# Patient Record
Sex: FEMALE | Race: BLACK OR AFRICAN AMERICAN | NOT HISPANIC OR LATINO | Employment: UNEMPLOYED | URBAN - METROPOLITAN AREA
[De-identification: names, ages, dates, MRNs, and addresses within clinical notes are randomized per-mention and may not be internally consistent; named-entity substitution may affect disease eponyms.]

---

## 2017-03-10 ENCOUNTER — OFFICE VISIT (OUTPATIENT)
Dept: FAMILY MEDICINE | Facility: CLINIC | Age: 67
End: 2017-03-10
Payer: MEDICARE

## 2017-03-10 VITALS
HEART RATE: 59 BPM | TEMPERATURE: 99 F | OXYGEN SATURATION: 97 % | SYSTOLIC BLOOD PRESSURE: 150 MMHG | WEIGHT: 232.81 LBS | BODY MASS INDEX: 39.75 KG/M2 | HEIGHT: 64 IN | DIASTOLIC BLOOD PRESSURE: 80 MMHG

## 2017-03-10 DIAGNOSIS — E11.69 DIABETES MELLITUS TYPE 2 IN OBESE: ICD-10-CM

## 2017-03-10 DIAGNOSIS — E66.9 DIABETES MELLITUS TYPE 2 IN OBESE: ICD-10-CM

## 2017-03-10 DIAGNOSIS — Z12.31 OTHER SCREENING MAMMOGRAM: ICD-10-CM

## 2017-03-10 DIAGNOSIS — J06.9 UPPER RESPIRATORY TRACT INFECTION, UNSPECIFIED TYPE: ICD-10-CM

## 2017-03-10 DIAGNOSIS — I10 ESSENTIAL HYPERTENSION: Primary | ICD-10-CM

## 2017-03-10 DIAGNOSIS — Z12.31 SCREENING MAMMOGRAM, ENCOUNTER FOR: ICD-10-CM

## 2017-03-10 PROCEDURE — 1160F RVW MEDS BY RX/DR IN RCRD: CPT | Mod: S$GLB,,, | Performed by: FAMILY MEDICINE

## 2017-03-10 PROCEDURE — 3046F HEMOGLOBIN A1C LEVEL >9.0%: CPT | Mod: S$GLB,,, | Performed by: FAMILY MEDICINE

## 2017-03-10 PROCEDURE — 3077F SYST BP >= 140 MM HG: CPT | Mod: S$GLB,,, | Performed by: FAMILY MEDICINE

## 2017-03-10 PROCEDURE — 3079F DIAST BP 80-89 MM HG: CPT | Mod: S$GLB,,, | Performed by: FAMILY MEDICINE

## 2017-03-10 PROCEDURE — 99203 OFFICE O/P NEW LOW 30 MIN: CPT | Mod: S$GLB,,, | Performed by: FAMILY MEDICINE

## 2017-03-10 PROCEDURE — 99999 PR PBB SHADOW E&M-NEW PATIENT-LVL III: CPT | Mod: PBBFAC,,, | Performed by: FAMILY MEDICINE

## 2017-03-10 PROCEDURE — 2022F DILAT RTA XM EVC RTNOPTHY: CPT | Mod: S$GLB,,, | Performed by: FAMILY MEDICINE

## 2017-03-10 PROCEDURE — 3060F POS MICROALBUMINURIA REV: CPT | Mod: S$GLB,,, | Performed by: FAMILY MEDICINE

## 2017-03-10 RX ORDER — TRIAMTERENE/HYDROCHLOROTHIAZID 37.5-25 MG
1 TABLET ORAL DAILY
COMMUNITY
End: 2017-03-10 | Stop reason: SDUPTHER

## 2017-03-10 RX ORDER — METFORMIN HYDROCHLORIDE 500 MG/1
500 TABLET ORAL 2 TIMES DAILY WITH MEALS
COMMUNITY
End: 2017-03-10 | Stop reason: SDUPTHER

## 2017-03-10 RX ORDER — METFORMIN HYDROCHLORIDE 500 MG/1
500 TABLET ORAL 2 TIMES DAILY WITH MEALS
Qty: 180 TABLET | Refills: 1 | Status: SHIPPED | OUTPATIENT
Start: 2017-03-10 | End: 2017-06-08 | Stop reason: SDUPTHER

## 2017-03-10 RX ORDER — ASPIRIN 81 MG/1
81 TABLET ORAL DAILY
COMMUNITY

## 2017-03-10 RX ORDER — TRIAMTERENE/HYDROCHLOROTHIAZID 37.5-25 MG
1 TABLET ORAL DAILY
Qty: 90 TABLET | Refills: 1 | Status: SHIPPED | OUTPATIENT
Start: 2017-03-10 | End: 2017-06-08 | Stop reason: SDUPTHER

## 2017-03-10 RX ORDER — PROMETHAZINE HYDROCHLORIDE AND DEXTROMETHORPHAN HYDROBROMIDE 6.25; 15 MG/5ML; MG/5ML
5 SYRUP ORAL
Qty: 150 ML | Refills: 0 | Status: SHIPPED | OUTPATIENT
Start: 2017-03-10 | End: 2017-03-20

## 2017-03-10 RX ORDER — AMLODIPINE BESYLATE 10 MG/1
10 TABLET ORAL DAILY
Qty: 90 TABLET | Refills: 1 | Status: SHIPPED | OUTPATIENT
Start: 2017-03-10 | End: 2017-06-08 | Stop reason: SDUPTHER

## 2017-03-10 NOTE — MR AVS SNAPSHOT
[facility]  [address]  [place] [address]  Phone: [phone]  Fax: [phone]                  Bette WANG Johnson   3/10/2017 7:45 AM   Office Visit    Description:  Female : 1950   Provider:  Neeta Estrada MD   Department:  [facility]           Reason for Visit     Establish Care     Diabetes                To Do List           Goals (5 Years of Data)     None       These Medications        Disp Refills Start End    amlodipine (NORVASC) 10 MG tablet 90 tablet 1 3/10/2017 3/10/2018    Take 1 tablet (10 mg total) by mouth once daily. - Oral    Pharmacy: [facility] - [place], [place] - [address] Ph #: [phone]       triamterene-hydrochlorothiazide 37.5-25 mg (MAXZIDE-25) 37.5-25 mg per tablet 90 tablet 1 3/10/2017     Take 1 tablet by mouth once daily. - Oral    Pharmacy: [facility] - [place], [place] - [address] Ph #: [phone]       metformin (GLUCOPHAGE) 500 MG tablet 180 tablet 1 3/10/2017     Take 1 tablet (500 mg total) by mouth 2 (two) times daily with meals. - Oral    Pharmacy: [facility] - [place], [place] - [address] Ph #: [phone]       promethazine-dextromethorphan (PROMETHAZINE-DM) 6.25-15 mg/5 mL Syrp 150 mL 0 3/10/2017 3/20/2017    Take 5 mLs by mouth every 4 to 6 hours as needed. - Oral    Pharmacy: [facility] - [place], [place] - [address] Ph #: [phone]         [facility] On Call     [facility] On Call Nurse [facility] -  Assistance  Registered nurses in the [facility] provide clinical advisement, health education, appointment booking, and other advisory services.  Call for this free service at [phone].             Medications           Message regarding Medications     Verify the changes and/or additions to your medication regime listed below are the same as discussed with your clinician today.  If any of these changes or additions are incorrect, please  "notify your healthcare provider.        START taking these NEW medications        Refills    amlodipine (NORVASC) 10 MG tablet 1    Sig: Take 1 tablet (10 mg total) by mouth once daily.    Class: Normal    Route: Oral    triamterene-hydrochlorothiazide 37.5-25 mg (MAXZIDE-25) 37.5-25 mg per tablet 1    Sig: Take 1 tablet by mouth once daily.    Class: Normal    Route: Oral    metformin (GLUCOPHAGE) 500 MG tablet 1    Sig: Take 1 tablet (500 mg total) by mouth 2 (two) times daily with meals.    Class: Normal    Route: Oral    promethazine-dextromethorphan (PROMETHAZINE-DM) 6.25-15 mg/5 mL Syrp 0    Sig: Take 5 mLs by mouth every 4 to 6 hours as needed.    Class: Print    Route: Oral           Verify that the below list of medications is an accurate representation of the medications you are currently taking.  If none reported, the list may be blank. If incorrect, please contact your healthcare provider. Carry this list with you in case of emergency.           Current Medications     amlodipine (NORVASC) 10 MG tablet Take 1 tablet (10 mg total) by mouth once daily.    aspirin (ECOTRIN) 81 MG EC tablet Take 81 mg by mouth once daily.    metformin (GLUCOPHAGE) 500 MG tablet Take 1 tablet (500 mg total) by mouth 2 (two) times daily with meals.    triamterene-hydrochlorothiazide 37.5-25 mg (MAXZIDE-25) 37.5-25 mg per tablet Take 1 tablet by mouth once daily.    promethazine-dextromethorphan (PROMETHAZINE-DM) 6.25-15 mg/5 mL Syrp Take 5 mLs by mouth every 4 to 6 hours as needed.           Clinical Reference Information           Your Vitals Were     BP Pulse Temp Height Weight SpO2    150/80 (BP Location: Right arm, Patient Position: Sitting, BP Method: Manual) 59 98.5 °F (36.9 °C) (Oral) 5' 4" (1.626 m) 105.6 kg (232 lb 12.9 oz) 97%    BMI                39.96 kg/m2          Blood Pressure          Most Recent Value    BP  (!)  150/80      Allergies as of 3/10/2017     Iodine And Iodide Containing Products    Caffeine    " Latex, Natural Rubber    Penicillins      Immunizations Administered on Date of Encounter - 3/10/2017     None      [facility] Sign-Up     Activating your [facility] account is as easy as 1-2-3!     1) Visit [email or web address], select Sign Up Now, enter this activation code and your date of birth, then select Next.  [email or web address]  Expires: 4/24/2017  8:26 AM      2) Create a username and password to use when you visit [facility] in the future and select a security question in case you lose your password and select Next.    3) Enter your e-mail address and click Sign Up!    Additional Information  If you have questions, please e-mail [email or web address] or call [phone] to talk to our [facility] staff. Remember, [facility] is NOT to be used for urgent needs. For medical emergencies, dial 911.         Instructions      Adult Self-Care for Colds  Colds are caused by viruses. They cant be cured with antibiotics. However, you can relieve symptoms and support your bodys efforts to heal itself. No matter which symptoms you have, be sure to drink plenty of fluids (water or clear soup); stop smoking and drinking alcohol; and get plenty of rest.   Understand a fever  · Take your temperature several times a day. If your fever is 100.4°F (38.0°C) for more than a day, call your doctor.  · Relax, lie down. Go to bed if you want. Just get off your feet and rest. Also, drink plenty of fluids to avoid dehydration.  · Take acetaminophen or a nonsteroidal anti-inflammatory agent (NSAID), such as ibuprofen.  Treat a troubled nose kindly  · Breathe steam or heated humidified air to open blocked nasal passages.  a hot shower or use a vaporizer. Be careful not to get burned by the steam.  · Saline nasal sprays and decongestant tablets help open a stuffy nose. Antihistamines can also help, but they can cause side effects such as drowsiness and drying of the eyes, nose, and mouth.  Soothe a sore throat and cough  · Gargle  every 2 hours with 1/4 teaspoon of salt dissolved in 1/2 cup of warm water. Suck on throat lozenges and cough drops to moisten your throat.  · Cough medicines are available but it is unclear how effective they actually are.  · Take acetaminophen or an NSAID, such as ibuprofen to ease throat pain  Ease digestive problems  · Put fluid back into your body. Take frequent sips of clear liquids such as water or broth. Do not drink beverages with a lot of sugar in them, such as juices and sodas. These can make diarrhea worse. Older children and adults can drink sports drinks.  · As your appetite returns, you can resume your normal diet. Ask your doctor whether there are any foods you should avoid.     When to seek medical care  When you first notice symptoms, ask your health care provider if antiviral medications are appropriate. Antibiotics should not be taken for colds or flu. Also, call your doctor if you have any of the following symptoms or if you arent feeling better after 7 days:  · Shortness of breath  · Pain or pressure in the chest or abdomen  · Worsening symptoms, especially after a period of improvement  · Fever of 100.4°F  (38.0°C) or higher, or fever that doesnt go down with medication  · Sudden dizziness or confusion  · Severe or continued vomiting  · Signs of dehydration, including extreme thirst, dark urine, infrequent urination, dry mouth  · Spotted, red, or very sore throat   Date Last Reviewed: 6/19/2014  © 8103-5731 [employer]. [address] [place] [address], [place] [address]. All rights reserved. This information is not intended as a substitute for professional medical care. Always follow your healthcare professional's instructions.             Language Assistance Services     ATTENTION: Language assistance services are available, free of charge. Please call [phone].      ATENCIÓN: Si habla español, tiene a lazaro disposición servicios gratuitos de asistencia lingüística. Lledgardo al  [phone].     BARON Ý: N?u b?n nói Ti?ng Vi?t, có các d?ch v? h? tr? ngôn ng? mi?n phí dành cho b?n. G?i s? [phone].         [facility] complies with applicable Federal civil rights laws and does not discriminate on the basis of race, color, national origin, age, disability, or sex.

## 2017-03-10 NOTE — PATIENT INSTRUCTIONS
Adult Self-Care for Colds  Colds are caused by viruses. They cant be cured with antibiotics. However, you can relieve symptoms and support your bodys efforts to heal itself. No matter which symptoms you have, be sure to drink plenty of fluids (water or clear soup); stop smoking and drinking alcohol; and get plenty of rest.   Understand a fever  · Take your temperature several times a day. If your fever is 100.4°F (38.0°C) for more than a day, call your doctor.  · Relax, lie down. Go to bed if you want. Just get off your feet and rest. Also, drink plenty of fluids to avoid dehydration.  · Take acetaminophen or a nonsteroidal anti-inflammatory agent (NSAID), such as ibuprofen.  Treat a troubled nose kindly  · Breathe steam or heated humidified air to open blocked nasal passages.  a hot shower or use a vaporizer. Be careful not to get burned by the steam.  · Saline nasal sprays and decongestant tablets help open a stuffy nose. Antihistamines can also help, but they can cause side effects such as drowsiness and drying of the eyes, nose, and mouth.  Soothe a sore throat and cough  · Gargle every 2 hours with 1/4 teaspoon of salt dissolved in 1/2 cup of warm water. Suck on throat lozenges and cough drops to moisten your throat.  · Cough medicines are available but it is unclear how effective they actually are.  · Take acetaminophen or an NSAID, such as ibuprofen to ease throat pain  Ease digestive problems  · Put fluid back into your body. Take frequent sips of clear liquids such as water or broth. Do not drink beverages with a lot of sugar in them, such as juices and sodas. These can make diarrhea worse. Older children and adults can drink sports drinks.  · As your appetite returns, you can resume your normal diet. Ask your doctor whether there are any foods you should avoid.     When to seek medical care  When you first notice symptoms, ask your health care provider if antiviral medications are  appropriate. Antibiotics should not be taken for colds or flu. Also, call your doctor if you have any of the following symptoms or if you arent feeling better after 7 days:  · Shortness of breath  · Pain or pressure in the chest or abdomen  · Worsening symptoms, especially after a period of improvement  · Fever of 100.4°F  (38.0°C) or higher, or fever that doesnt go down with medication  · Sudden dizziness or confusion  · Severe or continued vomiting  · Signs of dehydration, including extreme thirst, dark urine, infrequent urination, dry mouth  · Spotted, red, or very sore throat   Date Last Reviewed: 6/19/2014  © 6723-3059 [employer]. [address] [place] [address], [place] [address]. All rights reserved. This information is not intended as a substitute for professional medical care. Always follow your healthcare professional's instructions.

## 2017-03-10 NOTE — PROGRESS NOTES
"Routine Office Visit    Patient Name: Bette Johnson    : 1950  MRN: [id number]    Subjective:  Bette WANG is a 67 y.o. female who presents today for     1. Establish care / new to me / was previously establish at [facility]  2. Cough and congestion - started 5 days ago - pt c/o nasal congestion, cough and congestion. She has tried otc medications with minimal to no relief of symptoms. No fever.     Review of Systems   Constitutional: Positive for chills. Negative for fever.   HENT: Positive for congestion.    Eyes: Negative for blurred vision.   Respiratory: Positive for cough. Negative for sputum production, shortness of breath and wheezing.    Cardiovascular: Negative for chest pain.   Gastrointestinal: Negative for abdominal pain, constipation, diarrhea, heartburn, nausea and vomiting.   Genitourinary: Negative for dysuria.   Musculoskeletal: Negative for myalgias.   Skin: Negative for itching and rash.   Neurological: Negative for dizziness and headaches.   Psychiatric/Behavioral: Negative for depression.       Active Problem List  There is no problem list on file for this patient.      Past Surgical History  Past Surgical History:   Procedure Laterality Date    CHOLECYSTECTOMY         Family History  History reviewed. No pertinent family history.    Social History  Social History     Social History    Marital status:      Spouse name: N/A    Number of children: N/A    Years of education: N/A     Occupational History    Not on file.     Social History Main Topics    Smoking status: Never Smoker    Smokeless tobacco: Not on file    Alcohol use No    Drug use: Not on file    Sexual activity: Not on file     Other Topics Concern    Not on file     Social History Narrative       Medications and Allergies  Reviewed and updated.       Physical Exam  BP (!) 150/80 (BP Location: Right arm, Patient Position: Sitting, BP Method: Manual)  Pulse (!) 59  Temp 98.5 °F (36.9 °C) (Oral)   Ht 5' 4" " (1.626 m)  Wt 105.6 kg (232 lb 12.9 oz)  SpO2 97%  BMI 39.96 kg/m2  Physical Exam   Constitutional: She is oriented to person, place, and time. She appears well-developed and well-nourished.   HENT:   Head: Normocephalic and atraumatic.   Right Ear: Tympanic membrane and external ear normal.   Left Ear: Tympanic membrane and external ear normal.   Nose: Mucosal edema and rhinorrhea present.   Mouth/Throat: Oropharynx is clear and moist and mucous membranes are normal.   Eyes: Conjunctivae and EOM are normal. Pupils are equal, round, and reactive to light.   Neck: Normal range of motion. Neck supple.   Cardiovascular: Normal rate, regular rhythm and normal heart sounds.  Exam reveals no gallop and no friction rub.    No murmur heard.  Pulmonary/Chest: Effort normal and breath sounds normal. No respiratory distress. She has no wheezes.   Abdominal: Soft. Bowel sounds are normal. She exhibits no distension. There is no tenderness.   Musculoskeletal: Normal range of motion.   Lymphadenopathy:     She has no cervical adenopathy.   Neurological: She is alert and oriented to person, place, and time.   Skin: Skin is warm and dry.   Psychiatric: She has a normal mood and affect. Her behavior is normal.         Assessment/Plan:  Bette Johnson is a 67 y.o. female who presents today for :    Essential hypertension  -     amlodipine (NORVASC) 10 MG tablet; Take 1 tablet (10 mg total) by mouth once daily.  Dispense: 90 tablet; Refill: 1  -     triamterene-hydrochlorothiazide 37.5-25 mg (MAXZIDE-25) 37.5-25 mg per tablet; Take 1 tablet by mouth once daily.  Dispense: 90 tablet; Refill: 1  The current medical regimen is effective;  continue present plan and medications.    Diabetes mellitus type 2 in obese  -     metformin (GLUCOPHAGE) 500 MG tablet; Take 1 tablet (500 mg total) by mouth 2 (two) times daily with meals.  Dispense: 180 tablet; Refill: 1  The current medical regimen is effective;  continue present plan and  medications.    Upper respiratory tract infection, unspecified type  -     promethazine-dextromethorphan (PROMETHAZINE-DM) 6.25-15 mg/5 mL Syrp; Take 5 mLs by mouth every 4 to 6 hours as needed.  Dispense: 150 mL; Refill: 0  - Antibiotics are not needed at this time  - Usual course of cold symptom is 10-14 days, but longer if patient is a smoker.   - If symptoms have not improved, will consider an antibiotic.   - Symptomatic treatment with over the counter Tylenol / Ibuprofen  - Use salt water gargle for sore throat  - Drink plenty of fluids      Return in about 3 months (around 6/10/2017), or if symptoms worsen or fail to improve.

## 2017-03-30 ENCOUNTER — TELEPHONE (OUTPATIENT)
Dept: FAMILY MEDICINE | Facility: CLINIC | Age: 67
End: 2017-03-30

## 2017-03-30 RX ORDER — METFORMIN HYDROCHLORIDE 500 MG/1
500 TABLET ORAL
COMMUNITY
End: 2017-06-08 | Stop reason: SDUPTHER

## 2017-03-30 NOTE — TELEPHONE ENCOUNTER
Spoke with patient and she states that she has been taking metformin once daily for years and now the prescription states to take BID. She is concerned because there was no blood work done and she wanted to know why this has changed or should she stay on her once a day regimen. Please advise

## 2017-03-30 NOTE — TELEPHONE ENCOUNTER
----- Message from Ladonna Miller sent at 3/30/2017  8:25 AM CDT -----  Contact: self  Pt calling to discuss recent script. Please call [phone].

## 2017-03-30 NOTE — TELEPHONE ENCOUNTER
The refill request was sent to me without directions of how you usually take it, so I put down the usual dose of the medication. I did not have record of your original prescription.

## 2017-06-08 ENCOUNTER — LAB VISIT (OUTPATIENT)
Dept: LAB | Facility: HOSPITAL | Age: 67
End: 2017-06-08
Attending: FAMILY MEDICINE
Payer: MEDICARE

## 2017-06-08 ENCOUNTER — OFFICE VISIT (OUTPATIENT)
Dept: FAMILY MEDICINE | Facility: CLINIC | Age: 67
End: 2017-06-08
Payer: MEDICARE

## 2017-06-08 VITALS
OXYGEN SATURATION: 97 % | TEMPERATURE: 97 F | DIASTOLIC BLOOD PRESSURE: 80 MMHG | BODY MASS INDEX: 39.4 KG/M2 | SYSTOLIC BLOOD PRESSURE: 150 MMHG | WEIGHT: 230.81 LBS | HEIGHT: 64 IN | HEART RATE: 48 BPM

## 2017-06-08 DIAGNOSIS — E66.9 DIABETES MELLITUS TYPE 2 IN OBESE: ICD-10-CM

## 2017-06-08 DIAGNOSIS — I10 ESSENTIAL HYPERTENSION: ICD-10-CM

## 2017-06-08 DIAGNOSIS — E11.69 DIABETES MELLITUS TYPE 2 IN OBESE: ICD-10-CM

## 2017-06-08 DIAGNOSIS — Z12.11 SCREENING FOR MALIGNANT NEOPLASM OF COLON: ICD-10-CM

## 2017-06-08 DIAGNOSIS — E11.9 TYPE 2 DIABETES MELLITUS WITHOUT COMPLICATION: ICD-10-CM

## 2017-06-08 DIAGNOSIS — Z00.00 ANNUAL PHYSICAL EXAM: Primary | ICD-10-CM

## 2017-06-08 DIAGNOSIS — Z00.00 ANNUAL PHYSICAL EXAM: ICD-10-CM

## 2017-06-08 LAB
ALBUMIN SERPL BCP-MCNC: 4 G/DL
ALP SERPL-CCNC: 76 U/L
ALT SERPL W/O P-5'-P-CCNC: 14 U/L
ANION GAP SERPL CALC-SCNC: 7 MMOL/L
AST SERPL-CCNC: 20 U/L
BASOPHILS # BLD AUTO: 0.04 K/UL
BASOPHILS NFR BLD: 0.8 %
BILIRUB SERPL-MCNC: 0.4 MG/DL
BUN SERPL-MCNC: 15 MG/DL
CALCIUM SERPL-MCNC: 9.3 MG/DL
CHLORIDE SERPL-SCNC: 99 MMOL/L
CHOLEST/HDLC SERPL: 3.5 {RATIO}
CO2 SERPL-SCNC: 29 MMOL/L
CREAT SERPL-MCNC: 1 MG/DL
DIFFERENTIAL METHOD: ABNORMAL
EOSINOPHIL # BLD AUTO: 0.2 K/UL
EOSINOPHIL NFR BLD: 3.5 %
ERYTHROCYTE [DISTWIDTH] IN BLOOD BY AUTOMATED COUNT: 14.2 %
EST. GFR  (AFRICAN AMERICAN): >60 ML/MIN/1.73 M^2
EST. GFR  (NON AFRICAN AMERICAN): 59.3 ML/MIN/1.73 M^2
GLUCOSE SERPL-MCNC: 119 MG/DL
HCT VFR BLD AUTO: 42.3 %
HDL/CHOLESTEROL RATIO: 28.5 %
HDLC SERPL-MCNC: 179 MG/DL
HDLC SERPL-MCNC: 51 MG/DL
HGB BLD-MCNC: 13.8 G/DL
LDLC SERPL CALC-MCNC: 105 MG/DL
LYMPHOCYTES # BLD AUTO: 2.4 K/UL
LYMPHOCYTES NFR BLD: 49.7 %
MCH RBC QN AUTO: 27.9 PG
MCHC RBC AUTO-ENTMCNC: 32.6 %
MCV RBC AUTO: 86 FL
MONOCYTES # BLD AUTO: 0.3 K/UL
MONOCYTES NFR BLD: 6.4 %
NEUTROPHILS # BLD AUTO: 1.9 K/UL
NEUTROPHILS NFR BLD: 39.6 %
NONHDLC SERPL-MCNC: 128 MG/DL
PLATELET # BLD AUTO: 305 K/UL
PMV BLD AUTO: 10.6 FL
POTASSIUM SERPL-SCNC: 3.9 MMOL/L
PROT SERPL-MCNC: 8.4 G/DL
RBC # BLD AUTO: 4.95 M/UL
SODIUM SERPL-SCNC: 135 MMOL/L
TRIGL SERPL-MCNC: 115 MG/DL
TSH SERPL DL<=0.005 MIU/L-ACNC: 2.38 UIU/ML
WBC # BLD AUTO: 4.81 K/UL

## 2017-06-08 PROCEDURE — 36415 COLL VENOUS BLD VENIPUNCTURE: CPT | Mod: PO

## 2017-06-08 PROCEDURE — 99999 PR PBB SHADOW E&M-EST. PATIENT-LVL V: CPT | Mod: PBBFAC,,, | Performed by: FAMILY MEDICINE

## 2017-06-08 PROCEDURE — 99397 PER PM REEVAL EST PAT 65+ YR: CPT | Mod: S$GLB,,, | Performed by: FAMILY MEDICINE

## 2017-06-08 PROCEDURE — 84443 ASSAY THYROID STIM HORMONE: CPT

## 2017-06-08 PROCEDURE — 99499 UNLISTED E&M SERVICE: CPT | Mod: S$GLB,,, | Performed by: FAMILY MEDICINE

## 2017-06-08 PROCEDURE — 85025 COMPLETE CBC W/AUTO DIFF WBC: CPT

## 2017-06-08 PROCEDURE — 80053 COMPREHEN METABOLIC PANEL: CPT

## 2017-06-08 PROCEDURE — 80061 LIPID PANEL: CPT

## 2017-06-08 PROCEDURE — 83036 HEMOGLOBIN GLYCOSYLATED A1C: CPT

## 2017-06-08 RX ORDER — METFORMIN HYDROCHLORIDE 500 MG/1
500 TABLET ORAL
Qty: 90 TABLET | Refills: 1 | Status: SHIPPED | OUTPATIENT
Start: 2017-06-08 | End: 2017-12-11 | Stop reason: SDUPTHER

## 2017-06-08 RX ORDER — AMLODIPINE BESYLATE 10 MG/1
10 TABLET ORAL DAILY
Qty: 90 TABLET | Refills: 1 | Status: SHIPPED | OUTPATIENT
Start: 2017-06-08 | End: 2017-12-11 | Stop reason: SDUPTHER

## 2017-06-08 RX ORDER — TRIAMTERENE/HYDROCHLOROTHIAZID 37.5-25 MG
1 TABLET ORAL DAILY
Qty: 90 TABLET | Refills: 1 | Status: SHIPPED | OUTPATIENT
Start: 2017-06-08 | End: 2017-12-11 | Stop reason: SDUPTHER
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
## 2017-06-08 NOTE — PATIENT INSTRUCTIONS
Managing Diabetes: The A1C Test       Healthy red blood cells have some glucose stuck to them. A high A1C means that unhealthy amounts of glucose are stuck to the cells.   What is the A1C test?  Using your meter helps you track your blood sugar every day. But your glucose meter tells you the value at the time of testing only. You also need to know if your treatment plan is keeping you healthy over time. The hemoglobin A1C (or glycated hemoglobin) test can help. This test measures your average blood sugar level over a few months. A higher A1C result means that you have a higher risk of developing complications.  The A1C test  The A1C is a blood test done by your healthcare provider. You will likely have an A1C test every 3 to 6 months.  Your blood glucose goal  A1C has been shown as a percentage. But it can also be shown as a number representing the estimated Average Glucose (eAG). Unlike the A1C percentage, eAG is a number similar to the numbers listed on your daily glucose monitor. Both A1C and eAG measure the amount of glucose stuck to a protein called hemoglobin in red blood cells. Your healthcare provider will help you figure out what your ideal A1C or eAG should be. Your target number will depend on your age, general health, and other factors. If your current number is too high, your treatment plan may need changes, such as different medicines.  Sample results  Most people aim for an A1c lower than 7%. Thats an eAG less than 154 mg/dL. Or, your healthcare provider may want you to aim for an A1C of 6%. Thats an eAG of 126 mg/dL.     Glucose calculator  Visit http://professional.diabetes.org/diapro/glucose_calc for a chart that helps convert your A1C percentages into eAG numbers.   Date Last Reviewed: 6/1/2016  © 3618-3841 [employer]. [address] [place] [address], [place] [address]. All rights reserved. This information is not intended as a substitute for professional medical care. Always follow your  healthcare professional's instructions.

## 2017-06-09 LAB
ESTIMATED AVG GLUCOSE: 151 MG/DL
HBA1C MFR BLD HPLC: 6.9 %

## 2017-06-16 ENCOUNTER — TELEPHONE (OUTPATIENT)
Dept: FAMILY MEDICINE | Facility: CLINIC | Age: 67
End: 2017-06-16

## 2017-06-16 NOTE — TELEPHONE ENCOUNTER
----- Message from Jeanette Barth sent at 6/15/2017  4:27 PM CDT -----  REFERRAL: Pt scheduled to see Dr. Mcginnis on 6/19/17.

## 2017-06-20 ENCOUNTER — TELEPHONE (OUTPATIENT)
Dept: FAMILY MEDICINE | Facility: CLINIC | Age: 67
End: 2017-06-20

## 2017-06-20 NOTE — TELEPHONE ENCOUNTER
----- Message from Anju Adame sent at 6/19/2017  1:39 PM CDT -----  Contact: self  Patient called stating that she seen eye Dr on today and results will be sent. Please contact patient at [phone].    Thanks!

## 2017-07-12 ENCOUNTER — OFFICE VISIT (OUTPATIENT)
Dept: FAMILY MEDICINE | Facility: CLINIC | Age: 67
End: 2017-07-12
Payer: MEDICARE

## 2017-07-12 VITALS
WEIGHT: 234 LBS | DIASTOLIC BLOOD PRESSURE: 70 MMHG | SYSTOLIC BLOOD PRESSURE: 140 MMHG | TEMPERATURE: 97 F | BODY MASS INDEX: 39.95 KG/M2 | HEIGHT: 64 IN | HEART RATE: 52 BPM

## 2017-07-12 DIAGNOSIS — I10 BENIGN ESSENTIAL HTN: ICD-10-CM

## 2017-07-12 DIAGNOSIS — E11.9 TYPE 2 DIABETES MELLITUS WITHOUT COMPLICATION, WITHOUT LONG-TERM CURRENT USE OF INSULIN: ICD-10-CM

## 2017-07-12 DIAGNOSIS — E66.01 MORBID OBESITY WITH BMI OF 40.0-44.9, ADULT: ICD-10-CM

## 2017-07-12 DIAGNOSIS — I70.0 THORACIC AORTA ATHEROSCLEROSIS: ICD-10-CM

## 2017-07-12 DIAGNOSIS — Z00.00 ENCOUNTER FOR PREVENTIVE HEALTH EXAMINATION: Primary | ICD-10-CM

## 2017-07-12 PROCEDURE — G0439 PPPS, SUBSEQ VISIT: HCPCS | Mod: S$GLB,,, | Performed by: NURSE PRACTITIONER

## 2017-07-12 PROCEDURE — 99499 UNLISTED E&M SERVICE: CPT | Mod: S$GLB,,, | Performed by: NURSE PRACTITIONER

## 2017-07-12 PROCEDURE — 99999 PR PBB SHADOW E&M-EST. PATIENT-LVL IV: CPT | Mod: PBBFAC,,, | Performed by: NURSE PRACTITIONER

## 2017-07-12 RX ORDER — LORATADINE 10 MG/1
10 TABLET ORAL DAILY
COMMUNITY

## 2017-07-12 NOTE — PROGRESS NOTES
"Bette Johnson presented for a  Medicare AWV and comprehensive Health Risk Assessment today. The following components were reviewed and updated:    · Medical history  · Family History  · Social history  · Allergies and Current Medications  · Health Risk Assessment  · Health Maintenance  · Care Team     ** See Completed Assessments for Annual Wellness Visit within the encounter summary.**       The following assessments were completed:  · Living Situation  · CAGE  · Depression Screening  · Timed Get Up and Go  · Whisper Test  · Cognitive Function Screening  · Nutrition Screening  · ADL Screening  · PAQ Screening    Vitals:    07/12/17 1003   BP: (!) 140/70   Pulse: (!) 52   Temp: 97.4 °F (36.3 °C)   TempSrc: Oral   Weight: 106.1 kg (234 lb 0.3 oz)   Height: 5' 4" (1.626 m)     Body mass index is 40.17 kg/m².  Physical Exam   Constitutional: She is oriented to person, place, and time.   Cardiovascular: Normal rate, regular rhythm and normal heart sounds.    Pulses:       Dorsalis pedis pulses are 2+ on the right side, and 2+ on the left side.   Pulmonary/Chest: Effort normal and breath sounds normal.   Musculoskeletal: Normal range of motion. Edema: bilateral trace pretibial edema         Right foot: There is normal range of motion and no deformity.        Left foot: There is normal range of motion and no deformity.   Feet:   Right Foot:   Skin Integrity: Negative for ulcer, blister, skin breakdown, erythema, warmth, callus or dry skin.   Left Foot:   Skin Integrity: Negative for ulcer, blister, skin breakdown, erythema, warmth, callus or dry skin.   Neurological: She is alert and oriented to person, place, and time.   Skin: Skin is warm and dry.   Psychiatric: She has a normal mood and affect. Her behavior is normal. Thought content normal.   Vitals reviewed.        Diagnoses and health risks identified today and associated recommendations/orders:    1. Encounter for preventive health examination  Education " provided about preventive health examinations and procedures; addressed and discussed patient's health concerns. Additionally, reviewed medical record for risk factors and documented the results during this encounter.    2. Type 2 diabetes mellitus without complication, without long-term current use of insulin  Education provided about diabetes, management of blood glucose with diet and activities, monitoring for worsening effects of diabetes.  Reviewed most recent Ha1c (6.9- June 2017), complications associated with uncontrolled diabetes.   Patient requesting referral to podiatry for thorough evaluation of feet.  We discussed diabetic shoes, inserts, diabetic complications.- Ambulatory referral to Podiatry    3. Morbid obesity with BMI of 40.0-44.9, adult  Reminded patient of [employer]'s Silver Sneakers benefits with access to fitness centers and classes.   We discussed diet and exercise for weight loss.  Educated about diet, activities, and ways to avoid sedentary lifestyle.     4. Thoracic aorta atherosclerosis  Stable, asymptomatic on ASA and antihypertensive medication; monitor    5. Benign essential HTN  Discussed blood pressure, dietary options, activities; encouraged participation in aquatics for weight management.     Reviewed health maintenance with patient, educated about recommended examinations, procedures (labs & images), and immunizations.     Provided Bette with a 5-10 year written screening schedule and personal prevention plan. Recommendations were developed using the USPSTF age appropriate recommendations. Education, counseling, and referrals were provided as needed. After Visit Summary printed and given to patient which includes a list of additional screenings\tests needed.    No Follow-up on file.    Manas Vann Jr, NP

## 2017-07-12 NOTE — PATIENT INSTRUCTIONS
Counseling and Referral of Other Preventative  (Italic type indicates deductible and co-insurance are waived)    Patient Name: Bette Johnson  Today's Date: 7/12/2017      SERVICE LIMITATIONS RECOMMENDATION    Vaccines    · Pneumococcal (once after 65)    · Influenza (annually)    · Hepatitis B (if medium/high risk)    · Prevnar 13      Hepatitis B medium/high risk factors:       - End-stage renal disease       - Hemophiliacs who received Factor VII or         IX concentrates       - Clients of institutions for the mentally             retarded       - Persons who live in the same house as          a HepB carrier       - Homosexual men       - Illicit injectable drug abusers     Pneumococcal: discussed with patient     Influenza: discussed with patient     Hepatitis B: N/A     Prevnar 13:  discussed with patient      Mammogram (biennial age 50-74)  Annually (age 40 or over)  Done this year, repeat every year    Pap (up to age 70 and after 70 if unknown history or abnormal study last 10 years)          discussed with patient      Colorectal cancer screening (to age 75)    · Fecal occult blood test (annual)  · Flexible sigmoidoscopy (5y)  · Screening colonoscopy (10y)  · Barium enema   scheduled by provider June 2017    Diabetes self-management training (no USPSTF recommendations)  Requires referral by treating physician for patient with diabetes or renal disease. 10 hours of initial DSMT sessions of no less than 30 minutes each in a continuous 12-month period. 2 hours of follow-up DSMT in subsequent years.  Requires referral by treating physician for patient with diabetes    Bone mass measurements (age 65 & older, biennial)  Requires diagnosis related to osteoporosis or estrogen deficiency. Biennial benefit unless patient has history of long-term glucocorticoid  Recommended to patient    Glaucoma screening (no USPSTF recommendation)  Diabetes mellitus, family history   , age 50 or over     American, age 65 or over  Recommended to patient    Medical nutrition therapy for diabetes or renal disease (no recommended schedule)  Requires referral by treating physician for patient with diabetes or renal disease or kidney transplant within the past 3 years.  Can be provided in same year as diabetes self-management training (DSMT), and CMS recommends medical nutrition therapy take place after DSMT. Up to 3 hours for initial year and 2 hours in subsequent years.  Requires referral by treating physician for patient with diabetes    Cardiovascular screening blood tests (every 5 years)  · Fasting lipid panel  Order as a panel if possible  Done this year, repeat every year    Diabetes screening tests (at least every 3 years, Medicare covers annually or at 6-month intervals for prediabetic patients)  · Fasting blood sugar (FBS) or glucose tolerance test (GTT)  Patient must be diagnosed with one of the following:       - Hypertension       - Dyslipidemia       - Obesity (BMI 30kg/m2)       - Previous elevated impaired FBS or GTT       ... or any two of the following:       - Overweight (BMI 25 but <30)       - Family history of diabetes       - Age 65 or older       - History of gestational diabetes or birth of baby weighing more than 9 pounds  Done this year, repeat every 6 months     Abdominal aortic aneurysm screening (once)  · Sonogram   Limited to patients who meet one of the following criteria:       - Men who are 65-75 years old and have smoked more than 100 cigarette in their lifetime       - Anyone with a family history of abdominal aortic aneurysm       - Anyone recommended for screening by the USPSTF  no clinical risk factors      HIV screening (annually for increased risk patients)  · HIV-1 and HIV-2 by EIA, or KARL, rapid antibody test or oral mucosa transudate  Patients must be at increased risk for HIV infection per USPSTF guidelines or pregnant. Tests covered annually for patient at increased risk or  as requested by the patient. Pregnant patients may receive up to 3 tests during pregnancy.  no clinical risk factors      Smoking cessation counseling (up to 8 sessions per year)  Patients must be asymptomatic of tobacco-related conditions to receive as a preventative service.  Non-smoker    Subsequent annual wellness visit  At least 12 months since last AWV  Return in one year     The following information is provided to all patients.  This information is to help you find resources for any of the problems found today that may be affecting your health:                Living healthy guide: www.[facility].[place].[facility]      Understanding Diabetes: www.diabetes.org      Eating healthy: www.cdc.gov/healthyweight      [facility] home safety checklist: www.cdc.gov/steadi/patient.html      Agency on Aging: www.goea.[place].[facility]      Alcoholics anonymous (AA): www.aa.org      Physical Activity: www.allie.nih.gov/ai4mgiz      Tobacco use: www.quitwithusla.org

## 2017-07-21 ENCOUNTER — OFFICE VISIT (OUTPATIENT)
Dept: PODIATRY | Facility: CLINIC | Age: 67
End: 2017-07-21
Payer: MEDICARE

## 2017-07-21 VITALS
SYSTOLIC BLOOD PRESSURE: 118 MMHG | DIASTOLIC BLOOD PRESSURE: 60 MMHG | BODY MASS INDEX: 39.95 KG/M2 | HEIGHT: 64 IN | WEIGHT: 234 LBS

## 2017-07-21 DIAGNOSIS — M79.671 FOOT PAIN, RIGHT: ICD-10-CM

## 2017-07-21 DIAGNOSIS — B35.1 ONYCHOMYCOSIS DUE TO DERMATOPHYTE: ICD-10-CM

## 2017-07-21 DIAGNOSIS — L60.0 INGROWN TOENAIL WITHOUT INFECTION: ICD-10-CM

## 2017-07-21 DIAGNOSIS — M19.071 ARTHRITIS OF RIGHT FOOT: ICD-10-CM

## 2017-07-21 DIAGNOSIS — M19.072 ARTHRITIS OF LEFT FOOT: ICD-10-CM

## 2017-07-21 DIAGNOSIS — E11.49 TYPE II DIABETES MELLITUS WITH NEUROLOGICAL MANIFESTATIONS: ICD-10-CM

## 2017-07-21 DIAGNOSIS — M79.674 PAIN AROUND TOENAIL, RIGHT FOOT: ICD-10-CM

## 2017-07-21 DIAGNOSIS — R60.0 BILATERAL LOWER EXTREMITY EDEMA: Primary | ICD-10-CM

## 2017-07-21 DIAGNOSIS — E11.9 COMPREHENSIVE DIABETIC FOOT EXAMINATION, TYPE 2 DM, ENCOUNTER FOR: ICD-10-CM

## 2017-07-21 PROCEDURE — 3044F HG A1C LEVEL LT 7.0%: CPT | Mod: S$GLB,,, | Performed by: PODIATRIST

## 2017-07-21 PROCEDURE — 99999 PR PBB SHADOW E&M-EST. PATIENT-LVL III: CPT | Mod: PBBFAC,,, | Performed by: PODIATRIST

## 2017-07-21 PROCEDURE — 11720 DEBRIDE NAIL 1-5: CPT | Mod: Q9,S$GLB,, | Performed by: PODIATRIST

## 2017-07-21 PROCEDURE — 99499 UNLISTED E&M SERVICE: CPT | Mod: S$GLB,,, | Performed by: PODIATRIST

## 2017-07-21 PROCEDURE — 99203 OFFICE O/P NEW LOW 30 MIN: CPT | Mod: 25,S$GLB,, | Performed by: PODIATRIST

## 2017-07-21 NOTE — PATIENT INSTRUCTIONS
Recommend lotions: eucerin, aquaphor, A&D ointment, gold bond for diabetics        Shoe recommendations: (try 6pm.com, zappos.com , nordstromrack.com, or shoes.com for discounted prices) you can visit DSW shoes in [place] as well    Asics (GT 1000 or gel foundations), new balance, saucony (stabil c3),  Dumont (transcend), vionic, propet (tennis shoe)    soft brand, clarks, crocs, aerosoles, naturalizers, SAS, ecco, altaf, yogesh banegas, rockports (dress shoes)    Vionic, volitiles, burkenstocks, fitflops, propet (sandals)    Nike comfort thong sandals, crocs (house shoes)      Nail Home remedy:  Vicks Vapor rub to cuticles  Listerine and apple cider vinegar in a spray bottle to nails          Diabetes: Inspecting Your Feet    Diabetes increases your chances of developing foot problems. So inspect your feet every day. This helps you find small skin irritations before they become serious ulcers or infections. If you have trouble seeing the bottoms of your feet, use a mirror or ask a family member or friend to help.  How to check your feet  Below are tips to help you look for foot problems. Try to check your feet at the same time each day, such as when you get out of bed in the morning:  · Check the top of each foot. The tops of toes, back of the heel, and outer edge of the foot can get a lot of rubbing from poor-fitting shoes.  · Check the bottom of each foot. Daily wear and tear often leads to problems at pressure spots.  · Check the toes and nails. Fungal infections often occur between toes. Toenail problems can also be a sign of fungal infections or lead to breaks in the skin.  · Check your shoes, too. Loose objects inside a shoe can injure the foot. Use your hand to feel inside your shoes for things like joni, loose stitching, or rough areas that could irritate your skin.  Warning signs  Look for any color changes in the foot. Redness with streaks can signal a severe infection, which needs immediate medical  attention. Tell your healthcare provider right away if you have any of these problems:  · Swelling, sometimes with color changes, may be a sign of poor blood flow or infection. Symptoms include tenderness and an increase in the size of your foot.  · Warm or hot areas on your feet may be signs of infection. A foot that is cold may not be getting enough blood.  · Sensations such as burning, tingling, or pins and needles can be signs of a problem. Also check for areas that may be numb.  · Hot spots are caused by friction or pressure. Look for hot spots in areas that get a lot of rubbing. Hot spots can turn into blisters, calluses, or sores.  · Cracks and sores are caused by dry or irritated skin. They are a sign that the skin is breaking down, which can lead to infection.  · Toenail problems to watch for include nails growing into the skin (ingrown toenail) and causing redness or pain. Thick, yellow, or discolored nails can signal a fungal infection.  · Drainage and odor can develop from untreated sores and ulcers. Call your healthcare provider right away if you notice white or yellow drainage, bleeding, or unpleasant odor.   Date Last Reviewed: 6/1/2016  © 5277-8020 [employer]. [address] [place] [address], [place] [address]. All rights reserved. This information is not intended as a substitute for professional medical care. Always follow your healthcare professional's instructions.      Recommend lotions: eucerin, aquaphor, A&D ointment, gold bond for diabetics    Shoe recommendations: (try 6pm.com, zappos.com , nordstromrack.com, or shoes.com for discounted prices) you can visit DSW shoes in [place] as well    Asics (GT 1000 or gel foundations), new balance, saucony (stabil c3),  Dumont (transcend), vionic, propet (tennis shoe)    soft brand, clarks, crocs, aerosoles, naturalizers, SAS, ecco, altaf, yogesh banegas, rockports (dress shoes)    Vionic, volitiles, burkenstocks, fitflops, propet (sandals)    Seven  comfort maximiliano paige, crocs (house shoes)

## 2017-07-21 NOTE — LETTER
July 21, 2017      Manas Vann Jr., NP  [address]  [place] [address]           [facility]  [address] [facility]  [place] [address]  Phone: [phone]          Patient: Bette Johnson   MR Number: [id number]   YOB: 1950   Date of Visit: 7/21/2017       Dear Manas Vann Jr.:    Thank you for referring Bette Johnson to me for evaluation. Attached you will find relevant portions of my assessment and plan of care.    If you have questions, please do not hesitate to call me. I look forward to following Bette Johnson along with you.    Sincerely,    Ekaterina Young DPM    Enclosure  CC:  No Recipients    If you would like to receive this communication electronically, please contact [email or web address] or [phone] to request more information on [employer] access.    For providers and/or their staff who would like to refer a patient to [facility], please contact us through our one-stop-shop provider referral line, [facility] , at [phone].    If you feel you have received this communication in error or would no longer like to receive these types of communications, please e-mail [email or web address]

## 2017-07-21 NOTE — PROGRESS NOTES
Subjective:      Patient ID: Bette Johnson is a 67 y.o. female.    Chief Complaint: Diabetes Mellitus (pcp Dr. Estrada 7/12/17); Diabetic Foot Exam; Nail Care; and Foot Pain (top right)    Bette is a 67 y.o. female who presents to the clinic upon referral from Dr. Vann  for evaluation and treatment of diabetic feet. Bette has a past medical history of Diabetes mellitus, type 2; Hypertension; and Severe obesity (BMI >= 40) (7/12/2017). Patient relates no major problem with feet. Only complaints today consist of having comprehensive DM foot exam and to establish care per recommendations of PCP. She is having on and off pain in the top and outside of the right foot middle part of the foot. This has been ongoing for several months. She uses [place] arthritis cream and massages this in the area which helps control the pain. Some days are worse than others. Here for assessment. She also relates a painful, thick and sharp nail on the R great toe which bothers her from time to time. She trims it periodically but the pain always returns.       PCP: Neeta Estrada MD    Date Last Seen by PCP:   Chief Complaint   Patient presents with    Diabetes Mellitus     pcp Dr. Estrada 7/12/17    Diabetic Foot Exam    Nail Care    Foot Pain     top right         Current shoe gear: Casual shoes    Hemoglobin A1C   Date Value Ref Range Status   06/08/2017 6.9 (H) 4.5 - 6.2 % Final     Comment:     According to ADA guidelines, hemoglobin A1C <7.0% represents  optimal control in non-pregnant diabetic patients.  Different  metrics may apply to specific populations.   Standards of Medical Care in Diabetes - 2016.  For the purpose of screening for the presence of diabetes:  <5.7%     Consistent with the absence of diabetes  5.7-6.4%  Consistent with increasing risk for diabetes   (prediabetes)  >or=6.5%  Consistent with diabetes  Currently no consensus exists for use of hemoglobin A1C  for diagnosis of diabetes for children.         Past  Medical History:   Diagnosis Date    Diabetes mellitus, type 2     Hypertension     Severe obesity (BMI >= 40) 7/12/2017       Past Surgical History:   Procedure Laterality Date    CHOLECYSTECTOMY         Family History   Problem Relation Age of Onset    Diabetes Mother     Diabetes Sister     Diabetes Brother     Kidney disease Brother     Diabetes Sister     Hypertension Sister     Diabetes Sister     Hypertension Sister     Hyperlipidemia Sister        Social History     Social History    Marital status:      Spouse name: N/A    Number of children: N/A    Years of education: N/A     Social History Main Topics    Smoking status: Never Smoker    Smokeless tobacco: Never Used    Alcohol use Yes    Drug use: No    Sexual activity: No     Other Topics Concern    None     Social History Narrative    None       Current Outpatient Prescriptions   Medication Sig Dispense Refill    amlodipine (NORVASC) 10 MG tablet Take 1 tablet (10 mg total) by mouth once daily. 90 tablet 1    aspirin (ECOTRIN) 81 MG EC tablet Take 81 mg by mouth once daily.      loratadine (CLARITIN) 10 mg tablet Take 10 mg by mouth once daily.      metformin (GLUCOPHAGE) 500 MG tablet Take 1 tablet (500 mg total) by mouth daily with breakfast. 90 tablet 1    triamterene-hydrochlorothiazide 37.5-25 mg (MAXZIDE-25) 37.5-25 mg per tablet Take 1 tablet by mouth once daily. 90 tablet 1     No current facility-administered medications for this visit.        Review of patient's allergies indicates:   Allergen Reactions    Iodine and iodide containing products Shortness Of Breath    Caffeine Other (See Comments)     Hyper then  Causes low    Latex, natural rubber Other (See Comments)     Dental work cause mouth ulcers    Penicillins Other (See Comments)     Loss of consciousness         Review of Systems   Constitution: Negative for chills and fever.   Cardiovascular: Positive for leg swelling. Negative for chest pain and  claudication.   Respiratory: Negative for cough and shortness of breath.    Skin: Positive for dry skin and nail changes.   Musculoskeletal: Positive for arthritis, joint pain and stiffness.   Gastrointestinal: Negative for nausea and vomiting.   Neurological: Negative for numbness and paresthesias.   Psychiatric/Behavioral: Negative for altered mental status.           Objective:      Physical Exam   Constitutional: She is oriented to person, place, and time. She appears well-developed and well-nourished.   HENT:   Head: Normocephalic.   Cardiovascular: Intact distal pulses.    Pulses:       Dorsalis pedis pulses are 2+ on the right side, and 2+ on the left side.        Posterior tibial pulses are 2+ on the right side, and 2+ on the left side.   CRT < 3 sec to tips of toes. 1+ pitting edema noted to b/l LE. No vericosities noted to b/l LEs.      Pulmonary/Chest: No respiratory distress.   Musculoskeletal:   Gastrocnemius equinus noted to b/l ankles with decreased DF noted on exam. MMT 5/5 in DF/PF/Inv/Ev resistance with no reproduction of pain in any direction. Passive range of motion of ankle and pedal joints is painless. Adequate pedal joint ROM.     + palpable bony prominences noted to R dorsal tarsometatarsal joints of rays 1-3 as well as 4th/5th met-cuboid joints. + minimal to no pain along this area with palpation and ROM.     + pain with palpation of R hallux nail medial border.     Hypermobility noted to 1st ray b/l with near complete collapse of medial longitudinal arch b/l with loading.      Neurological: She is alert and oriented to person, place, and time. She has normal strength. A sensory deficit is present.   Light touch, proprioception, and sharp/dull sensation are all intact bilaterally. Protective threshold with the Montague-Wienstein monofilament is intact bilaterally. Vibratory sensation decreased b/l distal foot.      Skin: Skin is warm, dry and intact. No erythema.   No open lesions, lacerations  or wounds noted. R hallux toenail slightly elongated and thickened by 2mm with yellow brown discoloration, brittleness and sharp incurvated medial nail border with associated pressure induced redness. No open wound associated. Remaining nails are normotrophic to R 2-5 and L 1-5. Interdigital spaces clean, dry and intact b/l. No erythema noted to b/l foot. Skin texture thin, shiny (legs), atrophic, dry. Pedal hair decreased. Skin temperature normal b/l foot.      Psychiatric: She has a normal mood and affect. Her behavior is normal. Judgment and thought content normal.   Vitals reviewed.            Assessment:       Encounter Diagnoses   Name Primary?    Type II diabetes mellitus with neurological manifestations     Bilateral lower extremity edema Yes    Comprehensive diabetic foot examination, type 2 DM, encounter for     Arthritis of right foot     Arthritis of left foot     Foot pain, right     Pain around toenail, right foot     Onychomycosis due to dermatophyte     Ingrown toenail without infection - Right Foot          Plan:       Bette was seen today for diabetes mellitus, diabetic foot exam, nail care and foot pain.    Diagnoses and all orders for this visit:    Bilateral lower extremity edema    Type II diabetes mellitus with neurological manifestations    Comprehensive diabetic foot examination, type 2 DM, encounter for    Arthritis of right foot    Arthritis of left foot    Foot pain, right    Pain around toenail, right foot    Onychomycosis due to dermatophyte    Ingrown toenail without infection - Right Foot      I counseled the patient on her conditions, their implications and medical management.        - Shoe inspection. Diabetic Foot Education. Patient reminded of the importance of good nutrition and blood sugar control to help prevent podiatric complications of diabetes. Patient instructed on proper foot hygeine. We discussed wearing proper shoe gear, daily foot inspections, never walking  without protective shoe gear, never putting sharp instruments to feet, routine podiatric nail visits every 2-3 months.      - With patient's permission, nails were aggressively reduced and debrided x 1 to their soft tissue attachment mechanically and with electric , removing all offending nail and debris. Patient relates relief following the procedure. She will continue to monitor the areas daily, inspect her feet, wear protective shoe gear when ambulatory, moisturizer to maintain skin integrity and follow in this office in approximately 2-3 months, sooner p.r.n.    - Discussed regular and routine moisturizer to skin of both feet to help improve dry skin. Advised to apply twice daily until resolution of symptoms. Avoid between toes.     Discussed the use of compression stockings b/l to help control edema. Tubigrip applied today. Discussed proper and consistent elevation of lower extremities, above the level of the heart, while at rest, to help control/improve edema.     Long discussion with patient regarding appropriate, supportive and comfortable shoes. Recommended SAS/Easy Spirit style shoe brands with adequate arch supports to alleviate abnormal pressure and improve stability of foot while walking. Avoid flat shoes and barefoot walking as these will exacerbate or worsen symptoms.     Continue using Lelo Arthritis cream as needed.     The patient is advised to apply ice or cold packs intermittently as needed to relieve pain. 20 minute increments, protect skin with towel.     RTC 1 year for DM foot exam, sooner PRN

## 2017-10-25 ENCOUNTER — HOSPITAL ENCOUNTER (OUTPATIENT)
Dept: RADIOLOGY | Facility: HOSPITAL | Age: 67
Discharge: HOME | End: 2017-10-25
Attending: FAMILY MEDICINE
Payer: MEDICARE

## 2017-10-25 DIAGNOSIS — Z12.31 SCREENING MAMMOGRAM, ENCOUNTER FOR: ICD-10-CM

## 2017-10-25 PROCEDURE — 77067 SCR MAMMO BI INCL CAD: CPT | Mod: 26,,, | Performed by: RADIOLOGY

## 2017-10-25 PROCEDURE — 77063 BREAST TOMOSYNTHESIS BI: CPT | Mod: 26,,, | Performed by: RADIOLOGY

## 2017-10-25 PROCEDURE — 77067 SCR MAMMO BI INCL CAD: CPT | Mod: TC

## 2017-12-11 DIAGNOSIS — E11.69 DIABETES MELLITUS TYPE 2 IN OBESE: ICD-10-CM

## 2017-12-11 DIAGNOSIS — I10 ESSENTIAL HYPERTENSION: ICD-10-CM

## 2017-12-11 DIAGNOSIS — E66.9 DIABETES MELLITUS TYPE 2 IN OBESE: ICD-10-CM

## 2017-12-11 RX ORDER — METFORMIN HYDROCHLORIDE 500 MG/1
500 TABLET ORAL
Qty: 90 TABLET | Refills: 1 | Status: SHIPPED | OUTPATIENT
Start: 2017-12-11 | End: 2017-12-27 | Stop reason: SDUPTHER

## 2017-12-11 RX ORDER — AMLODIPINE BESYLATE 10 MG/1
10 TABLET ORAL DAILY
Qty: 90 TABLET | Refills: 1 | Status: SHIPPED | OUTPATIENT
Start: 2017-12-11 | End: 2018-06-11 | Stop reason: SDUPTHER

## 2017-12-11 RX ORDER — TRIAMTERENE/HYDROCHLOROTHIAZID 37.5-25 MG
1 TABLET ORAL DAILY
Qty: 90 TABLET | Refills: 1 | Status: SHIPPED | OUTPATIENT
Start: 2017-12-11 | End: 2018-06-04 | Stop reason: SDUPTHER

## 2017-12-11 NOTE — TELEPHONE ENCOUNTER
Patient scheduled for 12/27/17 @ 3:00 pm, this was Dr. Estrada's earliest available and patient would only like to see Dr. Estrada.   Patient requesting Triamterene-Hydrochlorothiazide 37.5-25 mg,Amlodipine 10 mg and Metformin 500 mg.       no

## 2017-12-11 NOTE — TELEPHONE ENCOUNTER
----- Message from Birgit Ceron sent at 12/11/2017  9:02 AM CST -----  Contact: self  Pt states she will be out of medications in about 7 days. She is asking for an appt to see. Dr. Estrada and only Dr. Estrada. Next available appt is 12/27. Please contact pt back in regards to an earlier appt.   [phone].  Thank you.

## 2017-12-27 ENCOUNTER — OFFICE VISIT (OUTPATIENT)
Dept: FAMILY MEDICINE | Facility: CLINIC | Age: 67
End: 2017-12-27
Payer: MEDICARE

## 2017-12-27 ENCOUNTER — LAB VISIT (OUTPATIENT)
Dept: LAB | Facility: HOSPITAL | Age: 67
End: 2017-12-27
Attending: FAMILY MEDICINE
Payer: MEDICARE

## 2017-12-27 VITALS
DIASTOLIC BLOOD PRESSURE: 80 MMHG | BODY MASS INDEX: 42.41 KG/M2 | OXYGEN SATURATION: 98 % | HEART RATE: 64 BPM | WEIGHT: 248.44 LBS | HEIGHT: 64 IN | SYSTOLIC BLOOD PRESSURE: 120 MMHG | TEMPERATURE: 98 F

## 2017-12-27 DIAGNOSIS — E11.9 TYPE 2 DIABETES MELLITUS WITHOUT COMPLICATION, WITHOUT LONG-TERM CURRENT USE OF INSULIN: ICD-10-CM

## 2017-12-27 DIAGNOSIS — I70.0 THORACIC AORTA ATHEROSCLEROSIS: ICD-10-CM

## 2017-12-27 DIAGNOSIS — Z23 NEED FOR PROPHYLACTIC VACCINATION AND INOCULATION AGAINST INFLUENZA: Primary | ICD-10-CM

## 2017-12-27 DIAGNOSIS — Z12.11 SCREENING FOR MALIGNANT NEOPLASM OF COLON: ICD-10-CM

## 2017-12-27 DIAGNOSIS — E66.01 MORBID OBESITY WITH BMI OF 40.0-44.9, ADULT: ICD-10-CM

## 2017-12-27 DIAGNOSIS — I10 BENIGN ESSENTIAL HTN: ICD-10-CM

## 2017-12-27 LAB
ESTIMATED AVG GLUCOSE: 143 MG/DL
HBA1C MFR BLD HPLC: 6.6 %

## 2017-12-27 PROCEDURE — 99499 UNLISTED E&M SERVICE: CPT | Mod: S$GLB,,, | Performed by: FAMILY MEDICINE

## 2017-12-27 PROCEDURE — 90662 IIV NO PRSV INCREASED AG IM: CPT | Mod: S$GLB,,, | Performed by: FAMILY MEDICINE

## 2017-12-27 PROCEDURE — 36415 COLL VENOUS BLD VENIPUNCTURE: CPT | Mod: PO

## 2017-12-27 PROCEDURE — 99214 OFFICE O/P EST MOD 30 MIN: CPT | Mod: S$GLB,,, | Performed by: FAMILY MEDICINE

## 2017-12-27 PROCEDURE — 99999 PR PBB SHADOW E&M-EST. PATIENT-LVL III: CPT | Mod: PBBFAC,,, | Performed by: FAMILY MEDICINE

## 2017-12-27 PROCEDURE — G0008 ADMIN INFLUENZA VIRUS VAC: HCPCS | Mod: S$GLB,,, | Performed by: FAMILY MEDICINE

## 2017-12-27 PROCEDURE — 83036 HEMOGLOBIN GLYCOSYLATED A1C: CPT

## 2017-12-27 RX ORDER — METFORMIN HYDROCHLORIDE 500 MG/1
500 TABLET ORAL 2 TIMES DAILY WITH MEALS
Qty: 180 TABLET | Refills: 1 | Status: SHIPPED | OUTPATIENT
Start: 2017-12-27 | End: 2018-06-11 | Stop reason: SDUPTHER

## 2017-12-27 NOTE — PROGRESS NOTES
Influenza vaccine administered, harpal well. Instructed to wait 15mins for observation, no reaction noted @ time of discharge.

## 2017-12-27 NOTE — PROGRESS NOTES
Routine Office Visit    Patient Name: Bette Johnson    : 1950  MRN: [id number]    Subjective:  Bette is a 67 y.o. female who presents today for     1. Hypertension - follow-up - pt has been taking her blood pressure medications. She reports no side effects from current medication regimen. bp is still high on occasion, but is within normal limits today after repeating. She c/o increasing weight gain. Weight gain is causing her to be short of breath especially with exertion (she has to walk up stairs to her home). She does try to exercise regularly. She does exercise class 3 times per week. She states that she has increased craving for food at night and indulges in the cravings. She believes this is cause of weight gain. She wants to make changes to her diet / exercise to lose weight.     Review of Systems   Constitutional: Negative for chills and fever.   HENT: Negative for congestion.    Eyes: Negative for blurred vision.   Respiratory: Positive for shortness of breath. Negative for cough.    Cardiovascular: Negative for chest pain.   Gastrointestinal: Negative for abdominal pain, constipation, diarrhea, heartburn, nausea and vomiting.   Genitourinary: Negative for dysuria.   Musculoskeletal: Negative for myalgias.   Skin: Negative for itching and rash.   Neurological: Negative for dizziness and headaches.   Psychiatric/Behavioral: Negative for depression.       Active Problem List  Patient Active Problem List   Diagnosis    Type 2 diabetes mellitus without complication, without long-term current use of insulin    Morbid obesity with BMI of 40.0-44.9, adult    Benign essential HTN    Thoracic aorta atherosclerosis       Past Surgical History  Past Surgical History:   Procedure Laterality Date    CHOLECYSTECTOMY         Family History  Family History   Problem Relation Age of Onset    Diabetes Mother     Diabetes Sister     Diabetes Brother     Kidney disease Brother     Diabetes Sister      "Hypertension Sister     Diabetes Sister     Hypertension Sister     Hyperlipidemia Sister        Social History  Social History     Social History    Marital status:      Spouse name: N/A    Number of children: N/A    Years of education: N/A     Occupational History    Not on file.     Social History Main Topics    Smoking status: Never Smoker    Smokeless tobacco: Never Used    Alcohol use Yes    Drug use: No    Sexual activity: No     Other Topics Concern    Not on file     Social History Narrative    No narrative on file       Medications and Allergies  Reviewed and updated.   Current Outpatient Prescriptions   Medication Sig    amLODIPine (NORVASC) 10 MG tablet Take 1 tablet (10 mg total) by mouth once daily.    aspirin (ECOTRIN) 81 MG EC tablet Take 81 mg by mouth once daily.    loratadine (CLARITIN) 10 mg tablet Take 10 mg by mouth once daily.    metFORMIN (GLUCOPHAGE) 500 MG tablet Take 1 tablet (500 mg total) by mouth 2 (two) times daily with meals.    triamterene-hydrochlorothiazide 37.5-25 mg (MAXZIDE-25) 37.5-25 mg per tablet Take 1 tablet by mouth once daily.     No current facility-administered medications for this visit.        Physical Exam  /80 (BP Location: Left arm, Patient Position: Sitting, BP Method: Large (Manual))   Pulse 64   Temp 97.7 °F (36.5 °C) (Oral)   Ht 5' 4" (1.626 m)   Wt 112.7 kg (248 lb 7.3 oz)   SpO2 98%   BMI 42.65 kg/m²   Physical Exam   Constitutional: She is oriented to person, place, and time. She appears well-developed and well-nourished.   HENT:   Head: Normocephalic and atraumatic.   Eyes: Conjunctivae and EOM are normal. Pupils are equal, round, and reactive to light.   Neck: Normal range of motion. Neck supple.   Cardiovascular: Normal rate, regular rhythm and normal heart sounds.  Exam reveals no gallop and no friction rub.    No murmur heard.  Pulmonary/Chest: Breath sounds normal. No respiratory distress.   Abdominal: Soft. Bowel " sounds are normal. She exhibits no distension. There is no tenderness.   Musculoskeletal: Normal range of motion.   Lymphadenopathy:     She has no cervical adenopathy.   Neurological: She is alert and oriented to person, place, and time.   Skin: Skin is warm.   Psychiatric: She has a normal mood and affect.         Assessment/Plan:  Bette Johnson is a 67 y.o. female who presents today for :    Need for prophylactic vaccination and inoculation against influenza  -     Influenza - High Dose (65+) (PF) (IM)    Type 2 diabetes mellitus without complication, without long-term current use of insulin  -     Hemoglobin A1c; Future; Expected date: 12/27/2017  -     metFORMIN (GLUCOPHAGE) 500 MG tablet; Take 1 tablet (500 mg total) by mouth 2 (two) times daily with meals.  Dispense: 180 tablet; Refill: 1  Will increase metformin from 500 qdaily to bid   Common side effects of this medication were discussed with the patient. Questions regarding medications were discussed during this visit.   To help decrease appetite at night    Benign essential HTN  -     2D Echo w/ Color Flow Doppler; Future    Screening for malignant neoplasm of colon  -     Case request GI: COLONOSCOPY    Thoracic aorta atherosclerosis  Patient with Atherosclerosis of the Aorta.  Stable/asymptomatic. Currently stable on lipid and b/p monitoring.    Morbid obesity with BMI of 40.0-44.9, adult  BMI noted  The patient is asked to make an attempt to improve diet and exercise patterns to aid in medical management of this problem.            Return in about 6 months (around 6/27/2018), or if symptoms worsen or fail to improve.

## 2017-12-28 ENCOUNTER — TELEPHONE (OUTPATIENT)
Dept: FAMILY MEDICINE | Facility: CLINIC | Age: 67
End: 2017-12-28

## 2017-12-28 NOTE — TELEPHONE ENCOUNTER
----- Message from Neeta Estrada MD sent at 12/28/2017  9:43 AM CST -----  a1c has improved  Keep up the great work!  Continue with increase in metformin.

## 2018-01-02 ENCOUNTER — HOSPITAL ENCOUNTER (OUTPATIENT)
Dept: CARDIOLOGY | Facility: HOSPITAL | Age: 68
Discharge: HOME | End: 2018-01-02
Attending: FAMILY MEDICINE
Payer: MEDICARE

## 2018-01-02 DIAGNOSIS — I10 BENIGN ESSENTIAL HTN: ICD-10-CM

## 2018-01-02 LAB
DIASTOLIC DYSFUNCTION: NO
ESTIMATED PA SYSTOLIC PRESSURE: 31.3
RETIRED EF AND QEF - SEE NOTES: 55 (ref 55–65)
TRICUSPID VALVE REGURGITATION: NORMAL

## 2018-01-02 PROCEDURE — 93306 TTE W/DOPPLER COMPLETE: CPT | Mod: 26,,, | Performed by: INTERNAL MEDICINE

## 2018-01-02 PROCEDURE — 93306 TTE W/DOPPLER COMPLETE: CPT

## 2018-01-09 ENCOUNTER — TELEPHONE (OUTPATIENT)
Dept: FAMILY MEDICINE | Facility: CLINIC | Age: 68
End: 2018-01-09

## 2018-01-09 NOTE — TELEPHONE ENCOUNTER
----- Message from Neeta Estrada MD sent at 1/8/2018  3:28 PM CST -----  Ultrasound of the heart shows it is pumping at normal levels  There are some changes noted in the heart form high blood pressure and age.   Please continue to take your blood pressure medication.

## 2018-01-12 ENCOUNTER — NURSE TRIAGE (OUTPATIENT)
Dept: ADMINISTRATIVE | Facility: CLINIC | Age: 68
End: 2018-01-12

## 2018-01-12 PROCEDURE — 93010 ELECTROCARDIOGRAM REPORT: CPT | Mod: ,,, | Performed by: INTERNAL MEDICINE

## 2018-01-12 PROCEDURE — 99285 EMERGENCY DEPT VISIT HI MDM: CPT | Mod: 25

## 2018-01-13 ENCOUNTER — HOSPITAL ENCOUNTER (OUTPATIENT)
Facility: HOSPITAL | Age: 68
Discharge: HOME | End: 2018-01-13
Attending: EMERGENCY MEDICINE | Admitting: EMERGENCY MEDICINE
Payer: MEDICARE

## 2018-01-13 VITALS
RESPIRATION RATE: 17 BRPM | BODY MASS INDEX: 42.9 KG/M2 | SYSTOLIC BLOOD PRESSURE: 164 MMHG | WEIGHT: 251.31 LBS | HEIGHT: 64 IN | HEART RATE: 63 BPM | TEMPERATURE: 98 F | DIASTOLIC BLOOD PRESSURE: 75 MMHG | OXYGEN SATURATION: 96 %

## 2018-01-13 DIAGNOSIS — R07.9 CHEST PAIN: ICD-10-CM

## 2018-01-13 DIAGNOSIS — R07.9 CHEST PAIN, UNSPECIFIED TYPE: Primary | ICD-10-CM

## 2018-01-13 DIAGNOSIS — E66.01 MORBID OBESITY WITH BMI OF 40.0-44.9, ADULT: ICD-10-CM

## 2018-01-13 DIAGNOSIS — I10 BENIGN ESSENTIAL HTN: ICD-10-CM

## 2018-01-13 DIAGNOSIS — E11.9 TYPE 2 DIABETES MELLITUS WITHOUT COMPLICATION, WITHOUT LONG-TERM CURRENT USE OF INSULIN: ICD-10-CM

## 2018-01-13 LAB
ALBUMIN SERPL BCP-MCNC: 4.2 G/DL
ALP SERPL-CCNC: 75 U/L
ALT SERPL W/O P-5'-P-CCNC: 20 U/L
ANION GAP SERPL CALC-SCNC: 12 MMOL/L
AST SERPL-CCNC: 20 U/L
BASOPHILS # BLD AUTO: 0.04 K/UL
BASOPHILS NFR BLD: 0.8 %
BILIRUB SERPL-MCNC: 0.3 MG/DL
BNP SERPL-MCNC: 16 PG/ML
BUN SERPL-MCNC: 13 MG/DL
CALCIUM SERPL-MCNC: 9.9 MG/DL
CHLORIDE SERPL-SCNC: 102 MMOL/L
CO2 SERPL-SCNC: 25 MMOL/L
CREAT SERPL-MCNC: 1 MG/DL
DIFFERENTIAL METHOD: NORMAL
EOSINOPHIL # BLD AUTO: 0.2 K/UL
EOSINOPHIL NFR BLD: 4.8 %
ERYTHROCYTE [DISTWIDTH] IN BLOOD BY AUTOMATED COUNT: 13.3 %
EST. GFR  (AFRICAN AMERICAN): >60 ML/MIN/1.73 M^2
EST. GFR  (NON AFRICAN AMERICAN): 59 ML/MIN/1.73 M^2
GLUCOSE SERPL-MCNC: 127 MG/DL
HCT VFR BLD AUTO: 39.7 %
HGB BLD-MCNC: 13.4 G/DL
INR PPP: 1
LYMPHOCYTES # BLD AUTO: 2.4 K/UL
LYMPHOCYTES NFR BLD: 47.1 %
MCH RBC QN AUTO: 28.3 PG
MCHC RBC AUTO-ENTMCNC: 33.8 G/DL
MCV RBC AUTO: 84 FL
MONOCYTES # BLD AUTO: 0.4 K/UL
MONOCYTES NFR BLD: 8.2 %
NEUTROPHILS # BLD AUTO: 2 K/UL
NEUTROPHILS NFR BLD: 39.1 %
PLATELET # BLD AUTO: 278 K/UL
PMV BLD AUTO: 10.1 FL
POCT GLUCOSE: 108 MG/DL (ref 70–110)
POCT GLUCOSE: 84 MG/DL (ref 70–110)
POTASSIUM SERPL-SCNC: 3.5 MMOL/L
PROT SERPL-MCNC: 8.4 G/DL
PROTHROMBIN TIME: 10.4 SEC
RBC # BLD AUTO: 4.73 M/UL
SODIUM SERPL-SCNC: 139 MMOL/L
TROPONIN I SERPL DL<=0.01 NG/ML-MCNC: 0.01 NG/ML
TROPONIN I SERPL DL<=0.01 NG/ML-MCNC: 0.02 NG/ML
TROPONIN I SERPL DL<=0.01 NG/ML-MCNC: 0.02 NG/ML
TROPONIN I SERPL DL<=0.01 NG/ML-MCNC: <0.006 NG/ML
WBC # BLD AUTO: 4.99 K/UL

## 2018-01-13 PROCEDURE — G0378 HOSPITAL OBSERVATION PER HR: HCPCS

## 2018-01-13 PROCEDURE — 25000003 PHARM REV CODE 250: Performed by: EMERGENCY MEDICINE

## 2018-01-13 PROCEDURE — 90670 PCV13 VACCINE IM: CPT | Performed by: HOSPITALIST

## 2018-01-13 PROCEDURE — 80053 COMPREHEN METABOLIC PANEL: CPT

## 2018-01-13 PROCEDURE — 63600175 PHARM REV CODE 636 W HCPCS: Performed by: HOSPITALIST

## 2018-01-13 PROCEDURE — 84484 ASSAY OF TROPONIN QUANT: CPT | Mod: 91

## 2018-01-13 PROCEDURE — 25000003 PHARM REV CODE 250: Performed by: PHYSICIAN ASSISTANT

## 2018-01-13 PROCEDURE — 85610 PROTHROMBIN TIME: CPT

## 2018-01-13 PROCEDURE — G0009 ADMIN PNEUMOCOCCAL VACCINE: HCPCS | Performed by: HOSPITALIST

## 2018-01-13 PROCEDURE — 85025 COMPLETE CBC W/AUTO DIFF WBC: CPT

## 2018-01-13 PROCEDURE — 36415 COLL VENOUS BLD VENIPUNCTURE: CPT

## 2018-01-13 PROCEDURE — 90471 IMMUNIZATION ADMIN: CPT | Performed by: HOSPITALIST

## 2018-01-13 PROCEDURE — 83880 ASSAY OF NATRIURETIC PEPTIDE: CPT

## 2018-01-13 PROCEDURE — A4216 STERILE WATER/SALINE, 10 ML: HCPCS | Performed by: EMERGENCY MEDICINE

## 2018-01-13 PROCEDURE — 84484 ASSAY OF TROPONIN QUANT: CPT

## 2018-01-13 RX ORDER — ENOXAPARIN SODIUM 100 MG/ML
40 INJECTION SUBCUTANEOUS EVERY 24 HOURS
Status: DISCONTINUED | OUTPATIENT
Start: 2018-01-13 | End: 2018-01-13 | Stop reason: HOSPADM

## 2018-01-13 RX ORDER — IBUPROFEN 200 MG
24 TABLET ORAL
Status: DISCONTINUED | OUTPATIENT
Start: 2018-01-13 | End: 2018-01-13 | Stop reason: HOSPADM

## 2018-01-13 RX ORDER — PANTOPRAZOLE SODIUM 40 MG/1
40 TABLET, DELAYED RELEASE ORAL DAILY
Status: DISCONTINUED | OUTPATIENT
Start: 2018-01-13 | End: 2018-01-13 | Stop reason: HOSPADM

## 2018-01-13 RX ORDER — OXYCODONE HYDROCHLORIDE 5 MG/1
5 TABLET ORAL EVERY 4 HOURS PRN
Status: DISCONTINUED | OUTPATIENT
Start: 2018-01-13 | End: 2018-01-13

## 2018-01-13 RX ORDER — ASPIRIN 81 MG/1
81 TABLET ORAL DAILY
Status: DISCONTINUED | OUTPATIENT
Start: 2018-01-13 | End: 2018-01-13 | Stop reason: HOSPADM

## 2018-01-13 RX ORDER — ASPIRIN 325 MG
325 TABLET, DELAYED RELEASE (ENTERIC COATED) ORAL
Status: COMPLETED | OUTPATIENT
Start: 2018-01-13 | End: 2018-01-13

## 2018-01-13 RX ORDER — TRIAMTERENE AND HYDROCHLOROTHIAZIDE 37.5; 25 MG/1; MG/1
1 CAPSULE ORAL DAILY
Status: DISCONTINUED | OUTPATIENT
Start: 2018-01-13 | End: 2018-01-13 | Stop reason: HOSPADM

## 2018-01-13 RX ORDER — AMOXICILLIN 250 MG
1 CAPSULE ORAL 2 TIMES DAILY
Status: DISCONTINUED | OUTPATIENT
Start: 2018-01-13 | End: 2018-01-13 | Stop reason: HOSPADM

## 2018-01-13 RX ORDER — RAMELTEON 8 MG/1
8 TABLET ORAL NIGHTLY PRN
Status: DISCONTINUED | OUTPATIENT
Start: 2018-01-13 | End: 2018-01-13 | Stop reason: HOSPADM

## 2018-01-13 RX ORDER — MORPHINE SULFATE 10 MG/ML
4 INJECTION INTRAMUSCULAR; INTRAVENOUS; SUBCUTANEOUS EVERY 4 HOURS PRN
Status: DISCONTINUED | OUTPATIENT
Start: 2018-01-13 | End: 2018-01-13

## 2018-01-13 RX ORDER — ONDANSETRON 2 MG/ML
4 INJECTION INTRAMUSCULAR; INTRAVENOUS EVERY 8 HOURS PRN
Status: DISCONTINUED | OUTPATIENT
Start: 2018-01-13 | End: 2018-01-13 | Stop reason: HOSPADM

## 2018-01-13 RX ORDER — NITROGLYCERIN 0.4 MG/1
0.4 TABLET SUBLINGUAL EVERY 5 MIN PRN
Status: DISCONTINUED | OUTPATIENT
Start: 2018-01-13 | End: 2018-01-13 | Stop reason: HOSPADM

## 2018-01-13 RX ORDER — INSULIN ASPART 100 [IU]/ML
0-5 INJECTION, SOLUTION INTRAVENOUS; SUBCUTANEOUS
Status: DISCONTINUED | OUTPATIENT
Start: 2018-01-13 | End: 2018-01-13 | Stop reason: HOSPADM

## 2018-01-13 RX ORDER — SODIUM CHLORIDE 0.9 % (FLUSH) 0.9 %
3 SYRINGE (ML) INJECTION EVERY 8 HOURS
Status: DISCONTINUED | OUTPATIENT
Start: 2018-01-13 | End: 2018-01-13 | Stop reason: HOSPADM

## 2018-01-13 RX ORDER — IBUPROFEN 200 MG
16 TABLET ORAL
Status: DISCONTINUED | OUTPATIENT
Start: 2018-01-13 | End: 2018-01-13 | Stop reason: HOSPADM

## 2018-01-13 RX ORDER — AMLODIPINE BESYLATE 5 MG/1
10 TABLET ORAL DAILY
Status: DISCONTINUED | OUTPATIENT
Start: 2018-01-13 | End: 2018-01-13 | Stop reason: HOSPADM

## 2018-01-13 RX ORDER — ACETAMINOPHEN 325 MG/1
650 TABLET ORAL EVERY 4 HOURS PRN
Status: DISCONTINUED | OUTPATIENT
Start: 2018-01-13 | End: 2018-01-13 | Stop reason: HOSPADM

## 2018-01-13 RX ORDER — GLUCAGON 1 MG
1 KIT INJECTION
Status: DISCONTINUED | OUTPATIENT
Start: 2018-01-13 | End: 2018-01-13 | Stop reason: HOSPADM

## 2018-01-13 RX ADMIN — DOCUSATE SODIUM AND SENNOSIDES 1 TABLET: 8.6; 5 TABLET, FILM COATED ORAL at 09:01

## 2018-01-13 RX ADMIN — ASPIRIN 325 MG: 325 TABLET, DELAYED RELEASE ORAL at 02:01

## 2018-01-13 RX ADMIN — TRIAMTERENE AND HYDROCHLOROTHIAZIDE 1 CAPSULE: 25; 37.5 CAPSULE ORAL at 09:01

## 2018-01-13 RX ADMIN — PNEUMOCOCCAL 13-VALENT CONJUGATE VACCINE 0.5 ML: 2.2; 2.2; 2.2; 2.2; 2.2; 4.4; 2.2; 2.2; 2.2; 2.2; 2.2; 2.2; 2.2 INJECTION, SUSPENSION INTRAMUSCULAR at 02:01

## 2018-01-13 RX ADMIN — ASPIRIN 81 MG: 81 TABLET, COATED ORAL at 09:01

## 2018-01-13 RX ADMIN — SODIUM CHLORIDE, PRESERVATIVE FREE 3 ML: 5 INJECTION INTRAVENOUS at 07:01

## 2018-01-13 RX ADMIN — AMLODIPINE BESYLATE 10 MG: 5 TABLET ORAL at 09:01

## 2018-01-13 RX ADMIN — PANTOPRAZOLE SODIUM 40 MG: 40 TABLET, DELAYED RELEASE ORAL at 09:01

## 2018-01-13 RX ADMIN — LIDOCAINE HYDROCHLORIDE: 20 SOLUTION ORAL; TOPICAL at 02:01

## 2018-01-13 NOTE — DISCHARGE INSTRUCTIONS
Discharge instructions reviewed with patient . No prescriptions given to patient. Continue to take home medications as ordered continue to monitor blood pressure and check blood sugars at home keep log for both.

## 2018-01-13 NOTE — HPI
"Patient is 66 yo female with HTN, DMII, and obesity who presents to ED with complaint of CP. Per patient CP began around 8 pm evening PTA while making a large pot of pecan candy on the stove. Pain described as "pressure" on the breastbone. She rates initially as 7/10 in severity at onset. Attempted treatment with hot compress and muscle rub without significant relief. She reports improvement in hospital overnight with burping and gas release resulting in pain now 2/10. Initially breastbone was tender to touch but now improving. She denies associated recent illness, cough, diaphoresis, jaw pain, arm pain, SOB, N/V, LE swelling and pain. She is non smoker. No personal cardiovascular history but significant family history (father and multiple siblings with CABG). She reports compliance to all home medications and participates in senior fitness classes 3x weekly. On presentation patient with negative initial troponin and non ischemic EKG. CXR unremarkable. Placed in observation for ACS r/o.   "

## 2018-01-13 NOTE — NURSING
Discharge instructions reviewed with the patient denies pain . No adverse side effects to pneumococcal vaccine . AVS discharge booklet given to patient placed in large blue discharge folder. No adverse events. No chest this shift patient instructed to call her primary care doctor Neeta Estrada for a follow up appointment and bring the avs booklet with her too. Transport taking patient to her family members car who is meeting patient at the front lobby of [facility].

## 2018-01-13 NOTE — ED TRIAGE NOTES
Chest Pain: Patient complains of chest pain. Onset was 1 day ago, with improving course since that time. The patient describes the pain as intermittent, sharp and substernal in nature, does not radiate. Patient rates pain as a 7/10 in intensity.  Associated symptoms are chest pain and chest pressure/discomfort.

## 2018-01-13 NOTE — TELEPHONE ENCOUNTER
"    Reason for Disposition   [1] Chest pain lasts > 5 minutes AND [2] described as crushing, pressure-like, or heavy     Pt describes pain as constant chest heaviness since 8 pm, like a fist clenching in her sternum.  She thinks it may be muscular, but has tried cold compress and muscle rub without relief.  Her last blood pressure is 144/74, which she says is consistent with her baseline.  Recommended she call 911 now for immediate medical attention.  Messaged Neeta Estrada, pcp  Please contact caller, directly, for any further advice.    Answer Assessment - Initial Assessment Questions  1. LOCATION: "Where does it hurt?"        Over the sternum  2. RADIATION: "Does the pain go anywhere else?" (e.g., into neck, jaw, arms, back)      no  3. ONSET: "When did the chest pain begin?" (Minutes, hours or days)       8 pm tonight  4. PATTERN "Does the pain come and go, or has it been constant since it started?"  "Does it get worse with exertion?"       Constant, not worsening with exertion, slightly better when sitting and leaning forward  5. DURATION: "How long does it last" (e.g., seconds, minutes, hours)      constant  6. SEVERITY: "How bad is the pain?"  (e.g., Scale 1-10; mild, moderate, or severe)     - MILD (1-3): doesn't interfere with normal activities      - MODERATE (4-7): interferes with normal activities or awakens from sleep     - SEVERE (8-10): excruciating pain, unable to do any normal activities        7/10  7. CARDIAC RISK FACTORS: "Do you have any history of heart problems or risk factors for heart disease?" (e.g., prior heart attack, angina; high blood pressure, diabetes, being overweight, high cholesterol, smoking, or strong family history of heart disease)      Htn, dm  8. PULMONARY RISK FACTORS: "Do you have any history of lung disease?"  (e.g., blood clots in lung, asthma, emphysema, birth control pills)      no  9. CAUSE: "What do you think is causing the chest pain?"      Stirring pots while making " "pecan candy, multiple double batches over 2 day period  10. OTHER SYMPTOMS: "Do you have any other symptoms?" (e.g., dizziness, nausea, vomiting, sweating, fever, difficulty breathing, cough)        Dry Cough yesterday, gone today  11. PREGNANCY: "Is there any chance you are pregnant?" "When was your last menstrual period?"        no    Protocols used: ST CHEST PAIN-A-      "

## 2018-01-13 NOTE — ED PROVIDER NOTES
After Visit Summary   10/9/2017    Madison Cortes    MRN: [id number]           Patient Information     Date Of Birth          1965        Visit Information        Provider Department      10/9/2017 1:00 PM Anita Tatum MD [facility]        Today's Diagnoses     Encounter for lipid screening for cardiovascular disease    -  1    Screen for colon cancer        Visit for screening mammogram        Need for prophylactic vaccination and inoculation against influenza        Major depressive disorder, recurrent episode, moderate (H)        Thyroid nodule          Care Instructions    [facility]: cancer screening to complete mammogram and colon cancer screening. Call: [phone].   Send copy of results to us for your chart.     You can decrease Celexa to 20 mg and if within 2-4 weeks you notice mood is worsening increase back to 40 mg. If mood is well controlled on 20 mg continue with 20 mg.          Follow-ups after your visit        Your next 10 appointments already scheduled     Oct 10, 2017 11:00 AM CDT   LAB with [facility] ONLY   [facility]    [address] [place] [id number] [phone]           Patient must bring picture ID. Patient should be prepared to give a urine specimen  Please do not eat 10-12 hours before your appointment if you are coming in fasting for labs on lipids, cholesterol, or glucose (sugar). Pregnant women should follow their Care Team instructions. Water with medications is okay. Do not drink coffee or other fluids. If you have concerns about taking  your medications, please ask at office or if scheduling via [employer], send a message by clicking on Secure Messaging, Message Your Care Team.              Future tests that were ordered for you today     Open Future Orders        Priority Expected Expires Ordered    Lipid panel reflex to direct LDL Routine   "Encounter Date: 2018    SCRIBE #1 NOTE: I, Petr Castillo II, am scribing for, and in the presence of,  Ld Vargas MD. I have scribed the following portions of the note - Other sections scribed: HPI, ROS, PE.       History     Chief Complaint   Patient presents with    Chest Pain     Pt c/o mid-sternal CP describes as a fist in my chest onset 8pm tonight, denies SOB, N/V, or dizziness. Pt reports her breast bone very sensitive to touch. 1wk ago had a normal ECHO.     CC: Chest Pain      HPI: This 67 y.o. female with HTN, NIDDM, and severe obesity presents to the ED c/o acute onset, localized, chest pain x2 days. Pt reports she has been dealing with a lot of stress lately. 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 She states her pain began as she was stirring a pot of candy. She reports pain is exacerbated with palpation. She states she believed it was indigestion so she took Tums and applied ice to her chest with minimal alleviation. She reports her chest pain episode lasted 10 minutes yesterday, but is constant today. She describes her chest pain as a "heavy dull pain". Pt states her chest pain returned tonight as she was making candy the same as yesterday. She reports family hx of heart problems. Chest pain is alleviated with sitting up. Chest pain is exacerbated with leaning forward. Pt denies diaphoresis, chills, and numbness.         The history is provided by the patient. No  was used.     Review of patient's allergies indicates:   Allergen Reactions    Iodine and iodide containing products Shortness Of Breath    Caffeine Other (See Comments)     Hyper then  Causes low    Latex, natural rubber Other (See Comments)     Dental work cause mouth ulcers    Penicillins Other (See Comments)     Loss of consciousness     Past Medical History:   Diagnosis Date    Diabetes mellitus, type 2     Hypertension     Severe obesity (BMI >= 40) 2017     Past " "4/9/2018 10/9/2017    Glucose Routine  4/9/2018 10/9/2017    TSH with free T4 reflex Routine  4/9/2018 10/9/2017            Who to contact     If you have questions or need follow up information about today's clinic visit or your schedule please contact [facility] directly at [phone].  Normal or non-critical lab and imaging results will be communicated to you by [employer], letter or phone within 4 business days after the clinic has received the results. If you do not hear from us within 7 days, please contact the clinic through [email or web address] or phone. If you have a critical or abnormal lab result, we will notify you by phone as soon as possible.  Submit refill requests through [employer] or call your pharmacy and they will forward the refill request to us. Please allow 3 business days for your refill to be completed.          Additional Information About Your Visit        [employer] Information     [employer] gives you secure access to your electronic health record. If you see a primary care provider, you can also send messages to your care team and make appointments. If you have questions, please call your primary care clinic.  If you do not have a primary care provider, please call [phone] and they will assist you.        Care EveryWhere ID     This is your Care EveryWhere ID. This could be used by other organizations to access your [place] medical records  [id number]        Your Vitals Were     Pulse Temperature Height Pulse Oximetry Breastfeeding? BMI (Body Mass Index)    69 98.6  F (37  C) (Oral) 1.689 m (5' 6.5\") 99% No 27.01 kg/m2       Blood Pressure from Last 3 Encounters:   10/09/17 118/76   09/16/16 114/70   09/29/15 120/72    Weight from Last 3 Encounters:   10/09/17 77.1 kg (169 lb 14.4 oz)   09/16/16 77.7 kg (171 lb 6.4 oz)   09/29/15 77.6 kg (171 lb 1.6 oz)                 Today's Medication Changes          These changes are accurate as of: 10/9/17  1:44 PM.  If you have any questions, " Surgical History:   Procedure Laterality Date    CHOLECYSTECTOMY       Family History   Problem Relation Age of Onset    Diabetes Mother     Diabetes Sister     Diabetes Brother     Kidney disease Brother     Diabetes Sister     Hypertension Sister     Diabetes Sister     Hypertension Sister     Hyperlipidemia Sister      Social History   Substance Use Topics    Smoking status: Never Smoker    Smokeless tobacco: Never Used    Alcohol use Yes     Review of Systems   Constitutional: Negative for fever.   HENT: Negative for sore throat.    Respiratory: Negative for shortness of breath.    Cardiovascular: Positive for chest pain.   Gastrointestinal: Negative for nausea.   Genitourinary: Negative for dysuria.   Musculoskeletal: Negative for back pain.   Skin: Negative for rash.   Neurological: Negative for weakness.   Hematological: Does not bruise/bleed easily.   Psychiatric/Behavioral: Negative for confusion.       Physical Exam     Initial Vitals [01/12/18 2340]   BP Pulse Resp Temp SpO2   (!) 183/78 69 16 98 °F (36.7 °C) 95 %      MAP       113         Physical Exam    Nursing note and vitals reviewed.  Constitutional: She appears well-developed and well-nourished. She is cooperative.  Non-toxic appearance. No distress.   HENT:   Head: Normocephalic and atraumatic.   Mouth/Throat: Oropharynx is clear and moist.   Eyes: Conjunctivae and EOM are normal. Pupils are equal, round, and reactive to light.   Neck: Normal range of motion and full passive range of motion without pain. Neck supple. No thyromegaly present. No JVD present.   Cardiovascular: Normal rate, regular rhythm and normal pulses.   Murmur heard.   Systolic (in the aortic position) murmur is present with a grade of 2/6   Pulmonary/Chest: Effort normal and breath sounds normal. No respiratory distress.   Abdominal: Soft. Normal appearance and bowel sounds are normal. She exhibits no distension and no mass. There is no tenderness.    Musculoskeletal: Normal range of motion.   Neurological: She is alert and oriented to person, place, and time. She has normal strength. No cranial nerve deficit or sensory deficit.   Skin: Skin is warm, dry and intact. No rash noted.   Psychiatric: She has a normal mood and affect. Her speech is normal and behavior is normal. Judgment and thought content normal.         ED Course   Procedures  Labs Reviewed   COMPREHENSIVE METABOLIC PANEL - Abnormal; Notable for the following:        Result Value    Glucose 127 (*)     eGFR if non  59 (*)     All other components within normal limits   CBC W/ AUTO DIFFERENTIAL   B-TYPE NATRIURETIC PEPTIDE   TROPONIN I   PROTIME-INR     EKG Readings: (Independently Interpreted)   Initial Reading: No STEMI. Rhythm: Normal Sinus Rhythm. Heart Rate: 60.          Medical Decision Making:   Initial Assessment:   68 yo female w/ Hx of HTN and DM presenting with Chest pain. EKG, exam, labs reassuring. Heart score is at least 5. History and vitals not likely c/w Pe. CXR normal. PNA, PTX, Pe, dissetion all less likely. Given risk factors, will admit for observation for ACS. Otherwise well appearing. ASA given in Ed.             Scribe Attestation:   Scribe #1: I performed the above scribed service and the documentation accurately describes the services I performed. I attest to the accuracy of the note.    Attending Attestation:           Physician Attestation for Scribe:  Physician Attestation Statement for Scribe #1: I, Ld Vagras MD, reviewed documentation, as scribed by Petr Castillo II in my presence, and it is both accurate and complete.                 ED Course      Clinical Impression:   The primary encounter diagnosis was Chest pain, unspecified type. Diagnoses of Chest pain, Type 2 diabetes mellitus without complication, without long-term current use of insulin, Morbid obesity with BMI of 40.0-44.9, adult, and Benign essential HTN were also pertinent to this  ask your nurse or doctor.               These medicines have changed or have updated prescriptions.        Dose/Directions    citalopram 40 MG tablet   Commonly known as:  celeXA   This may have changed:  See the new instructions.   Used for:  Major depressive disorder, recurrent episode, moderate (H)   Changed by:  Anita Tatum MD        TAKE 1 TABLET(40 MG) BY MOUTH DAILY   Quantity:  90 tablet   Refills:  3            Where to get your medicines      These medications were sent to [id number]  [place] - [address]  [address], [place] [address]    Hours:  test fax sent successfully 1/20/04  kr Phone:  [phone]     citalopram 40 MG tablet                Primary Care Provider Office Phone # Fax #    Anita Tatum [phone] [address]  [facility] [address]        Equal Access to Services     [facility] AH: Hadii aad ku hadasho Soomaali, waaxda luqadaha, qaybta kaalmada adeegyada, waxay idiin hayaan adeeg kharadaksha latejal . So [facility] [phone].    ATENCIÓN: Si habla español, tiene a lynn disposición servicios gratuitos de asistencia lingüística. Ll[facility] [phone].    We comply with applicable federal civil rights laws and [place] laws. We do not discriminate on the basis of race, color, national origin, age, disability, sex, sexual orientation, or gender identity.            Thank you!     Thank you for choosing [facility]  for your care. Our goal is always to provide you with excellent care. Hearing back from our patients is one way we can continue to improve our services. Please take a few minutes to complete the written survey that you may receive in the mail after your visit with us. Thank you!             Your Updated Medication List - Protect others around you: Learn how to safely use, store and throw away your medicines at www.disposemymeds.org.          This list  is accurate as of: 10/9/17  1:44 PM.  Always use your most recent med list.                   Brand Name Dispense Instructions for use Diagnosis    B-12 PO      Take 1 tablet by mouth.        citalopram 40 MG tablet    celeXA    90 tablet    TAKE 1 TABLET(40 MG) BY MOUTH DAILY    Major depressive disorder, recurrent episode, moderate (H)       Multivitamin  /Iron Tabs      Take  by mouth daily.        VITAMIN D PO      Take 1 tablet by mouth daily.           visit.    Disposition:   Disposition: Placed in Observation  Condition: Stable                        Ld Vargas MD  01/26/18 2502

## 2018-01-13 NOTE — SUBJECTIVE & OBJECTIVE
Past Medical History:   Diagnosis Date    Diabetes mellitus, type 2     Hypertension     Severe obesity (BMI >= 40) 7/12/2017       Past Surgical History:   Procedure Laterality Date    CHOLECYSTECTOMY         Review of patient's allergies indicates:   Allergen Reactions    Iodine and iodide containing products Shortness Of Breath    Caffeine Other (See Comments)     Hyper then  Causes low    Latex, natural rubber Other (See Comments)     Dental work cause mouth ulcers    Penicillins Other (See Comments)     Loss of consciousness       No current facility-administered medications on file prior to encounter.      Current Outpatient Prescriptions on File Prior to Encounter   Medication Sig    amLODIPine (NORVASC) 10 MG tablet Take 1 tablet (10 mg total) by mouth once daily.    aspirin (ECOTRIN) 81 MG EC tablet Take 81 mg by mouth once daily.    loratadine (CLARITIN) 10 mg tablet Take 10 mg by mouth once daily.    metFORMIN (GLUCOPHAGE) 500 MG tablet Take 1 tablet (500 mg total) by mouth 2 (two) times daily with meals.    triamterene-hydrochlorothiazide 37.5-25 mg (MAXZIDE-25) 37.5-25 mg per tablet Take 1 tablet by mouth once daily.     Family History     Problem Relation (Age of Onset)    Diabetes Mother, Sister, Brother, Sister, Sister    Hyperlipidemia Sister    Hypertension Sister, Sister    Kidney disease Brother        Social History Main Topics    Smoking status: Never Smoker    Smokeless tobacco: Never Used    Alcohol use Yes    Drug use: No    Sexual activity: No     Review of Systems   Constitutional: Negative for chills and fever.   HENT: Negative for congestion and sore throat.    Respiratory: Negative for cough and shortness of breath.    Cardiovascular: Positive for chest pain. Negative for palpitations.   Gastrointestinal: Negative for abdominal pain and nausea.   Endocrine: Negative for cold intolerance and heat intolerance.   Genitourinary: Negative for dysuria and frequency.    Musculoskeletal: Negative for arthralgias and myalgias.   Skin: Negative for color change and rash.   Neurological: Negative for dizziness and headaches.   Psychiatric/Behavioral: Negative for behavioral problems and confusion.     Objective:     Vital Signs (Most Recent):  Temp: 98.4 °F (36.9 °C) (01/13/18 0744)  Pulse: 61 (01/13/18 0744)  Resp: 16 (01/13/18 0744)  BP: (!) 168/72 (01/13/18 0744)  SpO2: (!) 94 % (01/13/18 0744) Vital Signs (24h Range):  Temp:  [97.6 °F (36.4 °C)-98.4 °F (36.9 °C)] 98.4 °F (36.9 °C)  Pulse:  [53-70] 61  Resp:  [16-20] 16  SpO2:  [94 %-98 %] 94 %  BP: (159-203)/(70-83) 168/72     Weight: 114 kg (251 lb 5.2 oz)  Body mass index is 43.14 kg/m².    Physical Exam   Constitutional: She is oriented to person, place, and time. She appears well-developed and well-nourished. No distress.   HENT:   Head: Normocephalic and atraumatic.   Eyes: EOM are normal. Pupils are equal, round, and reactive to light.   Neck: Normal range of motion. Neck supple.   Cardiovascular: Normal rate and regular rhythm.    No murmur heard.  Pulmonary/Chest: Effort normal and breath sounds normal.   Abdominal: Soft. Bowel sounds are normal. There is no tenderness.   Musculoskeletal: Normal range of motion.   Neurological: She is alert and oriented to person, place, and time.   Skin: Skin is warm and dry. No rash noted.   Psychiatric: She has a normal mood and affect. Her behavior is normal.         CRANIAL NERVES     CN III, IV, VI   Pupils are equal, round, and reactive to light.  Extraocular motions are normal.        Significant Labs:   CBC:   Recent Labs  Lab 01/13/18  0001   WBC 4.99   HGB 13.4   HCT 39.7        CMP:   Recent Labs  Lab 01/13/18  0001      K 3.5      CO2 25   *   BUN 13   CREATININE 1.0   CALCIUM 9.9   PROT 8.4   ALBUMIN 4.2   BILITOT 0.3   ALKPHOS 75   AST 20   ALT 20   ANIONGAP 12   EGFRNONAA 59*     Troponin:   Recent Labs  Lab 01/13/18  0001   TROPONINI 0.009        Significant Imaging:   Imaging Results          X-Ray Chest PA And Lateral (Final result)  Result time 01/13/18 00:38:30    Final result by Dionicio Javed MD (01/13/18 00:38:30)                 Impression:     No acute cardiopulmonary abnormality.      Electronically signed by: Dionicio Javed  Date:     01/13/18  Time:    00:38              Narrative:    EXAM:  2 VIEW CHEST RADIOGRAPH.     CLINICAL INDICATION:  Chest pain.    COMPARISON: 11/23/2010.    TECHNIQUE:  Two views of the chest were obtained.     FINDINGS: Cardiac silhouette is at the upper limits of normal in size but appears stable.  No air space disease.  No pleural effusion or pneumothorax.  Multilevel degenerative spurring in the thoracic spine.

## 2018-01-13 NOTE — HOSPITAL COURSE
Patient presents with CP. Normal initial troponin and non ischemic EKG. CXR without CP mimickers. Placed in observation for ACS r/o. Symptoms atypical and reproducible likely component of stress/anxiety +/- MSK +/- GI. Reported improvement following burping. Serial troponins negative. Had recent 2D echo with good EF and labs with PCP. She will discharge to home. Urged to return to ED if symptoms progress or return.

## 2018-01-13 NOTE — PLAN OF CARE
"   01/13/18 1638   Final Note   Assessment Type Final Discharge Note   Discharge Disposition Home   What phone number can be called within the next 1-3 days to see how you are doing after discharge? ([phone])   Hospital Follow Up  Appt(s) scheduled? No  (TN instructed patient to answer phone on Monday and TN will call with appointment date and time)   Discharge plans and expectations educations in teach back method with documentation complete? Yes   Right Care Referral Info   Post Acute Recommendation No Care   EDUCATION:  Ms Johnson provided with educational information on CHF.  Information reviewed and placed in :My Healthcare Packet" to be brought home for patient and spouse to use as resource after discharge.  Information included:  signs and symptoms to look for and call the doctor if experiencing, and symptoms that may indicate a medical emergency: CALL 911.    Reminded patient and spouse things they will be responsible for to manage her healthcare at home: getting Rx filled, attending follow up appointments, and taking medication as prescribed were discussed.   Teach back method used.  All questions answered.  Patient verbalized understanding of all information.      NurseMei notified that all CM needs are met.      "

## 2018-01-13 NOTE — PLAN OF CARE
01/13/18 1646   Final Note   Assessment Type Final Discharge Note   Discharge Disposition Home   Hospital Follow Up  Appt(s) scheduled? No   Discharge plans and expectations educations in teach back method with documentation complete? Yes   Right Care Referral Info   Post Acute Recommendation No Care   Patient discharged prior to being seen by CM

## 2018-01-13 NOTE — PROGRESS NOTES
[facility]    WRITTEN HEALTHCARE AND DISCHARGE INFORMATION     Follow-up Information     Neeta Estrada MD.    Specialty:  Family Medicine  Contact information:  [place] [address]  [phone]                                    Help at Home           [phone]  After discharge for assistance [facility] On Call Nurse Care Line 24/7  Assistance    Things You are responsible For To Manage Your Care At Home:  1.    Getting your prescriptions filled   2.    Taking your medications as directed, DO NOT MISS ANY DOSES!  3.    Going to your follow-up doctor appointment. This is important because it  allow the doctor to monitor your progress and determine if  any changes need to made to your treatment plan.     Thank you for choosing [facility] for your care.  Please answer any calls you may receive from [facility] we want to continue to support you as you manage your healthcare needs. [facility] is happy to have the opportunity to serve you.     Sincerely,  Your [facility] Team,  JAN Simon, ACMERRILL-RN; [phone]    Patient DC'd prior to being seen by ANGELICA

## 2018-01-13 NOTE — NURSING
Patient in bed resting she is talking on her phone . Denies pain denies chest pain when asked . Head of bed elevated side rails up x 2 call light in reach. No change on telemetry . Continue to monitor. Iv site clean dry and intact x 2.

## 2018-01-13 NOTE — ASSESSMENT & PLAN NOTE
A1c 6.6 Dec 2017. Well controlled on orals only at home--hold while in hospital. Will cover with SSI PRN, POCT checks, hypoglycemia protocol. Diabetic diet.

## 2018-01-13 NOTE — ASSESSMENT & PLAN NOTE
Initially with severely elevated BP but now improving. Will resume home anti hypertensive regimen and monitor.

## 2018-01-13 NOTE — NURSING
Pt brought to floor via wheelchair. Ambulated from wheelchair to bed. Alert and oriented X4. No s/sx distress noted. Pt oriented to room and call light. Call light placed in reach.

## 2018-01-13 NOTE — ASSESSMENT & PLAN NOTE
LISBETH score 1 for risk factors. Symptoms atypical and reproducible likely component of stress/anxiety +/-MSK.  -cardiac monitoring  -serial troponins  -ASA along with PRN NG  -2D echo  -recent TSH and lipid panel reviewed

## 2018-01-13 NOTE — H&P
"[facility] Medicine  History & Physical    Patient Name: Bette Johnson  MRN: [id number]  Admission Date: 1/13/2018  Attending Physician: Juvenal Tucker MD   Primary Care Provider: Neeta Estrada MD         Patient information was obtained from patient and ER records.     Subjective:     Principal Problem:Chest pain    Chief Complaint:   Chief Complaint   Patient presents with    Chest Pain     Pt c/o mid-sternal CP describes as a fist in my chest onset 8pm tonight, denies SOB, N/V, or dizziness. Pt reports her breast bone very sensitive to touch. 1wk ago had a normal ECHO.        HPI: Patient is 68 yo female with HTN, DMII, and obesity who presents to ED with complaint of CP. Per patient CP began around 8 pm evening PTA while making a large pot of pecan candy on the stove. Pain described as "pressure" on the breastbone. She rates initially as 7/10 in severity at onset. Attempted treatment with hot compress and muscle rub without significant relief. She reports improvement in hospital overnight with burping and gas release resulting in pain now 2/10. Initially breastbone was tender to touch but now improving. She denies associated recent illness, cough, diaphoresis, jaw pain, arm pain, SOB, N/V, LE swelling and pain. She is non smoker. No personal cardiovascular history but significant family history (father and multiple siblings with CABG). She reports compliance to all home medications and participates in senior fitness classes 3x weekly. On presentation patient with negative initial troponin and non ischemic EKG. CXR unremarkable. Placed in observation for ACS r/o.     Past Medical History:   Diagnosis Date    Diabetes mellitus, type 2     Hypertension     Severe obesity (BMI >= 40) 7/12/2017       Past Surgical History:   Procedure Laterality Date    CHOLECYSTECTOMY         Review of patient's allergies indicates:   Allergen Reactions    Iodine and iodide containing products " Shortness Of Breath    Caffeine Other (See Comments)     Hyper then  Causes low    Latex, natural rubber Other (See Comments)     Dental work cause mouth ulcers    Penicillins Other (See Comments)     Loss of consciousness       No current facility-administered medications on file prior to encounter.      Current Outpatient Prescriptions on File Prior to Encounter   Medication Sig    amLODIPine (NORVASC) 10 MG tablet Take 1 tablet (10 mg total) by mouth once daily.    aspirin (ECOTRIN) 81 MG EC tablet Take 81 mg by mouth once daily.    loratadine (CLARITIN) 10 mg tablet Take 10 mg by mouth once daily.    metFORMIN (GLUCOPHAGE) 500 MG tablet Take 1 tablet (500 mg total) by mouth 2 (two) times daily with meals.    triamterene-hydrochlorothiazide 37.5-25 mg (MAXZIDE-25) 37.5-25 mg per tablet Take 1 tablet by mouth once daily.     Family History     Problem Relation (Age of Onset)    Diabetes Mother, Sister, Brother, Sister, Sister    Hyperlipidemia Sister    Hypertension Sister, Sister    Kidney disease Brother        Social History Main Topics    Smoking status: Never Smoker    Smokeless tobacco: Never Used    Alcohol use Yes    Drug use: No    Sexual activity: No     Review of Systems   Constitutional: Negative for chills and fever.   HENT: Negative for congestion and sore throat.    Respiratory: Negative for cough and shortness of breath.    Cardiovascular: Positive for chest pain. Negative for palpitations.   Gastrointestinal: Negative for abdominal pain and nausea.   Endocrine: Negative for cold intolerance and heat intolerance.   Genitourinary: Negative for dysuria and frequency.   Musculoskeletal: Negative for arthralgias and myalgias.   Skin: Negative for color change and rash.   Neurological: Negative for dizziness and headaches.   Psychiatric/Behavioral: Negative for behavioral problems and confusion.     Objective:     Vital Signs (Most Recent):  Temp: 98.4 °F (36.9 °C) (01/13/18 0744)  Pulse:  61 (01/13/18 0744)  Resp: 16 (01/13/18 0744)  BP: (!) 168/72 (01/13/18 0744)  SpO2: (!) 94 % (01/13/18 0744) Vital Signs (24h Range):  Temp:  [97.6 °F (36.4 °C)-98.4 °F (36.9 °C)] 98.4 °F (36.9 °C)  Pulse:  [53-70] 61  Resp:  [16-20] 16  SpO2:  [94 %-98 %] 94 %  BP: (159-203)/(70-83) 168/72     Weight: 114 kg (251 lb 5.2 oz)  Body mass index is 43.14 kg/m².    Physical Exam   Constitutional: She is oriented to person, place, and time. She appears well-developed and well-nourished. No distress.   HENT:   Head: Normocephalic and atraumatic.   Eyes: EOM are normal. Pupils are equal, round, and reactive to light.   Neck: Normal range of motion. Neck supple.   Cardiovascular: Normal rate and regular rhythm.    No murmur heard.  Pulmonary/Chest: Effort normal and breath sounds normal.   Abdominal: Soft. Bowel sounds are normal. There is no tenderness.   Musculoskeletal: Normal range of motion.   Neurological: She is alert and oriented to person, place, and time.   Skin: Skin is warm and dry. No rash noted.   Psychiatric: She has a normal mood and affect. Her behavior is normal.         CRANIAL NERVES     CN III, IV, VI   Pupils are equal, round, and reactive to light.  Extraocular motions are normal.        Significant Labs:   CBC:   Recent Labs  Lab 01/13/18  0001   WBC 4.99   HGB 13.4   HCT 39.7        CMP:   Recent Labs  Lab 01/13/18  0001      K 3.5      CO2 25   *   BUN 13   CREATININE 1.0   CALCIUM 9.9   PROT 8.4   ALBUMIN 4.2   BILITOT 0.3   ALKPHOS 75   AST 20   ALT 20   ANIONGAP 12   EGFRNONAA 59*     Troponin:   Recent Labs  Lab 01/13/18  0001   TROPONINI 0.009       Significant Imaging:   Imaging Results          X-Ray Chest PA And Lateral (Final result)  Result time 01/13/18 00:38:30    Final result by Dionicio Javed MD (01/13/18 00:38:30)                 Impression:     No acute cardiopulmonary abnormality.      Electronically signed by: Dionicio Javed  Date:      01/13/18  Time:    00:38              Narrative:    EXAM:  2 VIEW CHEST RADIOGRAPH.     CLINICAL INDICATION:  Chest pain.    COMPARISON: 11/23/2010.    TECHNIQUE:  Two views of the chest were obtained.     FINDINGS: Cardiac silhouette is at the upper limits of normal in size but appears stable.  No air space disease.  No pleural effusion or pneumothorax.  Multilevel degenerative spurring in the thoracic spine.                                Assessment/Plan:     * Chest pain    LISBETH score 1 for risk factors. Symptoms atypical and reproducible likely component of stress/anxiety +/-MSK.  -cardiac monitoring  -serial troponins  -ASA along with PRN NG  -2D echo  -recent TSH and lipid panel reviewed              Benign essential HTN    Initially with severely elevated BP but now improving. Will resume home anti hypertensive regimen and monitor.           Morbid obesity with BMI of 40.0-44.9, adult    Patient counseled on risks obesity imparts on overall health. Urged toward diet and lifestyle modifications to aid in weight reduction.             Type 2 diabetes mellitus without complication, without long-term current use of insulin    A1c 6.6 Dec 2017. Well controlled on orals only at home--hold while in hospital. Will cover with SSI PRN, POCT checks, hypoglycemia protocol. Diabetic diet.               VTE Risk Mitigation         Ordered     enoxaparin injection 40 mg  Daily     Route:  Subcutaneous        01/13/18 0859     Low Risk of VTE  Once      01/13/18 0553             Radha Jara PA-C  Hospitalist-Department of Hospital Medicine  [address] [place] [address]  Office [phone] Pager [phone]

## 2018-01-13 NOTE — DISCHARGE SUMMARY
"[facility] Medicine  Discharge Summary      Patient Name: Bette Johnson  MRN: [id number]  Admission Date: 1/13/2018  Hospital Length of Stay: 0 days  Discharge Date and Time:  01/13/2018 2:49 PM  Attending Physician: Juvenal Tucker MD   Discharging Provider: Radha Jara PA-C  Primary Care Provider: Neeta Estrada MD      HPI:   Patient is 68 yo female with HTN, DMII, and obesity who presents to ED with complaint of CP. Per patient CP began around 8 pm evening PTA while making a large pot of pecan candy on the stove. Pain described as "pressure" on the breastbone. She rates initially as 7/10 in severity at onset. Attempted treatment with hot compress and muscle rub without significant relief. She reports improvement in hospital overnight with burping and gas release resulting in pain now 2/10. Initially breastbone was tender to touch but now improving. She denies associated recent illness, cough, diaphoresis, jaw pain, arm pain, SOB, N/V, LE swelling and pain. She is non smoker. No personal cardiovascular history but significant family history (father and multiple siblings with CABG). She reports compliance to all home medications and participates in senior fitness classes 3x weekly. On presentation patient with negative initial troponin and non ischemic EKG. CXR unremarkable. Placed in observation for ACS r/o.     * No surgery found *      Hospital Course:   Patient presents with CP. Normal initial troponin and non ischemic EKG. CXR without CP mimickers. Placed in observation for ACS r/o. Symptoms atypical and reproducible likely component of stress/anxiety +/- MSK +/- GI. Reported improvement following burping. Serial troponins negative. Had recent 2D echo with good EF and labs with PCP. She will discharge to home. Urged to return to ED if symptoms progress or return.      Consults:     No new Assessment & Plan notes have been filed under this hospital service since the last note " was generated.  Service: Hospital Medicine    Final Active Diagnoses:    Diagnosis Date Noted POA    Type 2 diabetes mellitus without complication, without long-term current use of insulin [E11.9] 07/12/2017 Yes    Morbid obesity with BMI of 40.0-44.9, adult [E66.01, Z68.41] 07/12/2017 Not Applicable    Benign essential HTN [I10] 07/12/2017 Yes      Problems Resolved During this Admission:    Diagnosis Date Noted Date Resolved POA    PRINCIPAL PROBLEM:  Chest pain [R07.9] 01/13/2018 01/13/2018 Yes       Discharged Condition: good    Disposition: Home or Self Care    Follow Up:  Follow-up Information     Neeta Estrada MD.    Specialty:  Family Medicine  Contact information:  [address] [facility]  [place] [address] [phone]                 Patient Instructions:     Diet diabetic     Diet Cardiac     Activity as tolerated         Significant Diagnostic Studies: Labs:   CMP   Recent Labs  Lab 01/13/18  0001      K 3.5      CO2 25   *   BUN 13   CREATININE 1.0   CALCIUM 9.9   PROT 8.4   ALBUMIN 4.2   BILITOT 0.3   ALKPHOS 75   AST 20   ALT 20   ANIONGAP 12   ESTGFRAFRICA >60   EGFRNONAA 59*   , CBC   Recent Labs  Lab 01/13/18  0001   WBC 4.99   HGB 13.4   HCT 39.7       and Troponin   Recent Labs  Lab 01/13/18  1153   TROPONINI <0.006       Pending Diagnostic Studies:     None         Medications:  Reconciled Home Medications:   Current Discharge Medication List      CONTINUE these medications which have NOT CHANGED    Details   amLODIPine (NORVASC) 10 MG tablet Take 1 tablet (10 mg total) by mouth once daily.  Qty: 90 tablet, Refills: 1    Associated Diagnoses: Essential hypertension      aspirin (ECOTRIN) 81 MG EC tablet Take 81 mg by mouth once daily.      loratadine (CLARITIN) 10 mg tablet Take 10 mg by mouth once daily.      metFORMIN (GLUCOPHAGE) 500 MG tablet Take 1 tablet (500 mg total) by mouth 2 (two) times daily with meals.  Qty: 180 tablet, Refills: 1    Associated Diagnoses:  Type 2 diabetes mellitus without complication, without long-term current use of insulin      triamterene-hydrochlorothiazide 37.5-25 mg (MAXZIDE-25) 37.5-25 mg per tablet Take 1 tablet by mouth once daily.  Qty: 90 tablet, Refills: 1    Associated Diagnoses: Essential hypertension             Indwelling Lines/Drains at time of discharge:   Lines/Drains/Airways          No matching active lines, drains, or airways          Time spent on the discharge of patient: less than thirty minutes  Patient was seen and examined on the date of discharge and determined to be suitable for discharge.         Radha Jara PA-C  Hospitalist-Department of Hospital Medicine  [address] [place] [address]  Office [phone] Pager [phone]

## 2018-01-13 NOTE — NURSING
Patient tolerated her meal well. No chest pain voiced. No change on telemetry . Continue to monitor.

## 2018-01-13 NOTE — ASSESSMENT & PLAN NOTE
Patient counseled on risks obesity imparts on overall health. Urged toward diet and lifestyle modifications to aid in weight reduction.

## 2018-01-13 NOTE — NURSING
0705-bedside rounding report received from abdirahman clayton rn and assessment completed reviewed the plan of care with patient . Denies chest pain or other discomfort when asked. No change on telemetry side rails up x  2 call light in bed and head of  Bed elevated .

## 2018-01-15 ENCOUNTER — TELEPHONE (OUTPATIENT)
Dept: FAMILY MEDICINE | Facility: CLINIC | Age: 68
End: 2018-01-15

## 2018-01-15 DIAGNOSIS — Z12.11 SCREEN FOR COLON CANCER: Primary | ICD-10-CM

## 2018-01-15 NOTE — TELEPHONE ENCOUNTER
Spoke to patient concerning chest pains. After speaking to the oncall nurse patient went to the ER. She has GERD.

## 2018-01-18 ENCOUNTER — TELEPHONE (OUTPATIENT)
Dept: FAMILY MEDICINE | Facility: CLINIC | Age: 68
End: 2018-01-18

## 2018-01-18 NOTE — TELEPHONE ENCOUNTER
----- Message from Micheal Hernández sent at 1/18/2018  2:34 PM CST -----  Contact: Self/[phone]  Patient called to get clarification on liquid for her Colonoscopy. Thank you.

## 2018-01-24 ENCOUNTER — ANESTHESIA EVENT (OUTPATIENT)
Dept: ENDOSCOPY | Facility: HOSPITAL | Age: 68
End: 2018-01-24
Payer: MEDICARE

## 2018-01-24 NOTE — ANESTHESIA PREPROCEDURE EVALUATION
01/24/2018  Bette Johnson is a 67 y.o., female.    Anesthesia Evaluation    I have reviewed the Patient Summary Reports.     I have reviewed the Medications.     Review of Systems  Anesthesia Hx:  No problems with previous Anesthesia    Social:  Alcohol Use, Non-Smoker    Cardiovascular:   Exercise tolerance: good Hypertension    Endocrine:   Diabetes, type 2        Physical Exam  General:  Well nourished    Airway/Jaw/Neck:  Airway Findings: Mouth Opening: Normal Tongue: Normal  General Airway Assessment: Adult  Mallampati: II  TM Distance: Normal, at least 6 cm  Jaw/Neck Findings:  Neck ROM: Normal ROM      Dental:  Dental Findings: In tact   Chest/Lungs:  Chest/Lungs Findings: Clear to auscultation, Normal Respiratory Rate     Heart/Vascular:  Heart Findings: Rate: Normal        Mental Status:  Mental Status Findings:  Cooperative, Alert and Oriented         Anesthesia Plan  Type of Anesthesia, risks & benefits discussed:  Anesthesia Type:  general  Patient's Preference:   Intra-op Monitoring Plan: standard ASA monitors  Intra-op Monitoring Plan Comments:   Post Op Pain Control Plan: multimodal analgesia, IV/PO Opioids PRN and per primary service following discharge from PACU  Post Op Pain Control Plan Comments:   Induction:   IV  Beta Blocker:  Patient is not currently on a Beta-Blocker (No further documentation required).       Informed Consent: Patient understands risks and agrees with Anesthesia plan.  Questions answered. Anesthesia consent signed with patient.  ASA Score: 3     Day of Surgery Review of History & Physical:    H&P update referred to the surgeon.         Ready For Surgery From Anesthesia Perspective.

## 2018-01-25 ENCOUNTER — HOSPITAL ENCOUNTER (OUTPATIENT)
Facility: HOSPITAL | Age: 68
Discharge: HOME | End: 2018-01-25
Attending: INTERNAL MEDICINE | Admitting: INTERNAL MEDICINE
Payer: MEDICARE

## 2018-01-25 ENCOUNTER — SURGERY (OUTPATIENT)
Age: 68
End: 2018-01-25

## 2018-01-25 ENCOUNTER — ANESTHESIA (OUTPATIENT)
Dept: ENDOSCOPY | Facility: HOSPITAL | Age: 68
End: 2018-01-25
Payer: MEDICARE

## 2018-01-25 VITALS
BODY MASS INDEX: 42.34 KG/M2 | RESPIRATION RATE: 18 BRPM | HEIGHT: 64 IN | OXYGEN SATURATION: 97 % | DIASTOLIC BLOOD PRESSURE: 68 MMHG | SYSTOLIC BLOOD PRESSURE: 154 MMHG | TEMPERATURE: 98 F | WEIGHT: 248 LBS | HEART RATE: 50 BPM

## 2018-01-25 DIAGNOSIS — Z12.11 COLON CANCER SCREENING: ICD-10-CM

## 2018-01-25 LAB — POCT GLUCOSE: 113 MG/DL (ref 70–110)

## 2018-01-25 PROCEDURE — 45385 COLONOSCOPY W/LESION REMOVAL: CPT | Performed by: INTERNAL MEDICINE

## 2018-01-25 PROCEDURE — 37000008 HC ANESTHESIA 1ST 15 MINUTES: Performed by: INTERNAL MEDICINE

## 2018-01-25 PROCEDURE — D9220A PRA ANESTHESIA: Mod: PT,ANES,, | Performed by: ANESTHESIOLOGY

## 2018-01-25 PROCEDURE — 25000003 PHARM REV CODE 250: Performed by: ANESTHESIOLOGY

## 2018-01-25 PROCEDURE — 37000009 HC ANESTHESIA EA ADD 15 MINS: Performed by: INTERNAL MEDICINE

## 2018-01-25 PROCEDURE — D9220A PRA ANESTHESIA: Mod: PT,CRNA,, | Performed by: NURSE ANESTHETIST, CERTIFIED REGISTERED

## 2018-01-25 PROCEDURE — 88305 TISSUE EXAM BY PATHOLOGIST: CPT

## 2018-01-25 PROCEDURE — 88305 TISSUE EXAM BY PATHOLOGIST: CPT | Mod: 26,,,

## 2018-01-25 PROCEDURE — 27201089 HC SNARE, DISP (ANY): Performed by: INTERNAL MEDICINE

## 2018-01-25 PROCEDURE — 63600175 PHARM REV CODE 636 W HCPCS: Performed by: NURSE ANESTHETIST, CERTIFIED REGISTERED

## 2018-01-25 RX ORDER — LIDOCAINE HCL/PF 100 MG/5ML
SYRINGE (ML) INTRAVENOUS
Status: DISCONTINUED | OUTPATIENT
Start: 2018-01-25 | End: 2018-01-25

## 2018-01-25 RX ORDER — PROPOFOL 10 MG/ML
VIAL (ML) INTRAVENOUS
Status: COMPLETED
Start: 2018-01-25 | End: 2018-01-25

## 2018-01-25 RX ORDER — LIDOCAINE HYDROCHLORIDE 10 MG/ML
1 INJECTION, SOLUTION EPIDURAL; INFILTRATION; INTRACAUDAL; PERINEURAL ONCE
Status: DISCONTINUED | OUTPATIENT
Start: 2018-01-25 | End: 2018-01-25 | Stop reason: HOSPADM

## 2018-01-25 RX ORDER — SODIUM CHLORIDE 9 MG/ML
INJECTION, SOLUTION INTRAVENOUS CONTINUOUS
Status: DISCONTINUED | OUTPATIENT
Start: 2018-01-25 | End: 2018-01-25 | Stop reason: HOSPADM

## 2018-01-25 RX ORDER — PROPOFOL 10 MG/ML
VIAL (ML) INTRAVENOUS
Status: DISCONTINUED | OUTPATIENT
Start: 2018-01-25 | End: 2018-01-25

## 2018-01-25 RX ORDER — LIDOCAINE HYDROCHLORIDE 20 MG/ML
INJECTION, SOLUTION EPIDURAL; INFILTRATION; INTRACAUDAL; PERINEURAL
Status: DISCONTINUED
Start: 2018-01-25 | End: 2018-01-25 | Stop reason: HOSPADM

## 2018-01-25 RX ADMIN — PROPOFOL 50 MG: 10 INJECTION, EMULSION INTRAVENOUS at 08:01

## 2018-01-25 RX ADMIN — PROPOFOL 100 MG: 10 INJECTION, EMULSION INTRAVENOUS at 08:01

## 2018-01-25 RX ADMIN — PROPOFOL 20 MG: 10 INJECTION, EMULSION INTRAVENOUS at 08:01

## 2018-01-25 RX ADMIN — SODIUM CHLORIDE: 0.9 INJECTION, SOLUTION INTRAVENOUS at 07:01

## 2018-01-25 RX ADMIN — LIDOCAINE HYDROCHLORIDE 60 MG: 20 INJECTION, SOLUTION INTRAVENOUS at 08:01

## 2018-01-25 RX ADMIN — PROPOFOL 30 MG: 10 INJECTION, EMULSION INTRAVENOUS at 08:01

## 2018-01-25 RX ADMIN — PROPOFOL 40 MG: 10 INJECTION, EMULSION INTRAVENOUS at 08:01

## 2018-01-25 NOTE — TRANSFER OF CARE
"Anesthesia Transfer of Care Note    Patient: Bette Johnson    Procedure(s) Performed: Procedure(s) (LRB):  COLONOSCOPY (N/A)    Patient location: GI    Anesthesia Type: general    Transport from OR: Transported from OR on room air with adequate spontaneous ventilation    Post pain: adequate analgesia    Post assessment: no apparent anesthetic complications and tolerated procedure well    Post vital signs: stable    Level of consciousness: awake, alert and oriented    Nausea/Vomiting: no nausea/vomiting    Complications: none    Transfer of care protocol was followed      Last vitals:   Visit Vitals  BP (!) 123/59 (BP Location: Left arm, Patient Position: Lying)   Pulse 64   Temp 36.6 °C (97.9 °F) (Oral)   Resp 18   Ht 5' 4" (1.626 m)   Wt 112.5 kg (248 lb)   SpO2 96%   Breastfeeding? No   BMI 42.57 kg/m²     "

## 2018-01-25 NOTE — H&P (VIEW-ONLY)
Routine Office Visit    Patient Name: Bette Johnson    : 1950  MRN: [id number]    Subjective:  Bette is a 67 y.o. female who presents today for     1. Hypertension - follow-up - pt has been taking her blood pressure medications. She reports no side effects from current medication regimen. bp is still high on occasion, but is within normal limits today after repeating. She c/o increasing weight gain. Weight gain is causing her to be short of breath especially with exertion (she has to walk up stairs to her home). She does try to exercise regularly. She does exercise class 3 times per week. She states that she has increased craving for food at night and indulges in the cravings. She believes this is cause of weight gain. She wants to make changes to her diet / exercise to lose weight.     Review of Systems   Constitutional: Negative for chills and fever.   HENT: Negative for congestion.    Eyes: Negative for blurred vision.   Respiratory: Positive for shortness of breath. Negative for cough.    Cardiovascular: Negative for chest pain.   Gastrointestinal: Negative for abdominal pain, constipation, diarrhea, heartburn, nausea and vomiting.   Genitourinary: Negative for dysuria.   Musculoskeletal: Negative for myalgias.   Skin: Negative for itching and rash.   Neurological: Negative for dizziness and headaches.   Psychiatric/Behavioral: Negative for depression.       Active Problem List  Patient Active Problem List   Diagnosis    Type 2 diabetes mellitus without complication, without long-term current use of insulin    Morbid obesity with BMI of 40.0-44.9, adult    Benign essential HTN    Thoracic aorta atherosclerosis       Past Surgical History  Past Surgical History:   Procedure Laterality Date    CHOLECYSTECTOMY         Family History  Family History   Problem Relation Age of Onset    Diabetes Mother     Diabetes Sister     Diabetes Brother     Kidney disease Brother     Diabetes Sister      "Hypertension Sister     Diabetes Sister     Hypertension Sister     Hyperlipidemia Sister        Social History  Social History     Social History    Marital status:      Spouse name: N/A    Number of children: N/A    Years of education: N/A     Occupational History    Not on file.     Social History Main Topics    Smoking status: Never Smoker    Smokeless tobacco: Never Used    Alcohol use Yes    Drug use: No    Sexual activity: No     Other Topics Concern    Not on file     Social History Narrative    No narrative on file       Medications and Allergies  Reviewed and updated.   Current Outpatient Prescriptions   Medication Sig    amLODIPine (NORVASC) 10 MG tablet Take 1 tablet (10 mg total) by mouth once daily.    aspirin (ECOTRIN) 81 MG EC tablet Take 81 mg by mouth once daily.    loratadine (CLARITIN) 10 mg tablet Take 10 mg by mouth once daily.    metFORMIN (GLUCOPHAGE) 500 MG tablet Take 1 tablet (500 mg total) by mouth 2 (two) times daily with meals.    triamterene-hydrochlorothiazide 37.5-25 mg (MAXZIDE-25) 37.5-25 mg per tablet Take 1 tablet by mouth once daily.     No current facility-administered medications for this visit.        Physical Exam  /80 (BP Location: Left arm, Patient Position: Sitting, BP Method: Large (Manual))   Pulse 64   Temp 97.7 °F (36.5 °C) (Oral)   Ht 5' 4" (1.626 m)   Wt 112.7 kg (248 lb 7.3 oz)   SpO2 98%   BMI 42.65 kg/m²   Physical Exam   Constitutional: She is oriented to person, place, and time. She appears well-developed and well-nourished.   HENT:   Head: Normocephalic and atraumatic.   Eyes: Conjunctivae and EOM are normal. Pupils are equal, round, and reactive to light.   Neck: Normal range of motion. Neck supple.   Cardiovascular: Normal rate, regular rhythm and normal heart sounds.  Exam reveals no gallop and no friction rub.    No murmur heard.  Pulmonary/Chest: Breath sounds normal. No respiratory distress.   Abdominal: Soft. Bowel " sounds are normal. She exhibits no distension. There is no tenderness.   Musculoskeletal: Normal range of motion.   Lymphadenopathy:     She has no cervical adenopathy.   Neurological: She is alert and oriented to person, place, and time.   Skin: Skin is warm.   Psychiatric: She has a normal mood and affect.         Assessment/Plan:  Bette Johnson is a 67 y.o. female who presents today for :    Need for prophylactic vaccination and inoculation against influenza  -     Influenza - High Dose (65+) (PF) (IM)    Type 2 diabetes mellitus without complication, without long-term current use of insulin  -     Hemoglobin A1c; Future; Expected date: 12/27/2017  -     metFORMIN (GLUCOPHAGE) 500 MG tablet; Take 1 tablet (500 mg total) by mouth 2 (two) times daily with meals.  Dispense: 180 tablet; Refill: 1  Will increase metformin from 500 qdaily to bid   Common side effects of this medication were discussed with the patient. Questions regarding medications were discussed during this visit.   To help decrease appetite at night    Benign essential HTN  -     2D Echo w/ Color Flow Doppler; Future    Screening for malignant neoplasm of colon  -     Case request GI: COLONOSCOPY    Thoracic aorta atherosclerosis  Patient with Atherosclerosis of the Aorta.  Stable/asymptomatic. Currently stable on lipid and b/p monitoring.    Morbid obesity with BMI of 40.0-44.9, adult  BMI noted  The patient is asked to make an attempt to improve diet and exercise patterns to aid in medical management of this problem.            Return in about 6 months (around 6/27/2018), or if symptoms worsen or fail to improve.

## 2018-01-25 NOTE — ANESTHESIA POSTPROCEDURE EVALUATION
"Anesthesia Post Evaluation    Patient: Bette Johnson    Procedure(s) Performed: Procedure(s) (LRB):  COLONOSCOPY (N/A)    Final Anesthesia Type: general  Patient location during evaluation: GI PACU  Patient participation: Yes- Able to Participate  Level of consciousness: awake and alert, awake and oriented  Post-procedure vital signs: reviewed and stable  Pain management: adequate  Airway patency: patent  PONV status at discharge: No PONV  Anesthetic complications: no      Cardiovascular status: blood pressure returned to baseline and hemodynamically stable  Respiratory status: unassisted, spontaneous ventilation and room air  Hydration status: euvolemic  Follow-up not needed.        Visit Vitals  BP (!) 159/70 (BP Location: Left arm, Patient Position: Lying)   Pulse (!) 51   Temp 36.6 °C (97.9 °F) (Oral)   Resp 18   Ht 5' 4" (1.626 m)   Wt 112.5 kg (248 lb)   SpO2 97%   Breastfeeding? No   BMI 42.57 kg/m²       Pain/Francisco Score: Pain Assessment Performed: Yes (1/25/2018  8:59 AM)  Presence of Pain: denies (1/25/2018  8:59 AM)  Pain Rating Prior to Med Admin: 0 (1/25/2018  7:44 AM)  Francisco Score: 10 (1/25/2018  9:09 AM)      "

## 2018-04-06 ENCOUNTER — PES CALL (OUTPATIENT)
Dept: ADMINISTRATIVE | Facility: CLINIC | Age: 68
End: 2018-04-06

## 2018-06-04 DIAGNOSIS — I10 ESSENTIAL HYPERTENSION: ICD-10-CM

## 2018-06-04 RX ORDER — TRIAMTERENE/HYDROCHLOROTHIAZID 37.5-25 MG
1 TABLET ORAL DAILY
Qty: 90 TABLET | Refills: 0 | Status: SHIPPED | OUTPATIENT
Start: 2018-06-04 | End: 2018-06-11 | Stop reason: SDUPTHER

## 2018-06-04 NOTE — TELEPHONE ENCOUNTER
----- Message from Hodan Blanc sent at 6/4/2018 10:24 AM CDT -----  Contact: self  Refill request: triamterene-hydrochlorothiazide 37.5-25 mg (MAXZIDE-25) 37.5-25 mg per tablet    .  [id number] - [place], [place] - [address] [place] [address]  Phone: [phone] Fax: [phone]    Owkkdp-[phone]    Thanks

## 2018-06-11 ENCOUNTER — OFFICE VISIT (OUTPATIENT)
Dept: FAMILY MEDICINE | Facility: CLINIC | Age: 68
End: 2018-06-11
Payer: MEDICARE

## 2018-06-11 ENCOUNTER — LAB VISIT (OUTPATIENT)
Dept: LAB | Facility: HOSPITAL | Age: 68
End: 2018-06-11
Attending: FAMILY MEDICINE
Payer: MEDICARE

## 2018-06-11 VITALS
DIASTOLIC BLOOD PRESSURE: 62 MMHG | HEIGHT: 64 IN | WEIGHT: 249.13 LBS | TEMPERATURE: 98 F | SYSTOLIC BLOOD PRESSURE: 110 MMHG | OXYGEN SATURATION: 97 % | BODY MASS INDEX: 42.53 KG/M2 | HEART RATE: 52 BPM

## 2018-06-11 DIAGNOSIS — E66.01 MORBID OBESITY WITH BMI OF 40.0-44.9, ADULT: ICD-10-CM

## 2018-06-11 DIAGNOSIS — Z11.59 ENCOUNTER FOR HEPATITIS C SCREENING TEST FOR LOW RISK PATIENT: ICD-10-CM

## 2018-06-11 DIAGNOSIS — I10 BENIGN ESSENTIAL HTN: ICD-10-CM

## 2018-06-11 DIAGNOSIS — E11.9 TYPE 2 DIABETES MELLITUS WITHOUT COMPLICATION, WITHOUT LONG-TERM CURRENT USE OF INSULIN: ICD-10-CM

## 2018-06-11 DIAGNOSIS — Z00.00 ANNUAL PHYSICAL EXAM: Primary | ICD-10-CM

## 2018-06-11 DIAGNOSIS — I70.0 THORACIC AORTA ATHEROSCLEROSIS: ICD-10-CM

## 2018-06-11 DIAGNOSIS — I10 ESSENTIAL HYPERTENSION: ICD-10-CM

## 2018-06-11 DIAGNOSIS — Z00.00 ANNUAL PHYSICAL EXAM: ICD-10-CM

## 2018-06-11 LAB
25(OH)D3+25(OH)D2 SERPL-MCNC: 20 NG/ML
ALBUMIN SERPL BCP-MCNC: 4.2 G/DL
ALP SERPL-CCNC: 71 U/L
ALT SERPL W/O P-5'-P-CCNC: 20 U/L
ANION GAP SERPL CALC-SCNC: 10 MMOL/L
AST SERPL-CCNC: 22 U/L
BASOPHILS # BLD AUTO: 0.07 K/UL
BASOPHILS NFR BLD: 1.3 %
BILIRUB SERPL-MCNC: 0.5 MG/DL
BUN SERPL-MCNC: 16 MG/DL
CALCIUM SERPL-MCNC: 9.6 MG/DL
CHLORIDE SERPL-SCNC: 101 MMOL/L
CHOLEST SERPL-MCNC: 176 MG/DL
CHOLEST/HDLC SERPL: 3.6 {RATIO}
CO2 SERPL-SCNC: 27 MMOL/L
CREAT SERPL-MCNC: 1.1 MG/DL
CREAT UR-MCNC: 107 MG/DL
DIFFERENTIAL METHOD: NORMAL
EOSINOPHIL # BLD AUTO: 0.2 K/UL
EOSINOPHIL NFR BLD: 3.7 %
ERYTHROCYTE [DISTWIDTH] IN BLOOD BY AUTOMATED COUNT: 13.4 %
EST. GFR  (AFRICAN AMERICAN): >60 ML/MIN/1.73 M^2
EST. GFR  (NON AFRICAN AMERICAN): 52.4 ML/MIN/1.73 M^2
ESTIMATED AVG GLUCOSE: 163 MG/DL
GLUCOSE SERPL-MCNC: 139 MG/DL
HBA1C MFR BLD HPLC: 7.3 %
HCT VFR BLD AUTO: 44.7 %
HDLC SERPL-MCNC: 49 MG/DL
HDLC SERPL: 27.8 %
HGB BLD-MCNC: 14.5 G/DL
IMM GRANULOCYTES # BLD AUTO: 0.01 K/UL
IMM GRANULOCYTES NFR BLD AUTO: 0.2 %
LDLC SERPL CALC-MCNC: 96.2 MG/DL
LYMPHOCYTES # BLD AUTO: 2.5 K/UL
LYMPHOCYTES NFR BLD: 45.4 %
MCH RBC QN AUTO: 28 PG
MCHC RBC AUTO-ENTMCNC: 32.4 G/DL
MCV RBC AUTO: 87 FL
MICROALBUMIN UR DL<=1MG/L-MCNC: 15 UG/ML
MICROALBUMIN/CREATININE RATIO: 14 UG/MG
MONOCYTES # BLD AUTO: 0.4 K/UL
MONOCYTES NFR BLD: 7.7 %
NEUTROPHILS # BLD AUTO: 2.3 K/UL
NEUTROPHILS NFR BLD: 41.7 %
NONHDLC SERPL-MCNC: 127 MG/DL
NRBC BLD-RTO: 0 /100 WBC
PLATELET # BLD AUTO: 304 K/UL
PMV BLD AUTO: 11.3 FL
POTASSIUM SERPL-SCNC: 3.8 MMOL/L
PROT SERPL-MCNC: 8.7 G/DL
RBC # BLD AUTO: 5.17 M/UL
SODIUM SERPL-SCNC: 138 MMOL/L
TRIGL SERPL-MCNC: 154 MG/DL
TSH SERPL DL<=0.005 MIU/L-ACNC: 2.45 UIU/ML
WBC # BLD AUTO: 5.46 K/UL

## 2018-06-11 PROCEDURE — 86803 HEPATITIS C AB TEST: CPT

## 2018-06-11 PROCEDURE — 3044F HG A1C LEVEL LT 7.0%: CPT | Mod: CPTII,S$GLB,, | Performed by: FAMILY MEDICINE

## 2018-06-11 PROCEDURE — 80061 LIPID PANEL: CPT

## 2018-06-11 PROCEDURE — 82043 UR ALBUMIN QUANTITATIVE: CPT

## 2018-06-11 PROCEDURE — 80053 COMPREHEN METABOLIC PANEL: CPT

## 2018-06-11 PROCEDURE — 99397 PER PM REEVAL EST PAT 65+ YR: CPT | Mod: S$GLB,,, | Performed by: FAMILY MEDICINE

## 2018-06-11 PROCEDURE — 99999 PR PBB SHADOW E&M-EST. PATIENT-LVL IV: CPT | Mod: PBBFAC,,, | Performed by: FAMILY MEDICINE

## 2018-06-11 PROCEDURE — 3074F SYST BP LT 130 MM HG: CPT | Mod: CPTII,S$GLB,, | Performed by: FAMILY MEDICINE

## 2018-06-11 PROCEDURE — 83036 HEMOGLOBIN GLYCOSYLATED A1C: CPT

## 2018-06-11 PROCEDURE — 3078F DIAST BP <80 MM HG: CPT | Mod: CPTII,S$GLB,, | Performed by: FAMILY MEDICINE

## 2018-06-11 PROCEDURE — 85025 COMPLETE CBC W/AUTO DIFF WBC: CPT

## 2018-06-11 PROCEDURE — 99499 UNLISTED E&M SERVICE: CPT | Mod: S$GLB,,, | Performed by: FAMILY MEDICINE

## 2018-06-11 PROCEDURE — 84443 ASSAY THYROID STIM HORMONE: CPT

## 2018-06-11 PROCEDURE — 36415 COLL VENOUS BLD VENIPUNCTURE: CPT | Mod: PO

## 2018-06-11 PROCEDURE — 82306 VITAMIN D 25 HYDROXY: CPT

## 2018-06-11 RX ORDER — AMLODIPINE BESYLATE 10 MG/1
10 TABLET ORAL DAILY
Qty: 90 TABLET | Refills: 1 | Status: SHIPPED | OUTPATIENT
Start: 2018-06-11 | End: 2018-12-27 | Stop reason: SDUPTHER

## 2018-06-11 RX ORDER — TRIAMTERENE/HYDROCHLOROTHIAZID 37.5-25 MG
1 TABLET ORAL DAILY
Qty: 90 TABLET | Refills: 1 | Status: SHIPPED | OUTPATIENT
Start: 2018-06-11 | End: 2019-03-05 | Stop reason: SDUPTHER

## 2018-06-11 RX ORDER — METFORMIN HYDROCHLORIDE 500 MG/1
500 TABLET ORAL
Qty: 90 TABLET | Refills: 1 | Status: SHIPPED | OUTPATIENT
Start: 2018-06-11 | End: 2018-12-27 | Stop reason: SDUPTHER

## 2018-06-11 NOTE — PROGRESS NOTES
Office Visit for Annual Exam    Patient Name: Bette Johnson    : 1950  MRN: [id number]    Subjective:  Bette is a 68 y.o. female who presents today for     1. Annual physical  2. Fatigue - pt c/o increase fatigue. She had an episode where she was walking / exerting herself and noticed truncal pain. Pain was intermittent and did not last. Pain was described as an achy pain that was present throughout her whole anterior chest and abdomen. No further episodes. Pt has noticed increased fatigue.     Review of Systems   Constitutional: Positive for malaise/fatigue. Negative for chills and fever.   HENT: Negative for congestion.    Eyes: Negative for blurred vision.   Respiratory: Negative for cough.    Cardiovascular: Negative for chest pain.   Gastrointestinal: Negative for abdominal pain, constipation, diarrhea, heartburn, nausea and vomiting.   Genitourinary: Negative for dysuria.   Musculoskeletal: Negative for myalgias.   Skin: Negative for itching and rash.   Neurological: Negative for dizziness and headaches.   Psychiatric/Behavioral: Negative for depression.       Active Problem List  Patient Active Problem List   Diagnosis    Type 2 diabetes mellitus without complication, without long-term current use of insulin    Morbid obesity with BMI of 40.0-44.9, adult    Essential hypertension    Thoracic aorta atherosclerosis       Past Surgical History  Past Surgical History:   Procedure Laterality Date    CHOLECYSTECTOMY      COLONOSCOPY N/A 2018    Procedure: COLONOSCOPY;  Surgeon: Jeffrey Solano MD;  Location: [facility];  Service: Endoscopy;  Laterality: N/A;       Family History  Family History   Problem Relation Age of Onset    Diabetes Mother     Diabetes Sister     Diabetes Brother     Kidney disease Brother     Diabetes Sister     Hypertension Sister     Diabetes Sister     Hypertension Sister     Hyperlipidemia Sister        Social History  Social History     Social History     "Marital status: Single     Spouse name: N/A    Number of children: N/A    Years of education: N/A     Occupational History    Not on file.     Social History Main Topics    Smoking status: Never Smoker    Smokeless tobacco: Never Used    Alcohol use Yes    Drug use: No    Sexual activity: No     Other Topics Concern    Not on file     Social History Narrative    No narrative on file       Medications and Allergies  Reviewed and updated.   Current Outpatient Prescriptions   Medication Sig    amLODIPine (NORVASC) 10 MG tablet Take 1 tablet (10 mg total) by mouth once daily.    aspirin (ECOTRIN) 81 MG EC tablet Take 81 mg by mouth once daily.    loratadine (CLARITIN) 10 mg tablet Take 10 mg by mouth once daily.    metFORMIN (GLUCOPHAGE) 500 MG tablet Take 1 tablet (500 mg total) by mouth daily with breakfast.    triamterene-hydrochlorothiazide 37.5-25 mg (MAXZIDE-25) 37.5-25 mg per tablet Take 1 tablet by mouth once daily.     No current facility-administered medications for this visit.        Physical Exam  /62 (BP Location: Left arm, Patient Position: Sitting, BP Method: Large (Manual))   Pulse (!) 52   Temp 97.5 °F (36.4 °C) (Oral)   Ht 5' 4" (1.626 m)   Wt 113 kg (249 lb 1.9 oz)   SpO2 97%   BMI 42.76 kg/m²   Physical Exam   Constitutional: She is oriented to person, place, and time. She appears well-developed and well-nourished.   HENT:   Head: Normocephalic and atraumatic.   Eyes: Conjunctivae and EOM are normal. Pupils are equal, round, and reactive to light.   Neck: Normal range of motion. Neck supple.   Cardiovascular: Normal rate, regular rhythm and normal heart sounds.  Exam reveals no gallop and no friction rub.    No murmur heard.  Pulmonary/Chest: Breath sounds normal. No respiratory distress.   Abdominal: Soft. Bowel sounds are normal. She exhibits no distension. There is no tenderness.   Musculoskeletal: Normal range of motion.   Lymphadenopathy:     She has no cervical " adenopathy.   Neurological: She is alert and oriented to person, place, and time.   Skin: Skin is warm.   Psychiatric: She has a normal mood and affect.         Assessment/Plan:  Bette Johnson is a 68 y.o. female who presents today for :    Annual physical exam  Health Maintenance       Date Due Completion Date    Hepatitis C Screening 1950 ---    High Dose Statin 03/02/1973 ---    Lipid Panel 06/08/2018 6/8/2017    Urine Microalbumin 06/08/2018 6/8/2017    Eye Exam 06/19/2018 6/19/2017 (Done)    Override on 6/19/2017: Done    Hemoglobin A1c 06/27/2018 12/27/2017    Influenza Vaccine 08/01/2018 12/27/2017    Pneumococcal (65+) (2 of 2 - PPSV23) 01/13/2019 1/13/2018    Foot Exam 06/11/2019 6/11/2018 (Done)    Override on 6/11/2018: Done    Mammogram 10/25/2019 10/25/2017    Override on 7/14/2016: Done    DEXA SCAN 03/01/2020 3/1/2017 (Done)    Override on 3/1/2017: Done    TETANUS VACCINE 07/12/2027 7/12/2017 (Declined)    Override on 7/12/2017: Declined    Colonoscopy 01/25/2028 1/25/2018        I addressed all major concerns as it related to health maintenance.  All were ordered and scheduled based on the patients wishes.  Any additional health maintenance will be readdressed at the next physical if declined or deferred by the patient.    Essential hypertension  -     triamterene-hydrochlorothiazide 37.5-25 mg (MAXZIDE-25) 37.5-25 mg per tablet; Take 1 tablet by mouth once daily.  Dispense: 90 tablet; Refill: 1  -     amLODIPine (NORVASC) 10 MG tablet; Take 1 tablet (10 mg total) by mouth once daily.  Dispense: 90 tablet; Refill: 1  -     Lipid panel; Future; Expected date: 06/11/2018  -     CBC auto differential; Future; Expected date: 06/11/2018  -     Comprehensive metabolic panel; Future; Expected date: 06/11/2018  -     TSH; Future; Expected date: 06/11/2018  The current medical regimen is effective;  continue present plan and medications.    Type 2 diabetes mellitus without complication, without  long-term current use of insulin  -     metFORMIN (GLUCOPHAGE) 500 MG tablet; Take 1 tablet (500 mg total) by mouth daily with breakfast.  Dispense: 90 tablet; Refill: 1  -     Microalbumin/creatinine urine ratio  The current medical regimen is effective;  continue present plan and medications.    Morbid obesity with BMI of 40.0-44.9, adult  -     Vitamin D; Future; Expected date: 06/11/2018    Thoracic aorta atherosclerosis  Patient with Atherosclerosis of the Aorta.  Stable/asymptomatic. Currently stable on lipid and b/p monitoring.  Will f/u with lipids and consider statin    Encounter for hepatitis C screening test for low risk patient  -     Hepatitis C antibody; Future; Expected date: 06/11/2018        Follow-up in about 6 months (around 12/11/2018), or if symptoms worsen or fail to improve.

## 2018-06-11 NOTE — PATIENT INSTRUCTIONS
Diet: Diabetes  Food is an important tool that you can use to control diabetes and stay healthy. Eating well-balanced meals in the correct amounts will help you control your blood glucose levels and prevent low blood sugar reactions. It will also help you reduce the health risks of diabetes. There is no one specific diet that is right for everyone with diabetes. But there are general guidelines to follow. A registered dietitian (RD) will create a tailored diet approach thats just right for you. He or she will also help you plan healthy meals and snacks. If you have any questions, call your dietitian for advice.     Guidelines for success  Talk with your healthcare provider before starting a diabetes diet or weight loss program. If you haven't talked with a dietitian yet, ask your provider for a referral. The following guidelines can help you succeed:  · Select foods from the 6 food groups below. Your dietitian will help you find food choices within each group. He or she will also show you serving sizes and how many servings you can have at each meal.  ¨ Grains, beans, and starchy vegetables  ¨ Vegetables  ¨ Fruit  ¨ Milk or yogurt  ¨ Meat, poultry, fish, or tofu  ¨ Healthy fats  · Check your blood sugar levels as directed by your provider. Take any medicine as prescribed by your provider.  · Learn to read food labels and pick the right portion sizes.  · Eat only the amount of food in your meal plan. Eat about the same amount of food at regular times each day. Dont skip meals. Eat meals 4 to 5 hours apart, with snacks in between.  · Limit alcohol. It raises blood sugar levels. Drink water or calorie-free diet drinks that use safe sweeteners.  · Eat less fat to help lower your risk of heart disease. Use nonfat or low-fat dairy products and lean meats. Avoid fried foods. Use cooking oils that are unsaturated, such as olive, canola, or peanut oil.  · Talk with your dietitian about safe sugar substitutes.  · Avoid  added salt. It can contribute to high blood pressure, which can cause heart disease. People with diabetes already have a risk of high blood pressure and heart disease.  · Stay at a healthy weight. If you need to lose weight, cut down on your portion sizes. But dont skip meals. Exercise is an important part of any weight management program. Talk with your provider about an exercise program thats right for you.  · For more information about the best diet plan for you, talk with a registered dietitian (RD). To find an RD in your area, contact:  ¨ Academy of Nutrition and Dietetics www.eatright.org  ¨ The American Diabetes Association 901-227-8082 www.diabetes.org  Date Last Reviewed: 8/1/2016 © 2000-2017 [employer]. [address] [place] [address], [place] [address]. All rights reserved. This information is not intended as a substitute for professional medical care. Always follow your healthcare professional's instructions.

## 2018-06-12 LAB
HCV AB SERPL QL IA: NEGATIVE
HCV AB SERPL QL IA: NEGATIVE

## 2018-06-14 DIAGNOSIS — E11.9 DIABETES MELLITUS WITHOUT COMPLICATION: ICD-10-CM

## 2018-07-09 LAB
LEFT EYE DM RETINOPATHY: NEGATIVE
RIGHT EYE DM RETINOPATHY: NEGATIVE

## 2018-07-16 ENCOUNTER — TELEPHONE (OUTPATIENT)
Dept: ADMINISTRATIVE | Facility: HOSPITAL | Age: 68
End: 2018-07-16

## 2018-08-13 ENCOUNTER — TELEPHONE (OUTPATIENT)
Dept: FAMILY MEDICINE | Facility: CLINIC | Age: 68
End: 2018-08-13

## 2018-08-13 DIAGNOSIS — Z12.31 VISIT FOR SCREENING MAMMOGRAM: Primary | ICD-10-CM

## 2018-08-13 NOTE — TELEPHONE ENCOUNTER
----- Message from Sheryl Brady sent at 8/13/2018  1:49 PM CDT -----  Contact: self  Pt needs order for mammogram,please call [phone]

## 2018-09-25 ENCOUNTER — PES CALL (OUTPATIENT)
Dept: ADMINISTRATIVE | Facility: CLINIC | Age: 68
End: 2018-09-25

## 2018-10-29 ENCOUNTER — HOSPITAL ENCOUNTER (OUTPATIENT)
Dept: RADIOLOGY | Facility: HOSPITAL | Age: 68
Discharge: HOME | End: 2018-10-29
Attending: NURSE PRACTITIONER
Payer: MEDICARE

## 2018-10-29 ENCOUNTER — OFFICE VISIT (OUTPATIENT)
Dept: FAMILY MEDICINE | Facility: CLINIC | Age: 68
End: 2018-10-29
Payer: MEDICARE

## 2018-10-29 VITALS — HEIGHT: 64 IN | WEIGHT: 249 LBS | BODY MASS INDEX: 42.51 KG/M2

## 2018-10-29 DIAGNOSIS — E66.01 MORBID OBESITY WITH BMI OF 40.0-44.9, ADULT: ICD-10-CM

## 2018-10-29 DIAGNOSIS — E11.69 DIABETES MELLITUS TYPE 2 IN OBESE: ICD-10-CM

## 2018-10-29 DIAGNOSIS — E66.9 DIABETES MELLITUS TYPE 2 IN OBESE: ICD-10-CM

## 2018-10-29 DIAGNOSIS — Z00.00 ENCOUNTER FOR PREVENTIVE HEALTH EXAMINATION: Primary | ICD-10-CM

## 2018-10-29 DIAGNOSIS — Z12.31 VISIT FOR SCREENING MAMMOGRAM: ICD-10-CM

## 2018-10-29 DIAGNOSIS — I70.0 THORACIC AORTA ATHEROSCLEROSIS: ICD-10-CM

## 2018-10-29 DIAGNOSIS — I10 ESSENTIAL HYPERTENSION: ICD-10-CM

## 2018-10-29 DIAGNOSIS — E11.9 TYPE 2 DIABETES MELLITUS WITHOUT COMPLICATION, WITHOUT LONG-TERM CURRENT USE OF INSULIN: ICD-10-CM

## 2018-10-29 PROCEDURE — 3075F SYST BP GE 130 - 139MM HG: CPT | Mod: CPTII,,, | Performed by: NURSE PRACTITIONER

## 2018-10-29 PROCEDURE — G0439 PPPS, SUBSEQ VISIT: HCPCS | Mod: ,,, | Performed by: NURSE PRACTITIONER

## 2018-10-29 PROCEDURE — 99999 PR PBB SHADOW E&M-EST. PATIENT-LVL III: CPT | Mod: PBBFAC,,, | Performed by: NURSE PRACTITIONER

## 2018-10-29 PROCEDURE — 99499 UNLISTED E&M SERVICE: CPT | Mod: S$GLB,,, | Performed by: NURSE PRACTITIONER

## 2018-10-29 PROCEDURE — 77063 BREAST TOMOSYNTHESIS BI: CPT | Mod: TC,PO

## 2018-10-29 PROCEDURE — 99213 OFFICE O/P EST LOW 20 MIN: CPT | Mod: PBBFAC,PO | Performed by: NURSE PRACTITIONER

## 2018-10-29 PROCEDURE — 77067 SCR MAMMO BI INCL CAD: CPT | Mod: 26,,, | Performed by: RADIOLOGY

## 2018-10-29 PROCEDURE — 3079F DIAST BP 80-89 MM HG: CPT | Mod: CPTII,,, | Performed by: NURSE PRACTITIONER

## 2018-10-29 PROCEDURE — 77063 BREAST TOMOSYNTHESIS BI: CPT | Mod: 26,,, | Performed by: RADIOLOGY

## 2018-10-29 PROCEDURE — 3045F PR MOST RECENT HEMOGLOBIN A1C LEVEL 7.0-9.0%: CPT | Mod: CPTII,,, | Performed by: NURSE PRACTITIONER

## 2018-10-30 VITALS — SYSTOLIC BLOOD PRESSURE: 134 MMHG | OXYGEN SATURATION: 97 % | DIASTOLIC BLOOD PRESSURE: 86 MMHG | HEART RATE: 71 BPM

## 2018-10-30 PROBLEM — E11.69 DIABETES MELLITUS TYPE 2 IN OBESE: Status: ACTIVE | Noted: 2018-10-30

## 2018-10-30 PROBLEM — E66.9 DIABETES MELLITUS TYPE 2 IN OBESE: Status: ACTIVE | Noted: 2018-10-30

## 2018-10-30 NOTE — PATIENT INSTRUCTIONS
Counseling and Referral of Other Preventative  (Italic type indicates deductible and co-insurance are waived)    Patient Name: Bette Johnson  Today's Date: 10/30/2018    Health Maintenance       Date Due Completion Date    High Dose Statin 05/01/2019 (Originally 3/2/1973) ---    Hemoglobin A1c 12/11/2018 6/11/2018    Pneumococcal (65+) (2 of 2 - PPSV23) 01/13/2019 1/13/2018    Foot Exam 06/11/2019 6/11/2018 (Done)    Override on 6/11/2018: Done    Lipid Panel 06/11/2019 6/11/2018    Urine Microalbumin 06/11/2019 6/11/2018    Eye Exam 07/09/2019 7/9/2018    Override on 6/19/2017: Done    Mammogram 10/25/2019 10/25/2017    Override on 7/14/2016: Done    DEXA SCAN 03/01/2020 3/1/2017 (Done)    Override on 3/1/2017: Done    TETANUS VACCINE 07/12/2027 7/12/2017 (Declined)    Override on 7/12/2017: Declined    Colonoscopy 01/25/2028 1/25/2018        No orders of the defined types were placed in this encounter.    The following information is provided to all patients.  This information is to help you find resources for any of the problems found today that may be affecting your health:                Living healthy guide: www.[facility].[place].gov      Understanding Diabetes: www.diabetes.org      Eating healthy: www.cdc.gov/healthyweight      CDC home safety checklist: www.cdc.gov/steadi/patient.html      Agency on Aging: www.goea.[place].gov      Alcoholics anonymous (AA): www.aa.org      Physical Activity: www.allie.nih.gov/gd5yjlq      Tobacco use: www.quitwithusla.org

## 2018-10-30 NOTE — PROGRESS NOTES
Bette Johnson presented for a  Medicare AWV and comprehensive Health Risk Assessment today. The following components were reviewed and updated:    · Medical history  · Family History  · Social history  · Allergies and Current Medications  · Health Risk Assessment  · Health Maintenance  · Care Team     ** See Completed Assessments for Annual Wellness Visit within the encounter summary.**       The following assessments were completed:  · Living Situation  · CAGE  · Depression Screening  · Timed Get Up and Go  · Whisper Test  · Cognitive Function Screening  ·   ·   · Nutrition Screening  · ADL Screening  · PAQ Screening    Vitals:    10/29/18 1450   BP: 134/86   BP Location: Left arm   Patient Position: Sitting   BP Method: Large (Manual)   Pulse: 71   SpO2: 97%     There is no height or weight on file to calculate BMI.  Physical Exam   Constitutional: She is oriented to person, place, and time.   Cardiovascular: Normal rate.   Pulmonary/Chest: Effort normal.   Neurological: She is alert and oriented to person, place, and time.   Skin: Skin is warm. Capillary refill takes less than 2 seconds.   Psychiatric: She has a normal mood and affect. Her behavior is normal. Thought content normal.   Vitals reviewed.        Diagnoses and health risks identified today and associated recommendations/orders:    1. Encounter for preventive health examination  Education provided about preventive health examinations and procedures; addressed and discussed patient's health concerns. Additionally, reviewed medical record for risk factors and documented the results during this encounter.    2. Thoracic aorta atherosclerosis  Stable, asymptomatic; monitor.     3. Morbid obesity with BMI of 40.0-44.9, adult  Patient engaged in structured fitness activities; participant of [email or web address] at [facility]   Engaged in aquatics, advised to continue.     4. Diabetes mellitus type 2 in obese  Education provided about diabetes, management of  blood glucose with diet and activities, monitoring for worsening effects of diabetes.  Reviewed most recent Ha1c and informed patient of complications associated with uncontrolled diabetes.     5. Type 2 diabetes mellitus without complication, without long-term current use of insulin  Education provided about diabetes, management of blood glucose with diet and activities, monitoring for worsening effects of diabetes.  Reviewed most recent Ha1c and informed patient of complications associated with uncontrolled diabetes.     6. Essential hypertension  Presently at goal. Continue as advised regarding dietary and lifestyle modifications.       Reviewed health maintenance, educated about recommended examinations, procedures (labs & images), and immunizations.     Provided Bette with a 5-10 year written screening schedule and personal prevention plan. Recommendations were developed using the USPSTF age appropriate recommendations. Education, counseling, and referrals were provided as needed. After Visit Summary printed and given to patient which includes a list of additional screenings\tests needed.    Follow-up in about 1 year (around 10/29/2019) for assessment .    Manas Vann Jr, NP

## 2018-12-27 DIAGNOSIS — E11.9 TYPE 2 DIABETES MELLITUS WITHOUT COMPLICATION, WITHOUT LONG-TERM CURRENT USE OF INSULIN: ICD-10-CM

## 2018-12-27 DIAGNOSIS — I10 ESSENTIAL HYPERTENSION: ICD-10-CM

## 2018-12-27 RX ORDER — AMLODIPINE BESYLATE 10 MG/1
10 TABLET ORAL DAILY
Qty: 90 TABLET | Refills: 1 | Status: SHIPPED | OUTPATIENT
Start: 2018-12-27 | End: 2019-06-13 | Stop reason: SDUPTHER

## 2018-12-27 RX ORDER — METFORMIN HYDROCHLORIDE 500 MG/1
500 TABLET ORAL
Qty: 90 TABLET | Refills: 1 | Status: SHIPPED | OUTPATIENT
Start: 2018-12-27 | End: 2019-06-13 | Stop reason: SDUPTHER

## 2018-12-27 NOTE — TELEPHONE ENCOUNTER
----- Message from Kaylie Suarez sent at 12/27/2018 10:46 AM CST -----  Contact: self [phone]  Requesting refills on amLODIPine (NORVASC) 10 MG tablet & metFORMIN (GLUCOPHAGE) 500 MG tablet  .  [facility] - [place], [place] - [address] [place] [address]  Phone: [phone] Fax: [phone]

## 2019-01-15 ENCOUNTER — OFFICE VISIT (OUTPATIENT)
Dept: FAMILY MEDICINE | Facility: CLINIC | Age: 69
End: 2019-01-15
Payer: MEDICARE

## 2019-01-15 ENCOUNTER — LAB VISIT (OUTPATIENT)
Dept: LAB | Facility: HOSPITAL | Age: 69
End: 2019-01-15
Attending: FAMILY MEDICINE
Payer: MEDICARE

## 2019-01-15 VITALS
OXYGEN SATURATION: 97 % | HEIGHT: 64 IN | DIASTOLIC BLOOD PRESSURE: 78 MMHG | BODY MASS INDEX: 41.67 KG/M2 | TEMPERATURE: 98 F | SYSTOLIC BLOOD PRESSURE: 154 MMHG | HEART RATE: 55 BPM | WEIGHT: 244.06 LBS

## 2019-01-15 DIAGNOSIS — Z23 NEED FOR PNEUMOCOCCAL VACCINATION: ICD-10-CM

## 2019-01-15 DIAGNOSIS — E11.69 DIABETES MELLITUS TYPE 2 IN OBESE: Primary | ICD-10-CM

## 2019-01-15 DIAGNOSIS — I70.0 THORACIC AORTA ATHEROSCLEROSIS: ICD-10-CM

## 2019-01-15 DIAGNOSIS — E66.9 DIABETES MELLITUS TYPE 2 IN OBESE: Primary | ICD-10-CM

## 2019-01-15 DIAGNOSIS — E11.69 DIABETES MELLITUS TYPE 2 IN OBESE: ICD-10-CM

## 2019-01-15 DIAGNOSIS — E66.9 DIABETES MELLITUS TYPE 2 IN OBESE: ICD-10-CM

## 2019-01-15 DIAGNOSIS — I10 ESSENTIAL HYPERTENSION: ICD-10-CM

## 2019-01-15 DIAGNOSIS — M62.838 MUSCLE SPASMS OF NECK: ICD-10-CM

## 2019-01-15 DIAGNOSIS — E66.01 MORBID OBESITY WITH BMI OF 40.0-44.9, ADULT: ICD-10-CM

## 2019-01-15 PROBLEM — E11.9 TYPE 2 DIABETES MELLITUS WITHOUT COMPLICATION, WITHOUT LONG-TERM CURRENT USE OF INSULIN: Status: RESOLVED | Noted: 2017-07-12 | Resolved: 2019-01-15

## 2019-01-15 LAB
ESTIMATED AVG GLUCOSE: 157 MG/DL
HBA1C MFR BLD HPLC: 7.1 %

## 2019-01-15 PROCEDURE — 90732 PNEUMOCOCCAL POLYSACCHARIDE VACCINE 23-VALENT =>2YO SQ IM: ICD-10-PCS | Mod: HCNC,S$GLB,, | Performed by: FAMILY MEDICINE

## 2019-01-15 PROCEDURE — 1101F PT FALLS ASSESS-DOCD LE1/YR: CPT | Mod: CPTII,HCNC,S$GLB, | Performed by: FAMILY MEDICINE

## 2019-01-15 PROCEDURE — 99499 RISK ADDL DX/OHS AUDIT: ICD-10-PCS | Mod: HCNC,S$GLB,, | Performed by: FAMILY MEDICINE

## 2019-01-15 PROCEDURE — 3045F PR MOST RECENT HEMOGLOBIN A1C LEVEL 7.0-9.0%: CPT | Mod: CPTII,HCNC,S$GLB, | Performed by: FAMILY MEDICINE

## 2019-01-15 PROCEDURE — 99999 PR PBB SHADOW E&M-EST. PATIENT-LVL III: ICD-10-PCS | Mod: PBBFAC,HCNC,, | Performed by: FAMILY MEDICINE

## 2019-01-15 PROCEDURE — 3077F SYST BP >= 140 MM HG: CPT | Mod: CPTII,HCNC,S$GLB, | Performed by: FAMILY MEDICINE

## 2019-01-15 PROCEDURE — 1101F PR PT FALLS ASSESS DOC 0-1 FALLS W/OUT INJ PAST YR: ICD-10-PCS | Mod: CPTII,HCNC,S$GLB, | Performed by: FAMILY MEDICINE

## 2019-01-15 PROCEDURE — 3077F PR MOST RECENT SYSTOLIC BLOOD PRESSURE >= 140 MM HG: ICD-10-PCS | Mod: CPTII,HCNC,S$GLB, | Performed by: FAMILY MEDICINE

## 2019-01-15 PROCEDURE — G0009 PNEUMOCOCCAL POLYSACCHARIDE VACCINE 23-VALENT =>2YO SQ IM: ICD-10-PCS | Mod: HCNC,S$GLB,, | Performed by: FAMILY MEDICINE

## 2019-01-15 PROCEDURE — G0009 ADMIN PNEUMOCOCCAL VACCINE: HCPCS | Mod: HCNC,S$GLB,, | Performed by: FAMILY MEDICINE

## 2019-01-15 PROCEDURE — 83036 HEMOGLOBIN GLYCOSYLATED A1C: CPT | Mod: HCNC

## 2019-01-15 PROCEDURE — 90732 PPSV23 VACC 2 YRS+ SUBQ/IM: CPT | Mod: HCNC,S$GLB,, | Performed by: FAMILY MEDICINE

## 2019-01-15 PROCEDURE — 99214 PR OFFICE/OUTPT VISIT, EST, LEVL IV, 30-39 MIN: ICD-10-PCS | Mod: HCNC,25,S$GLB, | Performed by: FAMILY MEDICINE

## 2019-01-15 PROCEDURE — 3045F PR MOST RECENT HEMOGLOBIN A1C LEVEL 7.0-9.0%: ICD-10-PCS | Mod: CPTII,HCNC,S$GLB, | Performed by: FAMILY MEDICINE

## 2019-01-15 PROCEDURE — 99214 OFFICE O/P EST MOD 30 MIN: CPT | Mod: HCNC,25,S$GLB, | Performed by: FAMILY MEDICINE

## 2019-01-15 PROCEDURE — 36415 COLL VENOUS BLD VENIPUNCTURE: CPT | Mod: HCNC,PO

## 2019-01-15 PROCEDURE — 99999 PR PBB SHADOW E&M-EST. PATIENT-LVL III: CPT | Mod: PBBFAC,HCNC,, | Performed by: FAMILY MEDICINE

## 2019-01-15 PROCEDURE — 99499 UNLISTED E&M SERVICE: CPT | Mod: HCNC,S$GLB,, | Performed by: FAMILY MEDICINE

## 2019-01-15 PROCEDURE — 3078F DIAST BP <80 MM HG: CPT | Mod: CPTII,HCNC,S$GLB, | Performed by: FAMILY MEDICINE

## 2019-01-15 PROCEDURE — 3078F PR MOST RECENT DIASTOLIC BLOOD PRESSURE < 80 MM HG: ICD-10-PCS | Mod: CPTII,HCNC,S$GLB, | Performed by: FAMILY MEDICINE

## 2019-01-15 NOTE — PROGRESS NOTES
Routine Office Visit    Patient Name: Bette Johnson    : 1950  MRN: [id number]    Subjective:  Bette is a 68 y.o. female who presents today for     1. Diabetes follow-up - pt exercises 3 days per week. She has been working on making changes to her diet. She states she has lost ~5 pounds since her last visit here but attributes that to being sick this past week. She has been on a liquid diet to to GI virus. She does want to continue to work on lifestyle modifications and changes in her diet.   2. Headaches - right sided headache - occurs once every other day. Pt feels a gripping pain that lasts a few seconds and stops patient in her tracks. She has to wait for the pain to past. No known triggers. No alleviating factors. She does have a lot of tension in her shoulders. She states symptoms are alleviated when she does stretching in her work out classes.     Review of Systems   Constitutional: Negative for chills and fever.   HENT: Negative for congestion.    Eyes: Negative for blurred vision.   Respiratory: Negative for cough.    Cardiovascular: Negative for chest pain.   Gastrointestinal: Negative for abdominal pain, constipation, diarrhea, heartburn, nausea and vomiting.   Genitourinary: Negative for dysuria.   Musculoskeletal: Positive for neck pain. Negative for myalgias.   Skin: Negative for itching and rash.   Neurological: Positive for headaches. Negative for dizziness.   Psychiatric/Behavioral: Negative for depression.       Active Problem List  Patient Active Problem List   Diagnosis    Morbid obesity with BMI of 40.0-44.9, adult    Essential hypertension    Thoracic aorta atherosclerosis    Diabetes mellitus type 2 in obese       Past Surgical History  Past Surgical History:   Procedure Laterality Date    CHOLECYSTECTOMY      COLONOSCOPY N/A 2018    Performed by Jeffrey Solano MD at [facility] ENDO       Family History  Family History   Problem Relation Age of Onset    Diabetes Mother      "Diabetes Sister     Diabetes Brother     Kidney disease Brother     Diabetes Sister     Hypertension Sister     Diabetes Sister     Hypertension Sister     Hyperlipidemia Sister        Social History  Social History     Socioeconomic History    Marital status: Single     Spouse name: Not on file    Number of children: Not on file    Years of education: Not on file    Highest education level: Not on file   Social Needs    Financial resource strain: Not on file    Food insecurity - worry: Not on file    Food insecurity - inability: Not on file    Transportation needs - medical: Not on file    Transportation needs - non-medical: Not on file   Occupational History    Not on file   Tobacco Use    Smoking status: Never Smoker    Smokeless tobacco: Never Used   Substance and Sexual Activity    Alcohol use: Yes    Drug use: No    Sexual activity: No   Other Topics Concern    Not on file   Social History Narrative    Not on file       Medications and Allergies  Reviewed and updated.   Current Outpatient Medications   Medication Sig    amLODIPine (NORVASC) 10 MG tablet Take 1 tablet (10 mg total) by mouth once daily.    aspirin (ECOTRIN) 81 MG EC tablet Take 81 mg by mouth once daily.    loratadine (CLARITIN) 10 mg tablet Take 10 mg by mouth once daily.    metFORMIN (GLUCOPHAGE) 500 MG tablet Take 1 tablet (500 mg total) by mouth daily with breakfast.    triamterene-hydrochlorothiazide 37.5-25 mg (MAXZIDE-25) 37.5-25 mg per tablet Take 1 tablet by mouth once daily.     No current facility-administered medications for this visit.        Physical Exam  BP (!) 154/78 (BP Location: Right arm, Patient Position: Sitting, BP Method: Large (Manual))   Pulse (!) 55   Temp 97.8 °F (36.6 °C) (Oral)   Ht 5' 4" (1.626 m)   Wt 110.7 kg (244 lb 0.8 oz)   SpO2 97%   BMI 41.89 kg/m²   Physical Exam   Constitutional: She is oriented to person, place, and time. She appears well-developed and well-nourished. " "  HENT:   Head: Normocephalic and atraumatic.   Eyes: Conjunctivae and EOM are normal. Pupils are equal, round, and reactive to light.   Neck: Normal range of motion. Neck supple.   Cardiovascular: Normal rate, regular rhythm and normal heart sounds. Exam reveals no gallop and no friction rub.   No murmur heard.  Pulmonary/Chest: Breath sounds normal. No respiratory distress.   Abdominal: Soft. Bowel sounds are normal. She exhibits no distension. There is no tenderness.   Musculoskeletal: Normal range of motion.   Lymphadenopathy:     She has no cervical adenopathy.   Neurological: She is alert and oriented to person, place, and time.   Skin: Skin is warm.   Psychiatric: She has a normal mood and affect.         Assessment/Plan:  Bette Johnson is a 68 y.o. female who presents today for :    Problem List Items Addressed This Visit        Cardiac/Vascular    Essential hypertension  The current medical regimen is effective;  continue present plan and medications.      Thoracic aorta atherosclerosis    Overview     " There is thoracic aortic atherosclerosis" - Xray Chest 11-  Patient with Atherosclerosis of the Aorta.  Stable/asymptomatic. Currently stable on lipid and b/p monitoring.  Need to consider adding statin on next visit.             Endocrine    Diabetes mellitus type 2 in obese - Primary    Relevant Orders    Hemoglobin A1c    Morbid obesity with BMI of 40.0-44.9, adult  The patient's BMI has been recorded in the chart. The patient has been provided educational materials regarding the benefits of attaining and maintaining a normal weight. We will continue to address and follow this issue during follow up visits.            Other Visit Diagnoses     Need for pneumococcal vaccination        Relevant Orders    Pneumococcal Polysaccharide Vaccine (23 Valent) (SQ/IM) (Completed)    Muscle spasms of neck      Recommend stretching exercises  Recommend using foam roller  Recommend massage      "         Follow-up in about 6 months (around 7/15/2019).

## 2019-03-05 DIAGNOSIS — I10 ESSENTIAL HYPERTENSION: ICD-10-CM

## 2019-03-06 RX ORDER — TRIAMTERENE/HYDROCHLOROTHIAZID 37.5-25 MG
1 TABLET ORAL DAILY
Qty: 90 TABLET | Refills: 0 | Status: SHIPPED | OUTPATIENT
Start: 2019-03-06 | End: 2019-06-13 | Stop reason: SDUPTHER

## 2019-04-10 ENCOUNTER — PATIENT MESSAGE (OUTPATIENT)
Dept: ADMINISTRATIVE | Facility: OTHER | Age: 69
End: 2019-04-10

## 2019-04-25 ENCOUNTER — OFFICE VISIT (OUTPATIENT)
Dept: PODIATRY | Facility: CLINIC | Age: 69
End: 2019-04-25
Payer: MEDICARE

## 2019-04-25 VITALS — BODY MASS INDEX: 41.66 KG/M2 | WEIGHT: 244 LBS | HEIGHT: 64 IN

## 2019-04-25 DIAGNOSIS — E11.49 TYPE II DIABETES MELLITUS WITH NEUROLOGICAL MANIFESTATIONS: Primary | ICD-10-CM

## 2019-04-25 DIAGNOSIS — B35.1 ONYCHOMYCOSIS DUE TO DERMATOPHYTE: ICD-10-CM

## 2019-04-25 PROCEDURE — 3045F PR MOST RECENT HEMOGLOBIN A1C LEVEL 7.0-9.0%: CPT | Mod: HCNC,CPTII,S$GLB, | Performed by: PODIATRIST

## 2019-04-25 PROCEDURE — 3045F PR MOST RECENT HEMOGLOBIN A1C LEVEL 7.0-9.0%: ICD-10-PCS | Mod: HCNC,CPTII,S$GLB, | Performed by: PODIATRIST

## 2019-04-25 PROCEDURE — 1101F PR PT FALLS ASSESS DOC 0-1 FALLS W/OUT INJ PAST YR: ICD-10-PCS | Mod: HCNC,CPTII,S$GLB, | Performed by: PODIATRIST

## 2019-04-25 PROCEDURE — 99999 PR PBB SHADOW E&M-EST. PATIENT-LVL III: ICD-10-PCS | Mod: PBBFAC,HCNC,, | Performed by: PODIATRIST

## 2019-04-25 PROCEDURE — 99213 OFFICE O/P EST LOW 20 MIN: CPT | Mod: HCNC,S$GLB,, | Performed by: PODIATRIST

## 2019-04-25 PROCEDURE — 99499 UNLISTED E&M SERVICE: CPT | Mod: S$GLB,,, | Performed by: PODIATRIST

## 2019-04-25 PROCEDURE — 99213 PR OFFICE/OUTPT VISIT, EST, LEVL III, 20-29 MIN: ICD-10-PCS | Mod: HCNC,S$GLB,, | Performed by: PODIATRIST

## 2019-04-25 PROCEDURE — 99999 PR PBB SHADOW E&M-EST. PATIENT-LVL III: CPT | Mod: PBBFAC,HCNC,, | Performed by: PODIATRIST

## 2019-04-25 PROCEDURE — 1101F PT FALLS ASSESS-DOCD LE1/YR: CPT | Mod: HCNC,CPTII,S$GLB, | Performed by: PODIATRIST

## 2019-04-25 PROCEDURE — 99499 RISK ADDL DX/OHS AUDIT: ICD-10-PCS | Mod: S$GLB,,, | Performed by: PODIATRIST

## 2019-04-25 RX ORDER — CICLOPIROX 80 MG/ML
SOLUTION TOPICAL NIGHTLY
Qty: 1 BOTTLE | Refills: 3 | Status: SHIPPED | OUTPATIENT
Start: 2019-04-25 | End: 2019-04-29 | Stop reason: SDUPTHER

## 2019-04-25 NOTE — PROGRESS NOTES
Subjective:      Patient ID: Bette Johnson is a 69 y.o. female.    Chief Complaint: Diabetes Mellitus (PCP Dr Estrada 1/15/19); Diabetic Foot Exam; and Nail Care    Bette is a 69 y.o. female who presents to the clinic upon referral from Dr. Bela mayberry. provider found  for evaluation and treatment of diabetic feet. Bette has a past medical history of Diabetes mellitus, type 2, Hypertension, and Severe obesity (BMI >= 40) (7/12/2017). Presents for diabetic foot risk assessment. Has been going to nail salon for pedicures. She recently noticed discoloration to B/L great toes. Has been applying tea tree oil.     PCP: Neeta Estrada MD    Date Last Seen by PCP:   Chief Complaint   Patient presents with    Diabetes Mellitus     PCP Dr Estrada 1/15/19    Diabetic Foot Exam    Nail Care         Current shoe gear: Casual shoes    Hemoglobin A1C   Date Value Ref Range Status   01/15/2019 7.1 (H) 4.0 - 5.6 % Final     Comment:     ADA Screening Guidelines:  5.7-6.4%  Consistent with prediabetes  >or=6.5%  Consistent with diabetes  High levels of fetal hemoglobin interfere with the HbA1C  assay. Heterozygous hemoglobin variants (HbS, HgC, etc)do  not significantly interfere with this assay.   However, presence of multiple variants may affect accuracy.     06/11/2018 7.3 (H) 4.0 - 5.6 % Final     Comment:     ADA Screening Guidelines:  5.7-6.4%  Consistent with prediabetes  >or=6.5%  Consistent with diabetes  High levels of fetal hemoglobin interfere with the HbA1C  assay. Heterozygous hemoglobin variants (HbS, HgC, etc)do  not significantly interfere with this assay.   However, presence of multiple variants may affect accuracy.     12/27/2017 6.6 (H) 4.0 - 5.6 % Final     Comment:     According to ADA guidelines, hemoglobin A1c <7.0% represents  optimal control in non-pregnant diabetic patients. Different  metrics may apply to specific patient populations.   Standards of Medical Care in Diabetes-2016.  For the purpose of  screening for the presence of diabetes:  <5.7%     Consistent with the absence of diabetes  5.7-6.4%  Consistent with increasing risk for diabetes   (prediabetes)  >or=6.5%  Consistent with diabetes  Currently, no consensus exists for use of hemoglobin A1c  for diagnosis of diabetes for children.  This Hemoglobin A1c assay has significant interference with fetal   hemoglobin   (HbF). The results are invalid for patients with abnormal amounts of   HbF,   including those with known Hereditary Persistence   of Fetal Hemoglobin. Heterozygous hemoglobin variants (HbAS, HbAC,   HbAD, HbAE, HbA2) do not significantly interfere with this assay;   however, presence of multiple variants in a sample may impact the %   interference.         Past Medical History:   Diagnosis Date    Diabetes mellitus, type 2     Hypertension     Severe obesity (BMI >= 40) 7/12/2017       Past Surgical History:   Procedure Laterality Date    CHOLECYSTECTOMY      COLONOSCOPY N/A 1/25/2018    Performed by Jeffrey Solano MD at [facility] ENDO       Family History   Problem Relation Age of Onset    Diabetes Mother     Diabetes Sister     Diabetes Brother     Kidney disease Brother     Diabetes Sister     Hypertension Sister     Diabetes Sister     Hypertension Sister     Hyperlipidemia Sister        Social History     Socioeconomic History    Marital status: Single     Spouse name: Not on file    Number of children: Not on file    Years of education: Not on file    Highest education level: Not on file   Occupational History    Not on file   Social Needs    Financial resource strain: Not on file    Food insecurity:     Worry: Not on file     Inability: Not on file    Transportation needs:     Medical: Not on file     Non-medical: Not on file   Tobacco Use    Smoking status: Never Smoker    Smokeless tobacco: Never Used   Substance and Sexual Activity    Alcohol use: Yes    Drug use: No    Sexual activity: Never   Lifestyle     Physical activity:     Days per week: Not on file     Minutes per session: Not on file    Stress: Not on file   Relationships    Social connections:     Talks on phone: Not on file     Gets together: Not on file     Attends Islam service: Not on file     Active member of club or organization: Not on file     Attends meetings of clubs or organizations: Not on file     Relationship status: Not on file   Other Topics Concern    Not on file   Social History Narrative    Not on file       Current Outpatient Medications   Medication Sig Dispense Refill    amLODIPine (NORVASC) 10 MG tablet Take 1 tablet (10 mg total) by mouth once daily. 90 tablet 1    aspirin (ECOTRIN) 81 MG EC tablet Take 81 mg by mouth once daily.      loratadine (CLARITIN) 10 mg tablet Take 10 mg by mouth once daily.      metFORMIN (GLUCOPHAGE) 500 MG tablet Take 1 tablet (500 mg total) by mouth daily with breakfast. 90 tablet 1    triamterene-hydrochlorothiazide 37.5-25 mg (MAXZIDE-25) 37.5-25 mg per tablet TAKE 1 TABLET BY MOUTH ONCE DAILY 90 tablet 0    ciclopirox (PENLAC) 8 % Soln Apply topically nightly. 1 Bottle 3     No current facility-administered medications for this visit.        Review of patient's allergies indicates:   Allergen Reactions    Iodine and iodide containing products Shortness Of Breath    Caffeine Other (See Comments)     Hyper then  Causes low    Latex, natural rubber Other (See Comments)     Dental work cause mouth ulcers    Penicillins Other (See Comments)     Loss of consciousness         Review of Systems   Constitution: Negative for chills and fever.   Cardiovascular: Positive for leg swelling. Negative for chest pain and claudication.   Respiratory: Negative for cough and shortness of breath.    Skin: Positive for dry skin and nail changes.   Musculoskeletal: Positive for arthritis, joint pain and stiffness.   Gastrointestinal: Negative for nausea and vomiting.   Neurological: Negative for numbness and  paresthesias.   Psychiatric/Behavioral: Negative for altered mental status.           Objective:      Physical Exam   Constitutional: She is oriented to person, place, and time. She appears well-developed and well-nourished.   HENT:   Head: Normocephalic.   Cardiovascular: Intact distal pulses.   Pulses:       Dorsalis pedis pulses are 2+ on the right side, and 2+ on the left side.        Posterior tibial pulses are 2+ on the right side, and 2+ on the left side.   CRT < 3 sec to tips of toes. 1+ pitting edema noted to b/l LE. No vericosities noted to b/l LEs.      Pulmonary/Chest: No respiratory distress.   Musculoskeletal:   Gastrocnemius equinus noted to b/l ankles with decreased DF noted on exam. MMT 5/5 in DF/PF/Inv/Ev resistance with no reproduction of pain in any direction. Passive range of motion of ankle and pedal joints is painless. Adequate pedal joint ROM.     + palpable bony prominences noted to R dorsal tarsometatarsal joints of rays 1-3 as well as 4th/5th met-cuboid joints. + minimal to no pain along this area with palpation and ROM.     + pain with palpation of R hallux nail medial border.     Hypermobility noted to 1st ray b/l with near complete collapse of medial longitudinal arch b/l with loading.      Neurological: She is alert and oriented to person, place, and time. She has normal strength. A sensory deficit is present.   Light touch, proprioception, and sharp/dull sensation are all intact bilaterally. Protective threshold with the Fairbanks-Wienstein monofilament is intact bilaterally. Vibratory sensation decreased b/l distal foot.      Skin: Skin is warm, dry and intact. No erythema.   No open lesions, lacerations or wounds noted.     Toenails 1-5 bilaterally are , discolored/yellowed, dystrophic, brittle with subungual debris.       Psychiatric: She has a normal mood and affect. Her behavior is normal. Judgment and thought content normal.   Vitals reviewed.            Assessment:       Encounter  Diagnoses   Name Primary?    Type II diabetes mellitus with neurological manifestations Yes    Onychomycosis due to dermatophyte          Plan:       Bette was seen today for diabetes mellitus, diabetic foot exam and nail care.    Diagnoses and all orders for this visit:    Type II diabetes mellitus with neurological manifestations    Onychomycosis due to dermatophyte    Other orders  -     Cancel: DIABETIC SHOES FOR HOME USE  -     ciclopirox (PENLAC) 8 % Soln; Apply topically nightly.      I counseled the patient on her conditions, their implications and medical management.    Declines diabetic shoes.     - Shoe inspection. Diabetic Foot Education. Patient reminded of the importance of good nutrition and blood sugar control to help prevent podiatric complications of diabetes. Patient instructed on proper foot hygeine. We discussed wearing proper shoe gear, daily foot inspections, never walking without protective shoe gear, never putting sharp instruments to feet, routine podiatric nail visits every year.     - With patient's permission, nails were aggressively reduced and debrided x 10 to their soft tissue attachment mechanically and with electric , removing all offending nail and debris. Patient relates relief following the procedure. She will continue to monitor the areas daily, inspect her feet, wear protective shoe gear when ambulatory, moisturizer to maintain skin integrity    - Discussed regular and routine moisturizer to skin of both feet to help improve dry skin. Advised to apply twice daily until resolution of symptoms. Avoid between toes.     Discussed the use of compression stockings b/l to help control edema. Tubigrip applied today. Discussed proper and consistent elevation of lower extremities, above the level of the heart, while at rest, to help control/improve edema.     Long discussion with patient regarding appropriate, supportive and comfortable shoes. Recommended SAS/Easy Spirit style  shoe brands with adequate arch supports to alleviate abnormal pressure and improve stability of foot while walking. Avoid flat shoes and barefoot walking as these will exacerbate or worsen symptoms.     Continue using Lelo Arthritis cream as needed.     The patient is advised to apply ice or cold packs intermittently as needed to relieve pain. 20 minute increments, protect skin with towel.     At patient's request, I discussed different treatments for toenail fungus. We discussed oral antifungals but I did not recommend them as a first line treatment since the medication is taken internally and can have side effects such as rash, taste disturbances, and liver enzyme elevation. We discussed topical Penlac to be applied daily and removed weekly. Pt. Expresses understanding and would like to try the Penlac. Rx sent to the pharmacy.         RTC 1 year for DM foot exam, sooner PRPARIS Bajwa DPM

## 2019-04-29 ENCOUNTER — PATIENT MESSAGE (OUTPATIENT)
Dept: PODIATRY | Facility: CLINIC | Age: 69
End: 2019-04-29

## 2019-04-30 RX ORDER — CICLOPIROX 80 MG/ML
SOLUTION TOPICAL NIGHTLY
Qty: 3 BOTTLE | Refills: 0 | Status: SHIPPED | OUTPATIENT
Start: 2019-04-30 | End: 2019-06-13

## 2019-06-13 ENCOUNTER — LAB VISIT (OUTPATIENT)
Dept: LAB | Facility: HOSPITAL | Age: 69
End: 2019-06-13
Attending: FAMILY MEDICINE
Payer: MEDICARE

## 2019-06-13 ENCOUNTER — OFFICE VISIT (OUTPATIENT)
Dept: FAMILY MEDICINE | Facility: CLINIC | Age: 69
End: 2019-06-13
Payer: MEDICARE

## 2019-06-13 VITALS
DIASTOLIC BLOOD PRESSURE: 79 MMHG | BODY MASS INDEX: 41.67 KG/M2 | HEART RATE: 60 BPM | HEIGHT: 64 IN | TEMPERATURE: 98 F | SYSTOLIC BLOOD PRESSURE: 138 MMHG | OXYGEN SATURATION: 97 % | WEIGHT: 244.06 LBS

## 2019-06-13 DIAGNOSIS — I10 ESSENTIAL HYPERTENSION: ICD-10-CM

## 2019-06-13 DIAGNOSIS — E66.01 MORBID OBESITY WITH BMI OF 40.0-44.9, ADULT: ICD-10-CM

## 2019-06-13 DIAGNOSIS — I70.0 THORACIC AORTA ATHEROSCLEROSIS: ICD-10-CM

## 2019-06-13 DIAGNOSIS — Z00.00 ANNUAL PHYSICAL EXAM: Primary | ICD-10-CM

## 2019-06-13 DIAGNOSIS — E11.9 TYPE 2 DIABETES MELLITUS WITHOUT COMPLICATION, WITHOUT LONG-TERM CURRENT USE OF INSULIN: ICD-10-CM

## 2019-06-13 DIAGNOSIS — E55.9 VITAMIN D DEFICIENCY: ICD-10-CM

## 2019-06-13 DIAGNOSIS — E66.9 DIABETES MELLITUS TYPE 2 IN OBESE: ICD-10-CM

## 2019-06-13 DIAGNOSIS — E11.69 DIABETES MELLITUS TYPE 2 IN OBESE: ICD-10-CM

## 2019-06-13 LAB
25(OH)D3+25(OH)D2 SERPL-MCNC: 39 NG/ML (ref 30–96)
ALBUMIN SERPL BCP-MCNC: 4 G/DL (ref 3.5–5.2)
ALP SERPL-CCNC: 71 U/L (ref 55–135)
ALT SERPL W/O P-5'-P-CCNC: 20 U/L (ref 10–44)
ANION GAP SERPL CALC-SCNC: 11 MMOL/L (ref 8–16)
AST SERPL-CCNC: 26 U/L (ref 10–40)
BASOPHILS # BLD AUTO: 0.07 K/UL (ref 0–0.2)
BASOPHILS NFR BLD: 1.4 % (ref 0–1.9)
BILIRUB SERPL-MCNC: 0.5 MG/DL (ref 0.1–1)
BUN SERPL-MCNC: 11 MG/DL (ref 8–23)
CALCIUM SERPL-MCNC: 9.7 MG/DL (ref 8.7–10.5)
CHLORIDE SERPL-SCNC: 103 MMOL/L (ref 95–110)
CHOLEST SERPL-MCNC: 177 MG/DL (ref 120–199)
CHOLEST/HDLC SERPL: 3.6 {RATIO} (ref 2–5)
CO2 SERPL-SCNC: 25 MMOL/L (ref 23–29)
CREAT SERPL-MCNC: 1 MG/DL (ref 0.5–1.4)
DIFFERENTIAL METHOD: ABNORMAL
EOSINOPHIL # BLD AUTO: 0.2 K/UL (ref 0–0.5)
EOSINOPHIL NFR BLD: 4.4 % (ref 0–8)
ERYTHROCYTE [DISTWIDTH] IN BLOOD BY AUTOMATED COUNT: 13.8 % (ref 11.5–14.5)
EST. GFR  (AFRICAN AMERICAN): >60 ML/MIN/1.73 M^2
EST. GFR  (NON AFRICAN AMERICAN): 58.4 ML/MIN/1.73 M^2
ESTIMATED AVG GLUCOSE: 160 MG/DL (ref 68–131)
GLUCOSE SERPL-MCNC: 125 MG/DL (ref 70–110)
HBA1C MFR BLD HPLC: 7.2 % (ref 4–5.6)
HCT VFR BLD AUTO: 44.2 % (ref 37–48.5)
HDLC SERPL-MCNC: 49 MG/DL (ref 40–75)
HDLC SERPL: 27.7 % (ref 20–50)
HGB BLD-MCNC: 14 G/DL (ref 12–16)
IMM GRANULOCYTES # BLD AUTO: 0.01 K/UL (ref 0–0.04)
IMM GRANULOCYTES NFR BLD AUTO: 0.2 % (ref 0–0.5)
LDLC SERPL CALC-MCNC: 101.6 MG/DL (ref 63–159)
LYMPHOCYTES # BLD AUTO: 2.3 K/UL (ref 1–4.8)
LYMPHOCYTES NFR BLD: 44.7 % (ref 18–48)
MCH RBC QN AUTO: 28 PG (ref 27–31)
MCHC RBC AUTO-ENTMCNC: 31.7 G/DL (ref 32–36)
MCV RBC AUTO: 88 FL (ref 82–98)
MONOCYTES # BLD AUTO: 0.4 K/UL (ref 0.3–1)
MONOCYTES NFR BLD: 8.3 % (ref 4–15)
NEUTROPHILS # BLD AUTO: 2.1 K/UL (ref 1.8–7.7)
NEUTROPHILS NFR BLD: 41 % (ref 38–73)
NONHDLC SERPL-MCNC: 128 MG/DL
NRBC BLD-RTO: 0 /100 WBC
PLATELET # BLD AUTO: 291 K/UL (ref 150–350)
PMV BLD AUTO: 11.6 FL (ref 9.2–12.9)
POTASSIUM SERPL-SCNC: 4 MMOL/L (ref 3.5–5.1)
PROT SERPL-MCNC: 8.2 G/DL (ref 6–8.4)
RBC # BLD AUTO: 5 M/UL (ref 4–5.4)
SODIUM SERPL-SCNC: 139 MMOL/L (ref 136–145)
TRIGL SERPL-MCNC: 132 MG/DL (ref 30–150)
TSH SERPL DL<=0.005 MIU/L-ACNC: 2.19 UIU/ML (ref 0.4–4)
WBC # BLD AUTO: 5.17 K/UL (ref 3.9–12.7)

## 2019-06-13 PROCEDURE — 83036 HEMOGLOBIN GLYCOSYLATED A1C: CPT | Mod: HCNC

## 2019-06-13 PROCEDURE — 3045F PR MOST RECENT HEMOGLOBIN A1C LEVEL 7.0-9.0%: ICD-10-PCS | Mod: HCNC,CPTII,S$GLB, | Performed by: FAMILY MEDICINE

## 2019-06-13 PROCEDURE — 82306 VITAMIN D 25 HYDROXY: CPT | Mod: HCNC

## 2019-06-13 PROCEDURE — 85025 COMPLETE CBC W/AUTO DIFF WBC: CPT | Mod: HCNC

## 2019-06-13 PROCEDURE — 3075F PR MOST RECENT SYSTOLIC BLOOD PRESS GE 130-139MM HG: ICD-10-PCS | Mod: HCNC,CPTII,S$GLB, | Performed by: FAMILY MEDICINE

## 2019-06-13 PROCEDURE — 99999 PR PBB SHADOW E&M-EST. PATIENT-LVL IV: ICD-10-PCS | Mod: PBBFAC,HCNC,, | Performed by: FAMILY MEDICINE

## 2019-06-13 PROCEDURE — 99999 PR PBB SHADOW E&M-EST. PATIENT-LVL IV: CPT | Mod: PBBFAC,HCNC,, | Performed by: FAMILY MEDICINE

## 2019-06-13 PROCEDURE — 36415 COLL VENOUS BLD VENIPUNCTURE: CPT | Mod: HCNC,PO

## 2019-06-13 PROCEDURE — 80061 LIPID PANEL: CPT | Mod: HCNC

## 2019-06-13 PROCEDURE — 3045F PR MOST RECENT HEMOGLOBIN A1C LEVEL 7.0-9.0%: CPT | Mod: HCNC,CPTII,S$GLB, | Performed by: FAMILY MEDICINE

## 2019-06-13 PROCEDURE — 99397 PER PM REEVAL EST PAT 65+ YR: CPT | Mod: HCNC,S$GLB,, | Performed by: FAMILY MEDICINE

## 2019-06-13 PROCEDURE — 3078F DIAST BP <80 MM HG: CPT | Mod: HCNC,CPTII,S$GLB, | Performed by: FAMILY MEDICINE

## 2019-06-13 PROCEDURE — 80053 COMPREHEN METABOLIC PANEL: CPT | Mod: HCNC

## 2019-06-13 PROCEDURE — 84443 ASSAY THYROID STIM HORMONE: CPT | Mod: HCNC

## 2019-06-13 PROCEDURE — 99397 PR PREVENTIVE VISIT,EST,65 & OVER: ICD-10-PCS | Mod: HCNC,S$GLB,, | Performed by: FAMILY MEDICINE

## 2019-06-13 PROCEDURE — 3075F SYST BP GE 130 - 139MM HG: CPT | Mod: HCNC,CPTII,S$GLB, | Performed by: FAMILY MEDICINE

## 2019-06-13 PROCEDURE — 3078F PR MOST RECENT DIASTOLIC BLOOD PRESSURE < 80 MM HG: ICD-10-PCS | Mod: HCNC,CPTII,S$GLB, | Performed by: FAMILY MEDICINE

## 2019-06-13 RX ORDER — AMLODIPINE BESYLATE 10 MG/1
10 TABLET ORAL DAILY
Qty: 90 TABLET | Refills: 1 | Status: SHIPPED | OUTPATIENT
Start: 2019-06-13 | End: 2019-12-31 | Stop reason: SDUPTHER

## 2019-06-13 RX ORDER — TRIAMTERENE/HYDROCHLOROTHIAZID 37.5-25 MG
1 TABLET ORAL DAILY
Qty: 90 TABLET | Refills: 1 | Status: SHIPPED | OUTPATIENT
Start: 2019-06-13 | End: 2019-12-18 | Stop reason: SDUPTHER

## 2019-06-13 RX ORDER — ACETAMINOPHEN 500 MG
1 TABLET ORAL DAILY
COMMUNITY
End: 2023-08-10 | Stop reason: ALTCHOICE

## 2019-06-13 RX ORDER — AMOXICILLIN 500 MG
1 CAPSULE ORAL DAILY
COMMUNITY
End: 2021-02-02

## 2019-06-13 RX ORDER — METFORMIN HYDROCHLORIDE 500 MG/1
500 TABLET ORAL
Qty: 90 TABLET | Refills: 1 | Status: SHIPPED | OUTPATIENT
Start: 2019-06-13 | End: 2019-12-31 | Stop reason: SDUPTHER

## 2019-06-13 NOTE — PATIENT INSTRUCTIONS
Diet: Diabetes  Food is an important tool that you can use to control diabetes and stay healthy. Eating well-balanced meals in the correct amounts will help you control your blood glucose levels and prevent low blood sugar reactions. It will also help you reduce the health risks of diabetes. There is no one specific diet that is right for everyone with diabetes. But there are general guidelines to follow. A registered dietitian (RD) will create a tailored diet approach thats just right for you. He or she will also help you plan healthy meals and snacks. If you have any questions, call your dietitian for advice.     Guidelines for success  Talk with your healthcare provider before starting a diabetes diet or weight loss program. If you haven't talked with a dietitian yet, ask your provider for a referral. The following guidelines can help you succeed:  · Select foods from the 6 food groups below. Your dietitian will help you find food choices within each group. He or she will also show you serving sizes and how many servings you can have at each meal.  ¨ Grains, beans, and starchy vegetables  ¨ Vegetables  ¨ Fruit  ¨ Milk or yogurt  ¨ Meat, poultry, fish, or tofu  ¨ Healthy fats  · Check your blood sugar levels as directed by your provider. Take any medicine as prescribed by your provider.  · Learn to read food labels and pick the right portion sizes.  · Eat only the amount of food in your meal plan. Eat about the same amount of food at regular times each day. Dont skip meals. Eat meals 4 to 5 hours apart, with snacks in between.  · Limit alcohol. It raises blood sugar levels. Drink water or calorie-free diet drinks that use safe sweeteners.  · Eat less fat to help lower your risk of heart disease. Use nonfat or low-fat dairy products and lean meats. Avoid fried foods. Use cooking oils that are unsaturated, such as olive, canola, or peanut oil.  · Talk with your dietitian about safe sugar substitutes.  · Avoid  added salt. It can contribute to high blood pressure, which can cause heart disease. People with diabetes already have a risk of high blood pressure and heart disease.  · Stay at a healthy weight. If you need to lose weight, cut down on your portion sizes. But dont skip meals. Exercise is an important part of any weight management program. Talk with your provider about an exercise program thats right for you.  · For more information about the best diet plan for you, talk with a registered dietitian (RD). To find an RD in your area, contact:  ¨ Academy of Nutrition and Dietetics www.eatright.org  ¨ The American Diabetes Association 700-230-5652 www.diabetes.org  Date Last Reviewed: 8/1/2016 © 2000-2017 [employer] [address] [place] [address], [place] [address]. All rights reserved. This information is not intended as a substitute for professional medical care. Always follow your healthcare professional's instructions.

## 2019-06-13 NOTE — PROGRESS NOTES
Office Visit for Annual Exam    Patient Name: Bette Johnson    : 1950  MRN: [id number]    Subjective:  Bette is a 69 y.o. female who presents today for     1. Annual physical -   2. Diabetes follow-up - pt exercises 3 days per week. She has been working on making changes to her diet.She does want to continue to work on lifestyle modifications and changes in her diet.     Answers for HPI/ROS submitted by the patient on 2019   Hypertension  Chronicity: chronic  Onset: more than 1 year ago  Progression since onset: unchanged  Condition status: controlled  anxiety: No  peripheral edema: Yes  sweats: Yes  Agents associated with hypertension: no associated agents  CAD risks: diabetes mellitus, dyslipidemia, family history, obesity, post-menopausal state  Compliance problems: no compliance problems  Past treatments: ACE inhibitors, diuretics, lifestyle changes  Improvement on treatment: moderate    Review of Systems   Constitutional: Positive for malaise/fatigue. Negative for chills and fever.   HENT: Negative for congestion.    Eyes: Negative for blurred vision.   Respiratory: Positive for shortness of breath. Negative for cough.    Cardiovascular: Negative for chest pain, palpitations, orthopnea and PND.   Gastrointestinal: Negative for abdominal pain, constipation, diarrhea, heartburn, nausea and vomiting.   Genitourinary: Negative for dysuria.   Musculoskeletal: Negative for myalgias and neck pain.   Skin: Negative for itching and rash.   Neurological: Negative for dizziness and headaches.   Psychiatric/Behavioral: Negative for depression.       Active Problem List  Patient Active Problem List   Diagnosis    Morbid obesity with BMI of 40.0-44.9, adult    Essential hypertension    Thoracic aorta atherosclerosis    Diabetes mellitus type 2 in obese       Past Surgical History  Past Surgical History:   Procedure Laterality Date    CHOLECYSTECTOMY      COLONOSCOPY N/A 2018    Performed by Jeffrey TOMPKINS  MD Russ at [facility]       Family History  Family History   Problem Relation Age of Onset    Diabetes Mother     Diabetes Sister     Diabetes Brother     Kidney disease Brother     Diabetes Sister     Hypertension Sister     Diabetes Sister     Hypertension Sister     Hyperlipidemia Sister        Social History  Social History     Socioeconomic History    Marital status: Single     Spouse name: Not on file    Number of children: Not on file    Years of education: Not on file    Highest education level: Not on file   Occupational History    Not on file   Social Needs    Financial resource strain: Not very hard    Food insecurity:     Worry: Sometimes true     Inability: Sometimes true    Transportation needs:     Medical: No     Non-medical: No   Tobacco Use    Smoking status: Never Smoker    Smokeless tobacco: Never Used   Substance and Sexual Activity    Alcohol use: Yes     Frequency: Monthly or less     Drinks per session: 1 or 2     Binge frequency: Never    Drug use: No    Sexual activity: Never   Lifestyle    Physical activity:     Days per week: 3 days     Minutes per session: 60 min    Stress: Not at all   Relationships    Social connections:     Talks on phone: More than three times a week     Gets together: More than three times a week     Attends [employer] service: Not on file     Active member of club or organization: Yes     Attends meetings of clubs or organizations: More than 4 times per year     Relationship status:    Other Topics Concern    Not on file   Social History Narrative    Not on file       Medications and Allergies  Reviewed and updated.   Current Outpatient Medications   Medication Sig    amLODIPine (NORVASC) 10 MG tablet Take 1 tablet (10 mg total) by mouth once daily.    aspirin (ECOTRIN) 81 MG EC tablet Take 81 mg by mouth once daily.    cholecalciferol, vitamin D3, (VITAMIN D3) 2,000 unit Cap Take 1 capsule by mouth once daily. gummy    fish  "oil-omega-3 fatty acids 300-1,000 mg capsule Take 1 capsule by mouth once daily.    loratadine (CLARITIN) 10 mg tablet Take 10 mg by mouth once daily.    metFORMIN (GLUCOPHAGE) 500 MG tablet Take 1 tablet (500 mg total) by mouth daily with breakfast.    multivit-minerals/folic acid (WOMEN'S MULTIVITAMIN GUMMIES ORAL) Take by mouth.    triamterene-hydrochlorothiazide 37.5-25 mg (MAXZIDE-25) 37.5-25 mg per tablet Take 1 tablet by mouth once daily.     No current facility-administered medications for this visit.        Physical Exam  /79 (BP Location: Right arm, Patient Position: Sitting, BP Method: Large (Manual))   Pulse 60   Temp 97.5 °F (36.4 °C) (Oral)   Ht 5' 4" (1.626 m)   Wt 110.7 kg (244 lb 0.8 oz)   SpO2 97%   BMI 41.89 kg/m²   Physical Exam   Constitutional: She is oriented to person, place, and time. She appears well-developed and well-nourished.   HENT:   Head: Normocephalic and atraumatic.   Eyes: Pupils are equal, round, and reactive to light. Conjunctivae and EOM are normal.   Neck: Normal range of motion. Neck supple.   Cardiovascular: Normal rate, regular rhythm and normal heart sounds. Exam reveals no gallop and no friction rub.   No murmur heard.  Pulmonary/Chest: Breath sounds normal. No respiratory distress.   Abdominal: Soft. Bowel sounds are normal. She exhibits no distension. There is no tenderness.   Musculoskeletal: Normal range of motion.   Lymphadenopathy:     She has no cervical adenopathy.   Neurological: She is alert and oriented to person, place, and time.   Skin: Skin is warm.   Psychiatric: She has a normal mood and affect.         Assessment/Plan:  Bette Johnson is a 69 y.o. female who presents today for :    Annual physical exam  Health Maintenance       Date Due Completion Date    High Dose Statin 03/02/1973 ---    Shingles Vaccine (1 of 2) 03/02/2002 ---    Lipid Panel 06/11/2019 6/11/2018    Urine Microalbumin 06/11/2019 6/11/2018    Eye Exam 07/09/2019 " 7/9/2018    Override on 6/19/2017: Done    Hemoglobin A1c 07/15/2019 1/15/2019    Influenza Vaccine 08/01/2019 10/20/2018    DEXA SCAN 03/01/2020 3/1/2017 (Done)    Override on 3/1/2017: Done    Foot Exam 04/19/2020 4/19/2019 (Done)    Override on 4/19/2019: Done    Override on 6/11/2018: Done    Mammogram 10/29/2020 10/29/2018    Override on 7/14/2016: Done    TETANUS VACCINE 07/12/2027 7/12/2017 (Declined)    Override on 7/12/2017: Declined    Colonoscopy 01/25/2028 1/25/2018        I addressed all major concerns as it related to health maintenance.  All were ordered and scheduled based on the patients wishes.  Any additional health maintenance will be readdressed at the next physical if declined or deferred by the patient.    Essential hypertension  -     triamterene-hydrochlorothiazide 37.5-25 mg (MAXZIDE-25) 37.5-25 mg per tablet; Take 1 tablet by mouth once daily.  Dispense: 90 tablet; Refill: 1  -     amLODIPine (NORVASC) 10 MG tablet; Take 1 tablet (10 mg total) by mouth once daily.  Dispense: 90 tablet; Refill: 1  The current medical regimen is effective;  continue present plan and medications.    Type 2 diabetes mellitus without complication, without long-term current use of insulin / Diabetes mellitus type 2 in obese  -     metFORMIN (GLUCOPHAGE) 500 MG tablet; Take 1 tablet (500 mg total) by mouth daily with breakfast.  Dispense: 90 tablet; Refill: 1  -     CBC auto differential; Future; Expected date: 06/13/2019  -     Comprehensive metabolic panel; Future; Expected date: 06/13/2019  -     Lipid panel; Future; Expected date: 06/13/2019  -     Hemoglobin A1c; Future; Expected date: 06/13/2019  -     TSH; Future; Expected date: 06/13/2019  -     Urinalysis  -     Microalbumin/creatinine urine ratio  The current medical regimen is effective;  continue present plan and medications.    Vitamin D deficiency  -     Vitamin D; Future; Expected date: 06/13/2019    Thoracic aorta atherosclerosis  Patient with  Atherosclerosis of the Aorta.  Stable/asymptomatic. Currently stable. Continue lipid and b/p monitoring.  Recommend statin - but pt pt would like to off until labs come back     Morbid obesity with BMI of 40.0-44.9, adult  The patient is asked to make an attempt to improve diet and exercise patterns to aid in medical management of this problem.            Follow up in about 6 months (around 12/13/2019), or if symptoms worsen or fail to improve, for chronic conditions follow-up.

## 2019-06-14 ENCOUNTER — PATIENT OUTREACH (OUTPATIENT)
Dept: ADMINISTRATIVE | Facility: HOSPITAL | Age: 69
End: 2019-06-14

## 2019-06-14 DIAGNOSIS — E66.9 DIABETES MELLITUS TYPE 2 IN OBESE: ICD-10-CM

## 2019-06-14 DIAGNOSIS — E11.69 DIABETES MELLITUS TYPE 2 IN OBESE: ICD-10-CM

## 2019-06-14 NOTE — PROGRESS NOTES
Diabetes Education referral placed today.    An appointment was scheduled for 06/24/19 with Imelda Muller.

## 2019-06-24 ENCOUNTER — CLINICAL SUPPORT (OUTPATIENT)
Dept: DIABETES | Facility: CLINIC | Age: 69
End: 2019-06-24
Payer: MEDICARE

## 2019-06-24 VITALS — WEIGHT: 247.38 LBS | BODY MASS INDEX: 42.46 KG/M2

## 2019-06-24 DIAGNOSIS — E11.69 DIABETES MELLITUS TYPE 2 IN OBESE: ICD-10-CM

## 2019-06-24 DIAGNOSIS — E66.9 DIABETES MELLITUS TYPE 2 IN OBESE: ICD-10-CM

## 2019-06-24 PROCEDURE — 99999 PR PBB SHADOW E&M-EST. PATIENT-LVL II: CPT | Mod: PBBFAC,HCNC,, | Performed by: DIETITIAN, REGISTERED

## 2019-06-24 PROCEDURE — G0108 DIAB MANAGE TRN  PER INDIV: HCPCS | Mod: HCNC,S$GLB,, | Performed by: DIETITIAN, REGISTERED

## 2019-06-24 PROCEDURE — G0108 PR DIAB MANAGE TRN  PER INDIV: ICD-10-PCS | Mod: HCNC,S$GLB,, | Performed by: DIETITIAN, REGISTERED

## 2019-06-24 PROCEDURE — 99999 PR PBB SHADOW E&M-EST. PATIENT-LVL II: ICD-10-PCS | Mod: PBBFAC,HCNC,, | Performed by: DIETITIAN, REGISTERED

## 2019-06-24 NOTE — PROGRESS NOTES
Diabetes Education  Author: Imelda Muller RD  Date: 6/24/2019    Diabetes Care Management Summary  Pt visit today focused on introducing pt to diabetes self management w emphasis on CHO awareness/counting, meal planning/label reading, SMBG, exercise, and meds.  Diabetes Education Record Assessment/Progress: Initial  Current Diabetes Risk Level: Low     Diabetes Type  Diabetes Type : Type II    Diabetes History  Diabetes Diagnosis: 5-10 years  Current Treatment: Oral Medication, Diet  Reviewed Problem List with Patient: Yes    Health Maintenance was reviewed today with patient. Discussed with patient importance of routine eye exams, foot exams/foot care, blood work (i.e.: A1c, microalbumin, and lipid), dental visits, yearly flu vaccine, and pneumonia vaccine as indicated by PCP. Patient verbalized understanding.     Health Maintenance Topics with due status: Not Due       Topic Last Completion Date    DEXA SCAN 03/01/2017    TETANUS VACCINE 07/12/2017    Colonoscopy 01/25/2018    Influenza Vaccine 10/20/2018    Mammogram 10/29/2018    Foot Exam 04/19/2019    Lipid Panel 06/13/2019    Hemoglobin A1c 06/13/2019     Health Maintenance Due   Topic Date Due    High Dose Statin  03/02/1973    Urine Microalbumin  06/11/2019    Eye Exam  07/09/2019     Nutrition  Meal Planning: drinks regular soda, water, 3 meals per day, diet drinks, artificial sweeteners, eats out often, snacks between meal  What type of sweetener do you use?: Sweet N Low  What type of beverages do you drink?: regular soda/tea, diet soda/tea, water, milk  Meal Plan 24 Hour Recall - Breakfast: 8am: whole banana, 1 cup oatmeal, 1/2 c milk  Meal Plan 24 Hour Recall - Lunch: noon: hot dog w chili and cheese on bun, small sno cone  Meal Plan 24 Hour Recall - Dinner: 430pm: sausage/chix jambalya, Fried Chicken , Diet tea  Meal Plan 24 Hour Recall - Snack: 7pm: tunal salad on 1/2 bun,chips, diet tea    Monitoring   Self Monitoring : does not check BG; has  blood work done every 6 months to monitor  Blood Glucose Logs: No  Do you use a personal continuous glucose monitor?: No  In the last month, how often have you had a low blood sugar reaction?: never  Can you tell when your blood sugar is too high?: no    Exercise   Exercise Type: walking, use exercise equipment, exercise class  Intensity: Moderate  Frequency: 3-5 Times per week  Duration: 1 hour    Current Diabetes Treatment   Current Treatment: Oral Medication, Diet    Social History  Preferred Learning Method: Face to Face, Group Education, Reading Materials  Primary Support: Self, Family  Educational Level: High School  Smoking Status: Never a Smoker    Barriers to Change: None  Learning Challenges : None   Readiness to Learn : Acceptance  Cultural Influences: No    Diabetes Education Assessment/Progress  Diabetes Disease Process (diabetes disease process and treatment options): Instructed, Comprehends Key Points, Discussion, Individual Session, Written Materials Provided  -pt has been pre-diabetic since 2006 w T2DM dx in 2010 and pt states she understands how A1c has gradually increase during that time to now 7.2%  -discussed straties for lifestyle changes to improve the A1c/glycemic control    Nutrition (Incorporating nutritional management into one's lifestyle): Written Materials Provided, Individual Session, Instructed, Discussion  -Pt states she understands carbs in diet but diet recall indicates inconsistent carb intake mainly due to carb unawareness, lack of label reading or large portions at meals. Diet hx indicates fruit daily, milk 4x/wk, salads or non-starchy veg once/day  -pt states she craves sweets everyday jose in the evenings; enjoys regular pastries and candy though she buys SF varieties but eats lg portion of these  Candies. Diet includes high fat meats, chips and occ reg Sprite  -Discussed carb vs non-carb foods. Discussed appropriate amount of carbs to have at meals/snacks. Discussed  appropriate serving sizes of individual carb items.   -Reviewed label reading, portion control (hand) and using the plate method of meal planning.   -Instructed pt to aim for 3 evenly-spaced meals with 30-45 gm carb/meal and 0-15 gm at snacks.    Physical Activity (incorporating physical activity into one's lifestyle): Individual Session, Written Materials Provided, Instructed, Demonstrates Understanding/Competency (verbalizes/demonstrates), Discussion  -pt quite involved in group exercise class 1 hr combined cardio and strength training 3x/wk. (about 90 min cardio)  -pt states she cannot understand why she isn't losing weight. Suggested increase cardio time to 150 -180 minutes/week to help w slow weight loss    nstructed, Discussion, Written Materials Provided, Individual Session, Demonstrates Understanding/Competency(verbalizes/demonstrates)  Monitoring (monitoring blood glucose/other parameters & using results): Individual Session, Written Materials Provided, Instructed, Discussion, Demonstrates Understanding/Competency (verbalizes/demonstrates)  Acute Complications (preventing, detecting, and treating acute complications): Individual Session, Written Materials Provided, Instructed, Discussion, Demonstrates Understanding/Competency (verbalizes/demonstrates)  Chronic Complications (preventing, detecting, and treating chronic complications): Individual Session, Written Materials Provided, Instructed, Discussion, Demonstrates Understanding/Competency (verbalizes/demonstrates)  Clinical (diabetes, other pertinent medical history, and relevant comorbidities reviewed during visit): Individual Session, Written Materials Provided, Instructed, Demonstrates Understanding/Competency (verbalizes/demonstrates), Discussion  Cognitive (knowledge of self-management skills, functional health literacy): Discussion, Written Materials Provided, Individual Session, Instructed  Psychosocial (emotional response to diabetes):  Discussion  Diabetes Distress and Support Systems: Discussion  Behavioral (readiness for change, lifestyle practices, self-care behaviors): Discussion    Goals  Patient has selected/evaluated goals during today's session: Yes, selected  Healthy Eating: Set(distribute carbs into 3 evenly spaced meals/day, 30-45 g carb/meal)  Start Date: 06/24/19  Target Date: 07/24/19    Diabetes Care Plan/Intervention  Education Plan/Intervention: Individual Follow-Up DSMT(contact information provided; pt will schedule follow-up)    Diabetes Meal Plan  Restrictions: Restricted Carbohydrate  Carbohydrate Per Meal: 30-45g  Carbohydrate Per Snack : 15-20g    Today's Self-Management Care Plan was developed with the patient's input and is based on barriers identified during today's assessment.    The long and short-term goals in the care plan were written with the patient/caregiver's input. The patient has agreed to work toward these goals to improve her overall diabetes control.      The patient received a copy of today's self-management plan and verbalized understanding of the care plan, goals, and all of today's instructions.      The patient was encouraged to communicate with her physician and care team regarding her condition(s) and treatment.  I provided the patient with my contact information today and encouraged her to contact me via phone or patient portal as needed.     Education Units of Time   Time Spent: 60 min

## 2019-07-05 ENCOUNTER — TELEPHONE (OUTPATIENT)
Dept: FAMILY MEDICINE | Facility: CLINIC | Age: 69
End: 2019-07-05

## 2019-07-05 NOTE — TELEPHONE ENCOUNTER
----- Message from Sherice Pierre sent at 7/5/2019 12:02 PM CDT -----  Contact: Self: [phone]  Type: Patient Call Back    Who called:Self    What is the request in detail:Patient called to inquire about a medication refill from pharmacy for metFORMIN (GLUCOPHAGE) 500 MG tablet and  amLODIPine (NORVASC) 10 MG tablet     Can the clinic reply by [facility]F? NO    Would the patient rather a call back or a response via [facility]? Callback    Best call back number:[phone]    Additional Information:N/A

## 2019-07-12 DIAGNOSIS — E11.9 TYPE 2 DIABETES MELLITUS WITHOUT COMPLICATION, UNSPECIFIED WHETHER LONG TERM INSULIN USE: ICD-10-CM

## 2019-07-30 ENCOUNTER — PATIENT MESSAGE (OUTPATIENT)
Dept: FAMILY MEDICINE | Facility: CLINIC | Age: 69
End: 2019-07-30

## 2019-07-30 ENCOUNTER — OFFICE VISIT (OUTPATIENT)
Dept: FAMILY MEDICINE | Facility: CLINIC | Age: 69
End: 2019-07-30
Payer: MEDICARE

## 2019-07-30 VITALS
DIASTOLIC BLOOD PRESSURE: 70 MMHG | HEART RATE: 47 BPM | SYSTOLIC BLOOD PRESSURE: 146 MMHG | OXYGEN SATURATION: 97 % | WEIGHT: 242.75 LBS | HEIGHT: 64 IN | BODY MASS INDEX: 41.44 KG/M2

## 2019-07-30 DIAGNOSIS — E66.01 MORBID OBESITY WITH BMI OF 40.0-44.9, ADULT: ICD-10-CM

## 2019-07-30 DIAGNOSIS — I70.0 THORACIC AORTA ATHEROSCLEROSIS: ICD-10-CM

## 2019-07-30 DIAGNOSIS — Z23 NEED FOR VACCINATION: ICD-10-CM

## 2019-07-30 DIAGNOSIS — E11.69 DIABETES MELLITUS TYPE 2 IN OBESE: ICD-10-CM

## 2019-07-30 DIAGNOSIS — I10 ESSENTIAL HYPERTENSION: ICD-10-CM

## 2019-07-30 DIAGNOSIS — E66.9 DIABETES MELLITUS TYPE 2 IN OBESE: ICD-10-CM

## 2019-07-30 DIAGNOSIS — Z00.00 ENCOUNTER FOR PREVENTIVE HEALTH EXAMINATION: Primary | ICD-10-CM

## 2019-07-30 PROCEDURE — 3078F PR MOST RECENT DIASTOLIC BLOOD PRESSURE < 80 MM HG: ICD-10-PCS | Mod: HCNC,CPTII,S$GLB, | Performed by: NURSE PRACTITIONER

## 2019-07-30 PROCEDURE — G0439 PPPS, SUBSEQ VISIT: HCPCS | Mod: HCNC,S$GLB,, | Performed by: NURSE PRACTITIONER

## 2019-07-30 PROCEDURE — 3077F SYST BP >= 140 MM HG: CPT | Mod: HCNC,CPTII,S$GLB, | Performed by: NURSE PRACTITIONER

## 2019-07-30 PROCEDURE — 3045F PR MOST RECENT HEMOGLOBIN A1C LEVEL 7.0-9.0%: CPT | Mod: HCNC,CPTII,S$GLB, | Performed by: NURSE PRACTITIONER

## 2019-07-30 PROCEDURE — 99999 PR PBB SHADOW E&M-EST. PATIENT-LVL IV: CPT | Mod: PBBFAC,HCNC,, | Performed by: NURSE PRACTITIONER

## 2019-07-30 PROCEDURE — 3045F PR MOST RECENT HEMOGLOBIN A1C LEVEL 7.0-9.0%: ICD-10-PCS | Mod: HCNC,CPTII,S$GLB, | Performed by: NURSE PRACTITIONER

## 2019-07-30 PROCEDURE — 3078F DIAST BP <80 MM HG: CPT | Mod: HCNC,CPTII,S$GLB, | Performed by: NURSE PRACTITIONER

## 2019-07-30 PROCEDURE — 3077F PR MOST RECENT SYSTOLIC BLOOD PRESSURE >= 140 MM HG: ICD-10-PCS | Mod: HCNC,CPTII,S$GLB, | Performed by: NURSE PRACTITIONER

## 2019-07-30 PROCEDURE — 99999 PR PBB SHADOW E&M-EST. PATIENT-LVL IV: ICD-10-PCS | Mod: PBBFAC,HCNC,, | Performed by: NURSE PRACTITIONER

## 2019-07-30 PROCEDURE — G0439 PR MEDICARE ANNUAL WELLNESS SUBSEQUENT VISIT: ICD-10-PCS | Mod: HCNC,S$GLB,, | Performed by: NURSE PRACTITIONER

## 2019-07-30 NOTE — PATIENT INSTRUCTIONS
Counseling and Referral of Other Preventative  (Italic type indicates deductible and co-insurance are waived)    Patient Name: Bette Johnson  Today's Date: 7/30/2019    Health Maintenance       Date Due Completion Date    High Dose Statin 03/02/1973 Patient aware of recommendation for statin medication.     Shingles Vaccine (1 of 2) 03/02/2002 Patient aware of recommendation for shingles vaccine.     Eye Exam 07/09/2019 7/9/2018, Patient aware of recommendation for updated eye exam.     Override on 6/19/2017: Done    Urine Microalbumin 06/11/2019 6/11/2018    Influenza Vaccine 08/01/2019 10/20/2018    Hemoglobin A1c 12/13/2019 6/13/2019    DEXA SCAN 03/01/2020 3/1/2017 (Done)    Override on 3/1/2017: Done    Foot Exam 04/19/2020 4/19/2019 (Done)    Override on 4/19/2019: Done    Override on 6/11/2018: Done    Lipid Panel 06/13/2020 6/13/2019    Mammogram 10/29/2020 10/29/2018    Override on 7/14/2016: Done    Colonoscopy 01/25/2023 1/25/2018    TETANUS VACCINE 07/12/2027 7/12/2017 (Declined)    Override on 7/12/2017: Declined        No orders of the defined types were placed in this encounter.    The following information is provided to all patients.  This information is to help you find resources for any of the problems found today that may be affecting your health:                Living healthy guide: www.[facility].[place].gov      Understanding Diabetes: www.diabetes.org      Eating healthy: www.cdc.gov/healthyweight      CDC home safety checklist: www.cdc.gov/steadi/patient.html      Agency on Aging: www.goea.[place].[facility]      Alcoholics anonymous (AA): www.aa.org      Physical Activity: www.allie.nih.gov/dh0jmvm      Tobacco use: www.quitwithusla.org

## 2019-07-30 NOTE — PROGRESS NOTES
"Bette Johnson presented for a  Medicare AWV and comprehensive Health Risk Assessment today. The following components were reviewed and updated:    · Medical history  · Family History  · Social history  · Allergies and Current Medications  · Health Risk Assessment  · Health Maintenance  · Care Team     ** See Completed Assessments for Annual Wellness Visit within the encounter summary.**       The following assessments were completed:  · Living Situation  · CAGE  · Depression Screening  · Timed Get Up and Go  · Whisper Test  · Cognitive Function Screening  ·   ·   · Nutrition Screening  · ADL Screening  · PAQ Screening    Vitals:    07/30/19 0822   BP: (!) 146/70   BP Location: Left arm   Patient Position: Sitting   BP Method: Large (Manual)   Pulse: (!) 47   SpO2: 97%   Weight: 110.1 kg (242 lb 11.6 oz)   Height: 5' 4" (1.626 m)     Body mass index is 41.66 kg/m².  Physical Exam   Constitutional: She is oriented to person, place, and time.   Cardiovascular: Regular rhythm. Bradycardia present.   Pulmonary/Chest: Effort normal.   Musculoskeletal: Normal range of motion.   Neurological: She is alert and oriented to person, place, and time.   Skin: Skin is warm. Capillary refill takes less than 2 seconds.   Psychiatric: She has a normal mood and affect. Her behavior is normal. Thought content normal.   Vitals reviewed.        Diagnoses and health risks identified today and associated recommendations/orders:    1. Encounter for preventive health examination  Education provided about preventive health examinations and procedures; addressed and discussed patient's health concerns. Additionally, reviewed medical record for risk factors and documented the results during this encounter.    2. Thoracic aorta atherosclerosis  Stable, taking ASA, deferred cholesterol managing STATIN.     3. Diabetes mellitus type 2 in obese  Education provided about diabetes, management of blood glucose with diet and activities, monitoring " for worsening effects of diabetes.  Reviewed most recent Ha1c and informed patient of complications associated with uncontrolled diabetes.     4. Morbid obesity with BMI of 40.0-44.9, adult  Patient engaged in structured fitness activities; continue with dietary and lifestyle modifications.     5. Essential hypertension  Presently not at goal. We discussed health risks associated with this issue.  Patient aware of dietary and lifestyle modifications and medicinal interventions. Informed patient to monitor blood pressure, provide random measurements to help us monitor and manage her BP.     6. Need for vaccination  Patient aware of recommendation for shingles vaccine.     Reviewed health maintenance, educated about recommended examinations, procedures (labs & images), and immunizations. Deferred statin medication at today's visit.       Provided Bette with a 5-10 year written screening schedule and personal prevention plan. Recommendations were developed using the USPSTF age appropriate recommendations. Education, counseling, and referrals were provided as needed. After Visit Summary printed and given to patient which includes a list of additional screenings\tests needed.    Follow up in about 1 year (around 7/30/2020) for assessment .    Manas Vann Jr, NP  I offered to discuss end of life issues, including information on how to make advance directives that the patient could use to name someone who would make medical decisions on their behalf if they became too ill to make themselves.    ___Patient declined  _X_Patient is interested, I provided paper work and offered to discuss.

## 2019-08-08 LAB
LEFT EYE DM RETINOPATHY: NEGATIVE
RIGHT EYE DM RETINOPATHY: NEGATIVE

## 2019-08-16 DIAGNOSIS — E11.9 TYPE 2 DIABETES MELLITUS WITHOUT COMPLICATION: ICD-10-CM

## 2019-08-21 ENCOUNTER — PATIENT MESSAGE (OUTPATIENT)
Dept: ADMINISTRATIVE | Facility: OTHER | Age: 69
End: 2019-08-21

## 2019-10-04 ENCOUNTER — PATIENT MESSAGE (OUTPATIENT)
Dept: FAMILY MEDICINE | Facility: CLINIC | Age: 69
End: 2019-10-04

## 2019-10-04 ENCOUNTER — PATIENT MESSAGE (OUTPATIENT)
Dept: ADMINISTRATIVE | Facility: HOSPITAL | Age: 69
End: 2019-10-04

## 2019-10-07 ENCOUNTER — PATIENT OUTREACH (OUTPATIENT)
Dept: ADMINISTRATIVE | Facility: HOSPITAL | Age: 69
End: 2019-10-07

## 2019-10-17 ENCOUNTER — TELEPHONE (OUTPATIENT)
Dept: FAMILY MEDICINE | Facility: CLINIC | Age: 69
End: 2019-10-17

## 2019-10-17 DIAGNOSIS — Z12.31 BREAST CANCER SCREENING BY MAMMOGRAM: Primary | ICD-10-CM

## 2019-10-17 NOTE — TELEPHONE ENCOUNTER
----- Message from Marisol Wang sent at 10/17/2019  4:12 PM CDT -----  Contact: Balbina lyle  Type:  Mammogram    Caller is requesting to schedule their annual mammogram appointment.  Order is not listed in EPIC.  Please enter order and contact patient to schedule.  Name of Caller: self     Where would they like the mammogram performed?  [facility]   Would the patient rather a call back or a response via [facility]?  Call   Best Call Back Number: [phone]    Additional Information:

## 2019-10-30 ENCOUNTER — HOSPITAL ENCOUNTER (OUTPATIENT)
Dept: RADIOLOGY | Facility: HOSPITAL | Age: 69
Discharge: HOME | End: 2019-10-30
Attending: FAMILY MEDICINE
Payer: MEDICARE

## 2019-10-30 VITALS — BODY MASS INDEX: 41.44 KG/M2 | HEIGHT: 64 IN | WEIGHT: 242.75 LBS

## 2019-10-30 DIAGNOSIS — Z12.31 BREAST CANCER SCREENING BY MAMMOGRAM: ICD-10-CM

## 2019-10-30 PROCEDURE — 77067 MAMMO DIGITAL SCREENING BILAT WITH TOMOSYNTHESIS_CAD: ICD-10-PCS | Mod: 26,HCNC,, | Performed by: RADIOLOGY

## 2019-10-30 PROCEDURE — 77067 SCR MAMMO BI INCL CAD: CPT | Mod: TC,HCNC

## 2019-10-30 PROCEDURE — 77063 MAMMO DIGITAL SCREENING BILAT WITH TOMOSYNTHESIS_CAD: ICD-10-PCS | Mod: 26,HCNC,, | Performed by: RADIOLOGY

## 2019-10-30 PROCEDURE — 77063 BREAST TOMOSYNTHESIS BI: CPT | Mod: 26,HCNC,, | Performed by: RADIOLOGY

## 2019-10-30 PROCEDURE — 77067 SCR MAMMO BI INCL CAD: CPT | Mod: 26,HCNC,, | Performed by: RADIOLOGY

## 2019-11-05 ENCOUNTER — TELEPHONE (OUTPATIENT)
Dept: FAMILY MEDICINE | Facility: CLINIC | Age: 69
End: 2019-11-05

## 2019-11-05 DIAGNOSIS — E66.9 DIABETES MELLITUS TYPE 2 IN OBESE: Primary | ICD-10-CM

## 2019-11-05 DIAGNOSIS — E11.69 DIABETES MELLITUS TYPE 2 IN OBESE: Primary | ICD-10-CM

## 2019-11-05 NOTE — TELEPHONE ENCOUNTER
Please contact patient   Patient due for 6 month follow-up with me in December.   Please schedule  Please schedule labs (a1c, lipid and cmp) prior to appt.

## 2019-12-10 ENCOUNTER — LAB VISIT (OUTPATIENT)
Dept: LAB | Facility: HOSPITAL | Age: 69
End: 2019-12-10
Attending: FAMILY MEDICINE
Payer: MEDICARE

## 2019-12-10 DIAGNOSIS — E66.9 DIABETES MELLITUS TYPE 2 IN OBESE: ICD-10-CM

## 2019-12-10 DIAGNOSIS — E11.69 DIABETES MELLITUS TYPE 2 IN OBESE: ICD-10-CM

## 2019-12-10 LAB
ALBUMIN SERPL BCP-MCNC: 3.9 G/DL (ref 3.5–5.2)
ALP SERPL-CCNC: 72 U/L (ref 55–135)
ALT SERPL W/O P-5'-P-CCNC: 19 U/L (ref 10–44)
ANION GAP SERPL CALC-SCNC: 7 MMOL/L (ref 8–16)
AST SERPL-CCNC: 24 U/L (ref 10–40)
BILIRUB SERPL-MCNC: 0.6 MG/DL (ref 0.1–1)
BUN SERPL-MCNC: 15 MG/DL (ref 8–23)
CALCIUM SERPL-MCNC: 9.4 MG/DL (ref 8.7–10.5)
CHLORIDE SERPL-SCNC: 103 MMOL/L (ref 95–110)
CHOLEST SERPL-MCNC: 182 MG/DL (ref 120–199)
CHOLEST/HDLC SERPL: 4 {RATIO} (ref 2–5)
CO2 SERPL-SCNC: 29 MMOL/L (ref 23–29)
CREAT SERPL-MCNC: 1.1 MG/DL (ref 0.5–1.4)
EST. GFR  (AFRICAN AMERICAN): >60 ML/MIN/1.73 M^2
EST. GFR  (NON AFRICAN AMERICAN): 52.1 ML/MIN/1.73 M^2
ESTIMATED AVG GLUCOSE: 154 MG/DL (ref 68–131)
GLUCOSE SERPL-MCNC: 153 MG/DL (ref 70–110)
HBA1C MFR BLD HPLC: 7 % (ref 4–5.6)
HDLC SERPL-MCNC: 46 MG/DL (ref 40–75)
HDLC SERPL: 25.3 % (ref 20–50)
LDLC SERPL CALC-MCNC: 106 MG/DL (ref 63–159)
NONHDLC SERPL-MCNC: 136 MG/DL
POTASSIUM SERPL-SCNC: 3.7 MMOL/L (ref 3.5–5.1)
PROT SERPL-MCNC: 8.1 G/DL (ref 6–8.4)
SODIUM SERPL-SCNC: 139 MMOL/L (ref 136–145)
TRIGL SERPL-MCNC: 150 MG/DL (ref 30–150)

## 2019-12-10 PROCEDURE — 80061 LIPID PANEL: CPT | Mod: HCNC

## 2019-12-10 PROCEDURE — 83036 HEMOGLOBIN GLYCOSYLATED A1C: CPT | Mod: HCNC

## 2019-12-10 PROCEDURE — 80053 COMPREHEN METABOLIC PANEL: CPT | Mod: HCNC

## 2019-12-10 PROCEDURE — 36415 COLL VENOUS BLD VENIPUNCTURE: CPT | Mod: HCNC,PO

## 2019-12-18 DIAGNOSIS — I10 ESSENTIAL HYPERTENSION: ICD-10-CM

## 2019-12-18 RX ORDER — TRIAMTERENE/HYDROCHLOROTHIAZID 37.5-25 MG
1 TABLET ORAL DAILY
Qty: 90 TABLET | Refills: 1 | Status: SHIPPED | OUTPATIENT
Start: 2019-12-18 | End: 2019-12-31 | Stop reason: SDUPTHER

## 2019-12-23 RX ORDER — QUINAPRIL 40 MG/1
40 TABLET ORAL
COMMUNITY
End: 2019-12-31

## 2019-12-23 RX ORDER — TRIAMTERENE/HYDROCHLOROTHIAZID 37.5-25 MG
1 TABLET ORAL
COMMUNITY
End: 2019-12-31 | Stop reason: SDUPTHER

## 2019-12-31 ENCOUNTER — OFFICE VISIT (OUTPATIENT)
Dept: FAMILY MEDICINE | Facility: CLINIC | Age: 69
End: 2019-12-31
Payer: MEDICARE

## 2019-12-31 VITALS
WEIGHT: 236.13 LBS | BODY MASS INDEX: 40.31 KG/M2 | SYSTOLIC BLOOD PRESSURE: 136 MMHG | TEMPERATURE: 98 F | HEIGHT: 64 IN | DIASTOLIC BLOOD PRESSURE: 74 MMHG | HEART RATE: 51 BPM | OXYGEN SATURATION: 99 %

## 2019-12-31 DIAGNOSIS — E66.01 MORBID OBESITY WITH BMI OF 40.0-44.9, ADULT: ICD-10-CM

## 2019-12-31 DIAGNOSIS — I10 ESSENTIAL HYPERTENSION: ICD-10-CM

## 2019-12-31 DIAGNOSIS — E66.9 DIABETES MELLITUS TYPE 2 IN OBESE: ICD-10-CM

## 2019-12-31 DIAGNOSIS — E11.69 DIABETES MELLITUS TYPE 2 IN OBESE: ICD-10-CM

## 2019-12-31 DIAGNOSIS — I70.0 THORACIC AORTA ATHEROSCLEROSIS: ICD-10-CM

## 2019-12-31 DIAGNOSIS — E55.9 VITAMIN D DEFICIENCY: ICD-10-CM

## 2019-12-31 DIAGNOSIS — Z00.00 ANNUAL PHYSICAL EXAM: Primary | ICD-10-CM

## 2019-12-31 DIAGNOSIS — E11.9 TYPE 2 DIABETES MELLITUS WITHOUT COMPLICATION, WITHOUT LONG-TERM CURRENT USE OF INSULIN: ICD-10-CM

## 2019-12-31 PROCEDURE — 1159F PR MEDICATION LIST DOCUMENTED IN MEDICAL RECORD: ICD-10-PCS | Mod: HCNC,S$GLB,, | Performed by: FAMILY MEDICINE

## 2019-12-31 PROCEDURE — 3075F PR MOST RECENT SYSTOLIC BLOOD PRESS GE 130-139MM HG: ICD-10-PCS | Mod: HCNC,CPTII,S$GLB, | Performed by: FAMILY MEDICINE

## 2019-12-31 PROCEDURE — 99214 OFFICE O/P EST MOD 30 MIN: CPT | Mod: HCNC,S$GLB,, | Performed by: FAMILY MEDICINE

## 2019-12-31 PROCEDURE — 3078F DIAST BP <80 MM HG: CPT | Mod: HCNC,CPTII,S$GLB, | Performed by: FAMILY MEDICINE

## 2019-12-31 PROCEDURE — 99214 PR OFFICE/OUTPT VISIT, EST, LEVL IV, 30-39 MIN: ICD-10-PCS | Mod: HCNC,S$GLB,, | Performed by: FAMILY MEDICINE

## 2019-12-31 PROCEDURE — 1126F PR PAIN SEVERITY QUANTIFIED, NO PAIN PRESENT: ICD-10-PCS | Mod: HCNC,S$GLB,, | Performed by: FAMILY MEDICINE

## 2019-12-31 PROCEDURE — 1101F PR PT FALLS ASSESS DOC 0-1 FALLS W/OUT INJ PAST YR: ICD-10-PCS | Mod: HCNC,CPTII,S$GLB, | Performed by: FAMILY MEDICINE

## 2019-12-31 PROCEDURE — 99999 PR PBB SHADOW E&M-EST. PATIENT-LVL III: ICD-10-PCS | Mod: PBBFAC,HCNC,, | Performed by: FAMILY MEDICINE

## 2019-12-31 PROCEDURE — 1126F AMNT PAIN NOTED NONE PRSNT: CPT | Mod: HCNC,S$GLB,, | Performed by: FAMILY MEDICINE

## 2019-12-31 PROCEDURE — 3075F SYST BP GE 130 - 139MM HG: CPT | Mod: HCNC,CPTII,S$GLB, | Performed by: FAMILY MEDICINE

## 2019-12-31 PROCEDURE — 1101F PT FALLS ASSESS-DOCD LE1/YR: CPT | Mod: HCNC,CPTII,S$GLB, | Performed by: FAMILY MEDICINE

## 2019-12-31 PROCEDURE — 1159F MED LIST DOCD IN RCRD: CPT | Mod: HCNC,S$GLB,, | Performed by: FAMILY MEDICINE

## 2019-12-31 PROCEDURE — 99999 PR PBB SHADOW E&M-EST. PATIENT-LVL III: CPT | Mod: PBBFAC,HCNC,, | Performed by: FAMILY MEDICINE

## 2019-12-31 PROCEDURE — 3078F PR MOST RECENT DIASTOLIC BLOOD PRESSURE < 80 MM HG: ICD-10-PCS | Mod: HCNC,CPTII,S$GLB, | Performed by: FAMILY MEDICINE

## 2019-12-31 RX ORDER — AMLODIPINE BESYLATE 10 MG/1
10 TABLET ORAL DAILY
Qty: 90 TABLET | Refills: 1 | Status: SHIPPED | OUTPATIENT
Start: 2019-12-31 | End: 2020-06-30 | Stop reason: SDUPTHER

## 2019-12-31 RX ORDER — TRIAMTERENE/HYDROCHLOROTHIAZID 37.5-25 MG
1 TABLET ORAL DAILY
Qty: 90 TABLET | Refills: 1 | Status: SHIPPED | OUTPATIENT
Start: 2019-12-31 | End: 2020-09-15

## 2019-12-31 RX ORDER — QUINAPRIL 40 MG/1
40 TABLET ORAL DAILY
Qty: 90 TABLET | Refills: 3 | Status: CANCELLED | OUTPATIENT
Start: 2019-12-31

## 2019-12-31 RX ORDER — METFORMIN HYDROCHLORIDE 500 MG/1
500 TABLET ORAL
Qty: 90 TABLET | Refills: 1 | Status: SHIPPED | OUTPATIENT
Start: 2019-12-31 | End: 2020-06-30 | Stop reason: SDUPTHER

## 2019-12-31 NOTE — PROGRESS NOTES
Routine Office Visit    Patient Name: Bette Johnson    : 1950  MRN: [id number]    Subjective:  Bette is a 69 y.o. female who presents today for     1. Diabetes follow-up -  pt exercises 3 days per week. She has been working on making changes to her diet.She does want to continue to work on lifestyle modifications and changes in her diet.   2. Left thigh muscle is painful and stiff - started approximately 6 months ago - Patient exercises 3 days a week on MWF. Patient is unable to pull her leg up. She feels she is losing her flexibility. No bone pain. Patient is able to walk and bear weight.     Answers for HPI/ROS submitted by the patient on 2019   activity change: No  unexpected weight change: No  rhinorrhea: No  trouble swallowing: No  visual disturbance: No  chest tightness: No  polyuria: No  difficulty urinating: No  menstrual problem: No  joint swelling: No  arthralgias: Yes  confusion: No  dysphoric mood: No      Review of Systems   Constitutional: Negative for chills and fever.   HENT: Negative for congestion and hearing loss.    Eyes: Negative for blurred vision and discharge.   Respiratory: Negative for cough and wheezing.    Cardiovascular: Negative for chest pain and palpitations.   Gastrointestinal: Negative for abdominal pain, blood in stool, constipation, diarrhea, heartburn, nausea and vomiting.   Genitourinary: Negative for dysuria and hematuria.   Musculoskeletal: Negative for myalgias and neck pain.   Skin: Negative for itching and rash.   Neurological: Negative for dizziness, weakness and headaches.   Endo/Heme/Allergies: Negative for polydipsia.   Psychiatric/Behavioral: Negative for depression.       Active Problem List  Patient Active Problem List   Diagnosis    Morbid obesity with BMI of 40.0-44.9, adult    Essential hypertension    Thoracic aorta atherosclerosis    Diabetes mellitus type 2 in obese       Past Surgical History  Past Surgical History:   Procedure Laterality  Date    CHOLECYSTECTOMY      COLONOSCOPY N/A 1/25/2018    Procedure: COLONOSCOPY;  Surgeon: Jeffrey Solano MD;  Location: [facility];  Service: Endoscopy;  Laterality: N/A;       Family History  Family History   Problem Relation Age of Onset    Diabetes Mother     Diabetes Sister     Diabetes Brother     Kidney disease Brother     Diabetes Sister     Hypertension Sister     Diabetes Sister     Hypertension Sister     Hyperlipidemia Sister        Social History  Social History     Socioeconomic History    Marital status: Single     Spouse name: Not on file    Number of children: Not on file    Years of education: Not on file    Highest education level: Not on file   Occupational History    Not on file   Social Needs    Financial resource strain: Not very hard    Food insecurity:     Worry: Sometimes true     Inability: Sometimes true    Transportation needs:     Medical: No     Non-medical: No   Tobacco Use    Smoking status: Never Smoker    Smokeless tobacco: Never Used   Substance and Sexual Activity    Alcohol use: Yes     Frequency: Monthly or less     Drinks per session: 1 or 2     Binge frequency: Never    Drug use: No    Sexual activity: Never   Lifestyle    Physical activity:     Days per week: 3 days     Minutes per session: 60 min    Stress: Not at all   Relationships    Social connections:     Talks on phone: More than three times a week     Gets together: More than three times a week     Attends [employer] service: Not on file     Active member of club or organization: Yes     Attends meetings of clubs or organizations: More than 4 times per year     Relationship status:    Other Topics Concern    Not on file   Social History Narrative    Not on file       Medications and Allergies  Reviewed and updated.   Current Outpatient Medications   Medication Sig    amLODIPine (NORVASC) 10 MG tablet Take 1 tablet (10 mg total) by mouth once daily.    aspirin (ECOTRIN) 81 MG  "EC tablet Take 81 mg by mouth once daily.    cholecalciferol, vitamin D3, (VITAMIN D3) 2,000 unit Cap Take 1 capsule by mouth once daily. gummy    fish oil-omega-3 fatty acids 300-1,000 mg capsule Take 1 capsule by mouth once daily.    FLUCELVAX QUAD 0391-2369 60 mcg (15 mcg x 4)/0.5 mL Susp ADM 0.5ML IM UTD    loratadine (CLARITIN) 10 mg tablet Take 10 mg by mouth once daily.    metFORMIN (GLUCOPHAGE) 500 MG tablet Take 1 tablet (500 mg total) by mouth daily with breakfast.    multivit-minerals/folic acid (WOMEN'S MULTIVITAMIN GUMMIES ORAL) Take by mouth.    triamterene-hydrochlorothiazide 37.5-25 mg (MAXZIDE-25) 37.5-25 mg per tablet Take 1 tablet by mouth once daily.     No current facility-administered medications for this visit.        Physical Exam  /74 (BP Location: Right arm, Patient Position: Sitting, BP Method: Large (Manual))   Pulse (!) 51   Temp 97.7 °F (36.5 °C) (Oral)   Ht 5' 4" (1.626 m)   Wt 107.1 kg (236 lb 1.8 oz)   SpO2 99%   BMI 40.53 kg/m²   Physical Exam   Constitutional: She is oriented to person, place, and time. She appears well-developed and well-nourished.   HENT:   Head: Normocephalic and atraumatic.   Eyes: Pupils are equal, round, and reactive to light. Conjunctivae and EOM are normal.   Neck: Normal range of motion. Neck supple.   Cardiovascular: Normal rate, regular rhythm and normal heart sounds. Exam reveals no gallop and no friction rub.   No murmur heard.  Pulmonary/Chest: Breath sounds normal. No respiratory distress.   Abdominal: Soft. Bowel sounds are normal. She exhibits no distension. There is no tenderness.   Musculoskeletal: Normal range of motion.   Lymphadenopathy:     She has no cervical adenopathy.   Neurological: She is alert and oriented to person, place, and time.   Skin: Skin is warm.   Psychiatric: She has a normal mood and affect.       Assessment/Plan:  Bette Johnson is a 69 y.o. female who presents today for :    Problem List Items " "Addressed This Visit        Cardiac/Vascular    Essential hypertension    Relevant Medications    amLODIPine (NORVASC) 10 MG tablet    triamterene-hydrochlorothiazide 37.5-25 mg (MAXZIDE-25) 37.5-25 mg per tablet  The current medical regimen is effective;  continue present plan and medications.      Thoracic aorta atherosclerosis    Overview     " There is thoracic aortic atherosclerosis" - Xray Chest 11-  Patient with Atherosclerosis of the Aorta.  Stable/asymptomatic. Currently stable on lipid and b/p monitoring.    Patient advised of benefit of statin - she does not want to start due to side effects.             Endocrine    Diabetes mellitus type 2 in obese    Relevant Medications    metFORMIN (GLUCOPHAGE) 500 MG tablet  The current medical regimen is effective;  continue present plan and medications.       Morbid obesity with BMI of 40.0-44.9, adult  The patient is asked to make an attempt to improve diet and exercise patterns to aid in medical management of this problem.        Other Visit Diagnoses     Annual physical exam    -  Primary  I addressed all major concerns as it related to health maintenance.  All were ordered and scheduled based on the patients wishes.  Any additional health maintenance will be readdressed at the next physical if declined or deferred by the patient.  Health Maintenance       Date Due Completion Date    High Dose Statin 03/02/1973 ---    Shingles Vaccine (1 of 2) 03/02/2002 ---    DEXA SCAN 03/31/2020 3/31/2017    Foot Exam 04/19/2020 4/19/2019 (Done)    Override on 4/19/2019: Done    Override on 6/11/2018: Done    Hemoglobin A1c 06/10/2020 12/10/2019    Eye Exam 08/08/2020 8/8/2019    Override on 6/19/2017: Done    Urine Microalbumin 10/30/2020 10/30/2019    Lipid Panel 12/10/2020 12/10/2019    Mammogram 10/30/2021 10/30/2019    Override on 7/14/2016: Done    Colonoscopy 01/25/2023 1/25/2018    TETANUS VACCINE 07/12/2027 7/12/2017 (Declined)    Override on 7/12/2017: " Declined            Type 2 diabetes mellitus without complication, without long-term current use of insulin        Relevant Medications    metFORMIN (GLUCOPHAGE) 500 MG tablet  The current medical regimen is effective;  continue present plan and medications.       Other Relevant Orders    CBC auto differential    Comprehensive metabolic panel    Lipid panel    Hemoglobin A1c    TSH    Vitamin D deficiency        Relevant Orders    Vitamin D            Follow up in about 6 months (around 6/30/2020).

## 2020-02-11 ENCOUNTER — TELEPHONE (OUTPATIENT)
Dept: FAMILY MEDICINE | Facility: CLINIC | Age: 70
End: 2020-02-11

## 2020-02-11 ENCOUNTER — OFFICE VISIT (OUTPATIENT)
Dept: FAMILY MEDICINE | Facility: CLINIC | Age: 70
End: 2020-02-11
Payer: MEDICARE

## 2020-02-11 ENCOUNTER — HOSPITAL ENCOUNTER (OUTPATIENT)
Dept: RADIOLOGY | Facility: HOSPITAL | Age: 70
Discharge: HOME | End: 2020-02-11
Attending: NURSE PRACTITIONER
Payer: MEDICARE

## 2020-02-11 VITALS
SYSTOLIC BLOOD PRESSURE: 138 MMHG | TEMPERATURE: 98 F | HEIGHT: 64 IN | DIASTOLIC BLOOD PRESSURE: 80 MMHG | BODY MASS INDEX: 41.05 KG/M2 | WEIGHT: 240.44 LBS | HEART RATE: 60 BPM | OXYGEN SATURATION: 96 %

## 2020-02-11 DIAGNOSIS — E66.01 MORBID OBESITY WITH BMI OF 40.0-44.9, ADULT: ICD-10-CM

## 2020-02-11 DIAGNOSIS — E66.9 DIABETES MELLITUS TYPE 2 IN OBESE: ICD-10-CM
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
## 2020-02-11 DIAGNOSIS — G89.29 CHRONIC ELBOW PAIN, RIGHT: Primary | ICD-10-CM

## 2020-02-11 DIAGNOSIS — G89.29 CHRONIC ELBOW PAIN, RIGHT: ICD-10-CM

## 2020-02-11 DIAGNOSIS — E11.69 DIABETES MELLITUS TYPE 2 IN OBESE: ICD-10-CM

## 2020-02-11 DIAGNOSIS — M25.521 CHRONIC ELBOW PAIN, RIGHT: ICD-10-CM

## 2020-02-11 DIAGNOSIS — M25.521 CHRONIC ELBOW PAIN, RIGHT: Primary | ICD-10-CM

## 2020-02-11 DIAGNOSIS — I70.0 THORACIC AORTA ATHEROSCLEROSIS: ICD-10-CM

## 2020-02-11 PROCEDURE — 3051F HG A1C>EQUAL 7.0%<8.0%: CPT | Mod: HCNC,CPTII,S$GLB, | Performed by: NURSE PRACTITIONER

## 2020-02-11 PROCEDURE — 73080 X-RAY EXAM OF ELBOW: CPT | Mod: TC,HCNC,FY,PO,RT

## 2020-02-11 PROCEDURE — 1159F MED LIST DOCD IN RCRD: CPT | Mod: HCNC,S$GLB,, | Performed by: NURSE PRACTITIONER

## 2020-02-11 PROCEDURE — 1126F AMNT PAIN NOTED NONE PRSNT: CPT | Mod: HCNC,S$GLB,, | Performed by: NURSE PRACTITIONER

## 2020-02-11 PROCEDURE — 99214 OFFICE O/P EST MOD 30 MIN: CPT | Mod: HCNC,S$GLB,, | Performed by: NURSE PRACTITIONER

## 2020-02-11 PROCEDURE — 3079F DIAST BP 80-89 MM HG: CPT | Mod: HCNC,CPTII,S$GLB, | Performed by: NURSE PRACTITIONER

## 2020-02-11 PROCEDURE — 1126F PR PAIN SEVERITY QUANTIFIED, NO PAIN PRESENT: ICD-10-PCS | Mod: HCNC,S$GLB,, | Performed by: NURSE PRACTITIONER

## 2020-02-11 PROCEDURE — 3075F PR MOST RECENT SYSTOLIC BLOOD PRESS GE 130-139MM HG: ICD-10-PCS | Mod: HCNC,CPTII,S$GLB, | Performed by: NURSE PRACTITIONER

## 2020-02-11 PROCEDURE — 99999 PR PBB SHADOW E&M-EST. PATIENT-LVL IV: CPT | Mod: PBBFAC,HCNC,, | Performed by: NURSE PRACTITIONER

## 2020-02-11 PROCEDURE — 3051F PR MOST RECENT HEMOGLOBIN A1C LEVEL 7.0 - < 8.0%: ICD-10-PCS | Mod: HCNC,CPTII,S$GLB, | Performed by: NURSE PRACTITIONER

## 2020-02-11 PROCEDURE — 1101F PR PT FALLS ASSESS DOC 0-1 FALLS W/OUT INJ PAST YR: ICD-10-PCS | Mod: HCNC,CPTII,S$GLB, | Performed by: NURSE PRACTITIONER

## 2020-02-11 PROCEDURE — 73080 X-RAY EXAM OF ELBOW: CPT | Mod: 26,HCNC,RT, | Performed by: RADIOLOGY

## 2020-02-11 PROCEDURE — 99999 PR PBB SHADOW E&M-EST. PATIENT-LVL IV: ICD-10-PCS | Mod: PBBFAC,HCNC,, | Performed by: NURSE PRACTITIONER

## 2020-02-11 PROCEDURE — 99214 PR OFFICE/OUTPT VISIT, EST, LEVL IV, 30-39 MIN: ICD-10-PCS | Mod: HCNC,S$GLB,, | Performed by: NURSE PRACTITIONER

## 2020-02-11 PROCEDURE — 1101F PT FALLS ASSESS-DOCD LE1/YR: CPT | Mod: HCNC,CPTII,S$GLB, | Performed by: NURSE PRACTITIONER

## 2020-02-11 PROCEDURE — 3079F PR MOST RECENT DIASTOLIC BLOOD PRESSURE 80-89 MM HG: ICD-10-PCS | Mod: HCNC,CPTII,S$GLB, | Performed by: NURSE PRACTITIONER

## 2020-02-11 PROCEDURE — 3075F SYST BP GE 130 - 139MM HG: CPT | Mod: HCNC,CPTII,S$GLB, | Performed by: NURSE PRACTITIONER

## 2020-02-11 PROCEDURE — 1159F PR MEDICATION LIST DOCUMENTED IN MEDICAL RECORD: ICD-10-PCS | Mod: HCNC,S$GLB,, | Performed by: NURSE PRACTITIONER

## 2020-02-11 PROCEDURE — 73080 XR ELBOW COMPLETE 3 VIEW RIGHT: ICD-10-PCS | Mod: 26,HCNC,RT, | Performed by: RADIOLOGY

## 2020-02-11 NOTE — TELEPHONE ENCOUNTER
----- Message from MATTHEW Kinsey-PEACE sent at 2/11/2020 10:11 AM CST -----  Please inform patient her xray is normal.

## 2020-02-11 NOTE — PATIENT INSTRUCTIONS
Bursitis of the Elbow (Olecranon)  Your elbow joint contains a small fluid-filled sac called a bursa. The bursa helps the muscles and tendons move smoothly over the bone. It also cushions and protects your elbow. Bursitis is when the bursa is inflamed or swollen. This is most often due to overuse of or injury to the elbow. Symptoms include swelling and pain. If the elbow is red and feels warm to the touch, the bursa itself may be infected.  In most cases, elbow bursitis resolves with medicine and self-care at home. It may take several weeks for the bursa to heal and the swelling to go away. In some cases, your healthcare provider may drain excess fluid from the bursa. Or, he or she may inject medicine directly into the bursa to help relieve symptoms. In severe cases, you may need surgery to remove the bursa may. If there is concern that the bursa is infected, your healthcare provider may prescribe antibiotics to treat the infection.    Home care  Your healthcare provider may prescribe medicine to help relieve pain and swelling. This may be an over-the-counter pain reliever or prescription pain medicine. Take all medicines as directed. To help treat or prevent infection, your provider may prescribe antibiotics. If these are prescribed, take them as directed until they are gone.  The following are general care guidelines:  · Apply an ice pack or bag of frozen peas wrapped in a thin towel to your elbow for 15 to 20 minutes at a time. Do this 3 to 4 times a day until pain and swelling improve.  · Keep your elbow raised above the level of your heart whenever possible. This helps reduce swelling. When sitting or lying down, place your arm on a pillow that rests on your chest or on a pillow at your side.  · Use an elastic wrap around the elbow joint to compress the area while it is healing. Make the wrap snug but not tight to the point of causing pain.  · Rest your elbow to give it time to heal. You may need to wear an  elbow pad to help protect and limit the movement of your elbow. During and after healing, avoid leaning on your elbows.  Follow-up care  Follow up with your healthcare provider, or as advised. If you have been referred to a specialist, make that appointment promptly.  When to seek medical advice  Call your healthcare provider right away if any of these occur:  · Fever of 100.4°F (38°C) or higher, or as advised  · Chills  · Increased pain, swelling, warmth, redness, or drainage from the joint  · Trouble moving the elbow joint  · Numbness or tingling in the hand  · Severe pain or swelling in forearm or hand  · Loss of pink color and slow return of color after squeezing fingertip or hand  Date Last Reviewed: 6/1/2016 © 2000-2017 [employer]. [address] [place] [id number]. All rights reserved. This information is not intended as a substitute for professional medical care. Always follow your healthcare professional's instructions.        Tennis Elbow  Muscles connect to bones by thick, fibrous cords (tendons). When the muscles are overused by repeated motion, the tendons may become inflamed and painful. This condition is called tendonitis.  Tennis elbow (lateral epicondylitis) is a form of tendonitis. It occurs when the forearm muscles are used again and again in a twisting motion. Pain from tennis elbow occurs mainly on the outside of the elbow. But the pain can spread into the forearm and wrist. Your elbow may also be swollen and tender to the touch.  The pain may get worse when you move your arm or do simple activities. Bending your wrist back, shaking hands, or turning a doorknob may cause pain. The pain often gets worse after several weeks or months. Sometimes you may feel pain when your arm is still.  Tennis players who use a backhand stroke with poor technique are more likely to get tennis elbow. But playing tennis is only one cause of tennis elbow. Other common activities that can cause  it include:  · Hammering  · Painting  · Raking  Besides tennis players, people at risk include , gardeners, musicians, and dentists. Sometimes people get tennis elbow without doing anything that would cause the injury.  Treatment includes resting the arm and taking anti-inflammatory medicines. Special splints help ease symptoms. Symptoms should get better after 4 to 6 weeks of rest. You may need steroid injections if resting and using a splint dont help. After the pain is relieved, you should change your activities so the symptoms dont return. You may need physical therapy. It may include stretching, range-of-motion, and strengthening exercises. These treatments help most cases. You may need surgery if your symptoms continue for 6 months.  Home care  Follow these guidelines when caring for yourself at home:  · Rest your elbow as needed. Protect it from movement that causes pain. You may be told to use a forearm splint at night to ease symptoms in the morning. Your health care provider may recommend a special wrap or splint to compress the muscles of the forearm. This can ease pain during daytime activities. As your symptoms get better, start to move your elbow more.  · Put an ice pack on the injured area. Do this for 20 minutes every 1 to 2 hours the first day for pain relief. You can make an ice pack by wrapping a plastic bag of ice cubes in a thin towel. Continue using the ice pack 3 to 4 times a day for the next 2 days. Then use the ice pack as needed to ease pain and swelling.  · You may use acetaminophen or ibuprofen to control pain, unless another pain medicine was prescribed. If you have chronic liver or kidney disease, talk with your health care provider before using these medicines. Also talk with your provider if youve had a stomach ulcer or GI bleeding.  · After your elbow heals, avoid the motion that caused your pain. Or learn to move in a way that causes less stress on the tendon. Using a  forearm wrap may keep tennis elbow from happening again.  · A tennis elbow strap may ease pain and keep you from further injury when you start playing tennis again. You can also lower your risk for injury by warming up before you play and cooling down afterward. You should also use the right equipment. For instance, make sure your racquet has the right  and is the right size for you.  Follow-up care  Follow up with your health care provider, or as advised, if your symptoms dont get better after 1 week of treatment.  When to seek medical advice  Call your health care provider right away if any of these occur:  · Redness over the painful area  · Pain or swelling at the elbow gets worse  · Any numbness or tingling in your arm, hands, or fingers  · Unexplained fever over 101ºF (37.8ºC)   Date Last Reviewed: 2/17/2015  © 1233-4511 [employer]. [address] [place] [address]. All rights reserved. This information is not intended as a substitute for professional medical care. Always follow your healthcare professional's instructions.        Treating Tennis Elbow    Your treatment will depend on how inflamed your tendon is. The goal is to relieve your symptoms and help you regain full use of your elbow.  Rest and medicine  Wearing a tennis elbow splint allows the inflamed tendon to rest. It must be worn properly. It should be placed down the arm past the painful area of the elbow. If it is directly over the inflamed tendon, it can worsen the symptoms. This brace can help the tendon heal. Using your other hand or changing your  also takes stress off the tendon. Oral nonsteroidal anti-inflammatory medicines (NSAIDs) and/or ice can relieve pain and reduce swelling.  Exercises and therapy  Your healthcare provider may give you an exercise program. He or she may refer you to a therapist. The therapist will teach you to gently stretch and then strengthen the muscles around your  elbow.  Anti-inflammatory injections  Your healthcare provider may give you injections of an anti-inflammatory, such as cortisone. This helps reduce swelling. You may have more pain at first. But in a few days, your elbow should feel better.  If surgery is needed  If your symptoms persist for a long time, or other treatments dont work, your healthcare provider may recommend surgery. Surgery repairs the inflamed tendon.   Date Last Reviewed: 9/26/2015  © 9174-5067 [employer]. [address] [place] [address]. All rights reserved. This information is not intended as a substitute for professional medical care. Always follow your healthcare professional's instructions.

## 2020-02-11 NOTE — TELEPHONE ENCOUNTER
Please inform patient her xray is normal.    Spoke with patient and informed of recommendations and patient verbalized understandings.

## 2020-02-11 NOTE — PROGRESS NOTES
Subjective:       Patient ID: Bette Johnson is a 69 y.o. female.    Chief Complaint: Elbow Pain (right elbow pain )    Elbow Injury   This is a new problem. The current episode started more than 1 month ago (at least 3 months ago). The problem occurs intermittently. Associated symptoms include arthralgias. Pertinent negatives include no chest pain, headaches, joint swelling, neck pain, vomiting or weakness. Exacerbated by: movement. Treatments tried: rubbing and naproxen. The treatment provided moderate relief.       Past Medical History:   Diagnosis Date    Diabetes mellitus, type 2     Hypertension     Severe obesity (BMI >= 40) 7/12/2017       Social History     Socioeconomic History    Marital status: Single     Spouse name: Not on file    Number of children: Not on file    Years of education: Not on file    Highest education level: Not on file   Occupational History    Not on file   Social Needs    Financial resource strain: Not very hard    Food insecurity:     Worry: Sometimes true     Inability: Sometimes true    Transportation needs:     Medical: No     Non-medical: No   Tobacco Use    Smoking status: Never Smoker    Smokeless tobacco: Never Used   Substance and Sexual Activity    Alcohol use: Yes     Frequency: Monthly or less     Drinks per session: 1 or 2     Binge frequency: Never    Drug use: No    Sexual activity: Never   Lifestyle    Physical activity:     Days per week: 3 days     Minutes per session: 60 min    Stress: Not at all   Relationships    Social connections:     Talks on phone: More than three times a week     Gets together: More than three times a week     Attends Shinto service: Not on file     Active member of club or organization: Yes     Attends meetings of clubs or organizations: More than 4 times per year     Relationship status:    Other Topics Concern    Not on file   Social History Narrative    Not on file       Past Surgical History:  "  Procedure Laterality Date    CHOLECYSTECTOMY      COLONOSCOPY N/A 1/25/2018    Procedure: COLONOSCOPY;  Surgeon: Jeffrey Solano MD;  [facility];  Service: Endoscopy;  Laterality: N/A;       Review of Systems   Constitutional: Negative for activity change and unexpected weight change.   HENT: Negative for hearing loss, rhinorrhea and trouble swallowing.    Eyes: Negative for discharge and visual disturbance.   Respiratory: Negative for chest tightness and wheezing.    Cardiovascular: Negative for chest pain and palpitations.   Gastrointestinal: Negative for blood in stool, constipation, diarrhea and vomiting.   Endocrine: Negative for polydipsia and polyuria.   Genitourinary: Negative for difficulty urinating, dysuria, hematuria and menstrual problem.   Musculoskeletal: Positive for arthralgias. Negative for joint swelling and neck pain.   Neurological: Negative for weakness and headaches.   Psychiatric/Behavioral: Negative for confusion and dysphoric mood.   All other systems reviewed and are negative.      Objective:   /80 (BP Location: Left arm, Patient Position: Sitting, BP Method: Large (Manual))   Pulse 60   Temp 97.7 °F (36.5 °C) (Oral)   Ht 5' 4" (1.626 m)   Wt 109.1 kg (240 lb 6.6 oz)   SpO2 96%   BMI 41.27 kg/m²      Physical Exam   Constitutional: She is oriented to person, place, and time. She appears well-developed and well-nourished.   HENT:   Head: Normocephalic and atraumatic.   Cardiovascular: Normal rate, regular rhythm and normal heart sounds.   Pulmonary/Chest: Effort normal and breath sounds normal. No respiratory distress.   Musculoskeletal:        Left elbow: She exhibits normal range of motion, no swelling and no effusion. Tenderness found. Medial epicondyle and lateral epicondyle tenderness noted.   Neurological: She is alert and oriented to person, place, and time.   Skin: She is not diaphoretic. No pallor.   Psychiatric: She has a normal mood and affect. Her " speech is normal and behavior is normal.       Assessment:       1. Chronic elbow pain, right    2. Overuse injury    3. Diabetes mellitus type 2 in obese    4. Morbid obesity with BMI of 40.0-44.9, adult    5. Thoracic aorta atherosclerosis        Plan:       Bette was seen today for elbow pain.    Diagnoses and all orders for this visit:    Chronic elbow pain, right  -     X-Ray Elbow Complete 3 view Right; Future    Overuse injury  · Rest your elbow as needed. Protect it from movement that causes pain. You may be told to use a forearm splint at night to ease symptoms in the morning. Your health care provider may recommend a special wrap or splint to compress the muscles of the forearm. This can ease pain during daytime activities. As your symptoms get better, start to move your elbow more.  · Put an ice pack on the injured area. Do this for 20 minutes every 1 to 2 hours the first day for pain relief. You can make an ice pack by wrapping a plastic bag of ice cubes in a thin towel. Continue using the ice pack 3 to 4 times a day for the next 2 days. Then use the ice pack as needed to ease pain and swelling.  · You may use acetaminophen or ibuprofen to control pain, unless another pain medicine was prescribed. If you have chronic liver or kidney disease, talk with your health care provider before using these medicines. Also talk with your provider if youve had a stomach ulcer or GI bleeding.  · After your elbow heals, avoid the motion that caused your pain. Or learn to move in a way that causes less stress on the tendon. Using a forearm wrap may keep tennis elbow from happening again.  · A tennis elbow strap may ease pain and keep you from further injury when you start playing tennis again. You can also lower your risk for injury by warming up before you play and cooling down afterward. You should also use the right equipment. For instance, make sure your racquet has the right  and is the right size for  "you.    Problem List Items Addressed This Visit     Thoracic aorta atherosclerosis    Overview     " There is thoracic aortic atherosclerosis" - Xray Chest 11-         Current Assessment & Plan     Stable. Continue to monitor           Morbid obesity with BMI of 40.0-44.9, adult    Current Assessment & Plan     The patient is asked to make an attempt to improve diet and exercise patterns to aid in medical management of this problem.           Diabetes mellitus type 2 in obese    Current Assessment & Plan     Stable and controlled. Continue current treatment plan as previously prescribed with your PCP.   The patient is asked to make an attempt to improve diet and exercise patterns to aid in medical management of this problem.  Followed by PCP             Other Visit Diagnoses     Chronic elbow pain, right    -  Primary    Relevant Orders    X-Ray Elbow Complete 3 view Right    Overuse injury              Follow up if symptoms worsen or fail to improve.    "

## 2020-06-16 ENCOUNTER — PATIENT OUTREACH (OUTPATIENT)
Dept: ADMINISTRATIVE | Facility: HOSPITAL | Age: 70
End: 2020-06-16

## 2020-06-23 ENCOUNTER — LAB VISIT (OUTPATIENT)
Dept: LAB | Facility: HOSPITAL | Age: 70
End: 2020-06-23
Attending: FAMILY MEDICINE
Payer: MEDICARE

## 2020-06-23 DIAGNOSIS — E55.9 VITAMIN D DEFICIENCY: ICD-10-CM

## 2020-06-23 DIAGNOSIS — E11.9 TYPE 2 DIABETES MELLITUS WITHOUT COMPLICATION, WITHOUT LONG-TERM CURRENT USE OF INSULIN: ICD-10-CM

## 2020-06-23 LAB
25(OH)D3+25(OH)D2 SERPL-MCNC: 33 NG/ML (ref 30–96)
ALBUMIN SERPL BCP-MCNC: 4 G/DL (ref 3.5–5.2)
ALP SERPL-CCNC: 67 U/L (ref 55–135)
ALT SERPL W/O P-5'-P-CCNC: 16 U/L (ref 10–44)
ANION GAP SERPL CALC-SCNC: 8 MMOL/L (ref 8–16)
AST SERPL-CCNC: 22 U/L (ref 10–40)
BASOPHILS # BLD AUTO: 0.06 K/UL (ref 0–0.2)
BASOPHILS NFR BLD: 1.2 % (ref 0–1.9)
BILIRUB SERPL-MCNC: 0.4 MG/DL (ref 0.1–1)
BUN SERPL-MCNC: 15 MG/DL (ref 8–23)
CALCIUM SERPL-MCNC: 9.3 MG/DL (ref 8.7–10.5)
CHLORIDE SERPL-SCNC: 103 MMOL/L (ref 95–110)
CHOLEST SERPL-MCNC: 175 MG/DL (ref 120–199)
CHOLEST/HDLC SERPL: 3.8 {RATIO} (ref 2–5)
CO2 SERPL-SCNC: 27 MMOL/L (ref 23–29)
CREAT SERPL-MCNC: 1.1 MG/DL (ref 0.5–1.4)
DIFFERENTIAL METHOD: ABNORMAL
EOSINOPHIL # BLD AUTO: 0.2 K/UL (ref 0–0.5)
EOSINOPHIL NFR BLD: 3.3 % (ref 0–8)
ERYTHROCYTE [DISTWIDTH] IN BLOOD BY AUTOMATED COUNT: 13.5 % (ref 11.5–14.5)
EST. GFR  (AFRICAN AMERICAN): 60 ML/MIN/1.73 M^2
EST. GFR  (NON AFRICAN AMERICAN): 52 ML/MIN/1.73 M^2
ESTIMATED AVG GLUCOSE: 160 MG/DL (ref 68–131)
GLUCOSE SERPL-MCNC: 157 MG/DL (ref 70–110)
HBA1C MFR BLD HPLC: 7.2 % (ref 4–5.6)
HCT VFR BLD AUTO: 43.1 % (ref 37–48.5)
HDLC SERPL-MCNC: 46 MG/DL (ref 40–75)
HDLC SERPL: 26.3 % (ref 20–50)
HGB BLD-MCNC: 13.8 G/DL (ref 12–16)
IMM GRANULOCYTES # BLD AUTO: 0.01 K/UL (ref 0–0.04)
IMM GRANULOCYTES NFR BLD AUTO: 0.2 % (ref 0–0.5)
LDLC SERPL CALC-MCNC: 99 MG/DL (ref 63–159)
LYMPHOCYTES # BLD AUTO: 2.5 K/UL (ref 1–4.8)
LYMPHOCYTES NFR BLD: 48.6 % (ref 18–48)
MCH RBC QN AUTO: 27.8 PG (ref 27–31)
MCHC RBC AUTO-ENTMCNC: 32 G/DL (ref 32–36)
MCV RBC AUTO: 87 FL (ref 82–98)
MONOCYTES # BLD AUTO: 0.3 K/UL (ref 0.3–1)
MONOCYTES NFR BLD: 6.7 % (ref 4–15)
NEUTROPHILS # BLD AUTO: 2 K/UL (ref 1.8–7.7)
NEUTROPHILS NFR BLD: 40 % (ref 38–73)
NONHDLC SERPL-MCNC: 129 MG/DL
NRBC BLD-RTO: 0 /100 WBC
PLATELET # BLD AUTO: 282 K/UL (ref 150–350)
PMV BLD AUTO: 10.7 FL (ref 9.2–12.9)
POTASSIUM SERPL-SCNC: 3.8 MMOL/L (ref 3.5–5.1)
PROT SERPL-MCNC: 8.1 G/DL (ref 6–8.4)
RBC # BLD AUTO: 4.97 M/UL (ref 4–5.4)
SODIUM SERPL-SCNC: 138 MMOL/L (ref 136–145)
TRIGL SERPL-MCNC: 150 MG/DL (ref 30–150)
TSH SERPL DL<=0.005 MIU/L-ACNC: 3.06 UIU/ML (ref 0.4–4)
WBC # BLD AUTO: 5.08 K/UL (ref 3.9–12.7)

## 2020-06-23 PROCEDURE — 85025 COMPLETE CBC W/AUTO DIFF WBC: CPT | Mod: HCNC

## 2020-06-23 PROCEDURE — 84443 ASSAY THYROID STIM HORMONE: CPT | Mod: HCNC

## 2020-06-23 PROCEDURE — 36415 COLL VENOUS BLD VENIPUNCTURE: CPT | Mod: HCNC

## 2020-06-23 PROCEDURE — 82306 VITAMIN D 25 HYDROXY: CPT | Mod: HCNC

## 2020-06-23 PROCEDURE — 83036 HEMOGLOBIN GLYCOSYLATED A1C: CPT | Mod: HCNC

## 2020-06-23 PROCEDURE — 80053 COMPREHEN METABOLIC PANEL: CPT | Mod: HCNC

## 2020-06-23 PROCEDURE — 80061 LIPID PANEL: CPT | Mod: HCNC

## 2020-06-26 DIAGNOSIS — E11.9 TYPE 2 DIABETES MELLITUS WITHOUT COMPLICATION, WITHOUT LONG-TERM CURRENT USE OF INSULIN: ICD-10-CM

## 2020-06-30 ENCOUNTER — OFFICE VISIT (OUTPATIENT)
Dept: FAMILY MEDICINE | Facility: CLINIC | Age: 70
End: 2020-06-30
Payer: MEDICARE

## 2020-06-30 VITALS
WEIGHT: 242.75 LBS | HEART RATE: 60 BPM | BODY MASS INDEX: 41.44 KG/M2 | DIASTOLIC BLOOD PRESSURE: 70 MMHG | OXYGEN SATURATION: 96 % | SYSTOLIC BLOOD PRESSURE: 130 MMHG | HEIGHT: 64 IN | TEMPERATURE: 98 F

## 2020-06-30 DIAGNOSIS — E66.01 MORBID OBESITY WITH BMI OF 40.0-44.9, ADULT: ICD-10-CM

## 2020-06-30 DIAGNOSIS — E11.9 TYPE 2 DIABETES MELLITUS WITHOUT COMPLICATION, WITHOUT LONG-TERM CURRENT USE OF INSULIN: ICD-10-CM

## 2020-06-30 DIAGNOSIS — E11.69 DIABETES MELLITUS TYPE 2 IN OBESE: ICD-10-CM

## 2020-06-30 DIAGNOSIS — I10 ESSENTIAL HYPERTENSION: ICD-10-CM

## 2020-06-30 DIAGNOSIS — I70.0 THORACIC AORTA ATHEROSCLEROSIS: Primary | ICD-10-CM

## 2020-06-30 DIAGNOSIS — E66.9 DIABETES MELLITUS TYPE 2 IN OBESE: ICD-10-CM

## 2020-06-30 PROCEDURE — 99499 UNLISTED E&M SERVICE: CPT | Mod: HCNC,S$GLB,, | Performed by: FAMILY MEDICINE

## 2020-06-30 PROCEDURE — 99999 PR PBB SHADOW E&M-EST. PATIENT-LVL IV: ICD-10-PCS | Mod: PBBFAC,HCNC,, | Performed by: FAMILY MEDICINE

## 2020-06-30 PROCEDURE — 1159F MED LIST DOCD IN RCRD: CPT | Mod: HCNC,S$GLB,, | Performed by: FAMILY MEDICINE

## 2020-06-30 PROCEDURE — 99499 RISK ADDL DX/OHS AUDIT: ICD-10-PCS | Mod: HCNC,S$GLB,, | Performed by: FAMILY MEDICINE

## 2020-06-30 PROCEDURE — 1101F PT FALLS ASSESS-DOCD LE1/YR: CPT | Mod: HCNC,CPTII,S$GLB, | Performed by: FAMILY MEDICINE

## 2020-06-30 PROCEDURE — 3075F PR MOST RECENT SYSTOLIC BLOOD PRESS GE 130-139MM HG: ICD-10-PCS | Mod: HCNC,CPTII,S$GLB, | Performed by: FAMILY MEDICINE

## 2020-06-30 PROCEDURE — 1159F PR MEDICATION LIST DOCUMENTED IN MEDICAL RECORD: ICD-10-PCS | Mod: HCNC,S$GLB,, | Performed by: FAMILY MEDICINE

## 2020-06-30 PROCEDURE — 3008F BODY MASS INDEX DOCD: CPT | Mod: HCNC,CPTII,S$GLB, | Performed by: FAMILY MEDICINE

## 2020-06-30 PROCEDURE — 3078F PR MOST RECENT DIASTOLIC BLOOD PRESSURE < 80 MM HG: ICD-10-PCS | Mod: HCNC,CPTII,S$GLB, | Performed by: FAMILY MEDICINE

## 2020-06-30 PROCEDURE — 99214 PR OFFICE/OUTPT VISIT, EST, LEVL IV, 30-39 MIN: ICD-10-PCS | Mod: HCNC,S$GLB,, | Performed by: FAMILY MEDICINE

## 2020-06-30 PROCEDURE — 99214 OFFICE O/P EST MOD 30 MIN: CPT | Mod: HCNC,S$GLB,, | Performed by: FAMILY MEDICINE

## 2020-06-30 PROCEDURE — 1126F AMNT PAIN NOTED NONE PRSNT: CPT | Mod: HCNC,S$GLB,, | Performed by: FAMILY MEDICINE

## 2020-06-30 PROCEDURE — 99999 PR PBB SHADOW E&M-EST. PATIENT-LVL IV: CPT | Mod: PBBFAC,HCNC,, | Performed by: FAMILY MEDICINE

## 2020-06-30 PROCEDURE — 1126F PR PAIN SEVERITY QUANTIFIED, NO PAIN PRESENT: ICD-10-PCS | Mod: HCNC,S$GLB,, | Performed by: FAMILY MEDICINE

## 2020-06-30 PROCEDURE — 3008F PR BODY MASS INDEX (BMI) DOCUMENTED: ICD-10-PCS | Mod: HCNC,CPTII,S$GLB, | Performed by: FAMILY MEDICINE

## 2020-06-30 PROCEDURE — 1101F PR PT FALLS ASSESS DOC 0-1 FALLS W/OUT INJ PAST YR: ICD-10-PCS | Mod: HCNC,CPTII,S$GLB, | Performed by: FAMILY MEDICINE

## 2020-06-30 PROCEDURE — 3051F HG A1C>EQUAL 7.0%<8.0%: CPT | Mod: HCNC,CPTII,S$GLB, | Performed by: FAMILY MEDICINE

## 2020-06-30 PROCEDURE — 3051F PR MOST RECENT HEMOGLOBIN A1C LEVEL 7.0 - < 8.0%: ICD-10-PCS | Mod: HCNC,CPTII,S$GLB, | Performed by: FAMILY MEDICINE

## 2020-06-30 PROCEDURE — 3078F DIAST BP <80 MM HG: CPT | Mod: HCNC,CPTII,S$GLB, | Performed by: FAMILY MEDICINE

## 2020-06-30 PROCEDURE — 3075F SYST BP GE 130 - 139MM HG: CPT | Mod: HCNC,CPTII,S$GLB, | Performed by: FAMILY MEDICINE

## 2020-06-30 RX ORDER — AMLODIPINE BESYLATE 10 MG/1
10 TABLET ORAL DAILY
Qty: 90 TABLET | Refills: 1 | Status: SHIPPED | OUTPATIENT
Start: 2020-06-30 | End: 2021-01-19

## 2020-06-30 RX ORDER — ACETAMINOPHEN 500 MG
1 TABLET ORAL DAILY
Status: CANCELLED | OUTPATIENT
Start: 2020-06-30

## 2020-06-30 RX ORDER — METFORMIN HYDROCHLORIDE 500 MG/1
500 TABLET ORAL
Qty: 90 TABLET | Refills: 1 | Status: SHIPPED | OUTPATIENT
Start: 2020-06-30 | End: 2021-02-02 | Stop reason: SDUPTHER

## 2020-06-30 NOTE — PROGRESS NOTES
Routine Office Visit    Patient Name: Bette Johnson    : 1950  MRN: [id number]    Subjective:  Bette is a 70 y.o. female who presents today for     1. Diabetes follow-up -  pt exercises 3 days per week. She has been working on making changes to her diet.She does want to continue to work on lifestyle modifications and changes in her diet.   2. Hypertension - Patient has been taking her medication as prescribed. She needs prescription refilled.       Answers for HPI/ROS submitted by the patient on 2020   activity change: Yes  unexpected weight change: No  rhinorrhea: No  trouble swallowing: No  visual disturbance: Yes  chest tightness: No  polyuria: No  difficulty urinating: No  menstrual problem: No  joint swelling: No  arthralgias: No  confusion: No  dysphoric mood: No      Review of Systems   Constitutional: Negative for chills and fever.   HENT: Negative for congestion and hearing loss.    Eyes: Negative for blurred vision and discharge.   Respiratory: Negative for cough and wheezing.    Cardiovascular: Negative for chest pain and palpitations.   Gastrointestinal: Negative for abdominal pain, blood in stool, constipation, diarrhea, heartburn, nausea and vomiting.   Genitourinary: Negative for dysuria and hematuria.   Musculoskeletal: Negative for myalgias and neck pain.   Skin: Negative for itching and rash.   Neurological: Negative for dizziness, weakness and headaches.   Endo/Heme/Allergies: Negative for polydipsia.   Psychiatric/Behavioral: Negative for depression.       Active Problem List  Patient Active Problem List   Diagnosis    Morbid obesity with BMI of 40.0-44.9, adult    Essential hypertension    Thoracic aorta atherosclerosis    Diabetes mellitus type 2 in obese       Past Surgical History  Past Surgical History:   Procedure Laterality Date    CHOLECYSTECTOMY      COLONOSCOPY N/A 2018    Procedure: COLONOSCOPY;  Surgeon: Jeffrey Solano MD;  Location: [facility] ENDO;  Service:  Endoscopy;  Laterality: N/A;       Family History  Family History   Problem Relation Age of Onset    Diabetes Mother     Diabetes Sister     Diabetes Brother     Kidney disease Brother     Diabetes Sister     Hypertension Sister     Diabetes Sister     Hypertension Sister     Hyperlipidemia Sister        Social History  Social History     Socioeconomic History    Marital status: Single     Spouse name: Not on file    Number of children: Not on file    Years of education: Not on file    Highest education level: Not on file   Occupational History    Not on file   Social Needs    Financial resource strain: Not very hard    Food insecurity     Worry: Sometimes true     Inability: Sometimes true    Transportation needs     Medical: No     Non-medical: No   Tobacco Use    Smoking status: Never Smoker    Smokeless tobacco: Never Used   Substance and Sexual Activity    Alcohol use: Yes     Frequency: Monthly or less     Drinks per session: 1 or 2     Binge frequency: Never    Drug use: No    Sexual activity: Never   Lifestyle    Physical activity     Days per week: 3 days     Minutes per session: 60 min    Stress: Not at all   Relationships    Social connections     Talks on phone: More than three times a week     Gets together: More than three times a week     Attends Worship service: Not on file     Active member of club or organization: Yes     Attends meetings of clubs or organizations: More than 4 times per year     Relationship status:    Other Topics Concern    Not on file   Social History Narrative    Not on file       Medications and Allergies  Reviewed and updated.   Current Outpatient Medications   Medication Sig    amLODIPine (NORVASC) 10 MG tablet Take 1 tablet (10 mg total) by mouth once daily.    aspirin (ECOTRIN) 81 MG EC tablet Take 81 mg by mouth once daily.    cholecalciferol, vitamin D3, (VITAMIN D3) 2,000 unit Cap Take 1 capsule by mouth once daily. gummy    fish  "oil-omega-3 fatty acids 300-1,000 mg capsule Take 1 capsule by mouth once daily.    FLUCELVAX QUAD [id number] 60 mcg (15 mcg x 4)/0.5 mL Susp ADM 0.5ML IM UTD    loratadine (CLARITIN) 10 mg tablet Take 10 mg by mouth once daily.    metFORMIN (GLUCOPHAGE) 500 MG tablet Take 1 tablet (500 mg total) by mouth daily with breakfast.    multivit-minerals/folic acid (WOMEN'S MULTIVITAMIN GUMMIES ORAL) Take by mouth.    triamterene-hydrochlorothiazide 37.5-25 mg (MAXZIDE-25) 37.5-25 mg per tablet Take 1 tablet by mouth once daily.     No current facility-administered medications for this visit.        Physical Exam  /70 (BP Location: Left arm, Patient Position: Sitting, BP Method: Large (Manual))   Pulse 60   Temp 97.5 °F (36.4 °C) (Temporal)   Ht 5' 4" (1.626 m)   Wt 110.1 kg (242 lb 11.6 oz)   SpO2 96%   BMI 41.66 kg/m²   Physical Exam  Constitutional:       Appearance: She is well-developed.   HENT:      Head: Normocephalic and atraumatic.   Eyes:      Conjunctiva/sclera: Conjunctivae normal.      Pupils: Pupils are equal, round, and reactive to light.   Neck:      Musculoskeletal: Normal range of motion and neck supple.   Cardiovascular:      Rate and Rhythm: Normal rate and regular rhythm.      Heart sounds: Normal heart sounds. No murmur. No friction rub. No gallop.    Pulmonary:      Effort: No respiratory distress.      Breath sounds: Normal breath sounds.   Abdominal:      General: Bowel sounds are normal. There is no distension.      Palpations: Abdomen is soft.      Tenderness: There is no abdominal tenderness.   Musculoskeletal: Normal range of motion.   Lymphadenopathy:      Cervical: No cervical adenopathy.   Skin:     General: Skin is warm.   Neurological:      Mental Status: She is alert and oriented to person, place, and time.           Assessment/Plan:  Bette Johnson is a 70 y.o. female who presents today for :    Problem List Items Addressed This Visit        Cardiac/Vascular    " "Essential hypertension    Relevant Medications    amLODIPine (NORVASC) 10 MG tablet  The current medical regimen is effective;  continue present plan and medications.      Thoracic aorta atherosclerosis - Primary    Overview     " There is thoracic aortic atherosclerosis" - Xray Chest 11-  Patient with Atherosclerosis of the Aorta.  Stable/asymptomatic. Currently stable on lipid and b/p monitoring.              Endocrine    Diabetes mellitus type 2 in obese    Relevant Medications    metFORMIN (GLUCOPHAGE) 500 MG tablet  The current medical regimen is effective;  continue present plan and medications.      Other Relevant Orders    Comprehensive metabolic panel    Hemoglobin A1C    Morbid obesity with BMI of 40.0-44.9, adult  The patient is asked to make an attempt to improve diet and exercise patterns to aid in medical management of this problem.        Other Visit Diagnoses     Type 2 diabetes mellitus without complication, without long-term current use of insulin        Relevant Medications    metFORMIN (GLUCOPHAGE) 500 MG tablet          Follow up in about 6 months (around 12/30/2020).        "

## 2020-07-23 ENCOUNTER — PES CALL (OUTPATIENT)
Dept: ADMINISTRATIVE | Facility: CLINIC | Age: 70
End: 2020-07-23

## 2020-07-28 ENCOUNTER — OFFICE VISIT (OUTPATIENT)
Dept: FAMILY MEDICINE | Facility: CLINIC | Age: 70
End: 2020-07-28
Payer: MEDICARE

## 2020-07-28 VITALS
SYSTOLIC BLOOD PRESSURE: 142 MMHG | HEIGHT: 64 IN | DIASTOLIC BLOOD PRESSURE: 86 MMHG | OXYGEN SATURATION: 97 % | BODY MASS INDEX: 41.33 KG/M2 | WEIGHT: 242.06 LBS | HEART RATE: 70 BPM | TEMPERATURE: 98 F

## 2020-07-28 DIAGNOSIS — E66.01 MORBID OBESITY WITH BMI OF 40.0-44.9, ADULT: ICD-10-CM

## 2020-07-28 DIAGNOSIS — I70.0 THORACIC AORTA ATHEROSCLEROSIS: ICD-10-CM

## 2020-07-28 DIAGNOSIS — I10 ESSENTIAL HYPERTENSION: ICD-10-CM

## 2020-07-28 DIAGNOSIS — E66.9 DIABETES MELLITUS TYPE 2 IN OBESE: ICD-10-CM

## 2020-07-28 DIAGNOSIS — E11.69 DIABETES MELLITUS TYPE 2 IN OBESE: ICD-10-CM

## 2020-07-28 DIAGNOSIS — Z00.00 ENCOUNTER FOR PREVENTIVE HEALTH EXAMINATION: Primary | ICD-10-CM

## 2020-07-28 PROCEDURE — 3051F PR MOST RECENT HEMOGLOBIN A1C LEVEL 7.0 - < 8.0%: ICD-10-PCS | Mod: HCNC,CPTII,S$GLB, | Performed by: NURSE PRACTITIONER

## 2020-07-28 PROCEDURE — 99999 PR PBB SHADOW E&M-EST. PATIENT-LVL IV: ICD-10-PCS | Mod: PBBFAC,HCNC,, | Performed by: NURSE PRACTITIONER

## 2020-07-28 PROCEDURE — 3079F PR MOST RECENT DIASTOLIC BLOOD PRESSURE 80-89 MM HG: ICD-10-PCS | Mod: HCNC,CPTII,S$GLB, | Performed by: NURSE PRACTITIONER

## 2020-07-28 PROCEDURE — G0439 PPPS, SUBSEQ VISIT: HCPCS | Mod: HCNC,S$GLB,, | Performed by: NURSE PRACTITIONER

## 2020-07-28 PROCEDURE — 3077F PR MOST RECENT SYSTOLIC BLOOD PRESSURE >= 140 MM HG: ICD-10-PCS | Mod: HCNC,CPTII,S$GLB, | Performed by: NURSE PRACTITIONER

## 2020-07-28 PROCEDURE — G0439 PR MEDICARE ANNUAL WELLNESS SUBSEQUENT VISIT: ICD-10-PCS | Mod: HCNC,S$GLB,, | Performed by: NURSE PRACTITIONER

## 2020-07-28 PROCEDURE — 99499 RISK ADDL DX/OHS AUDIT: ICD-10-PCS | Mod: HCNC,S$GLB,, | Performed by: NURSE PRACTITIONER

## 2020-07-28 PROCEDURE — 3051F HG A1C>EQUAL 7.0%<8.0%: CPT | Mod: HCNC,CPTII,S$GLB, | Performed by: NURSE PRACTITIONER

## 2020-07-28 PROCEDURE — 3079F DIAST BP 80-89 MM HG: CPT | Mod: HCNC,CPTII,S$GLB, | Performed by: NURSE PRACTITIONER

## 2020-07-28 PROCEDURE — 3077F SYST BP >= 140 MM HG: CPT | Mod: HCNC,CPTII,S$GLB, | Performed by: NURSE PRACTITIONER

## 2020-07-28 PROCEDURE — 99499 UNLISTED E&M SERVICE: CPT | Mod: HCNC,S$GLB,, | Performed by: NURSE PRACTITIONER

## 2020-07-28 PROCEDURE — 99999 PR PBB SHADOW E&M-EST. PATIENT-LVL IV: CPT | Mod: PBBFAC,HCNC,, | Performed by: NURSE PRACTITIONER

## 2020-07-28 NOTE — PATIENT INSTRUCTIONS
Counseling and Referral of Other Preventative  (Italic type indicates deductible and co-insurance are waived)    Patient Name: Bette Johnson  Today's Date: 7/28/2020    Health Maintenance       Date Due Completion Date    High Dose Statin 03/02/1973 ---    Shingles Vaccine (1 of 2) 03/02/2002 Patient aware of recommendation for shingles vaccine.     Eye Exam 08/08/2020 8/8/2019    Override on 6/19/2017: Done    Influenza Vaccine (1) 09/01/2020 10/14/2019    Urine Microalbumin 10/30/2020 10/30/2019    Hemoglobin A1c 12/23/2020 6/23/2020    Foot Exam 12/31/2020 12/31/2019    Override on 4/19/2019: Done    Override on 6/11/2018: Done    Lipid Panel 06/23/2021 6/23/2020    Mammogram 10/30/2021 10/30/2019    Override on 7/14/2016: Done    Colorectal Cancer Screening 01/25/2023 1/25/2018    TETANUS VACCINE 07/12/2027 7/12/2017 (Declined)    Override on 7/12/2017: Declined        No orders of the defined types were placed in this encounter.    The following information is provided to all patients.  This information is to help you find resources for any of the problems found today that may be affecting your health:                Living healthy guide: www.[facility].[place].gov      Understanding Diabetes: www.diabetes.org      Eating healthy: www.cdc.gov/healthyweight      CDC home safety checklist: www.cdc.gov/steadi/patient.html      Agency on Aging: www.goea.[place].gov      Alcoholics anonymous (AA): www.aa.org      Physical Activity: www.allie.nih.gov/zv5ksny      Tobacco use: www.quitwithusla.org

## 2020-07-28 NOTE — PROGRESS NOTES
"  Bette Johnson presented for a  Medicare AWV and comprehensive Health Risk Assessment today. The following components were reviewed and updated:    · Medical history  · Family History  · Social history  · Allergies and Current Medications  · Health Risk Assessment  · Health Maintenance  · Care Team     ** See Completed Assessments for Annual Wellness Visit within the encounter summary.**         The following assessments were completed:  · Living Situation  · CAGE  · Depression Screening  · Timed Get Up and Go  · Whisper Test  · Cognitive Function Screening  ·   ·   · Nutrition Screening  · ADL Screening  · PAQ Screening        Vitals:    07/28/20 0734   BP: (!) 142/86   BP Location: Left arm   Patient Position: Sitting   BP Method: Large (Manual)   Pulse: 70   Temp: 97.5 °F (36.4 °C)   TempSrc: Oral   SpO2: 97%   Weight: 109.8 kg (242 lb 1 oz)   Height: 5' 4" (1.626 m)     Body mass index is 41.55 kg/m².  Physical Exam  Constitutional:       Appearance: She is obese.   Cardiovascular:      Rate and Rhythm: Normal rate.   Pulmonary:      Effort: Pulmonary effort is normal.   Musculoskeletal: Normal range of motion.   Skin:     General: Skin is warm.      Capillary Refill: Capillary refill takes less than 2 seconds.   Neurological:      Mental Status: She is alert.   Psychiatric:         Mood and Affect: Mood normal.         Thought Content: Thought content normal.               Diagnoses and health risks identified today and associated recommendations/orders:    1. Encounter for preventive health examination  Education provided about preventive health examinations and procedures; addressed and discussed patient's health concerns. Additionally, reviewed medical record for risk factors and documented the results during this encounter.    2. Morbid obesity with BMI of 40.0-44.9, adult  We discussed diet and exercise for weight loss.  Educated about diet, activities, and ways to minimize a sedentary lifestyle. "   Patient engaged in structured fitness activities at local fitness center.     3. Thoracic aorta atherosclerosis  Stable and monitored. Continue current treatment plan as previously prescribed.     4. Diabetes mellitus type 2 in obese  Education provided about diabetes, management of blood glucose with diet and activities, monitoring for worsening effects of diabetes.  Reviewed most recent Ha1c and informed patient of complications associated with uncontrolled diabetes.     5. Essential hypertension  Presently not at goal. We discussed health risks associated with this issue.  Patient aware of dietary and lifestyle modifications and medicinal interventions.  Obtained another measurement at the end of the eAWV 136/80.     Reviewed health maintenance, educated about recommended examinations, procedures (labs & images), and immunizations. Declined shingles vaccine at today's visit.     Provided Bette with a 5-10 year written screening schedule and personal prevention plan. Recommendations were developed using the USPSTF age appropriate recommendations. Education, counseling, and referrals were provided as needed. After Visit Summary printed and given to patient which includes a list of additional screenings\tests needed.    Follow up in about 1 year (around 7/28/2021) for assessment .    Manas Vann Jr, NP  I offered to discuss end of life issues, including information on how to make advance directives that the patient could use to name someone who would make medical decisions on their behalf if they became too ill to make themselves.    ___Patient declined  _X_Patient is interested, I provided paper work and offered to discuss.

## 2020-08-03 ENCOUNTER — CLINICAL SUPPORT (OUTPATIENT)
Dept: DIABETES | Facility: CLINIC | Age: 70
End: 2020-08-03
Payer: MEDICARE

## 2020-08-03 DIAGNOSIS — E11.69 DIABETES MELLITUS TYPE 2 IN OBESE: Primary | ICD-10-CM

## 2020-08-03 DIAGNOSIS — E66.9 DIABETES MELLITUS TYPE 2 IN OBESE: Primary | ICD-10-CM

## 2020-08-03 DIAGNOSIS — E11.9 TYPE 2 DIABETES MELLITUS WITHOUT COMPLICATION, WITHOUT LONG-TERM CURRENT USE OF INSULIN: ICD-10-CM

## 2020-08-03 PROCEDURE — G0108 DIAB MANAGE TRN  PER INDIV: HCPCS | Mod: HCNC,S$GLB,, | Performed by: DIETITIAN, REGISTERED

## 2020-08-03 PROCEDURE — 99999 PR PBB SHADOW E&M-EST. PATIENT-LVL I: ICD-10-PCS | Mod: PBBFAC,HCNC,, | Performed by: DIETITIAN, REGISTERED

## 2020-08-03 PROCEDURE — G0108 PR DIAB MANAGE TRN  PER INDIV: ICD-10-PCS | Mod: HCNC,S$GLB,, | Performed by: DIETITIAN, REGISTERED

## 2020-08-03 PROCEDURE — 99999 PR PBB SHADOW E&M-EST. PATIENT-LVL I: CPT | Mod: PBBFAC,HCNC,, | Performed by: DIETITIAN, REGISTERED

## 2020-08-03 NOTE — PROGRESS NOTES
Diabetes Education  Author: Imelda Muller RD  Date: 8/3/2020    Diabetes Care Management Summary  continuation of previous diabetes education on self management w emphasis on CHO awareness/counting, meal planning/label reading, SMBG, exercise, and meds.  Diabetes Education Record Assessment/Progress: Initial(initial visit 6/24/2019)  Current Diabetes Risk Level: Low     Diabetes Type  Diabetes Type : Type II    Diabetes History  Diabetes Diagnosis: >10 years  Current Treatment: Oral Medication, Diet  Reviewed Problem List with Patient: Yes    Health Maintenance was reviewed today with patient. Discussed with patient importance of routine eye exams, foot exams/foot care, blood work (i.e.: A1c, microalbumin, and lipid), dental visits, yearly flu vaccine, and pneumonia vaccine as indicated by PCP. Patient verbalized understanding.     Health Maintenance Topics with due status: Not Due       Topic Last Completion Date    TETANUS VACCINE 07/12/2017    Colorectal Cancer Screening 01/25/2018    Influenza Vaccine 10/14/2019    Mammogram 10/30/2019    Urine Microalbumin 10/30/2019    Foot Exam 12/31/2019    Lipid Panel 06/23/2020    Hemoglobin A1c 06/23/2020     Health Maintenance Due   Topic Date Due    High Dose Statin  03/02/1973    Eye Exam  08/08/2020       Nutrition  Meal Planning: drinks regular soda, water, 3 meals per day, diet drinks, artificial sweeteners, snacks between meal  What type of beverages do you drink?: milk, water, diet soda/tea, regular soda/tea  Meal Plan 24 Hour Recall - Breakfast: grits, turkey deli meat, 3 oz lactaid milk, banana  Meal Plan 24 Hour Recall - Lunch: turkey sandwich w pickle, chips. Lite Rahul Plamer (14 g carb in 8 ounces)  Meal Plan 24 Hour Recall - Dinner: chicken marsala, pasta, green beans, kale salad w poppy seed dressing, water  Meal Plan 24 Hour Recall - Snack: 1/2 cup bluebell ice cream (18 g carb)    Monitoring   Self Monitoring : not checking BG at home; relies on  routine lab work for data  Blood Glucose Logs: No  Do you use a personal continuous glucose monitor?: No    Exercise   Exercise Type: walking, swimming, exercise class(just returned to light exercise at gym this past week)  Intensity: Low  Frequency: 3-5 Times per week  Duration: 45 min    Current Diabetes Treatment   Current Treatment: Oral Medication, Diet    Social History  Preferred Learning Method: Face to Face  Primary Support: Self, Family  Educational Level: Some College  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  Smoking Status: Never a Smoker  Alcohol Use: Rarely  DDS-2 Score  ( > 3 = SIGNIFICANT DISTRESS): 2.5  Barriers to Change  Barriers to Change: None  Learning Challenges : None  Readiness to Learn   Readiness to Learn : Eager  Cultural Influences  Cultural Influences: No    Diabetes Education Assessment/Progress  Diabetes Disease Process (diabetes disease process and treatment options): Discussion, Written Materials Provided, Individual Session, Instructed, Demonstrates Understanding/Competency(verbalizes/demonstrates)  Nutrition (Incorporating nutritional management into one's lifestyle): Written Materials Provided, Discussion, Individual Session, Demonstrates Understanding/Competency (verbalizes/demonstrates), Instructed  -pt admits to self-indulgent behavior durning past 4 months- not following diet/eating too many carbs and  no exercise  -diet recall notes high na foods such as deli meats, pre-pkg meals and condiments. Diet includes regular dessert and lite tea which contains 18 g carb in 1 cup; pt drinks several cups each day. Pt admits to not label reading lately nor carb counting  - -Discussed carb vs non-carb foods, appropriate amount of carbs to have at meals/snacks and acceptiable serving sizes of individual carb items.  - Instructed on appropriate label reading and serving sizes of specific carb containing foods., portion control (hand) and using the plate method of meal planning.  "  -Encouraged carb sources primarily from whole grains, fresh/frozen fruits, and low-fat milk and yogurt.   -Discussed meal plans and snack ideas amenable to pt.  -Instructed pt to aim for 3 evenly-spaced meals with 30-45 gm carb/meal and 0-15 gm at snacks.  Strategies reviewed to reduce Na in meals and encourage slow steady weight loss (1/2-1lb/wk)    Physical Activity (incorporating physical activity into one's lifestyle): Written Materials Provided, Discussion, Individual Session, Demonstrates Understanding/Competency (verbalizes/demonstrates), Instructed  -pt states she is now returning to gym and starting water exercises which are less painful to hip  -encouraged pt to return to exercise program which helps w slow weight loss, keeping 3 exercise components in mind: Frequency- 3-5x/wk, Duration- 40-60 min, Intensity- can say name but "not sing a song"    Medications (states correct name, dose, onset, peak, duration, side effects & timing of meds): Discussion, Written Materials Provided, Individual Session, Demonstrates Understanding/Competency(verbalizes/demonstrates), Instructed  Acute Complications (preventing, detecting, and treating acute complications): Discussion, Written Materials Provided, Individual Session, Comprehends Key Points  Chronic Complications (preventing, detecting, and treating chronic complications): Discussion, Written Materials Provided, Individual Session, Demonstrates Understanding/Competency (verbalizes/demonstrates), Instructed  Clinical (diabetes, other pertinent medical history, and relevant comorbidities reviewed during visit): Written Materials Provided, Discussion, Individual Session, Demonstrates Understanding/Competency (verbalizes/demonstrates), Instructed  Cognitive (knowledge of self-management skills, functional health literacy): Written Materials Provided, Discussion, Individual Session, Comprehends Key Points  -verbalized need to stay aware and attentive to self-care "   Psychosocial (emotional response to diabetes): Discussion, Individual Session, Comprehends Key Points  Diabetes Distress and Support Systems: Discussion, Individual Session, Comprehends Key Points  Behavioral (readiness for change, lifestyle practices, self-care behaviors): Discussion, Individual Session, Comprehends Key Points  -Pt indicated importance of changing present behaviors to allow for improved health long term and pt appears  motivated to make recommended changes    Goals  Patient has selected/evaluated goals during today's session: Yes, evaluated  Healthy Eating: In Progress  Physical Activity: Set(resume PA 3-5x/wk, 1 hour duration)  Start Date: 08/03/20  Target Date: 09/25/20    Diabetes Care Plan/Intervention  Education Plan/Intervention: Individual Follow-Up DSMT (annual follow up)    Diabetes Meal Plan  Restrictions: Low Sodium, Restricted Carbohydrate  Carbohydrate Per Meal: 30-45g  Carbohydrate Per Snack : 15-20g, 7-15g    Today's Self-Management Care Plan was developed with the patient's input and is based on barriers identified during today's assessment.    The long and short-term goals in the care plan were written with the patient/caregiver's input. The patient has agreed to work toward these goals to improve her overall diabetes control.      The patient received a copy of today's self-management plan and verbalized understanding of the care plan, goals, and all of today's instructions.      The patient was encouraged to communicate with her physician and care team regarding her condition(s) and treatment.  I provided the patient with my contact information today and encouraged her to contact me via phone or patient portal as needed.     Education Units of Time   Time Spent: 60 min

## 2020-08-12 LAB
LEFT EYE DM RETINOPATHY: NEGATIVE
RIGHT EYE DM RETINOPATHY: NEGATIVE

## 2020-09-28 ENCOUNTER — PATIENT MESSAGE (OUTPATIENT)
Dept: FAMILY MEDICINE | Facility: CLINIC | Age: 70
End: 2020-09-28

## 2020-09-29 ENCOUNTER — PATIENT MESSAGE (OUTPATIENT)
Dept: OTHER | Facility: OTHER | Age: 70
End: 2020-09-29

## 2020-10-13 ENCOUNTER — LAB VISIT (OUTPATIENT)
Dept: LAB | Facility: OTHER | Age: 70
End: 2020-10-13
Payer: OTHER GOVERNMENT

## 2020-10-13 DIAGNOSIS — Z03.818 ENCOUNTER FOR OBSERVATION FOR SUSPECTED EXPOSURE TO OTHER BIOLOGICAL AGENTS RULED OUT: ICD-10-CM

## 2020-10-13 PROCEDURE — U0003 INFECTIOUS AGENT DETECTION BY NUCLEIC ACID (DNA OR RNA); SEVERE ACUTE RESPIRATORY SYNDROME CORONAVIRUS 2 (SARS-COV-2) (CORONAVIRUS DISEASE [COVID-19]), AMPLIFIED PROBE TECHNIQUE, MAKING USE OF HIGH THROUGHPUT TECHNOLOGIES AS DESCRIBED BY CMS-2020-01-R: HCPCS

## 2020-10-14 LAB — SARS-COV-2 RNA RESP QL NAA+PROBE: NOT DETECTED

## 2020-10-20 ENCOUNTER — LAB VISIT (OUTPATIENT)
Dept: LAB | Facility: OTHER | Age: 70
End: 2020-10-20
Payer: OTHER GOVERNMENT

## 2020-10-20 DIAGNOSIS — Z03.818 ENCOUNTER FOR OBSERVATION FOR SUSPECTED EXPOSURE TO OTHER BIOLOGICAL AGENTS RULED OUT: ICD-10-CM

## 2020-10-20 PROCEDURE — U0003 INFECTIOUS AGENT DETECTION BY NUCLEIC ACID (DNA OR RNA); SEVERE ACUTE RESPIRATORY SYNDROME CORONAVIRUS 2 (SARS-COV-2) (CORONAVIRUS DISEASE [COVID-19]), AMPLIFIED PROBE TECHNIQUE, MAKING USE OF HIGH THROUGHPUT TECHNOLOGIES AS DESCRIBED BY CMS-2020-01-R: HCPCS

## 2020-10-21 ENCOUNTER — PATIENT MESSAGE (OUTPATIENT)
Dept: ADMINISTRATIVE | Facility: HOSPITAL | Age: 70
End: 2020-10-21

## 2020-10-21 DIAGNOSIS — Z12.31 BREAST CANCER SCREENING BY MAMMOGRAM: Primary | ICD-10-CM

## 2020-10-21 LAB — SARS-COV-2 RNA RESP QL NAA+PROBE: NOT DETECTED

## 2020-10-22 ENCOUNTER — PATIENT OUTREACH (OUTPATIENT)
Dept: ADMINISTRATIVE | Facility: HOSPITAL | Age: 70
End: 2020-10-22

## 2020-10-22 RX ORDER — A/SINGAPORE/GP1908/2015 IVR-180 (AN A/MICHIGAN/45/2015 (H1N1)PDM09-LIKE VIRUS, A/HONG KONG/4801/2014, NYMC X-263B (H3N2) (AN A/HONG KONG/4801/2014-LIKE VIRUS), AND B/BRISBANE/60/2008, WILD TYPE (A B/BRISBANE/60/2008-LIKE VIRUS) 15; 15; 15 UG/.5ML; UG/.5ML; UG/.5ML
INJECTION, SUSPENSION INTRAMUSCULAR
COMMUNITY
Start: 2020-09-20 | End: 2021-02-03

## 2020-10-27 ENCOUNTER — LAB VISIT (OUTPATIENT)
Dept: LAB | Facility: OTHER | Age: 70
End: 2020-10-27
Payer: OTHER GOVERNMENT

## 2020-10-27 DIAGNOSIS — Z03.818 ENCOUNTER FOR OBSERVATION FOR SUSPECTED EXPOSURE TO OTHER BIOLOGICAL AGENTS RULED OUT: ICD-10-CM

## 2020-10-27 PROCEDURE — U0003 INFECTIOUS AGENT DETECTION BY NUCLEIC ACID (DNA OR RNA); SEVERE ACUTE RESPIRATORY SYNDROME CORONAVIRUS 2 (SARS-COV-2) (CORONAVIRUS DISEASE [COVID-19]), AMPLIFIED PROBE TECHNIQUE, MAKING USE OF HIGH THROUGHPUT TECHNOLOGIES AS DESCRIBED BY CMS-2020-01-R: HCPCS

## 2020-10-28 LAB — SARS-COV-2 RNA RESP QL NAA+PROBE: NOT DETECTED

## 2020-11-05 ENCOUNTER — HOSPITAL ENCOUNTER (OUTPATIENT)
Dept: RADIOLOGY | Facility: HOSPITAL | Age: 70
Discharge: HOME | End: 2020-11-05
Attending: FAMILY MEDICINE
Payer: MEDICARE

## 2020-11-05 DIAGNOSIS — Z12.31 BREAST CANCER SCREENING BY MAMMOGRAM: ICD-10-CM

## 2020-11-05 PROCEDURE — 77063 MAMMO DIGITAL SCREENING BILAT WITH TOMO: ICD-10-PCS | Mod: 26,HCNC,, | Performed by: RADIOLOGY

## 2020-11-05 PROCEDURE — 77063 BREAST TOMOSYNTHESIS BI: CPT | Mod: 26,HCNC,, | Performed by: RADIOLOGY

## 2020-11-05 PROCEDURE — 77067 MAMMO DIGITAL SCREENING BILAT WITH TOMO: ICD-10-PCS | Mod: 26,HCNC,, | Performed by: RADIOLOGY

## 2020-11-05 PROCEDURE — 77067 SCR MAMMO BI INCL CAD: CPT | Mod: TC,HCNC,PO

## 2020-11-05 PROCEDURE — 77067 SCR MAMMO BI INCL CAD: CPT | Mod: 26,HCNC,, | Performed by: RADIOLOGY

## 2020-11-10 ENCOUNTER — LAB VISIT (OUTPATIENT)
Dept: LAB | Facility: OTHER | Age: 70
End: 2020-11-10
Payer: OTHER GOVERNMENT

## 2020-11-10 DIAGNOSIS — Z03.818 ENCOUNTER FOR OBSERVATION FOR SUSPECTED EXPOSURE TO OTHER BIOLOGICAL AGENTS RULED OUT: ICD-10-CM

## 2020-11-10 PROCEDURE — U0003 INFECTIOUS AGENT DETECTION BY NUCLEIC ACID (DNA OR RNA); SEVERE ACUTE RESPIRATORY SYNDROME CORONAVIRUS 2 (SARS-COV-2) (CORONAVIRUS DISEASE [COVID-19]), AMPLIFIED PROBE TECHNIQUE, MAKING USE OF HIGH THROUGHPUT TECHNOLOGIES AS DESCRIBED BY CMS-2020-01-R: HCPCS

## 2020-11-11 LAB — SARS-COV-2 RNA RESP QL NAA+PROBE: NOT DETECTED

## 2020-11-17 ENCOUNTER — PATIENT MESSAGE (OUTPATIENT)
Dept: ADMINISTRATIVE | Facility: HOSPITAL | Age: 70
End: 2020-11-17

## 2020-11-17 ENCOUNTER — PATIENT OUTREACH (OUTPATIENT)
Dept: ADMINISTRATIVE | Facility: HOSPITAL | Age: 70
End: 2020-11-17

## 2020-11-17 ENCOUNTER — TELEPHONE (OUTPATIENT)
Dept: FAMILY MEDICINE | Facility: CLINIC | Age: 70
End: 2020-11-17

## 2020-11-17 ENCOUNTER — LAB VISIT (OUTPATIENT)
Dept: LAB | Facility: OTHER | Age: 70
End: 2020-11-17
Payer: OTHER GOVERNMENT

## 2020-11-17 DIAGNOSIS — Z03.818 ENCOUNTER FOR OBSERVATION FOR SUSPECTED EXPOSURE TO OTHER BIOLOGICAL AGENTS RULED OUT: ICD-10-CM

## 2020-11-17 DIAGNOSIS — E66.9 DIABETES MELLITUS TYPE 2 IN OBESE: Primary | ICD-10-CM

## 2020-11-17 DIAGNOSIS — E11.69 DIABETES MELLITUS TYPE 2 IN OBESE: Primary | ICD-10-CM

## 2020-11-17 PROCEDURE — U0003 INFECTIOUS AGENT DETECTION BY NUCLEIC ACID (DNA OR RNA); SEVERE ACUTE RESPIRATORY SYNDROME CORONAVIRUS 2 (SARS-COV-2) (CORONAVIRUS DISEASE [COVID-19]), AMPLIFIED PROBE TECHNIQUE, MAKING USE OF HIGH THROUGHPUT TECHNOLOGIES AS DESCRIBED BY CMS-2020-01-R: HCPCS

## 2020-11-20 LAB — SARS-COV-2 RNA RESP QL NAA+PROBE: NOT DETECTED

## 2020-11-24 ENCOUNTER — LAB VISIT (OUTPATIENT)
Dept: LAB | Facility: OTHER | Age: 70
End: 2020-11-24
Payer: OTHER GOVERNMENT

## 2020-11-24 ENCOUNTER — PATIENT OUTREACH (OUTPATIENT)
Dept: ADMINISTRATIVE | Facility: HOSPITAL | Age: 70
End: 2020-11-24

## 2020-11-24 DIAGNOSIS — Z03.818 ENCOUNTER FOR OBSERVATION FOR SUSPECTED EXPOSURE TO OTHER BIOLOGICAL AGENTS RULED OUT: ICD-10-CM

## 2020-11-24 PROCEDURE — U0003 INFECTIOUS AGENT DETECTION BY NUCLEIC ACID (DNA OR RNA); SEVERE ACUTE RESPIRATORY SYNDROME CORONAVIRUS 2 (SARS-COV-2) (CORONAVIRUS DISEASE [COVID-19]), AMPLIFIED PROBE TECHNIQUE, MAKING USE OF HIGH THROUGHPUT TECHNOLOGIES AS DESCRIBED BY CMS-2020-01-R: HCPCS

## 2020-11-25 LAB — SARS-COV-2 RNA RESP QL NAA+PROBE: NOT DETECTED

## 2020-12-01 ENCOUNTER — LAB VISIT (OUTPATIENT)
Dept: LAB | Facility: OTHER | Age: 70
End: 2020-12-01
Payer: OTHER GOVERNMENT

## 2020-12-01 DIAGNOSIS — Z03.818 ENCOUNTER FOR OBSERVATION FOR SUSPECTED EXPOSURE TO OTHER BIOLOGICAL AGENTS RULED OUT: ICD-10-CM

## 2020-12-01 PROCEDURE — U0003 INFECTIOUS AGENT DETECTION BY NUCLEIC ACID (DNA OR RNA); SEVERE ACUTE RESPIRATORY SYNDROME CORONAVIRUS 2 (SARS-COV-2) (CORONAVIRUS DISEASE [COVID-19]), AMPLIFIED PROBE TECHNIQUE, MAKING USE OF HIGH THROUGHPUT TECHNOLOGIES AS DESCRIBED BY CMS-2020-01-R: HCPCS

## 2020-12-02 LAB — SARS-COV-2 RNA RESP QL NAA+PROBE: NOT DETECTED

## 2020-12-07 ENCOUNTER — PATIENT OUTREACH (OUTPATIENT)
Dept: ADMINISTRATIVE | Facility: HOSPITAL | Age: 70
End: 2020-12-07

## 2020-12-08 ENCOUNTER — LAB VISIT (OUTPATIENT)
Dept: LAB | Facility: OTHER | Age: 70
End: 2020-12-08
Payer: OTHER GOVERNMENT

## 2020-12-08 DIAGNOSIS — Z03.818 ENCOUNTER FOR OBSERVATION FOR SUSPECTED EXPOSURE TO OTHER BIOLOGICAL AGENTS RULED OUT: ICD-10-CM

## 2020-12-08 PROCEDURE — U0003 INFECTIOUS AGENT DETECTION BY NUCLEIC ACID (DNA OR RNA); SEVERE ACUTE RESPIRATORY SYNDROME CORONAVIRUS 2 (SARS-COV-2) (CORONAVIRUS DISEASE [COVID-19]), AMPLIFIED PROBE TECHNIQUE, MAKING USE OF HIGH THROUGHPUT TECHNOLOGIES AS DESCRIBED BY CMS-2020-01-R: HCPCS

## 2020-12-08 NOTE — PROGRESS NOTES
[employer] Statin Use report - per the clinic note in 12/2019, the patient declined to start statin therapy secondary to side effects.

## 2020-12-10 LAB — SARS-COV-2 RNA RESP QL NAA+PROBE: NOT DETECTED

## 2020-12-15 ENCOUNTER — LAB VISIT (OUTPATIENT)
Dept: LAB | Facility: OTHER | Age: 70
End: 2020-12-15
Payer: OTHER GOVERNMENT

## 2020-12-15 DIAGNOSIS — Z03.818 ENCOUNTER FOR OBSERVATION FOR SUSPECTED EXPOSURE TO OTHER BIOLOGICAL AGENTS RULED OUT: ICD-10-CM

## 2020-12-15 PROCEDURE — U0003 INFECTIOUS AGENT DETECTION BY NUCLEIC ACID (DNA OR RNA); SEVERE ACUTE RESPIRATORY SYNDROME CORONAVIRUS 2 (SARS-COV-2) (CORONAVIRUS DISEASE [COVID-19]), AMPLIFIED PROBE TECHNIQUE, MAKING USE OF HIGH THROUGHPUT TECHNOLOGIES AS DESCRIBED BY CMS-2020-01-R: HCPCS

## 2020-12-17 LAB — SARS-COV-2 RNA RESP QL NAA+PROBE: NOT DETECTED

## 2020-12-21 ENCOUNTER — LAB VISIT (OUTPATIENT)
Dept: LAB | Facility: OTHER | Age: 70
End: 2020-12-21
Payer: OTHER GOVERNMENT

## 2020-12-21 DIAGNOSIS — Z03.818 ENCOUNTER FOR OBSERVATION FOR SUSPECTED EXPOSURE TO OTHER BIOLOGICAL AGENTS RULED OUT: ICD-10-CM

## 2020-12-21 PROCEDURE — U0003 INFECTIOUS AGENT DETECTION BY NUCLEIC ACID (DNA OR RNA); SEVERE ACUTE RESPIRATORY SYNDROME CORONAVIRUS 2 (SARS-COV-2) (CORONAVIRUS DISEASE [COVID-19]), AMPLIFIED PROBE TECHNIQUE, MAKING USE OF HIGH THROUGHPUT TECHNOLOGIES AS DESCRIBED BY CMS-2020-01-R: HCPCS

## 2020-12-22 LAB — SARS-COV-2 RNA RESP QL NAA+PROBE: NOT DETECTED

## 2020-12-28 ENCOUNTER — PATIENT MESSAGE (OUTPATIENT)
Dept: ADMINISTRATIVE | Facility: OTHER | Age: 70
End: 2020-12-28

## 2020-12-28 ENCOUNTER — PATIENT MESSAGE (OUTPATIENT)
Dept: FAMILY MEDICINE | Facility: CLINIC | Age: 70
End: 2020-12-28

## 2020-12-28 ENCOUNTER — LAB VISIT (OUTPATIENT)
Dept: LAB | Facility: OTHER | Age: 70
End: 2020-12-28
Payer: OTHER GOVERNMENT

## 2020-12-28 DIAGNOSIS — Z03.818 ENCOUNTER FOR OBSERVATION FOR SUSPECTED EXPOSURE TO OTHER BIOLOGICAL AGENTS RULED OUT: ICD-10-CM

## 2020-12-28 PROCEDURE — U0003 INFECTIOUS AGENT DETECTION BY NUCLEIC ACID (DNA OR RNA); SEVERE ACUTE RESPIRATORY SYNDROME CORONAVIRUS 2 (SARS-COV-2) (CORONAVIRUS DISEASE [COVID-19]), AMPLIFIED PROBE TECHNIQUE, MAKING USE OF HIGH THROUGHPUT TECHNOLOGIES AS DESCRIBED BY CMS-2020-01-R: HCPCS

## 2020-12-29 ENCOUNTER — PATIENT MESSAGE (OUTPATIENT)
Dept: FAMILY MEDICINE | Facility: CLINIC | Age: 70
End: 2020-12-29

## 2020-12-29 LAB — SARS-COV-2 RNA RESP QL NAA+PROBE: NOT DETECTED

## 2020-12-30 NOTE — TELEPHONE ENCOUNTER
Incoming call received from patient, She canceled her 01/05/2021 appointment due to COVID 19 exposure. She was scheduled for Dr. Estrada's next available appointment on 02/12/2021 and labs will scheduled closer to the visit. The patient was also placed on the waiting list for a sooner appointment.

## 2021-01-03 ENCOUNTER — PATIENT MESSAGE (OUTPATIENT)
Dept: ADMINISTRATIVE | Facility: OTHER | Age: 71
End: 2021-01-03

## 2021-01-04 ENCOUNTER — TELEPHONE (OUTPATIENT)
Dept: ADMINISTRATIVE | Facility: HOSPITAL | Age: 71
End: 2021-01-04

## 2021-01-04 DIAGNOSIS — U07.1 COVID-19: Primary | ICD-10-CM

## 2021-01-08 ENCOUNTER — NURSE TRIAGE (OUTPATIENT)
Dept: ADMINISTRATIVE | Facility: CLINIC | Age: 71
End: 2021-01-08

## 2021-01-08 ENCOUNTER — TELEPHONE (OUTPATIENT)
Dept: ADMINISTRATIVE | Facility: HOSPITAL | Age: 71
End: 2021-01-08

## 2021-01-13 ENCOUNTER — TELEPHONE (OUTPATIENT)
Dept: WOUND CARE | Facility: HOSPITAL | Age: 71
End: 2021-01-13

## 2021-01-19 ENCOUNTER — PATIENT OUTREACH (OUTPATIENT)
Dept: ADMINISTRATIVE | Facility: HOSPITAL | Age: 71
End: 2021-01-19

## 2021-01-27 ENCOUNTER — LAB VISIT (OUTPATIENT)
Dept: LAB | Facility: HOSPITAL | Age: 71
End: 2021-01-27
Attending: FAMILY MEDICINE
Payer: MEDICARE

## 2021-01-27 DIAGNOSIS — E11.69 DIABETES MELLITUS TYPE 2 IN OBESE: ICD-10-CM

## 2021-01-27 DIAGNOSIS — E66.9 DIABETES MELLITUS TYPE 2 IN OBESE: ICD-10-CM

## 2021-01-27 LAB
ALBUMIN SERPL BCP-MCNC: 3.7 G/DL (ref 3.5–5.2)
ALP SERPL-CCNC: 59 U/L (ref 55–135)
ALT SERPL W/O P-5'-P-CCNC: 21 U/L (ref 10–44)
ANION GAP SERPL CALC-SCNC: 9 MMOL/L (ref 8–16)
AST SERPL-CCNC: 23 U/L (ref 10–40)
BILIRUB SERPL-MCNC: 0.4 MG/DL (ref 0.1–1)
BUN SERPL-MCNC: 16 MG/DL (ref 8–23)
CALCIUM SERPL-MCNC: 8.7 MG/DL (ref 8.7–10.5)
CHLORIDE SERPL-SCNC: 103 MMOL/L (ref 95–110)
CO2 SERPL-SCNC: 26 MMOL/L (ref 23–29)
CREAT SERPL-MCNC: 1 MG/DL (ref 0.5–1.4)
EST. GFR  (AFRICAN AMERICAN): >60 ML/MIN/1.73 M^2
EST. GFR  (NON AFRICAN AMERICAN): 58 ML/MIN/1.73 M^2
ESTIMATED AVG GLUCOSE: 169 MG/DL (ref 68–131)
GLUCOSE SERPL-MCNC: 146 MG/DL (ref 70–110)
HBA1C MFR BLD HPLC: 7.5 % (ref 4–5.6)
POTASSIUM SERPL-SCNC: 3.9 MMOL/L (ref 3.5–5.1)
PROT SERPL-MCNC: 7.8 G/DL (ref 6–8.4)
SODIUM SERPL-SCNC: 138 MMOL/L (ref 136–145)

## 2021-01-27 PROCEDURE — 83036 HEMOGLOBIN GLYCOSYLATED A1C: CPT

## 2021-01-27 PROCEDURE — 80053 COMPREHEN METABOLIC PANEL: CPT

## 2021-01-27 PROCEDURE — 36415 COLL VENOUS BLD VENIPUNCTURE: CPT

## 2021-02-02 ENCOUNTER — TELEPHONE (OUTPATIENT)
Dept: FAMILY MEDICINE | Facility: CLINIC | Age: 71
End: 2021-02-02

## 2021-02-02 ENCOUNTER — OFFICE VISIT (OUTPATIENT)
Dept: FAMILY MEDICINE | Facility: CLINIC | Age: 71
End: 2021-02-02
Payer: MEDICARE

## 2021-02-02 VITALS
HEIGHT: 64 IN | OXYGEN SATURATION: 97 % | WEIGHT: 236.75 LBS | SYSTOLIC BLOOD PRESSURE: 136 MMHG | TEMPERATURE: 98 F | BODY MASS INDEX: 40.42 KG/M2 | HEART RATE: 61 BPM | DIASTOLIC BLOOD PRESSURE: 68 MMHG

## 2021-02-02 DIAGNOSIS — E66.9 DIABETES MELLITUS TYPE 2 IN OBESE: ICD-10-CM

## 2021-02-02 DIAGNOSIS — Z00.00 ANNUAL PHYSICAL EXAM: Primary | ICD-10-CM

## 2021-02-02 DIAGNOSIS — E11.9 TYPE 2 DIABETES MELLITUS WITHOUT COMPLICATION, WITHOUT LONG-TERM CURRENT USE OF INSULIN: ICD-10-CM

## 2021-02-02 DIAGNOSIS — Z23 NEED FOR PROPHYLACTIC VACCINATION WITH TETANUS-DIPHTHERIA (TD): ICD-10-CM

## 2021-02-02 DIAGNOSIS — E66.01 MORBID OBESITY WITH BMI OF 40.0-44.9, ADULT: ICD-10-CM

## 2021-02-02 DIAGNOSIS — I70.0 THORACIC AORTA ATHEROSCLEROSIS: ICD-10-CM

## 2021-02-02 DIAGNOSIS — E11.69 DIABETES MELLITUS TYPE 2 IN OBESE: ICD-10-CM

## 2021-02-02 DIAGNOSIS — I10 ESSENTIAL HYPERTENSION: ICD-10-CM

## 2021-02-02 PROCEDURE — 3075F SYST BP GE 130 - 139MM HG: CPT | Mod: CPTII,S$GLB,, | Performed by: FAMILY MEDICINE

## 2021-02-02 PROCEDURE — 99397 PER PM REEVAL EST PAT 65+ YR: CPT | Mod: 25,S$GLB,, | Performed by: FAMILY MEDICINE

## 2021-02-02 PROCEDURE — 3008F PR BODY MASS INDEX (BMI) DOCUMENTED: ICD-10-PCS | Mod: CPTII,S$GLB,, | Performed by: FAMILY MEDICINE

## 2021-02-02 PROCEDURE — 3051F PR MOST RECENT HEMOGLOBIN A1C LEVEL 7.0 - < 8.0%: ICD-10-PCS | Mod: CPTII,S$GLB,, | Performed by: FAMILY MEDICINE

## 2021-02-02 PROCEDURE — 3075F PR MOST RECENT SYSTOLIC BLOOD PRESS GE 130-139MM HG: ICD-10-PCS | Mod: CPTII,S$GLB,, | Performed by: FAMILY MEDICINE

## 2021-02-02 PROCEDURE — 1126F PR PAIN SEVERITY QUANTIFIED, NO PAIN PRESENT: ICD-10-PCS | Mod: S$GLB,,, | Performed by: FAMILY MEDICINE

## 2021-02-02 PROCEDURE — 3288F FALL RISK ASSESSMENT DOCD: CPT | Mod: CPTII,S$GLB,, | Performed by: FAMILY MEDICINE

## 2021-02-02 PROCEDURE — 1101F PT FALLS ASSESS-DOCD LE1/YR: CPT | Mod: CPTII,S$GLB,, | Performed by: FAMILY MEDICINE

## 2021-02-02 PROCEDURE — 99999 PR PBB SHADOW E&M-EST. PATIENT-LVL IV: CPT | Mod: PBBFAC,,, | Performed by: FAMILY MEDICINE

## 2021-02-02 PROCEDURE — 90471 TD VACCINE GREATER THAN OR EQUAL TO 7YO PRESERVATIVE FREE IM: ICD-10-PCS | Mod: S$GLB,,, | Performed by: FAMILY MEDICINE

## 2021-02-02 PROCEDURE — 1101F PR PT FALLS ASSESS DOC 0-1 FALLS W/OUT INJ PAST YR: ICD-10-PCS | Mod: CPTII,S$GLB,, | Performed by: FAMILY MEDICINE

## 2021-02-02 PROCEDURE — 90714 TD VACC NO PRESV 7 YRS+ IM: CPT | Mod: S$GLB,,, | Performed by: FAMILY MEDICINE

## 2021-02-02 PROCEDURE — 3008F BODY MASS INDEX DOCD: CPT | Mod: CPTII,S$GLB,, | Performed by: FAMILY MEDICINE

## 2021-02-02 PROCEDURE — 3078F PR MOST RECENT DIASTOLIC BLOOD PRESSURE < 80 MM HG: ICD-10-PCS | Mod: CPTII,S$GLB,, | Performed by: FAMILY MEDICINE

## 2021-02-02 PROCEDURE — 90471 IMMUNIZATION ADMIN: CPT | Mod: S$GLB,,, | Performed by: FAMILY MEDICINE

## 2021-02-02 PROCEDURE — 90714 TD VACCINE GREATER THAN OR EQUAL TO 7YO PRESERVATIVE FREE IM: ICD-10-PCS | Mod: S$GLB,,, | Performed by: FAMILY MEDICINE

## 2021-02-02 PROCEDURE — 3288F PR FALLS RISK ASSESSMENT DOCUMENTED: ICD-10-PCS | Mod: CPTII,S$GLB,, | Performed by: FAMILY MEDICINE

## 2021-02-02 PROCEDURE — 3051F HG A1C>EQUAL 7.0%<8.0%: CPT | Mod: CPTII,S$GLB,, | Performed by: FAMILY MEDICINE

## 2021-02-02 PROCEDURE — 99999 PR PBB SHADOW E&M-EST. PATIENT-LVL IV: ICD-10-PCS | Mod: PBBFAC,,, | Performed by: FAMILY MEDICINE

## 2021-02-02 PROCEDURE — 99397 PR PREVENTIVE VISIT,EST,65 & OVER: ICD-10-PCS | Mod: 25,S$GLB,, | Performed by: FAMILY MEDICINE

## 2021-02-02 PROCEDURE — 1126F AMNT PAIN NOTED NONE PRSNT: CPT | Mod: S$GLB,,, | Performed by: FAMILY MEDICINE

## 2021-02-02 PROCEDURE — 3078F DIAST BP <80 MM HG: CPT | Mod: CPTII,S$GLB,, | Performed by: FAMILY MEDICINE

## 2021-02-02 RX ORDER — AMLODIPINE BESYLATE 10 MG/1
10 TABLET ORAL DAILY
Qty: 90 TABLET | Refills: 1 | Status: SHIPPED | OUTPATIENT
Start: 2021-02-02 | End: 2021-05-02

## 2021-02-02 RX ORDER — METFORMIN HYDROCHLORIDE 850 MG/1
850 TABLET ORAL
Qty: 90 TABLET | Refills: 3 | Status: SHIPPED | OUTPATIENT
Start: 2021-02-02 | End: 2022-03-11

## 2021-02-02 RX ORDER — TRIAMTERENE/HYDROCHLOROTHIAZID 37.5-25 MG
1 TABLET ORAL DAILY
Qty: 90 TABLET | Refills: 1 | Status: SHIPPED | OUTPATIENT
Start: 2021-02-02 | End: 2021-03-11 | Stop reason: ALTCHOICE

## 2021-02-02 RX ORDER — METFORMIN HYDROCHLORIDE 500 MG/1
500 TABLET ORAL
Qty: 90 TABLET | Refills: 1 | Status: SHIPPED | OUTPATIENT
Start: 2021-02-02 | End: 2021-02-02

## 2021-02-15 PROCEDURE — 99285 EMERGENCY DEPT VISIT HI MDM: CPT | Mod: 25

## 2021-02-16 ENCOUNTER — HOSPITAL ENCOUNTER (INPATIENT)
Facility: HOSPITAL | Age: 71
LOS: 4 days | Discharge: HOME | DRG: 445 | End: 2021-02-20
Attending: EMERGENCY MEDICINE | Admitting: EMERGENCY MEDICINE
Payer: MEDICARE

## 2021-02-16 DIAGNOSIS — K83.09 CHOLANGITIS: ICD-10-CM

## 2021-02-16 DIAGNOSIS — E66.9 DIABETES MELLITUS TYPE 2 IN OBESE: ICD-10-CM

## 2021-02-16 DIAGNOSIS — K80.50 CHOLEDOCHOLITHIASIS: Primary | ICD-10-CM

## 2021-02-16 DIAGNOSIS — R10.13 EPIGASTRIC PAIN: ICD-10-CM

## 2021-02-16 DIAGNOSIS — R78.81 BACTEREMIA DUE TO GRAM-NEGATIVE BACTERIA: ICD-10-CM

## 2021-02-16 DIAGNOSIS — Z86.16 HISTORY OF 2019 NOVEL CORONAVIRUS DISEASE (COVID-19): ICD-10-CM

## 2021-02-16 DIAGNOSIS — E11.69 DIABETES MELLITUS TYPE 2 IN OBESE: ICD-10-CM

## 2021-02-16 DIAGNOSIS — R74.01 TRANSAMINITIS: ICD-10-CM

## 2021-02-16 DIAGNOSIS — I10 ESSENTIAL HYPERTENSION: ICD-10-CM

## 2021-02-16 PROBLEM — N39.0 UTI (URINARY TRACT INFECTION): Status: ACTIVE | Noted: 2021-02-16

## 2021-02-16 LAB
ALBUMIN SERPL BCP-MCNC: 3.9 G/DL (ref 3.5–5.2)
ALBUMIN SERPL BCP-MCNC: 4.1 G/DL (ref 3.5–5.2)
ALP SERPL-CCNC: 154 U/L (ref 55–135)
ALP SERPL-CCNC: 193 U/L (ref 55–135)
ALT SERPL W/O P-5'-P-CCNC: 223 U/L (ref 10–44)
ALT SERPL W/O P-5'-P-CCNC: 382 U/L (ref 10–44)
AMYLASE SERPL-CCNC: 43 U/L (ref 20–110)
ANION GAP SERPL CALC-SCNC: 13 MMOL/L (ref 8–16)
ANION GAP SERPL CALC-SCNC: 14 MMOL/L (ref 8–16)
APTT BLDCRRT: 23.5 SEC (ref 21–32)
AST SERPL-CCNC: 243 U/L (ref 10–40)
AST SERPL-CCNC: 427 U/L (ref 10–40)
BACTERIA #/AREA URNS HPF: ABNORMAL /HPF
BASOPHILS # BLD AUTO: 0.02 K/UL (ref 0–0.2)
BASOPHILS # BLD AUTO: 0.05 K/UL (ref 0–0.2)
BASOPHILS NFR BLD: 0.2 % (ref 0–1.9)
BASOPHILS NFR BLD: 0.8 % (ref 0–1.9)
BILIRUB SERPL-MCNC: 1.3 MG/DL (ref 0.1–1)
BILIRUB SERPL-MCNC: 2 MG/DL (ref 0.1–1)
BILIRUB UR QL STRIP: NEGATIVE
BUN SERPL-MCNC: 11 MG/DL (ref 8–23)
BUN SERPL-MCNC: 14 MG/DL (ref 8–23)
CALCIUM SERPL-MCNC: 9 MG/DL (ref 8.7–10.5)
CALCIUM SERPL-MCNC: 9.4 MG/DL (ref 8.7–10.5)
CHLORIDE SERPL-SCNC: 99 MMOL/L (ref 95–110)
CHLORIDE SERPL-SCNC: 99 MMOL/L (ref 95–110)
CHOLEST SERPL-MCNC: 174 MG/DL (ref 120–199)
CHOLEST/HDLC SERPL: 2.7 {RATIO} (ref 2–5)
CLARITY UR: CLEAR
CO2 SERPL-SCNC: 24 MMOL/L (ref 23–29)
CO2 SERPL-SCNC: 27 MMOL/L (ref 23–29)
COLOR UR: YELLOW
CREAT SERPL-MCNC: 0.9 MG/DL (ref 0.5–1.4)
CREAT SERPL-MCNC: 1 MG/DL (ref 0.5–1.4)
CTP QC/QA: YES
DIFFERENTIAL METHOD: ABNORMAL
DIFFERENTIAL METHOD: NORMAL
EOSINOPHIL # BLD AUTO: 0 K/UL (ref 0–0.5)
EOSINOPHIL # BLD AUTO: 0.1 K/UL (ref 0–0.5)
EOSINOPHIL NFR BLD: 0 % (ref 0–8)
EOSINOPHIL NFR BLD: 1.6 % (ref 0–8)
ERYTHROCYTE [DISTWIDTH] IN BLOOD BY AUTOMATED COUNT: 14.2 % (ref 11.5–14.5)
ERYTHROCYTE [DISTWIDTH] IN BLOOD BY AUTOMATED COUNT: 14.4 % (ref 11.5–14.5)
EST. GFR  (AFRICAN AMERICAN): >60 ML/MIN/1.73 M^2
EST. GFR  (AFRICAN AMERICAN): >60 ML/MIN/1.73 M^2
EST. GFR  (NON AFRICAN AMERICAN): 58 ML/MIN/1.73 M^2
EST. GFR  (NON AFRICAN AMERICAN): >60 ML/MIN/1.73 M^2
GLUCOSE SERPL-MCNC: 197 MG/DL (ref 70–110)
GLUCOSE SERPL-MCNC: 202 MG/DL (ref 70–110)
GLUCOSE UR QL STRIP: NEGATIVE
HCT VFR BLD AUTO: 40.7 % (ref 37–48.5)
HCT VFR BLD AUTO: 42.2 % (ref 37–48.5)
HDLC SERPL-MCNC: 64 MG/DL (ref 40–75)
HDLC SERPL: 36.8 % (ref 20–50)
HGB BLD-MCNC: 13.2 G/DL (ref 12–16)
HGB BLD-MCNC: 13.6 G/DL (ref 12–16)
HGB UR QL STRIP: NEGATIVE
HIV1+2 IGG SERPL QL IA.RAPID: NORMAL
HYALINE CASTS #/AREA URNS LPF: 3 /LPF
IMM GRANULOCYTES # BLD AUTO: 0.02 K/UL (ref 0–0.04)
IMM GRANULOCYTES # BLD AUTO: 0.04 K/UL (ref 0–0.04)
IMM GRANULOCYTES NFR BLD AUTO: 0.3 % (ref 0–0.5)
IMM GRANULOCYTES NFR BLD AUTO: 0.4 % (ref 0–0.5)
INR PPP: 1 (ref 0.8–1.2)
KETONES UR QL STRIP: ABNORMAL
LACTATE SERPL-SCNC: 0.9 MMOL/L (ref 0.5–2.2)
LDLC SERPL CALC-MCNC: 95.6 MG/DL (ref 63–159)
LEUKOCYTE ESTERASE UR QL STRIP: ABNORMAL
LIPASE SERPL-CCNC: 25 U/L (ref 4–60)
LYMPHOCYTES # BLD AUTO: 0.5 K/UL (ref 1–4.8)
LYMPHOCYTES # BLD AUTO: 1.8 K/UL (ref 1–4.8)
LYMPHOCYTES NFR BLD: 29.9 % (ref 18–48)
LYMPHOCYTES NFR BLD: 4.2 % (ref 18–48)
MAGNESIUM SERPL-MCNC: 1.6 MG/DL (ref 1.6–2.6)
MCH RBC QN AUTO: 27.6 PG (ref 27–31)
MCH RBC QN AUTO: 27.6 PG (ref 27–31)
MCHC RBC AUTO-ENTMCNC: 32.2 G/DL (ref 32–36)
MCHC RBC AUTO-ENTMCNC: 32.4 G/DL (ref 32–36)
MCV RBC AUTO: 85 FL (ref 82–98)
MCV RBC AUTO: 86 FL (ref 82–98)
MICROSCOPIC COMMENT: ABNORMAL
MONOCYTES # BLD AUTO: 0.4 K/UL (ref 0.3–1)
MONOCYTES # BLD AUTO: 0.5 K/UL (ref 0.3–1)
MONOCYTES NFR BLD: 4.6 % (ref 4–15)
MONOCYTES NFR BLD: 6.7 % (ref 4–15)
NEUTROPHILS # BLD AUTO: 10.3 K/UL (ref 1.8–7.7)
NEUTROPHILS # BLD AUTO: 3.7 K/UL (ref 1.8–7.7)
NEUTROPHILS NFR BLD: 60.7 % (ref 38–73)
NEUTROPHILS NFR BLD: 90.6 % (ref 38–73)
NITRITE UR QL STRIP: NEGATIVE
NONHDLC SERPL-MCNC: 110 MG/DL
NRBC BLD-RTO: 0 /100 WBC
NRBC BLD-RTO: 0 /100 WBC
PH UR STRIP: 7 [PH] (ref 5–8)
PHOSPHATE SERPL-MCNC: 3.3 MG/DL (ref 2.7–4.5)
PLATELET # BLD AUTO: 275 K/UL (ref 150–350)
PLATELET # BLD AUTO: 289 K/UL (ref 150–350)
PMV BLD AUTO: 10.4 FL (ref 9.2–12.9)
PMV BLD AUTO: 11.2 FL (ref 9.2–12.9)
POCT GLUCOSE: 194 MG/DL (ref 70–110)
POCT GLUCOSE: 208 MG/DL (ref 70–110)
POTASSIUM SERPL-SCNC: 3.5 MMOL/L (ref 3.5–5.1)
POTASSIUM SERPL-SCNC: 3.7 MMOL/L (ref 3.5–5.1)
PROT SERPL-MCNC: 8.4 G/DL (ref 6–8.4)
PROT SERPL-MCNC: 8.5 G/DL (ref 6–8.4)
PROT UR QL STRIP: ABNORMAL
PROTHROMBIN TIME: 10.7 SEC (ref 9–12.5)
RBC # BLD AUTO: 4.78 M/UL (ref 4–5.4)
RBC # BLD AUTO: 4.93 M/UL (ref 4–5.4)
RBC #/AREA URNS HPF: 3 /HPF (ref 0–4)
SARS-COV-2 RDRP RESP QL NAA+PROBE: NEGATIVE
SODIUM SERPL-SCNC: 137 MMOL/L (ref 136–145)
SODIUM SERPL-SCNC: 139 MMOL/L (ref 136–145)
SP GR UR STRIP: 1.02 (ref 1–1.03)
SQUAMOUS #/AREA URNS HPF: 1 /HPF
TRIGL SERPL-MCNC: 72 MG/DL (ref 30–150)
TROPONIN I SERPL DL<=0.01 NG/ML-MCNC: 0.02 NG/ML (ref 0–0.03)
URN SPEC COLLECT METH UR: ABNORMAL
UROBILINOGEN UR STRIP-ACNC: >=8 EU/DL
WBC # BLD AUTO: 11.36 K/UL (ref 3.9–12.7)
WBC # BLD AUTO: 6.13 K/UL (ref 3.9–12.7)
WBC #/AREA URNS HPF: 15 /HPF (ref 0–5)

## 2021-02-16 PROCEDURE — 82150 ASSAY OF AMYLASE: CPT

## 2021-02-16 PROCEDURE — U0002 COVID-19 LAB TEST NON-CDC: HCPCS | Performed by: EMERGENCY MEDICINE

## 2021-02-16 PROCEDURE — 80053 COMPREHEN METABOLIC PANEL: CPT | Mod: 91

## 2021-02-16 PROCEDURE — 63600175 PHARM REV CODE 636 W HCPCS: Performed by: NURSE PRACTITIONER

## 2021-02-16 PROCEDURE — 83605 ASSAY OF LACTIC ACID: CPT

## 2021-02-16 PROCEDURE — 85730 THROMBOPLASTIN TIME PARTIAL: CPT

## 2021-02-16 PROCEDURE — 25000003 PHARM REV CODE 250: Performed by: EMERGENCY MEDICINE

## 2021-02-16 PROCEDURE — 85025 COMPLETE CBC W/AUTO DIFF WBC: CPT | Mod: 91

## 2021-02-16 PROCEDURE — 25500020 PHARM REV CODE 255: Performed by: EMERGENCY MEDICINE

## 2021-02-16 PROCEDURE — 96361 HYDRATE IV INFUSION ADD-ON: CPT

## 2021-02-16 PROCEDURE — 93010 ELECTROCARDIOGRAM REPORT: CPT | Mod: ,,, | Performed by: INTERNAL MEDICINE

## 2021-02-16 PROCEDURE — 25000003 PHARM REV CODE 250: Performed by: HOSPITALIST

## 2021-02-16 PROCEDURE — 86703 HIV-1/HIV-2 1 RESULT ANTBDY: CPT

## 2021-02-16 PROCEDURE — 87077 CULTURE AEROBIC IDENTIFY: CPT | Mod: 59

## 2021-02-16 PROCEDURE — 93005 ELECTROCARDIOGRAM TRACING: CPT

## 2021-02-16 PROCEDURE — 87040 BLOOD CULTURE FOR BACTERIA: CPT | Mod: 59

## 2021-02-16 PROCEDURE — 63600175 PHARM REV CODE 636 W HCPCS: Performed by: HOSPITALIST

## 2021-02-16 PROCEDURE — 87186 SC STD MICRODIL/AGAR DIL: CPT

## 2021-02-16 PROCEDURE — 99204 OFFICE O/P NEW MOD 45 MIN: CPT | Mod: ,,, | Performed by: INTERNAL MEDICINE

## 2021-02-16 PROCEDURE — 83690 ASSAY OF LIPASE: CPT

## 2021-02-16 PROCEDURE — 80053 COMPREHEN METABOLIC PANEL: CPT

## 2021-02-16 PROCEDURE — 84100 ASSAY OF PHOSPHORUS: CPT

## 2021-02-16 PROCEDURE — 96372 THER/PROPH/DIAG INJ SC/IM: CPT | Mod: 59

## 2021-02-16 PROCEDURE — 96375 TX/PRO/DX INJ NEW DRUG ADDON: CPT

## 2021-02-16 PROCEDURE — 96376 TX/PRO/DX INJ SAME DRUG ADON: CPT

## 2021-02-16 PROCEDURE — 83735 ASSAY OF MAGNESIUM: CPT

## 2021-02-16 PROCEDURE — 80061 LIPID PANEL: CPT

## 2021-02-16 PROCEDURE — 25000003 PHARM REV CODE 250: Performed by: NURSE PRACTITIONER

## 2021-02-16 PROCEDURE — 87086 URINE CULTURE/COLONY COUNT: CPT

## 2021-02-16 PROCEDURE — 11000001 HC ACUTE MED/SURG PRIVATE ROOM

## 2021-02-16 PROCEDURE — 63600175 PHARM REV CODE 636 W HCPCS: Performed by: EMERGENCY MEDICINE

## 2021-02-16 PROCEDURE — 80074 ACUTE HEPATITIS PANEL: CPT

## 2021-02-16 PROCEDURE — 99204 PR OFFICE/OUTPT VISIT, NEW, LEVL IV, 45-59 MIN: ICD-10-PCS | Mod: ,,, | Performed by: INTERNAL MEDICINE

## 2021-02-16 PROCEDURE — 84484 ASSAY OF TROPONIN QUANT: CPT

## 2021-02-16 PROCEDURE — 81000 URINALYSIS NONAUTO W/SCOPE: CPT

## 2021-02-16 PROCEDURE — 85610 PROTHROMBIN TIME: CPT

## 2021-02-16 PROCEDURE — 96374 THER/PROPH/DIAG INJ IV PUSH: CPT

## 2021-02-16 PROCEDURE — 93010 EKG 12-LEAD: ICD-10-PCS | Mod: ,,, | Performed by: INTERNAL MEDICINE

## 2021-02-16 RX ORDER — FENTANYL CITRATE 50 UG/ML
50 INJECTION, SOLUTION INTRAMUSCULAR; INTRAVENOUS
Status: DISCONTINUED | OUTPATIENT
Start: 2021-02-16 | End: 2021-02-16

## 2021-02-16 RX ORDER — HYDROMORPHONE HYDROCHLORIDE 2 MG/ML
1 INJECTION, SOLUTION INTRAMUSCULAR; INTRAVENOUS; SUBCUTANEOUS
Status: COMPLETED | OUTPATIENT
Start: 2021-02-16 | End: 2021-02-16

## 2021-02-16 RX ORDER — SODIUM CHLORIDE 0.9 % (FLUSH) 0.9 %
10 SYRINGE (ML) INJECTION
Status: DISCONTINUED | OUTPATIENT
Start: 2021-02-16 | End: 2021-02-20 | Stop reason: HOSPADM

## 2021-02-16 RX ORDER — GLUCAGON 1 MG
1 KIT INJECTION
Status: DISCONTINUED | OUTPATIENT
Start: 2021-02-16 | End: 2021-02-20 | Stop reason: HOSPADM

## 2021-02-16 RX ORDER — ONDANSETRON 2 MG/ML
4 INJECTION INTRAMUSCULAR; INTRAVENOUS EVERY 8 HOURS PRN
Status: DISCONTINUED | OUTPATIENT
Start: 2021-02-16 | End: 2021-02-20 | Stop reason: HOSPADM

## 2021-02-16 RX ORDER — CHOLECALCIFEROL (VITAMIN D3) 25 MCG
2000 TABLET ORAL DAILY
Status: DISCONTINUED | OUTPATIENT
Start: 2021-02-16 | End: 2021-02-16

## 2021-02-16 RX ORDER — MAGNESIUM SULFATE HEPTAHYDRATE 40 MG/ML
2 INJECTION, SOLUTION INTRAVENOUS ONCE
Status: DISCONTINUED | OUTPATIENT
Start: 2021-02-16 | End: 2021-02-17

## 2021-02-16 RX ORDER — SODIUM CHLORIDE, SODIUM LACTATE, POTASSIUM CHLORIDE, CALCIUM CHLORIDE 600; 310; 30; 20 MG/100ML; MG/100ML; MG/100ML; MG/100ML
INJECTION, SOLUTION INTRAVENOUS CONTINUOUS
Status: DISCONTINUED | OUTPATIENT
Start: 2021-02-16 | End: 2021-02-17

## 2021-02-16 RX ORDER — ACETAMINOPHEN 325 MG/1
650 TABLET ORAL EVERY 8 HOURS PRN
Status: DISCONTINUED | OUTPATIENT
Start: 2021-02-16 | End: 2021-02-20 | Stop reason: HOSPADM

## 2021-02-16 RX ORDER — KETOROLAC TROMETHAMINE 30 MG/ML
15 INJECTION, SOLUTION INTRAMUSCULAR; INTRAVENOUS
Status: COMPLETED | OUTPATIENT
Start: 2021-02-16 | End: 2021-02-16

## 2021-02-16 RX ORDER — HYDRALAZINE HYDROCHLORIDE 20 MG/ML
10 INJECTION INTRAMUSCULAR; INTRAVENOUS EVERY 8 HOURS PRN
Status: DISCONTINUED | OUTPATIENT
Start: 2021-02-16 | End: 2021-02-20 | Stop reason: HOSPADM

## 2021-02-16 RX ORDER — TRIAMTERENE AND HYDROCHLOROTHIAZIDE 37.5; 25 MG/1; MG/1
1 CAPSULE ORAL DAILY
Refills: 1 | Status: DISCONTINUED | OUTPATIENT
Start: 2021-02-16 | End: 2021-02-16

## 2021-02-16 RX ORDER — HYDROMORPHONE HYDROCHLORIDE 2 MG/ML
0.5 INJECTION, SOLUTION INTRAMUSCULAR; INTRAVENOUS; SUBCUTANEOUS EVERY 4 HOURS PRN
Status: DISCONTINUED | OUTPATIENT
Start: 2021-02-16 | End: 2021-02-20 | Stop reason: HOSPADM

## 2021-02-16 RX ORDER — AMLODIPINE BESYLATE 5 MG/1
10 TABLET ORAL DAILY
Status: DISCONTINUED | OUTPATIENT
Start: 2021-02-17 | End: 2021-02-16

## 2021-02-16 RX ORDER — TALC
6 POWDER (GRAM) TOPICAL NIGHTLY PRN
Status: DISCONTINUED | OUTPATIENT
Start: 2021-02-16 | End: 2021-02-20 | Stop reason: HOSPADM

## 2021-02-16 RX ORDER — ASPIRIN 81 MG/1
81 TABLET ORAL DAILY
Status: DISCONTINUED | OUTPATIENT
Start: 2021-02-16 | End: 2021-02-16

## 2021-02-16 RX ORDER — METRONIDAZOLE 500 MG/1
500 TABLET ORAL EVERY 8 HOURS
Status: DISCONTINUED | OUTPATIENT
Start: 2021-02-16 | End: 2021-02-20

## 2021-02-16 RX ORDER — MAGNESIUM SULFATE HEPTAHYDRATE 40 MG/ML
2 INJECTION, SOLUTION INTRAVENOUS ONCE
Status: COMPLETED | OUTPATIENT
Start: 2021-02-16 | End: 2021-02-16

## 2021-02-16 RX ORDER — AMLODIPINE BESYLATE 5 MG/1
10 TABLET ORAL
Status: COMPLETED | OUTPATIENT
Start: 2021-02-16 | End: 2021-02-16

## 2021-02-16 RX ORDER — CIPROFLOXACIN 500 MG/1
500 TABLET ORAL EVERY 12 HOURS
Status: DISCONTINUED | OUTPATIENT
Start: 2021-02-16 | End: 2021-02-20 | Stop reason: HOSPADM

## 2021-02-16 RX ORDER — SODIUM CHLORIDE 9 MG/ML
INJECTION, SOLUTION INTRAVENOUS CONTINUOUS
Status: DISCONTINUED | OUTPATIENT
Start: 2021-02-16 | End: 2021-02-16

## 2021-02-16 RX ORDER — INSULIN ASPART 100 [IU]/ML
1-10 INJECTION, SOLUTION INTRAVENOUS; SUBCUTANEOUS EVERY 6 HOURS PRN
Status: DISCONTINUED | OUTPATIENT
Start: 2021-02-16 | End: 2021-02-20 | Stop reason: HOSPADM

## 2021-02-16 RX ORDER — CETIRIZINE HYDROCHLORIDE 10 MG/1
10 TABLET ORAL DAILY
Status: DISCONTINUED | OUTPATIENT
Start: 2021-02-16 | End: 2021-02-16

## 2021-02-16 RX ORDER — ONDANSETRON 2 MG/ML
4 INJECTION INTRAMUSCULAR; INTRAVENOUS
Status: COMPLETED | OUTPATIENT
Start: 2021-02-16 | End: 2021-02-16

## 2021-02-16 RX ADMIN — CIPROFLOXACIN 500 MG: 500 TABLET, FILM COATED ORAL at 08:02

## 2021-02-16 RX ADMIN — ACETAMINOPHEN 650 MG: 325 TABLET ORAL at 11:02

## 2021-02-16 RX ADMIN — INSULIN ASPART 4 UNITS: 100 INJECTION, SOLUTION INTRAVENOUS; SUBCUTANEOUS at 10:02

## 2021-02-16 RX ADMIN — SODIUM CHLORIDE 1000 ML: 0.9 INJECTION, SOLUTION INTRAVENOUS at 12:02

## 2021-02-16 RX ADMIN — IOHEXOL 100 ML: 350 INJECTION, SOLUTION INTRAVENOUS at 02:02

## 2021-02-16 RX ADMIN — SODIUM CHLORIDE, SODIUM LACTATE, POTASSIUM CHLORIDE, AND CALCIUM CHLORIDE: .6; .31; .03; .02 INJECTION, SOLUTION INTRAVENOUS at 06:02

## 2021-02-16 RX ADMIN — METRONIDAZOLE 500 MG: 500 TABLET ORAL at 10:02

## 2021-02-16 RX ADMIN — HYDROMORPHONE HYDROCHLORIDE 0.5 MG: 2 INJECTION INTRAMUSCULAR; INTRAVENOUS; SUBCUTANEOUS at 08:02

## 2021-02-16 RX ADMIN — AMLODIPINE BESYLATE 10 MG: 5 TABLET ORAL at 05:02

## 2021-02-16 RX ADMIN — HYDROMORPHONE HYDROCHLORIDE 1 MG: 2 INJECTION INTRAMUSCULAR; INTRAVENOUS; SUBCUTANEOUS at 02:02

## 2021-02-16 RX ADMIN — MAGNESIUM SULFATE 2 G: 2 INJECTION INTRAVENOUS at 12:02

## 2021-02-16 RX ADMIN — SODIUM CHLORIDE: 0.9 INJECTION, SOLUTION INTRAVENOUS at 05:02

## 2021-02-16 RX ADMIN — HYDROMORPHONE HYDROCHLORIDE 1 MG: 2 INJECTION INTRAMUSCULAR; INTRAVENOUS; SUBCUTANEOUS at 12:02

## 2021-02-16 RX ADMIN — KETOROLAC TROMETHAMINE 15 MG: 30 INJECTION, SOLUTION INTRAMUSCULAR at 06:02

## 2021-02-16 RX ADMIN — METRONIDAZOLE 500 MG: 500 TABLET ORAL at 04:02

## 2021-02-16 RX ADMIN — ONDANSETRON 4 MG: 2 INJECTION INTRAMUSCULAR; INTRAVENOUS at 12:02

## 2021-02-16 RX ADMIN — CIPROFLOXACIN 500 MG: 500 TABLET, FILM COATED ORAL at 10:02

## 2021-02-17 ENCOUNTER — ANESTHESIA (OUTPATIENT)
Dept: ENDOSCOPY | Facility: HOSPITAL | Age: 71
DRG: 445 | End: 2021-02-17
Payer: MEDICARE

## 2021-02-17 ENCOUNTER — ANESTHESIA EVENT (OUTPATIENT)
Dept: ENDOSCOPY | Facility: HOSPITAL | Age: 71
DRG: 445 | End: 2021-02-17
Payer: MEDICARE

## 2021-02-17 PROBLEM — R78.81 BACTEREMIA DUE TO GRAM-NEGATIVE BACTERIA: Status: ACTIVE | Noted: 2021-02-17

## 2021-02-17 PROBLEM — K83.09 CHOLANGITIS: Status: ACTIVE | Noted: 2021-02-17

## 2021-02-17 LAB
ALBUMIN SERPL BCP-MCNC: 3.1 G/DL (ref 3.5–5.2)
ALP SERPL-CCNC: 171 U/L (ref 55–135)
ALT SERPL W/O P-5'-P-CCNC: 310 U/L (ref 10–44)
ANION GAP SERPL CALC-SCNC: 13 MMOL/L (ref 8–16)
AST SERPL-CCNC: 218 U/L (ref 10–40)
BASOPHILS # BLD AUTO: 0.05 K/UL (ref 0–0.2)
BASOPHILS NFR BLD: 0.6 % (ref 0–1.9)
BILIRUB SERPL-MCNC: 4.6 MG/DL (ref 0.1–1)
BUN SERPL-MCNC: 17 MG/DL (ref 8–23)
CALCIUM SERPL-MCNC: 8.5 MG/DL (ref 8.7–10.5)
CHLORIDE SERPL-SCNC: 103 MMOL/L (ref 95–110)
CO2 SERPL-SCNC: 21 MMOL/L (ref 23–29)
CREAT SERPL-MCNC: 0.9 MG/DL (ref 0.5–1.4)
DIFFERENTIAL METHOD: ABNORMAL
EOSINOPHIL # BLD AUTO: 0.1 K/UL (ref 0–0.5)
EOSINOPHIL NFR BLD: 1.3 % (ref 0–8)
ERYTHROCYTE [DISTWIDTH] IN BLOOD BY AUTOMATED COUNT: 14.9 % (ref 11.5–14.5)
EST. GFR  (AFRICAN AMERICAN): >60 ML/MIN/1.73 M^2
EST. GFR  (NON AFRICAN AMERICAN): >60 ML/MIN/1.73 M^2
GLUCOSE SERPL-MCNC: 100 MG/DL (ref 70–110)
HAV IGM SERPL QL IA: NEGATIVE
HBV CORE IGM SERPL QL IA: NEGATIVE
HBV SURFACE AG SERPL QL IA: NEGATIVE
HCT VFR BLD AUTO: 35.3 % (ref 37–48.5)
HCV AB SERPL QL IA: NEGATIVE
HGB BLD-MCNC: 11.3 G/DL (ref 12–16)
IMM GRANULOCYTES # BLD AUTO: 0.03 K/UL (ref 0–0.04)
IMM GRANULOCYTES NFR BLD AUTO: 0.4 % (ref 0–0.5)
LYMPHOCYTES # BLD AUTO: 0.8 K/UL (ref 1–4.8)
LYMPHOCYTES NFR BLD: 10.5 % (ref 18–48)
MCH RBC QN AUTO: 28 PG (ref 27–31)
MCHC RBC AUTO-ENTMCNC: 32 G/DL (ref 32–36)
MCV RBC AUTO: 88 FL (ref 82–98)
MONOCYTES # BLD AUTO: 0.7 K/UL (ref 0.3–1)
MONOCYTES NFR BLD: 8.8 % (ref 4–15)
NEUTROPHILS # BLD AUTO: 6.3 K/UL (ref 1.8–7.7)
NEUTROPHILS NFR BLD: 78.4 % (ref 38–73)
NRBC BLD-RTO: 0 /100 WBC
PLATELET # BLD AUTO: 213 K/UL (ref 150–350)
PMV BLD AUTO: 11.1 FL (ref 9.2–12.9)
POCT GLUCOSE: 125 MG/DL (ref 70–110)
POCT GLUCOSE: 134 MG/DL (ref 70–110)
POCT GLUCOSE: 99 MG/DL (ref 70–110)
POTASSIUM SERPL-SCNC: 3.9 MMOL/L (ref 3.5–5.1)
PROT SERPL-MCNC: 7.4 G/DL (ref 6–8.4)
RBC # BLD AUTO: 4.03 M/UL (ref 4–5.4)
SODIUM SERPL-SCNC: 137 MMOL/L (ref 136–145)
WBC # BLD AUTO: 7.97 K/UL (ref 3.9–12.7)

## 2021-02-17 PROCEDURE — D9220A PRA ANESTHESIA: ICD-10-PCS | Mod: CRNA,,, | Performed by: NURSE ANESTHETIST, CERTIFIED REGISTERED

## 2021-02-17 PROCEDURE — 74328 PR  X-RAY FOR BILE DUCT ENDOSCOPY: ICD-10-PCS | Mod: 26,,, | Performed by: INTERNAL MEDICINE

## 2021-02-17 PROCEDURE — 63600175 PHARM REV CODE 636 W HCPCS: Performed by: NURSE ANESTHETIST, CERTIFIED REGISTERED

## 2021-02-17 PROCEDURE — 74328 X-RAY BILE DUCT ENDOSCOPY: CPT | Mod: 26,,, | Performed by: INTERNAL MEDICINE

## 2021-02-17 PROCEDURE — 43262 ENDO CHOLANGIOPANCREATOGRAPH: CPT | Performed by: INTERNAL MEDICINE

## 2021-02-17 PROCEDURE — 80053 COMPREHEN METABOLIC PANEL: CPT

## 2021-02-17 PROCEDURE — 99223 PR INITIAL HOSPITAL CARE,LEVL III: ICD-10-PCS | Mod: 25,GC,, | Performed by: INTERNAL MEDICINE

## 2021-02-17 PROCEDURE — 99233 PR SUBSEQUENT HOSPITAL CARE,LEVL III: ICD-10-PCS | Mod: ,,, | Performed by: STUDENT IN AN ORGANIZED HEALTH CARE EDUCATION/TRAINING PROGRAM

## 2021-02-17 PROCEDURE — 43262 PR ERCP,SPHINCTEROTOMY: ICD-10-PCS | Mod: 51,,, | Performed by: INTERNAL MEDICINE

## 2021-02-17 PROCEDURE — 99223 1ST HOSP IP/OBS HIGH 75: CPT | Mod: 25,GC,, | Performed by: INTERNAL MEDICINE

## 2021-02-17 PROCEDURE — 43262 ENDO CHOLANGIOPANCREATOGRAPH: CPT | Mod: 51,,, | Performed by: INTERNAL MEDICINE

## 2021-02-17 PROCEDURE — D9220A PRA ANESTHESIA: ICD-10-PCS | Mod: ANES,,, | Performed by: ANESTHESIOLOGY

## 2021-02-17 PROCEDURE — 94761 N-INVAS EAR/PLS OXIMETRY MLT: CPT

## 2021-02-17 PROCEDURE — 43264 PR ERCP,W/REMOVAL STONE,BIL/PANCR DUCTS: ICD-10-PCS | Mod: ,,, | Performed by: INTERNAL MEDICINE

## 2021-02-17 PROCEDURE — 43264 ERCP REMOVE DUCT CALCULI: CPT | Performed by: INTERNAL MEDICINE

## 2021-02-17 PROCEDURE — 25000003 PHARM REV CODE 250: Performed by: HOSPITALIST

## 2021-02-17 PROCEDURE — 99233 SBSQ HOSP IP/OBS HIGH 50: CPT | Mod: ,,, | Performed by: STUDENT IN AN ORGANIZED HEALTH CARE EDUCATION/TRAINING PROGRAM

## 2021-02-17 PROCEDURE — 11000001 HC ACUTE MED/SURG PRIVATE ROOM

## 2021-02-17 PROCEDURE — 25000003 PHARM REV CODE 250: Performed by: NURSE PRACTITIONER

## 2021-02-17 PROCEDURE — D9220A PRA ANESTHESIA: Mod: CRNA,,, | Performed by: NURSE ANESTHETIST, CERTIFIED REGISTERED

## 2021-02-17 PROCEDURE — 25500020 PHARM REV CODE 255: Performed by: NURSE ANESTHETIST, CERTIFIED REGISTERED

## 2021-02-17 PROCEDURE — 25000003 PHARM REV CODE 250: Performed by: NURSE ANESTHETIST, CERTIFIED REGISTERED

## 2021-02-17 PROCEDURE — 37000008 HC ANESTHESIA 1ST 15 MINUTES: Performed by: INTERNAL MEDICINE

## 2021-02-17 PROCEDURE — 27202125 HC BALLOON, EXTRACTION (ANY): Performed by: INTERNAL MEDICINE

## 2021-02-17 PROCEDURE — 74328 X-RAY BILE DUCT ENDOSCOPY: CPT | Performed by: INTERNAL MEDICINE

## 2021-02-17 PROCEDURE — 27201674 HC SPHINCTERTOME: Performed by: INTERNAL MEDICINE

## 2021-02-17 PROCEDURE — C1769 GUIDE WIRE: HCPCS | Performed by: INTERNAL MEDICINE

## 2021-02-17 PROCEDURE — 85025 COMPLETE CBC W/AUTO DIFF WBC: CPT

## 2021-02-17 PROCEDURE — 37000009 HC ANESTHESIA EA ADD 15 MINS: Performed by: INTERNAL MEDICINE

## 2021-02-17 PROCEDURE — 87040 BLOOD CULTURE FOR BACTERIA: CPT

## 2021-02-17 PROCEDURE — D9220A PRA ANESTHESIA: Mod: ANES,,, | Performed by: ANESTHESIOLOGY

## 2021-02-17 PROCEDURE — 43264 ERCP REMOVE DUCT CALCULI: CPT | Mod: ,,, | Performed by: INTERNAL MEDICINE

## 2021-02-17 PROCEDURE — 36415 COLL VENOUS BLD VENIPUNCTURE: CPT

## 2021-02-17 RX ORDER — INDOMETHACIN 50 MG/1
SUPPOSITORY RECTAL
Status: COMPLETED | OUTPATIENT
Start: 2021-02-17 | End: 2021-02-17

## 2021-02-17 RX ORDER — SODIUM CHLORIDE 0.9 % (FLUSH) 0.9 %
3 SYRINGE (ML) INJECTION
Status: CANCELLED | OUTPATIENT
Start: 2021-02-17

## 2021-02-17 RX ORDER — PROPOFOL 10 MG/ML
VIAL (ML) INTRAVENOUS
Status: DISCONTINUED | OUTPATIENT
Start: 2021-02-17 | End: 2021-02-17

## 2021-02-17 RX ORDER — HYDROMORPHONE HYDROCHLORIDE 1 MG/ML
0.2 INJECTION, SOLUTION INTRAMUSCULAR; INTRAVENOUS; SUBCUTANEOUS EVERY 5 MIN PRN
Status: CANCELLED | OUTPATIENT
Start: 2021-02-17

## 2021-02-17 RX ORDER — FENTANYL CITRATE 50 UG/ML
INJECTION, SOLUTION INTRAMUSCULAR; INTRAVENOUS
Status: DISCONTINUED | OUTPATIENT
Start: 2021-02-17 | End: 2021-02-17

## 2021-02-17 RX ORDER — LIDOCAINE HYDROCHLORIDE 20 MG/ML
INJECTION, SOLUTION EPIDURAL; INFILTRATION; INTRACAUDAL; PERINEURAL
Status: DISCONTINUED | OUTPATIENT
Start: 2021-02-17 | End: 2021-02-17

## 2021-02-17 RX ORDER — PROPOFOL 10 MG/ML
VIAL (ML) INTRAVENOUS CONTINUOUS PRN
Status: DISCONTINUED | OUTPATIENT
Start: 2021-02-17 | End: 2021-02-17

## 2021-02-17 RX ORDER — MEPERIDINE HYDROCHLORIDE 25 MG/ML
12.5 INJECTION INTRAMUSCULAR; INTRAVENOUS; SUBCUTANEOUS ONCE AS NEEDED
Status: CANCELLED | OUTPATIENT
Start: 2021-02-17 | End: 2021-02-18

## 2021-02-17 RX ORDER — ONDANSETRON 2 MG/ML
INJECTION INTRAMUSCULAR; INTRAVENOUS
Status: DISCONTINUED | OUTPATIENT
Start: 2021-02-17 | End: 2021-02-17

## 2021-02-17 RX ORDER — ONDANSETRON 2 MG/ML
4 INJECTION INTRAMUSCULAR; INTRAVENOUS DAILY PRN
Status: CANCELLED | OUTPATIENT
Start: 2021-02-17

## 2021-02-17 RX ADMIN — INDOMETHACIN 100 MG: 50 SUPPOSITORY RECTAL at 10:02

## 2021-02-17 RX ADMIN — SODIUM CHLORIDE: 0.9 INJECTION, SOLUTION INTRAVENOUS at 09:02

## 2021-02-17 RX ADMIN — METRONIDAZOLE 500 MG: 500 TABLET ORAL at 01:02

## 2021-02-17 RX ADMIN — IOHEXOL 10 ML: 300 INJECTION, SOLUTION INTRAVENOUS at 10:02

## 2021-02-17 RX ADMIN — PROPOFOL 150 MCG/KG/MIN: 10 INJECTION, EMULSION INTRAVENOUS at 10:02

## 2021-02-17 RX ADMIN — ACETAMINOPHEN 650 MG: 325 TABLET ORAL at 06:02

## 2021-02-17 RX ADMIN — FENTANYL CITRATE 50 MCG: 50 INJECTION, SOLUTION INTRAMUSCULAR; INTRAVENOUS at 10:02

## 2021-02-17 RX ADMIN — CIPROFLOXACIN 500 MG: 500 TABLET, FILM COATED ORAL at 08:02

## 2021-02-17 RX ADMIN — METRONIDAZOLE 500 MG: 500 TABLET ORAL at 09:02

## 2021-02-17 RX ADMIN — FENTANYL CITRATE 50 MCG: 50 INJECTION, SOLUTION INTRAMUSCULAR; INTRAVENOUS at 09:02

## 2021-02-17 RX ADMIN — PROPOFOL 70 MG: 10 INJECTION, EMULSION INTRAVENOUS at 10:02

## 2021-02-17 RX ADMIN — METRONIDAZOLE 500 MG: 500 TABLET ORAL at 06:02

## 2021-02-17 RX ADMIN — ONDANSETRON 4 MG: 2 INJECTION INTRAMUSCULAR; INTRAVENOUS at 10:02

## 2021-02-17 RX ADMIN — LIDOCAINE HYDROCHLORIDE 75 MG: 20 INJECTION, SOLUTION EPIDURAL; INFILTRATION; INTRACAUDAL at 10:02

## 2021-02-18 PROBLEM — Z86.16 HISTORY OF 2019 NOVEL CORONAVIRUS DISEASE (COVID-19): Status: ACTIVE | Noted: 2021-02-18

## 2021-02-18 LAB
ALBUMIN SERPL BCP-MCNC: 3 G/DL (ref 3.5–5.2)
ALP SERPL-CCNC: 187 U/L (ref 55–135)
ALT SERPL W/O P-5'-P-CCNC: 181 U/L (ref 10–44)
ANION GAP SERPL CALC-SCNC: 8 MMOL/L (ref 8–16)
AST SERPL-CCNC: 93 U/L (ref 10–40)
BACTERIA UR CULT: NORMAL
BASOPHILS # BLD AUTO: 0.04 K/UL (ref 0–0.2)
BASOPHILS NFR BLD: 0.7 % (ref 0–1.9)
BILIRUB SERPL-MCNC: 2.9 MG/DL (ref 0.1–1)
BUN SERPL-MCNC: 13 MG/DL (ref 8–23)
CALCIUM SERPL-MCNC: 8.6 MG/DL (ref 8.7–10.5)
CHLORIDE SERPL-SCNC: 103 MMOL/L (ref 95–110)
CO2 SERPL-SCNC: 27 MMOL/L (ref 23–29)
CREAT SERPL-MCNC: 1 MG/DL (ref 0.5–1.4)
DIFFERENTIAL METHOD: ABNORMAL
EOSINOPHIL # BLD AUTO: 0.2 K/UL (ref 0–0.5)
EOSINOPHIL NFR BLD: 3.6 % (ref 0–8)
ERYTHROCYTE [DISTWIDTH] IN BLOOD BY AUTOMATED COUNT: 15.1 % (ref 11.5–14.5)
EST. GFR  (AFRICAN AMERICAN): >60 ML/MIN/1.73 M^2
EST. GFR  (NON AFRICAN AMERICAN): 58 ML/MIN/1.73 M^2
ESTIMATED AVG GLUCOSE: 157 MG/DL (ref 68–131)
GLUCOSE SERPL-MCNC: 149 MG/DL (ref 70–110)
HBA1C MFR BLD: 7.1 % (ref 4–5.6)
HCT VFR BLD AUTO: 34.7 % (ref 37–48.5)
HGB BLD-MCNC: 11 G/DL (ref 12–16)
IMM GRANULOCYTES # BLD AUTO: 0.02 K/UL (ref 0–0.04)
IMM GRANULOCYTES NFR BLD AUTO: 0.3 % (ref 0–0.5)
INR PPP: 1 (ref 0.8–1.2)
LYMPHOCYTES # BLD AUTO: 1.3 K/UL (ref 1–4.8)
LYMPHOCYTES NFR BLD: 21.6 % (ref 18–48)
MAGNESIUM SERPL-MCNC: 1.9 MG/DL (ref 1.6–2.6)
MCH RBC QN AUTO: 27.6 PG (ref 27–31)
MCHC RBC AUTO-ENTMCNC: 31.7 G/DL (ref 32–36)
MCV RBC AUTO: 87 FL (ref 82–98)
MONOCYTES # BLD AUTO: 0.5 K/UL (ref 0.3–1)
MONOCYTES NFR BLD: 8.1 % (ref 4–15)
NEUTROPHILS # BLD AUTO: 3.8 K/UL (ref 1.8–7.7)
NEUTROPHILS NFR BLD: 65.7 % (ref 38–73)
NRBC BLD-RTO: 0 /100 WBC
PHOSPHATE SERPL-MCNC: 1.9 MG/DL (ref 2.7–4.5)
PLATELET # BLD AUTO: 206 K/UL (ref 150–350)
PMV BLD AUTO: 11.2 FL (ref 9.2–12.9)
POCT GLUCOSE: 121 MG/DL (ref 70–110)
POCT GLUCOSE: 144 MG/DL (ref 70–110)
POCT GLUCOSE: 147 MG/DL (ref 70–110)
POCT GLUCOSE: 152 MG/DL (ref 70–110)
POCT GLUCOSE: 155 MG/DL (ref 70–110)
POTASSIUM SERPL-SCNC: 3.5 MMOL/L (ref 3.5–5.1)
PROT SERPL-MCNC: 7.3 G/DL (ref 6–8.4)
PROTHROMBIN TIME: 11.4 SEC (ref 9–12.5)
RBC # BLD AUTO: 3.99 M/UL (ref 4–5.4)
SODIUM SERPL-SCNC: 138 MMOL/L (ref 136–145)
WBC # BLD AUTO: 5.78 K/UL (ref 3.9–12.7)

## 2021-02-18 PROCEDURE — 83735 ASSAY OF MAGNESIUM: CPT

## 2021-02-18 PROCEDURE — 80053 COMPREHEN METABOLIC PANEL: CPT

## 2021-02-18 PROCEDURE — 25000003 PHARM REV CODE 250: Performed by: INTERNAL MEDICINE

## 2021-02-18 PROCEDURE — 85025 COMPLETE CBC W/AUTO DIFF WBC: CPT

## 2021-02-18 PROCEDURE — 85610 PROTHROMBIN TIME: CPT

## 2021-02-18 PROCEDURE — 99232 SBSQ HOSP IP/OBS MODERATE 35: CPT | Mod: 95,,, | Performed by: INTERNAL MEDICINE

## 2021-02-18 PROCEDURE — 83036 HEMOGLOBIN GLYCOSYLATED A1C: CPT

## 2021-02-18 PROCEDURE — 25000003 PHARM REV CODE 250: Performed by: NURSE PRACTITIONER

## 2021-02-18 PROCEDURE — 25000003 PHARM REV CODE 250: Performed by: HOSPITALIST

## 2021-02-18 PROCEDURE — 11000001 HC ACUTE MED/SURG PRIVATE ROOM

## 2021-02-18 PROCEDURE — 84100 ASSAY OF PHOSPHORUS: CPT

## 2021-02-18 PROCEDURE — 36415 COLL VENOUS BLD VENIPUNCTURE: CPT

## 2021-02-18 PROCEDURE — 99232 PR SUBSEQUENT HOSPITAL CARE,LEVL II: ICD-10-PCS | Mod: 95,,, | Performed by: INTERNAL MEDICINE

## 2021-02-18 RX ORDER — POLYETHYLENE GLYCOL 3350 17 G/17G
17 POWDER, FOR SOLUTION ORAL 3 TIMES DAILY PRN
Status: DISCONTINUED | OUTPATIENT
Start: 2021-02-18 | End: 2021-02-20 | Stop reason: HOSPADM

## 2021-02-18 RX ORDER — AMOXICILLIN 250 MG
1 CAPSULE ORAL 2 TIMES DAILY
Status: DISCONTINUED | OUTPATIENT
Start: 2021-02-18 | End: 2021-02-20 | Stop reason: HOSPADM

## 2021-02-18 RX ADMIN — CIPROFLOXACIN 500 MG: 500 TABLET, FILM COATED ORAL at 09:02

## 2021-02-18 RX ADMIN — METRONIDAZOLE 500 MG: 500 TABLET ORAL at 06:02

## 2021-02-18 RX ADMIN — ACETAMINOPHEN 650 MG: 325 TABLET ORAL at 11:02

## 2021-02-18 RX ADMIN — POLYETHYLENE GLYCOL 3350 17 G: 17 POWDER, FOR SOLUTION ORAL at 04:02

## 2021-02-18 RX ADMIN — METRONIDAZOLE 500 MG: 500 TABLET ORAL at 10:02

## 2021-02-18 RX ADMIN — METRONIDAZOLE 500 MG: 500 TABLET ORAL at 02:02

## 2021-02-18 RX ADMIN — ACETAMINOPHEN 650 MG: 325 TABLET ORAL at 08:02

## 2021-02-18 RX ADMIN — DOCUSATE SODIUM 50MG AND SENNOSIDES 8.6MG 1 TABLET: 8.6; 5 TABLET, FILM COATED ORAL at 04:02

## 2021-02-18 RX ADMIN — CIPROFLOXACIN 500 MG: 500 TABLET, FILM COATED ORAL at 08:02

## 2021-02-19 LAB
ALBUMIN SERPL BCP-MCNC: 3 G/DL (ref 3.5–5.2)
ALP SERPL-CCNC: 204 U/L (ref 55–135)
ALT SERPL W/O P-5'-P-CCNC: 157 U/L (ref 10–44)
ANION GAP SERPL CALC-SCNC: 8 MMOL/L (ref 8–16)
AST SERPL-CCNC: 71 U/L (ref 10–40)
BASOPHILS # BLD AUTO: 0.05 K/UL (ref 0–0.2)
BASOPHILS NFR BLD: 1.1 % (ref 0–1.9)
BILIRUB SERPL-MCNC: 1.9 MG/DL (ref 0.1–1)
BUN SERPL-MCNC: 11 MG/DL (ref 8–23)
CALCIUM SERPL-MCNC: 9.1 MG/DL (ref 8.7–10.5)
CHLORIDE SERPL-SCNC: 106 MMOL/L (ref 95–110)
CO2 SERPL-SCNC: 28 MMOL/L (ref 23–29)
CREAT SERPL-MCNC: 0.9 MG/DL (ref 0.5–1.4)
DIFFERENTIAL METHOD: ABNORMAL
EOSINOPHIL # BLD AUTO: 0.2 K/UL (ref 0–0.5)
EOSINOPHIL NFR BLD: 3.9 % (ref 0–8)
ERYTHROCYTE [DISTWIDTH] IN BLOOD BY AUTOMATED COUNT: 15.4 % (ref 11.5–14.5)
EST. GFR  (AFRICAN AMERICAN): >60 ML/MIN/1.73 M^2
EST. GFR  (NON AFRICAN AMERICAN): >60 ML/MIN/1.73 M^2
GLUCOSE SERPL-MCNC: 127 MG/DL (ref 70–110)
HCT VFR BLD AUTO: 39.1 % (ref 37–48.5)
HGB BLD-MCNC: 12.1 G/DL (ref 12–16)
IMM GRANULOCYTES # BLD AUTO: 0.02 K/UL (ref 0–0.04)
IMM GRANULOCYTES NFR BLD AUTO: 0.4 % (ref 0–0.5)
INR PPP: 1 (ref 0.8–1.2)
LYMPHOCYTES # BLD AUTO: 1.4 K/UL (ref 1–4.8)
LYMPHOCYTES NFR BLD: 29.6 % (ref 18–48)
MAGNESIUM SERPL-MCNC: 1.9 MG/DL (ref 1.6–2.6)
MCH RBC QN AUTO: 27.3 PG (ref 27–31)
MCHC RBC AUTO-ENTMCNC: 30.9 G/DL (ref 32–36)
MCV RBC AUTO: 88 FL (ref 82–98)
MONOCYTES # BLD AUTO: 0.4 K/UL (ref 0.3–1)
MONOCYTES NFR BLD: 9.2 % (ref 4–15)
NEUTROPHILS # BLD AUTO: 2.5 K/UL (ref 1.8–7.7)
NEUTROPHILS NFR BLD: 55.8 % (ref 38–73)
NRBC BLD-RTO: 0 /100 WBC
PHOSPHATE SERPL-MCNC: 3.5 MG/DL (ref 2.7–4.5)
PLATELET # BLD AUTO: 238 K/UL (ref 150–350)
PMV BLD AUTO: 11.3 FL (ref 9.2–12.9)
POCT GLUCOSE: 104 MG/DL (ref 70–110)
POCT GLUCOSE: 158 MG/DL (ref 70–110)
POTASSIUM SERPL-SCNC: 3.8 MMOL/L (ref 3.5–5.1)
PROT SERPL-MCNC: 7.6 G/DL (ref 6–8.4)
PROTHROMBIN TIME: 11.2 SEC (ref 9–12.5)
RBC # BLD AUTO: 4.44 M/UL (ref 4–5.4)
SODIUM SERPL-SCNC: 142 MMOL/L (ref 136–145)
WBC # BLD AUTO: 4.56 K/UL (ref 3.9–12.7)

## 2021-02-19 PROCEDURE — 25000003 PHARM REV CODE 250: Performed by: INTERNAL MEDICINE

## 2021-02-19 PROCEDURE — 85025 COMPLETE CBC W/AUTO DIFF WBC: CPT

## 2021-02-19 PROCEDURE — 83735 ASSAY OF MAGNESIUM: CPT

## 2021-02-19 PROCEDURE — 99233 PR SUBSEQUENT HOSPITAL CARE,LEVL III: ICD-10-PCS | Mod: 95,,, | Performed by: INTERNAL MEDICINE

## 2021-02-19 PROCEDURE — 99233 SBSQ HOSP IP/OBS HIGH 50: CPT | Mod: 95,,, | Performed by: INTERNAL MEDICINE

## 2021-02-19 PROCEDURE — 63600175 PHARM REV CODE 636 W HCPCS: Performed by: HOSPITALIST

## 2021-02-19 PROCEDURE — 36415 COLL VENOUS BLD VENIPUNCTURE: CPT

## 2021-02-19 PROCEDURE — 80053 COMPREHEN METABOLIC PANEL: CPT

## 2021-02-19 PROCEDURE — 25000003 PHARM REV CODE 250: Performed by: HOSPITALIST

## 2021-02-19 PROCEDURE — 99223 PR INITIAL HOSPITAL CARE,LEVL III: ICD-10-PCS | Mod: ,,, | Performed by: NURSE PRACTITIONER

## 2021-02-19 PROCEDURE — 25000003 PHARM REV CODE 250: Performed by: NURSE PRACTITIONER

## 2021-02-19 PROCEDURE — 99223 1ST HOSP IP/OBS HIGH 75: CPT | Mod: ,,, | Performed by: NURSE PRACTITIONER

## 2021-02-19 PROCEDURE — 85610 PROTHROMBIN TIME: CPT

## 2021-02-19 PROCEDURE — 84100 ASSAY OF PHOSPHORUS: CPT

## 2021-02-19 PROCEDURE — 11000001 HC ACUTE MED/SURG PRIVATE ROOM

## 2021-02-19 RX ADMIN — METRONIDAZOLE 500 MG: 500 TABLET ORAL at 06:02

## 2021-02-19 RX ADMIN — ACETAMINOPHEN 650 MG: 325 TABLET ORAL at 11:02

## 2021-02-19 RX ADMIN — DOCUSATE SODIUM 50MG AND SENNOSIDES 8.6MG 1 TABLET: 8.6; 5 TABLET, FILM COATED ORAL at 09:02

## 2021-02-19 RX ADMIN — CIPROFLOXACIN 500 MG: 500 TABLET, FILM COATED ORAL at 08:02

## 2021-02-19 RX ADMIN — ACETAMINOPHEN 650 MG: 325 TABLET ORAL at 09:02

## 2021-02-19 RX ADMIN — INSULIN ASPART 2 UNITS: 100 INJECTION, SOLUTION INTRAVENOUS; SUBCUTANEOUS at 12:02

## 2021-02-19 RX ADMIN — DOCUSATE SODIUM 50MG AND SENNOSIDES 8.6MG 1 TABLET: 8.6; 5 TABLET, FILM COATED ORAL at 08:02

## 2021-02-19 RX ADMIN — METRONIDAZOLE 500 MG: 500 TABLET ORAL at 02:02

## 2021-02-19 RX ADMIN — METRONIDAZOLE 500 MG: 500 TABLET ORAL at 09:02

## 2021-02-19 RX ADMIN — CIPROFLOXACIN 500 MG: 500 TABLET, FILM COATED ORAL at 09:02

## 2021-02-19 RX ADMIN — POLYETHYLENE GLYCOL 3350 17 G: 17 POWDER, FOR SOLUTION ORAL at 08:02

## 2021-02-20 VITALS
SYSTOLIC BLOOD PRESSURE: 136 MMHG | WEIGHT: 224.88 LBS | RESPIRATION RATE: 15 BRPM | HEIGHT: 64 IN | OXYGEN SATURATION: 95 % | HEART RATE: 63 BPM | TEMPERATURE: 98 F | BODY MASS INDEX: 38.39 KG/M2 | DIASTOLIC BLOOD PRESSURE: 74 MMHG

## 2021-02-20 PROBLEM — N39.0 UTI (URINARY TRACT INFECTION): Status: RESOLVED | Noted: 2021-02-16 | Resolved: 2021-02-20

## 2021-02-20 PROBLEM — K80.50 CHOLEDOCHOLITHIASIS: Status: RESOLVED | Noted: 2021-02-16 | Resolved: 2021-02-20

## 2021-02-20 PROBLEM — R10.13 EPIGASTRIC PAIN: Status: RESOLVED | Noted: 2021-02-16 | Resolved: 2021-02-20

## 2021-02-20 PROBLEM — K83.09 CHOLANGITIS: Status: RESOLVED | Noted: 2021-02-17 | Resolved: 2021-02-20

## 2021-02-20 LAB
ALBUMIN SERPL BCP-MCNC: 3 G/DL (ref 3.5–5.2)
ALP SERPL-CCNC: 205 U/L (ref 55–135)
ALT SERPL W/O P-5'-P-CCNC: 128 U/L (ref 10–44)
ANION GAP SERPL CALC-SCNC: 9 MMOL/L (ref 8–16)
AST SERPL-CCNC: 87 U/L (ref 10–40)
BACTERIA BLD CULT: ABNORMAL
BASOPHILS # BLD AUTO: 0.07 K/UL (ref 0–0.2)
BASOPHILS NFR BLD: 1.4 % (ref 0–1.9)
BILIRUB SERPL-MCNC: 1.5 MG/DL (ref 0.1–1)
BUN SERPL-MCNC: 10 MG/DL (ref 8–23)
CALCIUM SERPL-MCNC: 8.7 MG/DL (ref 8.7–10.5)
CHLORIDE SERPL-SCNC: 102 MMOL/L (ref 95–110)
CO2 SERPL-SCNC: 27 MMOL/L (ref 23–29)
CREAT SERPL-MCNC: 0.9 MG/DL (ref 0.5–1.4)
DIFFERENTIAL METHOD: ABNORMAL
EOSINOPHIL # BLD AUTO: 0.2 K/UL (ref 0–0.5)
EOSINOPHIL NFR BLD: 4.6 % (ref 0–8)
ERYTHROCYTE [DISTWIDTH] IN BLOOD BY AUTOMATED COUNT: 15.7 % (ref 11.5–14.5)
EST. GFR  (AFRICAN AMERICAN): >60 ML/MIN/1.73 M^2
EST. GFR  (NON AFRICAN AMERICAN): >60 ML/MIN/1.73 M^2
GLUCOSE SERPL-MCNC: 117 MG/DL (ref 70–110)
HCT VFR BLD AUTO: 38.1 % (ref 37–48.5)
HGB BLD-MCNC: 11.8 G/DL (ref 12–16)
IMM GRANULOCYTES # BLD AUTO: 0.03 K/UL (ref 0–0.04)
IMM GRANULOCYTES NFR BLD AUTO: 0.6 % (ref 0–0.5)
INR PPP: 1 (ref 0.8–1.2)
LYMPHOCYTES # BLD AUTO: 1.7 K/UL (ref 1–4.8)
LYMPHOCYTES NFR BLD: 34.6 % (ref 18–48)
MAGNESIUM SERPL-MCNC: 1.9 MG/DL (ref 1.6–2.6)
MCH RBC QN AUTO: 27.3 PG (ref 27–31)
MCHC RBC AUTO-ENTMCNC: 31 G/DL (ref 32–36)
MCV RBC AUTO: 88 FL (ref 82–98)
MONOCYTES # BLD AUTO: 0.5 K/UL (ref 0.3–1)
MONOCYTES NFR BLD: 10.9 % (ref 4–15)
NEUTROPHILS # BLD AUTO: 2.4 K/UL (ref 1.8–7.7)
NEUTROPHILS NFR BLD: 47.9 % (ref 38–73)
NRBC BLD-RTO: 0 /100 WBC
PHOSPHATE SERPL-MCNC: 3.7 MG/DL (ref 2.7–4.5)
PLATELET # BLD AUTO: 253 K/UL (ref 150–350)
PMV BLD AUTO: 11.6 FL (ref 9.2–12.9)
POCT GLUCOSE: 121 MG/DL (ref 70–110)
POCT GLUCOSE: 137 MG/DL (ref 70–110)
POCT GLUCOSE: 172 MG/DL (ref 70–110)
POTASSIUM SERPL-SCNC: 3.8 MMOL/L (ref 3.5–5.1)
PROT SERPL-MCNC: 7.4 G/DL (ref 6–8.4)
PROTHROMBIN TIME: 11.1 SEC (ref 9–12.5)
RBC # BLD AUTO: 4.33 M/UL (ref 4–5.4)
SODIUM SERPL-SCNC: 138 MMOL/L (ref 136–145)
WBC # BLD AUTO: 4.97 K/UL (ref 3.9–12.7)

## 2021-02-20 PROCEDURE — 99239 PR HOSPITAL DISCHARGE DAY,>30 MIN: ICD-10-PCS | Mod: 95,,, | Performed by: INTERNAL MEDICINE

## 2021-02-20 PROCEDURE — 83735 ASSAY OF MAGNESIUM: CPT

## 2021-02-20 PROCEDURE — 36415 COLL VENOUS BLD VENIPUNCTURE: CPT

## 2021-02-20 PROCEDURE — 80053 COMPREHEN METABOLIC PANEL: CPT

## 2021-02-20 PROCEDURE — 25000003 PHARM REV CODE 250: Performed by: NURSE PRACTITIONER

## 2021-02-20 PROCEDURE — 99233 PR SUBSEQUENT HOSPITAL CARE,LEVL III: ICD-10-PCS | Mod: ,,, | Performed by: PHYSICIAN ASSISTANT

## 2021-02-20 PROCEDURE — 85610 PROTHROMBIN TIME: CPT

## 2021-02-20 PROCEDURE — 25000003 PHARM REV CODE 250: Performed by: INTERNAL MEDICINE

## 2021-02-20 PROCEDURE — 99233 SBSQ HOSP IP/OBS HIGH 50: CPT | Mod: ,,, | Performed by: PHYSICIAN ASSISTANT

## 2021-02-20 PROCEDURE — 99239 HOSP IP/OBS DSCHRG MGMT >30: CPT | Mod: 95,,, | Performed by: INTERNAL MEDICINE

## 2021-02-20 PROCEDURE — 84100 ASSAY OF PHOSPHORUS: CPT

## 2021-02-20 PROCEDURE — 85025 COMPLETE CBC W/AUTO DIFF WBC: CPT

## 2021-02-20 RX ORDER — CIPROFLOXACIN 250 MG/1
500 TABLET, FILM COATED ORAL EVERY 12 HOURS
Qty: 44 TABLET | Refills: 0 | Status: SHIPPED | OUTPATIENT
Start: 2021-02-20 | End: 2021-03-03

## 2021-02-20 RX ADMIN — DOCUSATE SODIUM 50MG AND SENNOSIDES 8.6MG 1 TABLET: 8.6; 5 TABLET, FILM COATED ORAL at 08:02

## 2021-02-20 RX ADMIN — METRONIDAZOLE 500 MG: 500 TABLET ORAL at 06:02

## 2021-02-20 RX ADMIN — CIPROFLOXACIN 500 MG: 500 TABLET, FILM COATED ORAL at 08:02

## 2021-02-20 RX ADMIN — INSULIN ASPART 2 UNITS: 100 INJECTION, SOLUTION INTRAVENOUS; SUBCUTANEOUS at 11:02

## 2021-02-21 LAB — BACTERIA BLD CULT: NORMAL

## 2021-02-22 ENCOUNTER — PATIENT MESSAGE (OUTPATIENT)
Dept: FAMILY MEDICINE | Facility: CLINIC | Age: 71
End: 2021-02-22

## 2021-02-22 LAB
BACTERIA BLD CULT: NORMAL
BACTERIA BLD CULT: NORMAL

## 2021-02-23 ENCOUNTER — HOSPITAL ENCOUNTER (EMERGENCY)
Facility: HOSPITAL | Age: 71
Discharge: HOME | End: 2021-02-23
Attending: EMERGENCY MEDICINE
Payer: MEDICARE

## 2021-02-23 VITALS
HEART RATE: 57 BPM | DIASTOLIC BLOOD PRESSURE: 74 MMHG | OXYGEN SATURATION: 97 % | WEIGHT: 240 LBS | BODY MASS INDEX: 40.97 KG/M2 | SYSTOLIC BLOOD PRESSURE: 193 MMHG | TEMPERATURE: 98 F | HEIGHT: 64 IN | RESPIRATION RATE: 18 BRPM

## 2021-02-23 DIAGNOSIS — M25.559 HIP PAIN: Primary | ICD-10-CM

## 2021-02-23 DIAGNOSIS — M54.31 SCIATICA OF RIGHT SIDE: ICD-10-CM

## 2021-02-23 PROCEDURE — 99284 EMERGENCY DEPT VISIT MOD MDM: CPT | Mod: 25

## 2021-02-23 PROCEDURE — 25000003 PHARM REV CODE 250: Performed by: PHYSICIAN ASSISTANT

## 2021-02-23 PROCEDURE — 63600175 PHARM REV CODE 636 W HCPCS: Performed by: PHYSICIAN ASSISTANT

## 2021-02-23 RX ORDER — LIDOCAINE 50 MG/G
1 PATCH TOPICAL DAILY
Qty: 15 PATCH | Refills: 0 | Status: SHIPPED | OUTPATIENT
Start: 2021-02-23 | End: 2023-08-10

## 2021-02-23 RX ORDER — METHOCARBAMOL 500 MG/1
1000 TABLET, FILM COATED ORAL 3 TIMES DAILY
Qty: 30 TABLET | Refills: 0 | Status: SHIPPED | OUTPATIENT
Start: 2021-02-23 | End: 2021-02-28

## 2021-02-23 RX ORDER — KETOROLAC TROMETHAMINE 30 MG/ML
10 INJECTION, SOLUTION INTRAMUSCULAR; INTRAVENOUS
Status: COMPLETED | OUTPATIENT
Start: 2021-02-23 | End: 2021-02-23

## 2021-02-23 RX ORDER — METHOCARBAMOL 500 MG/1
1000 TABLET, FILM COATED ORAL
Status: COMPLETED | OUTPATIENT
Start: 2021-02-23 | End: 2021-02-23

## 2021-02-23 RX ADMIN — KETOROLAC TROMETHAMINE 10 MG: 30 INJECTION, SOLUTION INTRAMUSCULAR; INTRAVENOUS at 11:02

## 2021-02-23 RX ADMIN — METHOCARBAMOL 1000 MG: 500 TABLET ORAL at 11:02

## 2021-02-25 ENCOUNTER — PATIENT OUTREACH (OUTPATIENT)
Dept: ADMINISTRATIVE | Facility: HOSPITAL | Age: 71
End: 2021-02-25

## 2021-02-26 ENCOUNTER — PATIENT OUTREACH (OUTPATIENT)
Dept: ADMINISTRATIVE | Facility: CLINIC | Age: 71
End: 2021-02-26

## 2021-03-01 ENCOUNTER — PATIENT MESSAGE (OUTPATIENT)
Dept: GASTROENTEROLOGY | Facility: CLINIC | Age: 71
End: 2021-03-01

## 2021-03-01 ENCOUNTER — PATIENT MESSAGE (OUTPATIENT)
Dept: INFECTIOUS DISEASES | Facility: CLINIC | Age: 71
End: 2021-03-01

## 2021-03-01 ENCOUNTER — PES CALL (OUTPATIENT)
Dept: ADMINISTRATIVE | Facility: CLINIC | Age: 71
End: 2021-03-01

## 2021-03-01 ENCOUNTER — OFFICE VISIT (OUTPATIENT)
Dept: GASTROENTEROLOGY | Facility: CLINIC | Age: 71
End: 2021-03-01
Payer: MEDICARE

## 2021-03-01 DIAGNOSIS — K80.50 CHOLEDOCHOLITHIASIS: ICD-10-CM

## 2021-03-01 DIAGNOSIS — Z98.890 S/P ERCP: Primary | ICD-10-CM

## 2021-03-01 PROCEDURE — 1159F PR MEDICATION LIST DOCUMENTED IN MEDICAL RECORD: ICD-10-PCS | Mod: 95,,, | Performed by: INTERNAL MEDICINE

## 2021-03-01 PROCEDURE — 1159F MED LIST DOCD IN RCRD: CPT | Mod: 95,,, | Performed by: INTERNAL MEDICINE

## 2021-03-01 PROCEDURE — 99213 PR OFFICE/OUTPT VISIT, EST, LEVL III, 20-29 MIN: ICD-10-PCS | Mod: 95,,, | Performed by: INTERNAL MEDICINE

## 2021-03-01 PROCEDURE — 99213 OFFICE O/P EST LOW 20 MIN: CPT | Mod: 95,,, | Performed by: INTERNAL MEDICINE

## 2021-03-05 ENCOUNTER — LAB VISIT (OUTPATIENT)
Dept: LAB | Facility: HOSPITAL | Age: 71
End: 2021-03-05
Attending: INTERNAL MEDICINE
Payer: MEDICARE

## 2021-03-05 DIAGNOSIS — K80.50 CHOLEDOCHOLITHIASIS: ICD-10-CM

## 2021-03-05 LAB
ALBUMIN SERPL BCP-MCNC: 3.7 G/DL (ref 3.5–5.2)
ALP SERPL-CCNC: 95 U/L (ref 55–135)
ALT SERPL W/O P-5'-P-CCNC: 25 U/L (ref 10–44)
ANION GAP SERPL CALC-SCNC: 11 MMOL/L (ref 8–16)
AST SERPL-CCNC: 25 U/L (ref 10–40)
BASOPHILS # BLD AUTO: 0.07 K/UL (ref 0–0.2)
BASOPHILS NFR BLD: 1.8 % (ref 0–1.9)
BILIRUB DIRECT SERPL-MCNC: 0.3 MG/DL (ref 0.1–0.3)
BILIRUB SERPL-MCNC: 0.6 MG/DL (ref 0.1–1)
BUN SERPL-MCNC: 12 MG/DL (ref 8–23)
CALCIUM SERPL-MCNC: 8.6 MG/DL (ref 8.7–10.5)
CHLORIDE SERPL-SCNC: 103 MMOL/L (ref 95–110)
CO2 SERPL-SCNC: 26 MMOL/L (ref 23–29)
CREAT SERPL-MCNC: 0.9 MG/DL (ref 0.5–1.4)
DIFFERENTIAL METHOD: ABNORMAL
EOSINOPHIL # BLD AUTO: 0.1 K/UL (ref 0–0.5)
EOSINOPHIL NFR BLD: 3.6 % (ref 0–8)
ERYTHROCYTE [DISTWIDTH] IN BLOOD BY AUTOMATED COUNT: 14.5 % (ref 11.5–14.5)
EST. GFR  (AFRICAN AMERICAN): >60 ML/MIN/1.73 M^2
EST. GFR  (NON AFRICAN AMERICAN): >60 ML/MIN/1.73 M^2
GLUCOSE SERPL-MCNC: 155 MG/DL (ref 70–110)
HCT VFR BLD AUTO: 41.1 % (ref 37–48.5)
HGB BLD-MCNC: 13.2 G/DL (ref 12–16)
IMM GRANULOCYTES # BLD AUTO: 0.01 K/UL (ref 0–0.04)
IMM GRANULOCYTES NFR BLD AUTO: 0.3 % (ref 0–0.5)
LYMPHOCYTES # BLD AUTO: 2 K/UL (ref 1–4.8)
LYMPHOCYTES NFR BLD: 51.6 % (ref 18–48)
MCH RBC QN AUTO: 27.8 PG (ref 27–31)
MCHC RBC AUTO-ENTMCNC: 32.1 G/DL (ref 32–36)
MCV RBC AUTO: 87 FL (ref 82–98)
MONOCYTES # BLD AUTO: 0.3 K/UL (ref 0.3–1)
MONOCYTES NFR BLD: 7.8 % (ref 4–15)
NEUTROPHILS # BLD AUTO: 1.4 K/UL (ref 1.8–7.7)
NEUTROPHILS NFR BLD: 34.9 % (ref 38–73)
NRBC BLD-RTO: 0 /100 WBC
PLATELET # BLD AUTO: 405 K/UL (ref 150–350)
PMV BLD AUTO: 10.2 FL (ref 9.2–12.9)
POTASSIUM SERPL-SCNC: 4 MMOL/L (ref 3.5–5.1)
PROT SERPL-MCNC: 8.1 G/DL (ref 6–8.4)
RBC # BLD AUTO: 4.74 M/UL (ref 4–5.4)
SODIUM SERPL-SCNC: 140 MMOL/L (ref 136–145)
WBC # BLD AUTO: 3.86 K/UL (ref 3.9–12.7)

## 2021-03-05 PROCEDURE — 80076 HEPATIC FUNCTION PANEL: CPT | Performed by: INTERNAL MEDICINE

## 2021-03-05 PROCEDURE — 80048 BASIC METABOLIC PNL TOTAL CA: CPT | Performed by: INTERNAL MEDICINE

## 2021-03-05 PROCEDURE — 36415 COLL VENOUS BLD VENIPUNCTURE: CPT | Performed by: INTERNAL MEDICINE

## 2021-03-05 PROCEDURE — 85025 COMPLETE CBC W/AUTO DIFF WBC: CPT | Performed by: INTERNAL MEDICINE

## 2021-03-11 ENCOUNTER — HOSPITAL ENCOUNTER (OUTPATIENT)
Dept: RADIOLOGY | Facility: HOSPITAL | Age: 71
Discharge: HOME | End: 2021-03-11
Attending: FAMILY MEDICINE
Payer: MEDICARE

## 2021-03-11 ENCOUNTER — OFFICE VISIT (OUTPATIENT)
Dept: FAMILY MEDICINE | Facility: CLINIC | Age: 71
End: 2021-03-11
Payer: MEDICARE

## 2021-03-11 VITALS
HEIGHT: 64 IN | TEMPERATURE: 98 F | BODY MASS INDEX: 39.45 KG/M2 | DIASTOLIC BLOOD PRESSURE: 70 MMHG | WEIGHT: 231.06 LBS | OXYGEN SATURATION: 96 % | SYSTOLIC BLOOD PRESSURE: 180 MMHG | HEART RATE: 68 BPM

## 2021-03-11 DIAGNOSIS — E66.9 DIABETES MELLITUS TYPE 2 IN OBESE: ICD-10-CM

## 2021-03-11 DIAGNOSIS — G89.29 CHRONIC PAIN OF RIGHT KNEE: ICD-10-CM

## 2021-03-11 DIAGNOSIS — M25.561 CHRONIC PAIN OF RIGHT KNEE: ICD-10-CM

## 2021-03-11 DIAGNOSIS — R74.01 TRANSAMINITIS: ICD-10-CM

## 2021-03-11 DIAGNOSIS — E11.9 TYPE 2 DIABETES MELLITUS WITHOUT COMPLICATION, WITHOUT LONG-TERM CURRENT USE OF INSULIN: ICD-10-CM

## 2021-03-11 DIAGNOSIS — E11.69 DIABETES MELLITUS TYPE 2 IN OBESE: ICD-10-CM

## 2021-03-11 DIAGNOSIS — E66.01 MORBID OBESITY WITH BMI OF 40.0-44.9, ADULT: ICD-10-CM

## 2021-03-11 DIAGNOSIS — I10 ESSENTIAL HYPERTENSION: ICD-10-CM

## 2021-03-11 DIAGNOSIS — G89.29 CHRONIC PAIN OF RIGHT KNEE: Primary | ICD-10-CM

## 2021-03-11 DIAGNOSIS — I70.0 THORACIC AORTA ATHEROSCLEROSIS: ICD-10-CM

## 2021-03-11 DIAGNOSIS — M25.561 CHRONIC PAIN OF RIGHT KNEE: Primary | ICD-10-CM

## 2021-03-11 PROCEDURE — 99999 PR PBB SHADOW E&M-EST. PATIENT-LVL IV: ICD-10-PCS | Mod: PBBFAC,,, | Performed by: FAMILY MEDICINE

## 2021-03-11 PROCEDURE — 1125F AMNT PAIN NOTED PAIN PRSNT: CPT | Mod: S$GLB,,, | Performed by: FAMILY MEDICINE

## 2021-03-11 PROCEDURE — 3008F BODY MASS INDEX DOCD: CPT | Mod: CPTII,S$GLB,, | Performed by: FAMILY MEDICINE

## 2021-03-11 PROCEDURE — 99499 RISK ADDL DX/OHS AUDIT: ICD-10-PCS | Mod: S$GLB,,, | Performed by: FAMILY MEDICINE

## 2021-03-11 PROCEDURE — 73562 XR KNEE 3 VIEW RIGHT: ICD-10-PCS | Mod: 26,RT,, | Performed by: RADIOLOGY

## 2021-03-11 PROCEDURE — 20610 DRAIN/INJ JOINT/BURSA W/O US: CPT | Mod: RT,S$GLB,, | Performed by: FAMILY MEDICINE

## 2021-03-11 PROCEDURE — 1125F PR PAIN SEVERITY QUANTIFIED, PAIN PRESENT: ICD-10-PCS | Mod: S$GLB,,, | Performed by: FAMILY MEDICINE

## 2021-03-11 PROCEDURE — 1101F PR PT FALLS ASSESS DOC 0-1 FALLS W/OUT INJ PAST YR: ICD-10-PCS | Mod: CPTII,S$GLB,, | Performed by: FAMILY MEDICINE

## 2021-03-11 PROCEDURE — 99999 PR PBB SHADOW E&M-EST. PATIENT-LVL IV: CPT | Mod: PBBFAC,,, | Performed by: FAMILY MEDICINE

## 2021-03-11 PROCEDURE — 73562 X-RAY EXAM OF KNEE 3: CPT | Mod: TC,FY,PO,RT

## 2021-03-11 PROCEDURE — 3288F PR FALLS RISK ASSESSMENT DOCUMENTED: ICD-10-PCS | Mod: CPTII,S$GLB,, | Performed by: FAMILY MEDICINE

## 2021-03-11 PROCEDURE — 3288F FALL RISK ASSESSMENT DOCD: CPT | Mod: CPTII,S$GLB,, | Performed by: FAMILY MEDICINE

## 2021-03-11 PROCEDURE — 99214 OFFICE O/P EST MOD 30 MIN: CPT | Mod: 25,S$GLB,, | Performed by: FAMILY MEDICINE

## 2021-03-11 PROCEDURE — 99499 UNLISTED E&M SERVICE: CPT | Mod: S$GLB,,, | Performed by: FAMILY MEDICINE

## 2021-03-11 PROCEDURE — 20610 LARGE JOINT ASPIRATION/INJECTION: R KNEE: ICD-10-PCS | Mod: RT,S$GLB,, | Performed by: FAMILY MEDICINE

## 2021-03-11 PROCEDURE — 99214 PR OFFICE/OUTPT VISIT, EST, LEVL IV, 30-39 MIN: ICD-10-PCS | Mod: 25,S$GLB,, | Performed by: FAMILY MEDICINE

## 2021-03-11 PROCEDURE — 1101F PT FALLS ASSESS-DOCD LE1/YR: CPT | Mod: CPTII,S$GLB,, | Performed by: FAMILY MEDICINE

## 2021-03-11 PROCEDURE — 3008F PR BODY MASS INDEX (BMI) DOCUMENTED: ICD-10-PCS | Mod: CPTII,S$GLB,, | Performed by: FAMILY MEDICINE

## 2021-03-11 PROCEDURE — 73562 X-RAY EXAM OF KNEE 3: CPT | Mod: 26,RT,, | Performed by: RADIOLOGY

## 2021-03-11 RX ORDER — VALSARTAN AND HYDROCHLOROTHIAZIDE 320; 25 MG/1; MG/1
1 TABLET, FILM COATED ORAL DAILY
Qty: 90 TABLET | Refills: 3 | Status: SHIPPED | OUTPATIENT
Start: 2021-03-11 | End: 2022-03-23

## 2021-03-11 RX ADMIN — TRIAMCINOLONE ACETONIDE 80 MG: 40 INJECTION, SUSPENSION INTRA-ARTICULAR; INTRAMUSCULAR at 03:03

## 2021-03-12 ENCOUNTER — TELEPHONE (OUTPATIENT)
Dept: FAMILY MEDICINE | Facility: CLINIC | Age: 71
End: 2021-03-12

## 2021-03-12 ENCOUNTER — PATIENT MESSAGE (OUTPATIENT)
Dept: FAMILY MEDICINE | Facility: CLINIC | Age: 71
End: 2021-03-12

## 2021-03-12 RX ORDER — TRIAMCINOLONE ACETONIDE 40 MG/ML
80 INJECTION, SUSPENSION INTRA-ARTICULAR; INTRAMUSCULAR
Status: DISCONTINUED | OUTPATIENT
Start: 2021-03-11 | End: 2021-03-12 | Stop reason: HOSPADM

## 2021-03-25 ENCOUNTER — CLINICAL SUPPORT (OUTPATIENT)
Dept: FAMILY MEDICINE | Facility: CLINIC | Age: 71
End: 2021-03-25
Payer: MEDICARE

## 2021-03-25 VITALS — OXYGEN SATURATION: 98 % | SYSTOLIC BLOOD PRESSURE: 134 MMHG | DIASTOLIC BLOOD PRESSURE: 60 MMHG | HEART RATE: 59 BPM

## 2021-03-25 DIAGNOSIS — I10 ESSENTIAL HYPERTENSION: Primary | ICD-10-CM

## 2021-03-25 PROCEDURE — 99999 PR PBB SHADOW E&M-EST. PATIENT-LVL II: CPT | Mod: PBBFAC,,,

## 2021-03-25 PROCEDURE — 99499 NO LOS: ICD-10-PCS | Mod: S$GLB,,, | Performed by: FAMILY MEDICINE

## 2021-03-25 PROCEDURE — 99999 PR PBB SHADOW E&M-EST. PATIENT-LVL II: ICD-10-PCS | Mod: PBBFAC,,,

## 2021-03-25 PROCEDURE — 99499 UNLISTED E&M SERVICE: CPT | Mod: S$GLB,,, | Performed by: FAMILY MEDICINE

## 2021-04-01 ENCOUNTER — TELEPHONE (OUTPATIENT)
Dept: ADMINISTRATIVE | Facility: CLINIC | Age: 71
End: 2021-04-01

## 2021-04-05 ENCOUNTER — OFFICE VISIT (OUTPATIENT)
Dept: HOME HEALTH SERVICES | Facility: CLINIC | Age: 71
End: 2021-04-05
Payer: MEDICARE

## 2021-04-05 VITALS — HEIGHT: 64 IN | WEIGHT: 234 LBS | BODY MASS INDEX: 39.95 KG/M2

## 2021-04-05 DIAGNOSIS — I70.0 THORACIC AORTA ATHEROSCLEROSIS: ICD-10-CM

## 2021-04-05 DIAGNOSIS — E11.69 DIABETES MELLITUS TYPE 2 IN OBESE: ICD-10-CM

## 2021-04-05 DIAGNOSIS — Z00.00 ENCOUNTER FOR PREVENTIVE HEALTH EXAMINATION: Primary | ICD-10-CM

## 2021-04-05 DIAGNOSIS — I10 ESSENTIAL HYPERTENSION: ICD-10-CM

## 2021-04-05 DIAGNOSIS — E66.01 MORBID OBESITY WITH BMI OF 40.0-44.9, ADULT: ICD-10-CM

## 2021-04-05 DIAGNOSIS — E66.9 DIABETES MELLITUS TYPE 2 IN OBESE: ICD-10-CM

## 2021-04-05 DIAGNOSIS — D75.839 THROMBOCYTOSIS: ICD-10-CM

## 2021-04-05 PROCEDURE — 1125F PR PAIN SEVERITY QUANTIFIED, PAIN PRESENT: ICD-10-PCS | Mod: S$GLB,,, | Performed by: NURSE PRACTITIONER

## 2021-04-05 PROCEDURE — 3288F FALL RISK ASSESSMENT DOCD: CPT | Mod: CPTII,S$GLB,, | Performed by: NURSE PRACTITIONER

## 2021-04-05 PROCEDURE — 1125F AMNT PAIN NOTED PAIN PRSNT: CPT | Mod: S$GLB,,, | Performed by: NURSE PRACTITIONER

## 2021-04-05 PROCEDURE — 3051F PR MOST RECENT HEMOGLOBIN A1C LEVEL 7.0 - < 8.0%: ICD-10-PCS | Mod: CPTII,S$GLB,, | Performed by: NURSE PRACTITIONER

## 2021-04-05 PROCEDURE — 1158F PR ADVANCE CARE PLANNING DISCUSS DOCUMENTED IN MEDICAL RECORD: ICD-10-PCS | Mod: S$GLB,,, | Performed by: NURSE PRACTITIONER

## 2021-04-05 PROCEDURE — 3288F PR FALLS RISK ASSESSMENT DOCUMENTED: ICD-10-PCS | Mod: CPTII,S$GLB,, | Performed by: NURSE PRACTITIONER

## 2021-04-05 PROCEDURE — 1101F PR PT FALLS ASSESS DOC 0-1 FALLS W/OUT INJ PAST YR: ICD-10-PCS | Mod: CPTII,S$GLB,, | Performed by: NURSE PRACTITIONER

## 2021-04-05 PROCEDURE — 1158F ADVNC CARE PLAN TLK DOCD: CPT | Mod: S$GLB,,, | Performed by: NURSE PRACTITIONER

## 2021-04-05 PROCEDURE — 1101F PT FALLS ASSESS-DOCD LE1/YR: CPT | Mod: CPTII,S$GLB,, | Performed by: NURSE PRACTITIONER

## 2021-04-05 PROCEDURE — 3008F BODY MASS INDEX DOCD: CPT | Mod: CPTII,S$GLB,, | Performed by: NURSE PRACTITIONER

## 2021-04-05 PROCEDURE — G0439 PPPS, SUBSEQ VISIT: HCPCS | Mod: 95,,, | Performed by: NURSE PRACTITIONER

## 2021-04-05 PROCEDURE — 3051F HG A1C>EQUAL 7.0%<8.0%: CPT | Mod: CPTII,S$GLB,, | Performed by: NURSE PRACTITIONER

## 2021-04-05 PROCEDURE — 99499 RISK ADDL DX/OHS AUDIT: ICD-10-PCS | Mod: S$GLB,,, | Performed by: NURSE PRACTITIONER

## 2021-04-05 PROCEDURE — 99499 UNLISTED E&M SERVICE: CPT | Mod: S$GLB,,, | Performed by: NURSE PRACTITIONER

## 2021-04-05 PROCEDURE — 3008F PR BODY MASS INDEX (BMI) DOCUMENTED: ICD-10-PCS | Mod: CPTII,S$GLB,, | Performed by: NURSE PRACTITIONER

## 2021-04-05 PROCEDURE — G0439 PR MEDICARE ANNUAL WELLNESS SUBSEQUENT VISIT: ICD-10-PCS | Mod: 95,,, | Performed by: NURSE PRACTITIONER

## 2021-04-06 ENCOUNTER — LAB VISIT (OUTPATIENT)
Dept: LAB | Facility: OTHER | Age: 71
End: 2021-04-06
Payer: OTHER GOVERNMENT

## 2021-04-06 DIAGNOSIS — Z20.822 ENCOUNTER FOR LABORATORY TESTING FOR COVID-19 VIRUS: ICD-10-CM

## 2021-04-06 PROCEDURE — U0003 INFECTIOUS AGENT DETECTION BY NUCLEIC ACID (DNA OR RNA); SEVERE ACUTE RESPIRATORY SYNDROME CORONAVIRUS 2 (SARS-COV-2) (CORONAVIRUS DISEASE [COVID-19]), AMPLIFIED PROBE TECHNIQUE, MAKING USE OF HIGH THROUGHPUT TECHNOLOGIES AS DESCRIBED BY CMS-2020-01-R: HCPCS | Performed by: NURSE PRACTITIONER

## 2021-04-08 LAB — SARS-COV-2 RNA RESP QL NAA+PROBE: NOT DETECTED

## 2021-04-13 ENCOUNTER — LAB VISIT (OUTPATIENT)
Dept: LAB | Facility: OTHER | Age: 71
End: 2021-04-13
Payer: OTHER GOVERNMENT

## 2021-04-13 DIAGNOSIS — Z20.822 ENCOUNTER FOR LABORATORY TESTING FOR COVID-19 VIRUS: ICD-10-CM

## 2021-04-13 PROCEDURE — U0003 INFECTIOUS AGENT DETECTION BY NUCLEIC ACID (DNA OR RNA); SEVERE ACUTE RESPIRATORY SYNDROME CORONAVIRUS 2 (SARS-COV-2) (CORONAVIRUS DISEASE [COVID-19]), AMPLIFIED PROBE TECHNIQUE, MAKING USE OF HIGH THROUGHPUT TECHNOLOGIES AS DESCRIBED BY CMS-2020-01-R: HCPCS | Performed by: NURSE PRACTITIONER

## 2021-04-14 LAB — SARS-COV-2 RNA RESP QL NAA+PROBE: NOT DETECTED

## 2021-04-27 ENCOUNTER — LAB VISIT (OUTPATIENT)
Dept: LAB | Facility: OTHER | Age: 71
End: 2021-04-27
Payer: OTHER GOVERNMENT

## 2021-04-27 DIAGNOSIS — Z20.822 ENCOUNTER FOR LABORATORY TESTING FOR COVID-19 VIRUS: ICD-10-CM

## 2021-04-27 PROCEDURE — U0003 INFECTIOUS AGENT DETECTION BY NUCLEIC ACID (DNA OR RNA); SEVERE ACUTE RESPIRATORY SYNDROME CORONAVIRUS 2 (SARS-COV-2) (CORONAVIRUS DISEASE [COVID-19]), AMPLIFIED PROBE TECHNIQUE, MAKING USE OF HIGH THROUGHPUT TECHNOLOGIES AS DESCRIBED BY CMS-2020-01-R: HCPCS | Performed by: NURSE PRACTITIONER

## 2021-04-28 LAB — SARS-COV-2 RNA RESP QL NAA+PROBE: NOT DETECTED

## 2021-05-04 ENCOUNTER — LAB VISIT (OUTPATIENT)
Dept: LAB | Facility: OTHER | Age: 71
End: 2021-05-04
Payer: OTHER GOVERNMENT

## 2021-05-04 DIAGNOSIS — Z20.822 ENCOUNTER FOR LABORATORY TESTING FOR COVID-19 VIRUS: ICD-10-CM

## 2021-05-04 PROCEDURE — U0003 INFECTIOUS AGENT DETECTION BY NUCLEIC ACID (DNA OR RNA); SEVERE ACUTE RESPIRATORY SYNDROME CORONAVIRUS 2 (SARS-COV-2) (CORONAVIRUS DISEASE [COVID-19]), AMPLIFIED PROBE TECHNIQUE, MAKING USE OF HIGH THROUGHPUT TECHNOLOGIES AS DESCRIBED BY CMS-2020-01-R: HCPCS | Performed by: NURSE PRACTITIONER

## 2021-05-06 LAB — SARS-COV-2 RNA RESP QL NAA+PROBE: NOT DETECTED

## 2021-05-11 ENCOUNTER — LAB VISIT (OUTPATIENT)
Dept: LAB | Facility: OTHER | Age: 71
End: 2021-05-11
Payer: OTHER GOVERNMENT

## 2021-05-11 DIAGNOSIS — Z20.822 ENCOUNTER FOR LABORATORY TESTING FOR COVID-19 VIRUS: ICD-10-CM

## 2021-05-11 PROCEDURE — U0003 INFECTIOUS AGENT DETECTION BY NUCLEIC ACID (DNA OR RNA); SEVERE ACUTE RESPIRATORY SYNDROME CORONAVIRUS 2 (SARS-COV-2) (CORONAVIRUS DISEASE [COVID-19]), AMPLIFIED PROBE TECHNIQUE, MAKING USE OF HIGH THROUGHPUT TECHNOLOGIES AS DESCRIBED BY CMS-2020-01-R: HCPCS | Performed by: NURSE PRACTITIONER

## 2021-05-13 LAB — SARS-COV-2 RNA RESP QL NAA+PROBE: NOT DETECTED

## 2021-05-18 ENCOUNTER — LAB VISIT (OUTPATIENT)
Dept: LAB | Facility: OTHER | Age: 71
End: 2021-05-18
Payer: OTHER GOVERNMENT

## 2021-05-18 DIAGNOSIS — Z20.822 ENCOUNTER FOR LABORATORY TESTING FOR COVID-19 VIRUS: ICD-10-CM

## 2021-05-18 PROCEDURE — U0003 INFECTIOUS AGENT DETECTION BY NUCLEIC ACID (DNA OR RNA); SEVERE ACUTE RESPIRATORY SYNDROME CORONAVIRUS 2 (SARS-COV-2) (CORONAVIRUS DISEASE [COVID-19]), AMPLIFIED PROBE TECHNIQUE, MAKING USE OF HIGH THROUGHPUT TECHNOLOGIES AS DESCRIBED BY CMS-2020-01-R: HCPCS | Performed by: NURSE PRACTITIONER

## 2021-05-19 LAB — SARS-COV-2 RNA RESP QL NAA+PROBE: NOT DETECTED

## 2021-05-23 ENCOUNTER — PATIENT MESSAGE (OUTPATIENT)
Dept: FAMILY MEDICINE | Facility: CLINIC | Age: 71
End: 2021-05-23

## 2021-05-25 ENCOUNTER — LAB VISIT (OUTPATIENT)
Dept: LAB | Facility: OTHER | Age: 71
End: 2021-05-25
Payer: OTHER GOVERNMENT

## 2021-05-25 DIAGNOSIS — Z20.822 ENCOUNTER FOR LABORATORY TESTING FOR COVID-19 VIRUS: ICD-10-CM

## 2021-05-25 PROCEDURE — U0003 INFECTIOUS AGENT DETECTION BY NUCLEIC ACID (DNA OR RNA); SEVERE ACUTE RESPIRATORY SYNDROME CORONAVIRUS 2 (SARS-COV-2) (CORONAVIRUS DISEASE [COVID-19]), AMPLIFIED PROBE TECHNIQUE, MAKING USE OF HIGH THROUGHPUT TECHNOLOGIES AS DESCRIBED BY CMS-2020-01-R: HCPCS | Performed by: NURSE PRACTITIONER

## 2021-05-26 LAB — SARS-COV-2 RNA RESP QL NAA+PROBE: NOT DETECTED

## 2021-06-15 ENCOUNTER — LAB VISIT (OUTPATIENT)
Dept: LAB | Facility: OTHER | Age: 71
End: 2021-06-15
Payer: OTHER GOVERNMENT

## 2021-06-15 DIAGNOSIS — Z20.822 ENCOUNTER FOR LABORATORY TESTING FOR COVID-19 VIRUS: ICD-10-CM

## 2021-06-15 PROCEDURE — U0003 INFECTIOUS AGENT DETECTION BY NUCLEIC ACID (DNA OR RNA); SEVERE ACUTE RESPIRATORY SYNDROME CORONAVIRUS 2 (SARS-COV-2) (CORONAVIRUS DISEASE [COVID-19]), AMPLIFIED PROBE TECHNIQUE, MAKING USE OF HIGH THROUGHPUT TECHNOLOGIES AS DESCRIBED BY CMS-2020-01-R: HCPCS | Performed by: NURSE PRACTITIONER

## 2021-06-17 LAB — SARS-COV-2 RNA RESP QL NAA+PROBE: NOT DETECTED

## 2021-06-22 ENCOUNTER — LAB VISIT (OUTPATIENT)
Dept: LAB | Facility: OTHER | Age: 71
End: 2021-06-22
Payer: OTHER GOVERNMENT

## 2021-06-22 DIAGNOSIS — Z20.822 ENCOUNTER FOR LABORATORY TESTING FOR COVID-19 VIRUS: ICD-10-CM

## 2021-06-22 PROCEDURE — U0003 INFECTIOUS AGENT DETECTION BY NUCLEIC ACID (DNA OR RNA); SEVERE ACUTE RESPIRATORY SYNDROME CORONAVIRUS 2 (SARS-COV-2) (CORONAVIRUS DISEASE [COVID-19]), AMPLIFIED PROBE TECHNIQUE, MAKING USE OF HIGH THROUGHPUT TECHNOLOGIES AS DESCRIBED BY CMS-2020-01-R: HCPCS | Performed by: NURSE PRACTITIONER

## 2021-06-23 LAB — SARS-COV-2 RNA RESP QL NAA+PROBE: NOT DETECTED

## 2021-07-20 ENCOUNTER — PATIENT OUTREACH (OUTPATIENT)
Dept: ADMINISTRATIVE | Facility: HOSPITAL | Age: 71
End: 2021-07-20

## 2021-07-20 RX ORDER — TRIAMCINOLONE ACETONIDE 40 MG/ML
INJECTION, SUSPENSION INTRA-ARTICULAR; INTRAMUSCULAR
COMMUNITY
Start: 2021-03-11 | End: 2022-01-14

## 2021-07-20 RX ORDER — CLOSTRIDIUM TETANI TOXOID ANTIGEN (FORMALDEHYDE INACTIVATED) AND CORYNEBACTERIUM DIPHTHERIAE TOXOID ANTIGEN (FORMALDEHYDE INACTIVATED) 5; 2 [LF]/.5ML; [LF]/.5ML
INJECTION, SUSPENSION INTRAMUSCULAR
COMMUNITY
Start: 2021-02-02 | End: 2022-01-11

## 2021-07-27 ENCOUNTER — LAB VISIT (OUTPATIENT)
Dept: LAB | Facility: OTHER | Age: 71
End: 2021-07-27
Payer: OTHER GOVERNMENT

## 2021-07-27 DIAGNOSIS — Z20.822 ENCOUNTER FOR LABORATORY TESTING FOR COVID-19 VIRUS: ICD-10-CM

## 2021-07-27 PROCEDURE — U0003 INFECTIOUS AGENT DETECTION BY NUCLEIC ACID (DNA OR RNA); SEVERE ACUTE RESPIRATORY SYNDROME CORONAVIRUS 2 (SARS-COV-2) (CORONAVIRUS DISEASE [COVID-19]), AMPLIFIED PROBE TECHNIQUE, MAKING USE OF HIGH THROUGHPUT TECHNOLOGIES AS DESCRIBED BY CMS-2020-01-R: HCPCS | Performed by: NURSE PRACTITIONER

## 2021-07-28 LAB
SARS-COV-2 RNA RESP QL NAA+PROBE: NOT DETECTED
SARS-COV-2- CYCLE NUMBER: -1

## 2021-07-30 ENCOUNTER — LAB VISIT (OUTPATIENT)
Dept: LAB | Facility: HOSPITAL | Age: 71
End: 2021-07-30
Attending: FAMILY MEDICINE
Payer: MEDICARE

## 2021-07-30 DIAGNOSIS — E66.9 DIABETES MELLITUS TYPE 2 IN OBESE: ICD-10-CM

## 2021-07-30 DIAGNOSIS — E11.69 DIABETES MELLITUS TYPE 2 IN OBESE: ICD-10-CM

## 2021-07-30 DIAGNOSIS — E11.9 TYPE 2 DIABETES MELLITUS WITHOUT COMPLICATION, WITHOUT LONG-TERM CURRENT USE OF INSULIN: ICD-10-CM

## 2021-07-30 LAB
ALBUMIN SERPL BCP-MCNC: 3.9 G/DL (ref 3.5–5.2)
ALP SERPL-CCNC: 65 U/L (ref 55–135)
ALT SERPL W/O P-5'-P-CCNC: 14 U/L (ref 10–44)
ANION GAP SERPL CALC-SCNC: 7 MMOL/L (ref 8–16)
AST SERPL-CCNC: 18 U/L (ref 10–40)
BASOPHILS # BLD AUTO: 0.06 K/UL (ref 0–0.2)
BASOPHILS NFR BLD: 1.2 % (ref 0–1.9)
BILIRUB SERPL-MCNC: 0.5 MG/DL (ref 0.1–1)
BUN SERPL-MCNC: 19 MG/DL (ref 8–23)
CALCIUM SERPL-MCNC: 9.4 MG/DL (ref 8.7–10.5)
CHLORIDE SERPL-SCNC: 103 MMOL/L (ref 95–110)
CHOLEST SERPL-MCNC: 181 MG/DL (ref 120–199)
CHOLEST/HDLC SERPL: 3.9 {RATIO} (ref 2–5)
CO2 SERPL-SCNC: 27 MMOL/L (ref 23–29)
CREAT SERPL-MCNC: 1 MG/DL (ref 0.5–1.4)
DIFFERENTIAL METHOD: ABNORMAL
EOSINOPHIL # BLD AUTO: 0.2 K/UL (ref 0–0.5)
EOSINOPHIL NFR BLD: 3.4 % (ref 0–8)
ERYTHROCYTE [DISTWIDTH] IN BLOOD BY AUTOMATED COUNT: 13.5 % (ref 11.5–14.5)
EST. GFR  (AFRICAN AMERICAN): >60 ML/MIN/1.73 M^2
EST. GFR  (NON AFRICAN AMERICAN): 58 ML/MIN/1.73 M^2
ESTIMATED AVG GLUCOSE: 160 MG/DL (ref 68–131)
GLUCOSE SERPL-MCNC: 161 MG/DL (ref 70–110)
HBA1C MFR BLD: 7.2 % (ref 4–5.6)
HCT VFR BLD AUTO: 41 % (ref 37–48.5)
HDLC SERPL-MCNC: 47 MG/DL (ref 40–75)
HDLC SERPL: 26 % (ref 20–50)
HGB BLD-MCNC: 13.5 G/DL (ref 12–16)
IMM GRANULOCYTES # BLD AUTO: 0.01 K/UL (ref 0–0.04)
IMM GRANULOCYTES NFR BLD AUTO: 0.2 % (ref 0–0.5)
LDLC SERPL CALC-MCNC: 105.2 MG/DL (ref 63–159)
LYMPHOCYTES # BLD AUTO: 2.5 K/UL (ref 1–4.8)
LYMPHOCYTES NFR BLD: 48.9 % (ref 18–48)
MCH RBC QN AUTO: 28.5 PG (ref 27–31)
MCHC RBC AUTO-ENTMCNC: 32.9 G/DL (ref 32–36)
MCV RBC AUTO: 87 FL (ref 82–98)
MONOCYTES # BLD AUTO: 0.4 K/UL (ref 0.3–1)
MONOCYTES NFR BLD: 7.1 % (ref 4–15)
NEUTROPHILS # BLD AUTO: 2 K/UL (ref 1.8–7.7)
NEUTROPHILS NFR BLD: 39.2 % (ref 38–73)
NONHDLC SERPL-MCNC: 134 MG/DL
NRBC BLD-RTO: 0 /100 WBC
PLATELET # BLD AUTO: 310 K/UL (ref 150–450)
PMV BLD AUTO: 10.1 FL (ref 9.2–12.9)
POTASSIUM SERPL-SCNC: 4 MMOL/L (ref 3.5–5.1)
PROT SERPL-MCNC: 8.1 G/DL (ref 6–8.4)
RBC # BLD AUTO: 4.73 M/UL (ref 4–5.4)
SODIUM SERPL-SCNC: 137 MMOL/L (ref 136–145)
TRIGL SERPL-MCNC: 144 MG/DL (ref 30–150)
TSH SERPL DL<=0.005 MIU/L-ACNC: 2.49 UIU/ML (ref 0.4–4)
WBC # BLD AUTO: 5.05 K/UL (ref 3.9–12.7)

## 2021-07-30 PROCEDURE — 80061 LIPID PANEL: CPT | Performed by: FAMILY MEDICINE

## 2021-07-30 PROCEDURE — 83036 HEMOGLOBIN GLYCOSYLATED A1C: CPT | Performed by: FAMILY MEDICINE

## 2021-07-30 PROCEDURE — 36415 COLL VENOUS BLD VENIPUNCTURE: CPT | Performed by: FAMILY MEDICINE

## 2021-07-30 PROCEDURE — 85025 COMPLETE CBC W/AUTO DIFF WBC: CPT | Performed by: FAMILY MEDICINE

## 2021-07-30 PROCEDURE — 84443 ASSAY THYROID STIM HORMONE: CPT | Performed by: FAMILY MEDICINE

## 2021-07-30 PROCEDURE — 80053 COMPREHEN METABOLIC PANEL: CPT | Performed by: FAMILY MEDICINE

## 2021-08-03 ENCOUNTER — LAB VISIT (OUTPATIENT)
Dept: LAB | Facility: OTHER | Age: 71
End: 2021-08-03
Payer: OTHER GOVERNMENT

## 2021-08-03 ENCOUNTER — OFFICE VISIT (OUTPATIENT)
Dept: FAMILY MEDICINE | Facility: CLINIC | Age: 71
End: 2021-08-03
Payer: MEDICARE

## 2021-08-03 VITALS
WEIGHT: 237.63 LBS | OXYGEN SATURATION: 97 % | TEMPERATURE: 98 F | SYSTOLIC BLOOD PRESSURE: 112 MMHG | DIASTOLIC BLOOD PRESSURE: 50 MMHG | HEIGHT: 64 IN | BODY MASS INDEX: 40.57 KG/M2 | HEART RATE: 88 BPM

## 2021-08-03 DIAGNOSIS — E11.9 TYPE 2 DIABETES MELLITUS WITHOUT COMPLICATION, WITHOUT LONG-TERM CURRENT USE OF INSULIN: Primary | ICD-10-CM

## 2021-08-03 DIAGNOSIS — E66.9 DIABETES MELLITUS TYPE 2 IN OBESE: ICD-10-CM

## 2021-08-03 DIAGNOSIS — E55.9 VITAMIN D DEFICIENCY: ICD-10-CM

## 2021-08-03 DIAGNOSIS — I70.0 THORACIC AORTA ATHEROSCLEROSIS: ICD-10-CM

## 2021-08-03 DIAGNOSIS — D75.839 THROMBOCYTOSIS: ICD-10-CM

## 2021-08-03 DIAGNOSIS — M25.561 CHRONIC PAIN OF RIGHT KNEE: ICD-10-CM

## 2021-08-03 DIAGNOSIS — E11.69 DIABETES MELLITUS TYPE 2 IN OBESE: ICD-10-CM

## 2021-08-03 DIAGNOSIS — Z20.822 ENCOUNTER FOR LABORATORY TESTING FOR COVID-19 VIRUS: ICD-10-CM

## 2021-08-03 DIAGNOSIS — G89.29 CHRONIC PAIN OF RIGHT KNEE: ICD-10-CM

## 2021-08-03 DIAGNOSIS — I10 ESSENTIAL HYPERTENSION: ICD-10-CM

## 2021-08-03 DIAGNOSIS — E66.01 MORBID OBESITY WITH BMI OF 40.0-44.9, ADULT: ICD-10-CM

## 2021-08-03 PROCEDURE — 3288F PR FALLS RISK ASSESSMENT DOCUMENTED: ICD-10-PCS | Mod: CPTII,S$GLB,, | Performed by: FAMILY MEDICINE

## 2021-08-03 PROCEDURE — 1101F PR PT FALLS ASSESS DOC 0-1 FALLS W/OUT INJ PAST YR: ICD-10-PCS | Mod: CPTII,S$GLB,, | Performed by: FAMILY MEDICINE

## 2021-08-03 PROCEDURE — 1125F PR PAIN SEVERITY QUANTIFIED, PAIN PRESENT: ICD-10-PCS | Mod: CPTII,S$GLB,, | Performed by: FAMILY MEDICINE

## 2021-08-03 PROCEDURE — 3051F PR MOST RECENT HEMOGLOBIN A1C LEVEL 7.0 - < 8.0%: ICD-10-PCS | Mod: CPTII,S$GLB,, | Performed by: FAMILY MEDICINE

## 2021-08-03 PROCEDURE — 99214 PR OFFICE/OUTPT VISIT, EST, LEVL IV, 30-39 MIN: ICD-10-PCS | Mod: S$GLB,,, | Performed by: FAMILY MEDICINE

## 2021-08-03 PROCEDURE — 3078F DIAST BP <80 MM HG: CPT | Mod: CPTII,S$GLB,, | Performed by: FAMILY MEDICINE

## 2021-08-03 PROCEDURE — 99214 OFFICE O/P EST MOD 30 MIN: CPT | Mod: S$GLB,,, | Performed by: FAMILY MEDICINE

## 2021-08-03 PROCEDURE — 3074F SYST BP LT 130 MM HG: CPT | Mod: CPTII,S$GLB,, | Performed by: FAMILY MEDICINE

## 2021-08-03 PROCEDURE — 99499 UNLISTED E&M SERVICE: CPT | Mod: S$GLB,,, | Performed by: FAMILY MEDICINE

## 2021-08-03 PROCEDURE — 1160F PR REVIEW ALL MEDS BY PRESCRIBER/CLIN PHARMACIST DOCUMENTED: ICD-10-PCS | Mod: CPTII,S$GLB,, | Performed by: FAMILY MEDICINE

## 2021-08-03 PROCEDURE — 3074F PR MOST RECENT SYSTOLIC BLOOD PRESSURE < 130 MM HG: ICD-10-PCS | Mod: CPTII,S$GLB,, | Performed by: FAMILY MEDICINE

## 2021-08-03 PROCEDURE — U0003 INFECTIOUS AGENT DETECTION BY NUCLEIC ACID (DNA OR RNA); SEVERE ACUTE RESPIRATORY SYNDROME CORONAVIRUS 2 (SARS-COV-2) (CORONAVIRUS DISEASE [COVID-19]), AMPLIFIED PROBE TECHNIQUE, MAKING USE OF HIGH THROUGHPUT TECHNOLOGIES AS DESCRIBED BY CMS-2020-01-R: HCPCS | Performed by: NURSE PRACTITIONER

## 2021-08-03 PROCEDURE — 1159F MED LIST DOCD IN RCRD: CPT | Mod: CPTII,S$GLB,, | Performed by: FAMILY MEDICINE

## 2021-08-03 PROCEDURE — 1159F PR MEDICATION LIST DOCUMENTED IN MEDICAL RECORD: ICD-10-PCS | Mod: CPTII,S$GLB,, | Performed by: FAMILY MEDICINE

## 2021-08-03 PROCEDURE — 3008F BODY MASS INDEX DOCD: CPT | Mod: CPTII,S$GLB,, | Performed by: FAMILY MEDICINE

## 2021-08-03 PROCEDURE — 3008F PR BODY MASS INDEX (BMI) DOCUMENTED: ICD-10-PCS | Mod: CPTII,S$GLB,, | Performed by: FAMILY MEDICINE

## 2021-08-03 PROCEDURE — 3051F HG A1C>EQUAL 7.0%<8.0%: CPT | Mod: CPTII,S$GLB,, | Performed by: FAMILY MEDICINE

## 2021-08-03 PROCEDURE — 99999 PR PBB SHADOW E&M-EST. PATIENT-LVL IV: ICD-10-PCS | Mod: PBBFAC,,, | Performed by: FAMILY MEDICINE

## 2021-08-03 PROCEDURE — 3078F PR MOST RECENT DIASTOLIC BLOOD PRESSURE < 80 MM HG: ICD-10-PCS | Mod: CPTII,S$GLB,, | Performed by: FAMILY MEDICINE

## 2021-08-03 PROCEDURE — 1125F AMNT PAIN NOTED PAIN PRSNT: CPT | Mod: CPTII,S$GLB,, | Performed by: FAMILY MEDICINE

## 2021-08-03 PROCEDURE — 99999 PR PBB SHADOW E&M-EST. PATIENT-LVL IV: CPT | Mod: PBBFAC,,, | Performed by: FAMILY MEDICINE

## 2021-08-03 PROCEDURE — 1160F RVW MEDS BY RX/DR IN RCRD: CPT | Mod: CPTII,S$GLB,, | Performed by: FAMILY MEDICINE

## 2021-08-03 PROCEDURE — 1101F PT FALLS ASSESS-DOCD LE1/YR: CPT | Mod: CPTII,S$GLB,, | Performed by: FAMILY MEDICINE

## 2021-08-03 PROCEDURE — 3288F FALL RISK ASSESSMENT DOCD: CPT | Mod: CPTII,S$GLB,, | Performed by: FAMILY MEDICINE

## 2021-08-03 PROCEDURE — 99499 RISK ADDL DX/OHS AUDIT: ICD-10-PCS | Mod: S$GLB,,, | Performed by: FAMILY MEDICINE

## 2021-08-03 RX ORDER — ATORVASTATIN CALCIUM 10 MG/1
10 TABLET, FILM COATED ORAL DAILY
Qty: 90 TABLET | Refills: 3 | Status: SHIPPED | OUTPATIENT
Start: 2021-08-03 | End: 2022-08-30

## 2021-08-06 LAB
SARS-COV-2 RNA RESP QL NAA+PROBE: NORMAL
TEST PERFORMANCE INFO SPEC: NORMAL

## 2021-08-10 ENCOUNTER — LAB VISIT (OUTPATIENT)
Dept: LAB | Facility: OTHER | Age: 71
End: 2021-08-10
Payer: OTHER GOVERNMENT

## 2021-08-10 DIAGNOSIS — Z20.822 ENCOUNTER FOR LABORATORY TESTING FOR COVID-19 VIRUS: ICD-10-CM

## 2021-08-10 PROCEDURE — U0003 INFECTIOUS AGENT DETECTION BY NUCLEIC ACID (DNA OR RNA); SEVERE ACUTE RESPIRATORY SYNDROME CORONAVIRUS 2 (SARS-COV-2) (CORONAVIRUS DISEASE [COVID-19]), AMPLIFIED PROBE TECHNIQUE, MAKING USE OF HIGH THROUGHPUT TECHNOLOGIES AS DESCRIBED BY CMS-2020-01-R: HCPCS | Performed by: NURSE PRACTITIONER

## 2021-08-13 LAB
SARS-COV-2 RNA RESP QL NAA+PROBE: NOT DETECTED
SARS-COV-2- CYCLE NUMBER: -1

## 2021-08-17 ENCOUNTER — LAB VISIT (OUTPATIENT)
Dept: LAB | Facility: OTHER | Age: 71
End: 2021-08-17
Payer: OTHER GOVERNMENT

## 2021-08-17 DIAGNOSIS — Z20.822 ENCOUNTER FOR LABORATORY TESTING FOR COVID-19 VIRUS: ICD-10-CM

## 2021-08-17 PROCEDURE — U0003 INFECTIOUS AGENT DETECTION BY NUCLEIC ACID (DNA OR RNA); SEVERE ACUTE RESPIRATORY SYNDROME CORONAVIRUS 2 (SARS-COV-2) (CORONAVIRUS DISEASE [COVID-19]), AMPLIFIED PROBE TECHNIQUE, MAKING USE OF HIGH THROUGHPUT TECHNOLOGIES AS DESCRIBED BY CMS-2020-01-R: HCPCS | Performed by: NURSE PRACTITIONER

## 2021-08-19 LAB
SARS-COV-2 RNA RESP QL NAA+PROBE: NOT DETECTED
SARS-COV-2- CYCLE NUMBER: -1

## 2021-08-24 ENCOUNTER — LAB VISIT (OUTPATIENT)
Dept: LAB | Facility: OTHER | Age: 71
End: 2021-08-24
Payer: OTHER GOVERNMENT

## 2021-08-24 DIAGNOSIS — Z20.822 ENCOUNTER FOR LABORATORY TESTING FOR COVID-19 VIRUS: ICD-10-CM

## 2021-08-24 PROCEDURE — U0003 INFECTIOUS AGENT DETECTION BY NUCLEIC ACID (DNA OR RNA); SEVERE ACUTE RESPIRATORY SYNDROME CORONAVIRUS 2 (SARS-COV-2) (CORONAVIRUS DISEASE [COVID-19]), AMPLIFIED PROBE TECHNIQUE, MAKING USE OF HIGH THROUGHPUT TECHNOLOGIES AS DESCRIBED BY CMS-2020-01-R: HCPCS | Performed by: NURSE PRACTITIONER

## 2021-08-26 LAB
SARS-COV-2 RNA RESP QL NAA+PROBE: NORMAL
TEST PERFORMANCE INFO SPEC: NORMAL

## 2021-10-06 ENCOUNTER — TELEPHONE (OUTPATIENT)
Dept: FAMILY MEDICINE | Facility: CLINIC | Age: 71
End: 2021-10-06

## 2021-10-06 DIAGNOSIS — Z12.31 ENCOUNTER FOR SCREENING MAMMOGRAM FOR BREAST CANCER: Primary | ICD-10-CM

## 2021-11-08 ENCOUNTER — PATIENT MESSAGE (OUTPATIENT)
Dept: FAMILY MEDICINE | Facility: CLINIC | Age: 71
End: 2021-11-08
Payer: MEDICARE

## 2021-11-08 ENCOUNTER — HOSPITAL ENCOUNTER (OUTPATIENT)
Dept: RADIOLOGY | Facility: HOSPITAL | Age: 71
Discharge: HOME | End: 2021-11-08
Attending: FAMILY MEDICINE
Payer: MEDICARE

## 2021-11-08 VITALS — BODY MASS INDEX: 40.57 KG/M2 | HEIGHT: 64 IN | WEIGHT: 237.63 LBS

## 2021-11-08 DIAGNOSIS — Z12.31 ENCOUNTER FOR SCREENING MAMMOGRAM FOR BREAST CANCER: ICD-10-CM

## 2021-11-08 PROCEDURE — 77067 SCR MAMMO BI INCL CAD: CPT | Mod: 26,,, | Performed by: RADIOLOGY

## 2021-11-08 PROCEDURE — 77063 MAMMO DIGITAL SCREENING BILAT WITH TOMO: ICD-10-PCS | Mod: 26,,, | Performed by: RADIOLOGY

## 2021-11-08 PROCEDURE — 77063 BREAST TOMOSYNTHESIS BI: CPT | Mod: 26,,, | Performed by: RADIOLOGY

## 2021-11-08 PROCEDURE — 77067 MAMMO DIGITAL SCREENING BILAT WITH TOMO: ICD-10-PCS | Mod: 26,,, | Performed by: RADIOLOGY

## 2021-11-08 PROCEDURE — 77067 SCR MAMMO BI INCL CAD: CPT | Mod: TC

## 2021-12-08 DIAGNOSIS — E11.69 DIABETES MELLITUS TYPE 2 IN OBESE: ICD-10-CM

## 2021-12-08 DIAGNOSIS — E66.9 DIABETES MELLITUS TYPE 2 IN OBESE: ICD-10-CM

## 2021-12-13 ENCOUNTER — PES CALL (OUTPATIENT)
Dept: ADMINISTRATIVE | Facility: CLINIC | Age: 71
End: 2021-12-13
Payer: MEDICARE

## 2022-01-05 ENCOUNTER — TELEPHONE (OUTPATIENT)
Dept: FAMILY MEDICINE | Facility: CLINIC | Age: 72
End: 2022-01-05

## 2022-01-05 ENCOUNTER — PES CALL (OUTPATIENT)
Dept: ADMINISTRATIVE | Facility: CLINIC | Age: 72
End: 2022-01-05
Payer: MEDICARE

## 2022-01-07 ENCOUNTER — TELEPHONE (OUTPATIENT)
Dept: ADMINISTRATIVE | Facility: CLINIC | Age: 72
End: 2022-01-07
Payer: MEDICARE

## 2022-01-07 ENCOUNTER — PATIENT MESSAGE (OUTPATIENT)
Dept: FAMILY MEDICINE | Facility: CLINIC | Age: 72
End: 2022-01-07
Payer: MEDICARE

## 2022-01-10 ENCOUNTER — TELEPHONE (OUTPATIENT)
Dept: ADMINISTRATIVE | Facility: CLINIC | Age: 72
End: 2022-01-10
Payer: MEDICARE

## 2022-01-10 ENCOUNTER — OFFICE VISIT (OUTPATIENT)
Dept: HOME HEALTH SERVICES | Facility: CLINIC | Age: 72
End: 2022-01-10
Payer: MEDICARE

## 2022-01-10 VITALS — BODY MASS INDEX: 40.97 KG/M2 | HEIGHT: 64 IN | WEIGHT: 240 LBS

## 2022-01-10 DIAGNOSIS — E66.9 DIABETES MELLITUS TYPE 2 IN OBESE: ICD-10-CM

## 2022-01-10 DIAGNOSIS — E11.69 DIABETES MELLITUS TYPE 2 IN OBESE: ICD-10-CM

## 2022-01-10 DIAGNOSIS — I10 ESSENTIAL HYPERTENSION: ICD-10-CM

## 2022-01-10 DIAGNOSIS — Z00.00 ENCOUNTER FOR PREVENTIVE HEALTH EXAMINATION: Primary | ICD-10-CM

## 2022-01-10 DIAGNOSIS — I70.0 THORACIC AORTA ATHEROSCLEROSIS: ICD-10-CM

## 2022-01-10 DIAGNOSIS — E66.01 MORBID OBESITY WITH BMI OF 40.0-44.9, ADULT: ICD-10-CM

## 2022-01-10 DIAGNOSIS — E11.9 TYPE 2 DIABETES MELLITUS WITHOUT COMPLICATION, WITHOUT LONG-TERM CURRENT USE OF INSULIN: ICD-10-CM

## 2022-01-10 PROCEDURE — 3288F FALL RISK ASSESSMENT DOCD: CPT | Mod: CPTII,S$GLB,, | Performed by: NURSE PRACTITIONER

## 2022-01-10 PROCEDURE — 3051F PR MOST RECENT HEMOGLOBIN A1C LEVEL 7.0 - < 8.0%: ICD-10-PCS | Mod: CPTII,S$GLB,, | Performed by: NURSE PRACTITIONER

## 2022-01-10 PROCEDURE — 99499 RISK ADDL DX/OHS AUDIT: ICD-10-PCS | Mod: 95,,, | Performed by: NURSE PRACTITIONER

## 2022-01-10 PROCEDURE — G0439 PPPS, SUBSEQ VISIT: HCPCS | Mod: 95,,, | Performed by: NURSE PRACTITIONER

## 2022-01-10 PROCEDURE — 1159F PR MEDICATION LIST DOCUMENTED IN MEDICAL RECORD: ICD-10-PCS | Mod: CPTII,S$GLB,, | Performed by: NURSE PRACTITIONER

## 2022-01-10 PROCEDURE — 1125F AMNT PAIN NOTED PAIN PRSNT: CPT | Mod: CPTII,S$GLB,, | Performed by: NURSE PRACTITIONER

## 2022-01-10 PROCEDURE — 1101F PR PT FALLS ASSESS DOC 0-1 FALLS W/OUT INJ PAST YR: ICD-10-PCS | Mod: CPTII,S$GLB,, | Performed by: NURSE PRACTITIONER

## 2022-01-10 PROCEDURE — 1160F RVW MEDS BY RX/DR IN RCRD: CPT | Mod: CPTII,S$GLB,, | Performed by: NURSE PRACTITIONER

## 2022-01-10 PROCEDURE — 1125F PR PAIN SEVERITY QUANTIFIED, PAIN PRESENT: ICD-10-PCS | Mod: CPTII,S$GLB,, | Performed by: NURSE PRACTITIONER

## 2022-01-10 PROCEDURE — 99499 UNLISTED E&M SERVICE: CPT | Mod: 95,,, | Performed by: NURSE PRACTITIONER

## 2022-01-10 PROCEDURE — 1159F MED LIST DOCD IN RCRD: CPT | Mod: CPTII,S$GLB,, | Performed by: NURSE PRACTITIONER

## 2022-01-10 PROCEDURE — 1160F PR REVIEW ALL MEDS BY PRESCRIBER/CLIN PHARMACIST DOCUMENTED: ICD-10-PCS | Mod: CPTII,S$GLB,, | Performed by: NURSE PRACTITIONER

## 2022-01-10 PROCEDURE — G0439 PR MEDICARE ANNUAL WELLNESS SUBSEQUENT VISIT: ICD-10-PCS | Mod: 95,,, | Performed by: NURSE PRACTITIONER

## 2022-01-10 PROCEDURE — 3051F HG A1C>EQUAL 7.0%<8.0%: CPT | Mod: CPTII,S$GLB,, | Performed by: NURSE PRACTITIONER

## 2022-01-10 PROCEDURE — 3008F PR BODY MASS INDEX (BMI) DOCUMENTED: ICD-10-PCS | Mod: CPTII,S$GLB,, | Performed by: NURSE PRACTITIONER

## 2022-01-10 PROCEDURE — 3008F BODY MASS INDEX DOCD: CPT | Mod: CPTII,S$GLB,, | Performed by: NURSE PRACTITIONER

## 2022-01-10 PROCEDURE — 3288F PR FALLS RISK ASSESSMENT DOCUMENTED: ICD-10-PCS | Mod: CPTII,S$GLB,, | Performed by: NURSE PRACTITIONER

## 2022-01-10 PROCEDURE — 1101F PT FALLS ASSESS-DOCD LE1/YR: CPT | Mod: CPTII,S$GLB,, | Performed by: NURSE PRACTITIONER

## 2022-01-10 RX ORDER — EPINEPHRINE 0.22MG
200 AEROSOL WITH ADAPTER (ML) INHALATION DAILY
COMMUNITY

## 2022-01-10 NOTE — PROGRESS NOTES
"The patient location is: [place]  The chief complaint leading to consultation is: Health Risk Assessment    Visit type: audiovisual    Face to Face time with patient: 20  35 minutes of total time spent on the encounter, which includes face to face time and non-face to face time preparing to see the patient (eg, review of tests), Obtaining and/or reviewing separately obtained history, Documenting clinical information in the electronic or other health record, Independently interpreting results (not separately reported) and communicating results to the patient/family/caregiver, or Care coordination (not separately reported).         Each patient to whom he or she provides medical services by telemedicine is:  (1) informed of the relationship between the physician and patient and the respective role of any other health care provider with respect to management of the patient; and (2) notified that he or she may decline to receive medical services by telemedicine and may withdraw from such care at any time.    Notes:       Bette Johnson presented for a  Medicare AWV and comprehensive Health Risk Assessment today. The following components were reviewed and updated:    · Medical history  · Family History  · Social history  · Allergies and Current Medications  · Health Risk Assessment  · Health Maintenance  · Care Team         ** See Completed Assessments for Annual Wellness Visit within the encounter summary.**         The following assessments were completed:  · Living Situation  · CAGE  · Depression Screening  · Fall Risk Assessment (MACH 10)  · Hearing Assessment(HHI)  · Cognitive Function Screening  · Nutrition Screening  · ADL Screening  · PAQ Screening      Vitals:    01/10/22 1001   Weight: 108.9 kg (240 lb)   Height: 5' 4" (1.626 m)     Body mass index is 41.2 kg/m².  Physical Exam  Neurological:      Mental Status: She is alert and oriented to person, place, and time.               Diagnoses and health risks " identified today and associated recommendations/orders:    1. Encounter for preventive health examination  Assessments completed. Preventive measures and health maintenance reviewed with patient.    2. Type 2 diabetes mellitus without complication, without long-term current use of insulin  Stable, followed by PCP.    3. Diabetes mellitus type 2 in obese  Stable, followed by PCP.    4. Morbid obesity with BMI of 40.0-44.9, adult  Stable, followed by PCP.     5. Thoracic aorta atherosclerosis  Stable, followed by PCP.    6. Essential hypertension  Stable, followed by PCP.    Provided Bette with a 5-10 year written screening schedule and personal prevention plan. Recommendations were developed using the USPSTF age appropriate recommendations. Education, counseling, and referrals were provided as needed. After Visit Summary printed and given to patient which includes a list of additional screenings\tests needed.    Follow up in about 1 year (around 1/10/2023) for your next annual wellness visit.    Jody Hanley, NP  I offered to discuss advanced care planning, including how to pick a person who would make decisions for you if you were unable to make them for yourself, called a health care power of , and what kind of decisions you might make such as use of life sustaining treatments such as ventilators and tube feeding when faced with a life limiting illness recorded on a living will that they will need to know. (How you want to be cared for as you near the end of your natural life)     X Patient is interested in learning more about how to make advanced directives.  I provided them paperwork and offered to discuss this with them.

## 2022-01-10 NOTE — PATIENT INSTRUCTIONS
Counseling and Referral of Other Preventative  (Italic type indicates deductible and co-insurance are waived)    Patient Name: Bette Johnson  Today's Date: 1/10/2022    Health Maintenance       Date Due Completion Date    COVID-19 Vaccine (2 - Moderna 3-dose series) 05/06/2021 4/8/2021    Eye Exam 11/28/2021 11/28/2020    Override on 6/19/2017: Done    Diabetes Urine Screening 01/27/2022 1/27/2021    Foot Exam 02/02/2022 2/2/2021    Override on 4/19/2019: Done    Override on 6/11/2018: Done    Hemoglobin A1c 01/30/2022 7/30/2021    Shingles Vaccine (2 of 2) 02/14/2022 12/20/2021    Lipid Panel 07/30/2022 7/30/2021    Mammogram 11/08/2022 11/8/2021    Override on 7/14/2016: Done    High Dose Statin 01/10/2023 1/10/2022    Colorectal Cancer Screening 01/25/2023 1/25/2018    TETANUS VACCINE 02/02/2031 2/2/2021        No orders of the defined types were placed in this encounter.    The following information is provided to all patients.  This information is to help you find resources for any of the problems found today that may be affecting your health:                Living healthy guide: www.[facility].[place].gov      Understanding Diabetes: www.diabetes.org      Eating healthy: www.cdc.gov/healthyweight      CDC home safety checklist: www.cdc.gov/steadi/patient.html      Agency on Aging: www.goea.[place].gov      Alcoholics anonymous (AA): www.aa.org      Physical Activity: www.allie.nih.gov/yu6rqrn      Tobacco use: www.quitwithusla.org

## 2022-01-11 ENCOUNTER — OFFICE VISIT (OUTPATIENT)
Dept: FAMILY MEDICINE | Facility: CLINIC | Age: 72
End: 2022-01-11
Payer: MEDICARE

## 2022-01-11 VITALS
BODY MASS INDEX: 42.76 KG/M2 | SYSTOLIC BLOOD PRESSURE: 124 MMHG | OXYGEN SATURATION: 95 % | TEMPERATURE: 98 F | HEIGHT: 64 IN | HEART RATE: 73 BPM | DIASTOLIC BLOOD PRESSURE: 50 MMHG | WEIGHT: 250.44 LBS

## 2022-01-11 DIAGNOSIS — I10 ESSENTIAL HYPERTENSION: ICD-10-CM

## 2022-01-11 DIAGNOSIS — M25.561 CHRONIC PAIN OF RIGHT KNEE: ICD-10-CM

## 2022-01-11 DIAGNOSIS — E11.69 DIABETES MELLITUS TYPE 2 IN OBESE: ICD-10-CM

## 2022-01-11 DIAGNOSIS — E11.9 TYPE 2 DIABETES MELLITUS WITHOUT COMPLICATION, WITHOUT LONG-TERM CURRENT USE OF INSULIN: Primary | ICD-10-CM

## 2022-01-11 DIAGNOSIS — I70.0 THORACIC AORTA ATHEROSCLEROSIS: ICD-10-CM

## 2022-01-11 DIAGNOSIS — E66.01 MORBID OBESITY WITH BMI OF 40.0-44.9, ADULT: ICD-10-CM

## 2022-01-11 DIAGNOSIS — G89.29 CHRONIC PAIN OF RIGHT KNEE: ICD-10-CM

## 2022-01-11 DIAGNOSIS — E66.9 DIABETES MELLITUS TYPE 2 IN OBESE: ICD-10-CM

## 2022-01-11 DIAGNOSIS — M25.561 RIGHT KNEE PAIN, UNSPECIFIED CHRONICITY: Primary | ICD-10-CM

## 2022-01-11 PROBLEM — D75.839 THROMBOCYTOSIS: Status: RESOLVED | Noted: 2021-04-05 | Resolved: 2022-01-11

## 2022-01-11 PROCEDURE — 3066F NEPHROPATHY DOC TX: CPT | Mod: CPTII,S$GLB,, | Performed by: FAMILY MEDICINE

## 2022-01-11 PROCEDURE — 1101F PR PT FALLS ASSESS DOC 0-1 FALLS W/OUT INJ PAST YR: ICD-10-PCS | Mod: CPTII,S$GLB,, | Performed by: FAMILY MEDICINE

## 2022-01-11 PROCEDURE — 3061F PR NEG MICROALBUMINURIA RESULT DOCUMENTED/REVIEW: ICD-10-PCS | Mod: CPTII,S$GLB,, | Performed by: FAMILY MEDICINE

## 2022-01-11 PROCEDURE — 99999 PR PBB SHADOW E&M-EST. PATIENT-LVL V: ICD-10-PCS | Mod: PBBFAC,,, | Performed by: FAMILY MEDICINE

## 2022-01-11 PROCEDURE — 3078F DIAST BP <80 MM HG: CPT | Mod: CPTII,S$GLB,, | Performed by: FAMILY MEDICINE

## 2022-01-11 PROCEDURE — 3066F PR DOCUMENTATION OF TREATMENT FOR NEPHROPATHY: ICD-10-PCS | Mod: CPTII,S$GLB,, | Performed by: FAMILY MEDICINE

## 2022-01-11 PROCEDURE — 3051F HG A1C>EQUAL 7.0%<8.0%: CPT | Mod: CPTII,S$GLB,, | Performed by: FAMILY MEDICINE

## 2022-01-11 PROCEDURE — 99999 PR PBB SHADOW E&M-EST. PATIENT-LVL V: CPT | Mod: PBBFAC,,, | Performed by: FAMILY MEDICINE

## 2022-01-11 PROCEDURE — 3074F SYST BP LT 130 MM HG: CPT | Mod: CPTII,S$GLB,, | Performed by: FAMILY MEDICINE

## 2022-01-11 PROCEDURE — 1125F AMNT PAIN NOTED PAIN PRSNT: CPT | Mod: CPTII,S$GLB,, | Performed by: FAMILY MEDICINE

## 2022-01-11 PROCEDURE — 3061F NEG MICROALBUMINURIA REV: CPT | Mod: CPTII,S$GLB,, | Performed by: FAMILY MEDICINE

## 2022-01-11 PROCEDURE — 3288F FALL RISK ASSESSMENT DOCD: CPT | Mod: CPTII,S$GLB,, | Performed by: FAMILY MEDICINE

## 2022-01-11 PROCEDURE — 1101F PT FALLS ASSESS-DOCD LE1/YR: CPT | Mod: CPTII,S$GLB,, | Performed by: FAMILY MEDICINE

## 2022-01-11 PROCEDURE — 3008F BODY MASS INDEX DOCD: CPT | Mod: CPTII,S$GLB,, | Performed by: FAMILY MEDICINE

## 2022-01-11 PROCEDURE — 3008F PR BODY MASS INDEX (BMI) DOCUMENTED: ICD-10-PCS | Mod: CPTII,S$GLB,, | Performed by: FAMILY MEDICINE

## 2022-01-11 PROCEDURE — 1125F PR PAIN SEVERITY QUANTIFIED, PAIN PRESENT: ICD-10-PCS | Mod: CPTII,S$GLB,, | Performed by: FAMILY MEDICINE

## 2022-01-11 PROCEDURE — 3074F PR MOST RECENT SYSTOLIC BLOOD PRESSURE < 130 MM HG: ICD-10-PCS | Mod: CPTII,S$GLB,, | Performed by: FAMILY MEDICINE

## 2022-01-11 PROCEDURE — 99214 PR OFFICE/OUTPT VISIT, EST, LEVL IV, 30-39 MIN: ICD-10-PCS | Mod: S$GLB,,, | Performed by: FAMILY MEDICINE

## 2022-01-11 PROCEDURE — 3288F PR FALLS RISK ASSESSMENT DOCUMENTED: ICD-10-PCS | Mod: CPTII,S$GLB,, | Performed by: FAMILY MEDICINE

## 2022-01-11 PROCEDURE — 3051F PR MOST RECENT HEMOGLOBIN A1C LEVEL 7.0 - < 8.0%: ICD-10-PCS | Mod: CPTII,S$GLB,, | Performed by: FAMILY MEDICINE

## 2022-01-11 PROCEDURE — 99214 OFFICE O/P EST MOD 30 MIN: CPT | Mod: S$GLB,,, | Performed by: FAMILY MEDICINE

## 2022-01-11 PROCEDURE — 3078F PR MOST RECENT DIASTOLIC BLOOD PRESSURE < 80 MM HG: ICD-10-PCS | Mod: CPTII,S$GLB,, | Performed by: FAMILY MEDICINE

## 2022-01-11 NOTE — PROGRESS NOTES
"Routine Office Visit    Bette Johnson  1950  [id number]      Subjective     Bette is a 71 y.o. female who presents today for:    1. Right knee pain - chronic problem for patient. Pain can change in severity and is intermittent. Pain is worse with ambulating. Patient is unable to completely bend her knee. She has not been able to exercise due to knee. She did not improve after steroid injection into her knee. Pain is minimally alleviated with NSAIDs. Patient has severe pain and is requesting referral to orthopedics prior to going to physical therapy   2. Hypertension - Patient has been taking her medication as prescribed. No side effect from current regimen.     Objective     Review of Systems   Constitutional: Negative for chills and fever.   HENT: Negative for congestion.    Eyes: Negative for blurred vision.   Respiratory: Negative for cough.    Cardiovascular: Negative for chest pain.   Gastrointestinal: Negative for abdominal pain, constipation, diarrhea, heartburn, nausea and vomiting.   Genitourinary: Negative for dysuria.   Musculoskeletal: Negative for myalgias.   Skin: Negative for itching and rash.   Neurological: Negative for dizziness and headaches.   Psychiatric/Behavioral: Negative for depression.       BP (!) 124/50 (BP Location: Left arm, Patient Position: Sitting, BP Method: Large (Manual))   Pulse 73   Temp 97.8 °F (36.6 °C) (Oral)   Ht 5' 4" (1.626 m)   Wt 113.6 kg (250 lb 7.1 oz)   LMP  (LMP Unknown)   SpO2 95%   BMI 42.99 kg/m²   Physical Exam  Constitutional:       Appearance: She is well-developed and well-nourished.   HENT:      Head: Normocephalic and atraumatic.   Eyes:      Extraocular Movements: EOM normal.      Conjunctiva/sclera: Conjunctivae normal.      Pupils: Pupils are equal, round, and reactive to light.   Cardiovascular:      Rate and Rhythm: Normal rate and regular rhythm.      Heart sounds: Normal heart sounds. No murmur heard.  No friction rub. No gallop.  " "  Pulmonary:      Effort: No respiratory distress.      Breath sounds: Normal breath sounds.   Abdominal:      General: Bowel sounds are normal. There is no distension.      Palpations: Abdomen is soft.      Tenderness: There is no abdominal tenderness.   Musculoskeletal:      Cervical back: Normal range of motion and neck supple.      Right knee: No swelling. Decreased range of motion. Tenderness present over the medial joint line and patellar tendon. Normal alignment.   Lymphadenopathy:      Cervical: No cervical adenopathy.   Skin:     General: Skin is warm.   Neurological:      Mental Status: She is alert and oriented to person, place, and time.   Psychiatric:         Mood and Affect: Mood and affect normal.       Protective Sensation (w/ 10 gram monofilament):  Right: Intact  Left: Intact    Visual Inspection:  Normal -  Bilateral    Pedal Pulses:   Right: Present  Left: Present    Posterior tibialis:   Right:Present  Left: Present      Assessment     Problem List Items Addressed This Visit        Cardiac/Vascular    Essential hypertension  The current medical regimen is effective;  continue present plan and medications.      Thoracic aorta atherosclerosis    Overview     " There is thoracic aortic atherosclerosis" - Xray Chest 11-  Patient with Atherosclerosis of the Aorta.  Stable/asymptomatic. Currently stable on lipid and b/p monitoring.              Endocrine    Diabetes mellitus type 2 in obese  The current medical regimen is effective;  continue present plan and medications.      Morbid obesity with BMI of 40.0-44.9, adult  The patient is asked to make an attempt to improve diet and exercise patterns to aid in medical management of this problem.        Other Visit Diagnoses     Type 2 diabetes mellitus without complication, without long-term current use of insulin    -  Primary    Relevant Orders    Microalbumin/Creatinine Ratio, Urine (Completed)    Chronic pain of right knee        Relevant " Orders    Ambulatory referral/consult to Orthopedics  Continue nsaids prn   Recommend physical therapy             Follow up in about 6 months (around 7/11/2022), or if symptoms worsen or fail to improve.

## 2022-01-14 ENCOUNTER — OFFICE VISIT (OUTPATIENT)
Dept: ORTHOPEDICS | Facility: CLINIC | Age: 72
End: 2022-01-14
Attending: ORTHOPAEDIC SURGERY
Payer: MEDICARE

## 2022-01-14 VITALS
BODY MASS INDEX: 42.32 KG/M2 | DIASTOLIC BLOOD PRESSURE: 75 MMHG | OXYGEN SATURATION: 99 % | RESPIRATION RATE: 18 BRPM | HEART RATE: 50 BPM | SYSTOLIC BLOOD PRESSURE: 125 MMHG | HEIGHT: 64 IN | WEIGHT: 247.88 LBS

## 2022-01-14 DIAGNOSIS — G89.29 CHRONIC PAIN OF RIGHT KNEE: ICD-10-CM

## 2022-01-14 DIAGNOSIS — M76.899 HAMSTRING TENDONITIS: ICD-10-CM

## 2022-01-14 DIAGNOSIS — M25.561 CHRONIC PAIN OF RIGHT KNEE: ICD-10-CM

## 2022-01-14 DIAGNOSIS — M70.50 PES ANSERINE BURSITIS: Primary | ICD-10-CM

## 2022-01-14 PROCEDURE — 3061F NEG MICROALBUMINURIA REV: CPT | Mod: CPTII,S$GLB,, | Performed by: ORTHOPAEDIC SURGERY

## 2022-01-14 PROCEDURE — 3078F PR MOST RECENT DIASTOLIC BLOOD PRESSURE < 80 MM HG: ICD-10-PCS | Mod: CPTII,S$GLB,, | Performed by: ORTHOPAEDIC SURGERY

## 2022-01-14 PROCEDURE — 3008F BODY MASS INDEX DOCD: CPT | Mod: CPTII,S$GLB,, | Performed by: ORTHOPAEDIC SURGERY

## 2022-01-14 PROCEDURE — 99999 PR PBB SHADOW E&M-EST. PATIENT-LVL IV: ICD-10-PCS | Mod: PBBFAC,,, | Performed by: ORTHOPAEDIC SURGERY

## 2022-01-14 PROCEDURE — 1125F AMNT PAIN NOTED PAIN PRSNT: CPT | Mod: CPTII,S$GLB,, | Performed by: ORTHOPAEDIC SURGERY

## 2022-01-14 PROCEDURE — 3066F PR DOCUMENTATION OF TREATMENT FOR NEPHROPATHY: ICD-10-PCS | Mod: CPTII,S$GLB,, | Performed by: ORTHOPAEDIC SURGERY

## 2022-01-14 PROCEDURE — 3078F DIAST BP <80 MM HG: CPT | Mod: CPTII,S$GLB,, | Performed by: ORTHOPAEDIC SURGERY

## 2022-01-14 PROCEDURE — 1125F PR PAIN SEVERITY QUANTIFIED, PAIN PRESENT: ICD-10-PCS | Mod: CPTII,S$GLB,, | Performed by: ORTHOPAEDIC SURGERY

## 2022-01-14 PROCEDURE — 3074F PR MOST RECENT SYSTOLIC BLOOD PRESSURE < 130 MM HG: ICD-10-PCS | Mod: CPTII,S$GLB,, | Performed by: ORTHOPAEDIC SURGERY

## 2022-01-14 PROCEDURE — 99999 PR PBB SHADOW E&M-EST. PATIENT-LVL IV: CPT | Mod: PBBFAC,,, | Performed by: ORTHOPAEDIC SURGERY

## 2022-01-14 PROCEDURE — 3008F PR BODY MASS INDEX (BMI) DOCUMENTED: ICD-10-PCS | Mod: CPTII,S$GLB,, | Performed by: ORTHOPAEDIC SURGERY

## 2022-01-14 PROCEDURE — 99203 PR OFFICE/OUTPT VISIT, NEW, LEVL III, 30-44 MIN: ICD-10-PCS | Mod: S$GLB,,, | Performed by: ORTHOPAEDIC SURGERY

## 2022-01-14 PROCEDURE — 99203 OFFICE O/P NEW LOW 30 MIN: CPT | Mod: S$GLB,,, | Performed by: ORTHOPAEDIC SURGERY

## 2022-01-14 PROCEDURE — 99499 RISK ADDL DX/OHS AUDIT: ICD-10-PCS | Mod: S$GLB,,, | Performed by: ORTHOPAEDIC SURGERY

## 2022-01-14 PROCEDURE — 3061F PR NEG MICROALBUMINURIA RESULT DOCUMENTED/REVIEW: ICD-10-PCS | Mod: CPTII,S$GLB,, | Performed by: ORTHOPAEDIC SURGERY

## 2022-01-14 PROCEDURE — 3074F SYST BP LT 130 MM HG: CPT | Mod: CPTII,S$GLB,, | Performed by: ORTHOPAEDIC SURGERY

## 2022-01-14 PROCEDURE — 99499 UNLISTED E&M SERVICE: CPT | Mod: S$GLB,,, | Performed by: ORTHOPAEDIC SURGERY

## 2022-01-14 PROCEDURE — 3066F NEPHROPATHY DOC TX: CPT | Mod: CPTII,S$GLB,, | Performed by: ORTHOPAEDIC SURGERY

## 2022-01-14 PROCEDURE — 1159F MED LIST DOCD IN RCRD: CPT | Mod: CPTII,S$GLB,, | Performed by: ORTHOPAEDIC SURGERY

## 2022-01-14 PROCEDURE — 1159F PR MEDICATION LIST DOCUMENTED IN MEDICAL RECORD: ICD-10-PCS | Mod: CPTII,S$GLB,, | Performed by: ORTHOPAEDIC SURGERY

## 2022-01-14 RX ORDER — METHYLPREDNISOLONE 4 MG/1
TABLET ORAL
Qty: 1 EACH | Refills: 0 | Status: SHIPPED | OUTPATIENT
Start: 2022-01-14 | End: 2022-02-04

## 2022-01-14 NOTE — PROGRESS NOTES
NEW PATIENT ORTHOPAEDIC: Knee    PRIMARY CARE PHYSICIAN: Neeta Estrada MD   REFERRING PROVIDER: Neeta Estrada MD  [address]  [place] [address]     ASSESSMENT & PLAN:    Impression:  Right Knee Mild Osteoarthritis, Primary    Right Knee Hamstring Tendonitis  Right Knee Pes Bursitis    Follow Up Plan: PRN      Non operative care:    Bette Johnson has physical exam evidence of above and wishes to pursue an non-operative care. I am recommending the following: medrol dosepak.  Patient reports knee pain for approximately the last year.  She associates that with the onset of COVID in January of 2021.  She saw her primary care physician who performed a steroid injection in March.  This provided her some relief but she had a return of her knee pain.  She is not interested in a repeat injection today.  X-rays I reviewed and obtained demonstrate mild arthritis.  Her primary symptoms are actually more along her hamstring tendons and its insertion into her pes bursa.  I think for these would be beneficial to try Medrol Dosepak to help with the inflammation.  Should this fail to alleviate her pains I asked her to call and we would write a prescription for her to enroll in physical therapy.    The patient has been ordered:  Pain Prescription    CONSULTS:   None    ACTIVE PROBLEM LIST  Patient Active Problem List   Diagnosis    Morbid obesity with BMI of 40.0-44.9, adult    Essential hypertension    Thoracic aorta atherosclerosis    Diabetes mellitus type 2 in obese    Transaminitis    Bacteremia due to Gram-negative bacteria    History of 2019 novel coronavirus disease (COVID-19)    Type 2 diabetes mellitus without complication, without long-term current use of insulin           SUBJECTIVE    CHIEF COMPLAINT: Knee Pain    HPI:   Bette Johnson is a 71 y.o. female here for evaluation and management of right knee pain. There is not a specific incident that brought about this pain. she has had progressive  problems with the knee(s) starting 1 years ago but is now progressing to interfere with activities which include: walking and enjoying hobbies    Currently the pain in the joint is rated at mild with activity. The pain is intermittent and is located in the knee, located medially. The pain is described as aching, sharp, stabbing and stiffness. Relieving factors include ice or heat and over the counter medication .     There is associated Catching.     Bette Johnson has no additional complaints.     PROGRESSIVE SYMPTOMS:  Pain worsened by weight bearing    FUNCTIONAL STATUS:   Climb a flight of stairs or walk up a hill     PREVIOUS TREATMENTS:  Medical: Attempted Weight Loss, OTC NSAIDS and Steroid Injections  Physical Therapy: Activities Modified   Previous Orthopaedic Surgery: None    REVIEW OF SYSTEMS:  PAIN ASSESSMENT:  See HPI.  MUSCULOSKELETAL: See HPI.  OTHER 10 point review of systems is negative except as stated in HPI above    PAST MEDICAL HISTORY   has a past medical history of Diabetes mellitus, type 2, Hypertension, and Severe obesity (BMI >= 40) (7/12/2017).     PAST SURGICAL HISTORY   has a past surgical history that includes Cholecystectomy; Colonoscopy (N/A, 1/25/2018); and ERCP (N/A, 2/17/2021).     FAMILY HISTORY  family history includes Diabetes in her brother, mother, sister, sister, and sister; Hyperlipidemia in her sister; Hypertension in her sister and sister; Kidney disease in her brother.     SOCIAL HISTORY   reports that she has never smoked. She has never used smokeless tobacco. She reports current alcohol use. She reports that she does not use drugs.     ALLERGIES   Review of patient's allergies indicates:   Allergen Reactions    Iodine and iodide containing products Shortness Of Breath and Other (See Comments)     Fever, blisters around mouth, flu like symptoms  Fever, blisters around mouth, flu like symptoms      Penicillins Other (See Comments) and Anaphylaxis     Loss of  "consciousness    Caffeine Other (See Comments)     Hyper then  Causes low  Hyperactivity, followed by sleepiness  Hyperactivity, followed by sleepiness      Latex, natural rubber Other (See Comments)     Dental work cause mouth ulcers  Ulcers in mouth when used during dental work  Ulcers in mouth when used during dental work          MEDICATIONS  Current Outpatient Medications on File Prior to Visit   Medication Sig Dispense Refill    amLODIPine (NORVASC) 10 MG tablet Take 1 tablet by mouth once daily 90 tablet 0    aspirin (ECOTRIN) 81 MG EC tablet Take 81 mg by mouth once daily.      atorvastatin (LIPITOR) 10 MG tablet Take 1 tablet (10 mg total) by mouth once daily. 90 tablet 3    cholecalciferol, vitamin D3, (VITAMIN D3) 50 mcg (2,000 unit) Cap Take 1 capsule by mouth once daily. gummy      coenzyme Q10 100 mg capsule Take 200 mg by mouth once daily.      LIDOcaine (LIDODERM) 5 % Place 1 patch onto the skin once daily. Remove & Discard patch within 12 hours or as directed by MD 15 patch 0    loratadine (CLARITIN) 10 mg tablet Take 10 mg by mouth once daily.      metFORMIN (GLUCOPHAGE) 850 MG tablet Take 1 tablet (850 mg total) by mouth daily with breakfast. 90 tablet 3    multivit-minerals/folic acid (WOMEN'S MULTIVITAMIN GUMMIES ORAL) Take by mouth.      valsartan-hydrochlorothiazide (DIOVAN-HCT) 320-25 mg per tablet Take 1 tablet by mouth once daily. 90 tablet 3    [DISCONTINUED] KENALOG 40 mg/mL injection        No current facility-administered medications on file prior to visit.          PHYSICAL EXAM   height is 5' 4" (1.626 m) and weight is 112.4 kg (247 lb 14.4 oz). Her blood pressure is 125/75 and her pulse is 50 (abnormal). Her respiration is 18 and oxygen saturation is 99%.   Body mass index is 42.55 kg/m².      All other systems deferred.  GENERAL:  No acute distress  HABITUS: Obese  GAIT: Antalgic  SKIN: Normal  and No erythema, warmth, fluctuance     KNEE EXAM:    right:   Effusion: No " joint effusion  TTP: yes over Pes Bursa and medial hamstring  no crepitus with passive knee ROM  Passive ROM: Extension 0, Flexion 130  No pain with manipulation of patella  Stable to varus/valgus stress. No increased laxity to anterior/posterior drawer testing  negative Maday's test  No pain with IR/ER rotation of the hip  5/5 strength in knee flexion and extension, ankle plantarflexion and dorsiflexion  Neurovascular Status: Sensation intact to light touch in Sural, Saphenous, SPN, DPN, Tibial nerve distribution  2+ pulse DP/PT, normal capillary refill, foot has normal warmth    DATA:  Diagnostic tests reviewed for today's visit:     4v of the knee reveal Mild degenerative changes of the Medial and Patellofemoral compartment. There is evidence of advanced osteoarthritis changes with Joint space narrowing.

## 2022-01-27 ENCOUNTER — LAB VISIT (OUTPATIENT)
Dept: LAB | Facility: HOSPITAL | Age: 72
End: 2022-01-27
Attending: FAMILY MEDICINE
Payer: MEDICARE

## 2022-01-27 DIAGNOSIS — E11.9 TYPE 2 DIABETES MELLITUS WITHOUT COMPLICATION, WITHOUT LONG-TERM CURRENT USE OF INSULIN: ICD-10-CM

## 2022-01-27 LAB
ALBUMIN SERPL BCP-MCNC: 3.5 G/DL (ref 3.5–5.2)
ALP SERPL-CCNC: 71 U/L (ref 55–135)
ALT SERPL W/O P-5'-P-CCNC: 17 U/L (ref 10–44)
ANION GAP SERPL CALC-SCNC: 9 MMOL/L (ref 8–16)
AST SERPL-CCNC: 18 U/L (ref 10–40)
BILIRUB SERPL-MCNC: 0.4 MG/DL (ref 0.1–1)
BUN SERPL-MCNC: 16 MG/DL (ref 8–23)
CALCIUM SERPL-MCNC: 8.9 MG/DL (ref 8.7–10.5)
CHLORIDE SERPL-SCNC: 100 MMOL/L (ref 95–110)
CHOLEST SERPL-MCNC: 128 MG/DL (ref 120–199)
CHOLEST/HDLC SERPL: 3 {RATIO} (ref 2–5)
CO2 SERPL-SCNC: 28 MMOL/L (ref 23–29)
CREAT SERPL-MCNC: 1.1 MG/DL (ref 0.5–1.4)
EST. GFR  (AFRICAN AMERICAN): 59 ML/MIN/1.73 M^2
EST. GFR  (NON AFRICAN AMERICAN): 51 ML/MIN/1.73 M^2
ESTIMATED AVG GLUCOSE: 174 MG/DL (ref 68–131)
GLUCOSE SERPL-MCNC: 170 MG/DL (ref 70–110)
HBA1C MFR BLD: 7.7 % (ref 4–5.6)
HDLC SERPL-MCNC: 43 MG/DL (ref 40–75)
HDLC SERPL: 33.6 % (ref 20–50)
LDLC SERPL CALC-MCNC: 61.6 MG/DL (ref 63–159)
NONHDLC SERPL-MCNC: 85 MG/DL
POTASSIUM SERPL-SCNC: 4 MMOL/L (ref 3.5–5.1)
PROT SERPL-MCNC: 7.5 G/DL (ref 6–8.4)
SODIUM SERPL-SCNC: 137 MMOL/L (ref 136–145)
TRIGL SERPL-MCNC: 117 MG/DL (ref 30–150)

## 2022-01-27 PROCEDURE — 80053 COMPREHEN METABOLIC PANEL: CPT | Performed by: FAMILY MEDICINE

## 2022-01-27 PROCEDURE — 80061 LIPID PANEL: CPT | Performed by: FAMILY MEDICINE

## 2022-01-27 PROCEDURE — 83036 HEMOGLOBIN GLYCOSYLATED A1C: CPT | Performed by: FAMILY MEDICINE

## 2022-01-27 PROCEDURE — 36415 COLL VENOUS BLD VENIPUNCTURE: CPT | Performed by: FAMILY MEDICINE

## 2022-02-04 ENCOUNTER — PATIENT MESSAGE (OUTPATIENT)
Dept: ORTHOPEDICS | Facility: CLINIC | Age: 72
End: 2022-02-04
Payer: MEDICARE

## 2022-02-04 ENCOUNTER — OFFICE VISIT (OUTPATIENT)
Dept: FAMILY MEDICINE | Facility: CLINIC | Age: 72
End: 2022-02-04
Payer: MEDICARE

## 2022-02-04 VITALS
HEART RATE: 62 BPM | SYSTOLIC BLOOD PRESSURE: 126 MMHG | BODY MASS INDEX: 42.26 KG/M2 | WEIGHT: 247.56 LBS | TEMPERATURE: 98 F | DIASTOLIC BLOOD PRESSURE: 68 MMHG | HEIGHT: 64 IN | OXYGEN SATURATION: 97 %

## 2022-02-04 DIAGNOSIS — I10 ESSENTIAL HYPERTENSION: ICD-10-CM

## 2022-02-04 DIAGNOSIS — M25.561 RIGHT KNEE PAIN, UNSPECIFIED CHRONICITY: Primary | ICD-10-CM

## 2022-02-04 DIAGNOSIS — D47.3 ESSENTIAL (HEMORRHAGIC) THROMBOCYTHEMIA: ICD-10-CM

## 2022-02-04 DIAGNOSIS — E66.9 DIABETES MELLITUS TYPE 2 IN OBESE: ICD-10-CM

## 2022-02-04 DIAGNOSIS — E11.69 DIABETES MELLITUS TYPE 2 IN OBESE: ICD-10-CM

## 2022-02-04 DIAGNOSIS — I70.0 THORACIC AORTA ATHEROSCLEROSIS: ICD-10-CM

## 2022-02-04 DIAGNOSIS — N18.31 CHRONIC KIDNEY DISEASE, STAGE 3A: ICD-10-CM

## 2022-02-04 DIAGNOSIS — E66.01 MORBID OBESITY WITH BMI OF 40.0-44.9, ADULT: ICD-10-CM

## 2022-02-04 DIAGNOSIS — Z00.00 ANNUAL PHYSICAL EXAM: Primary | ICD-10-CM

## 2022-02-04 DIAGNOSIS — E11.9 TYPE 2 DIABETES MELLITUS WITHOUT COMPLICATION, WITHOUT LONG-TERM CURRENT USE OF INSULIN: ICD-10-CM

## 2022-02-04 PROCEDURE — 99499 RISK ADDL DX/OHS AUDIT: ICD-10-PCS | Mod: S$GLB,,, | Performed by: FAMILY MEDICINE

## 2022-02-04 PROCEDURE — 1125F PR PAIN SEVERITY QUANTIFIED, PAIN PRESENT: ICD-10-PCS | Mod: CPTII,S$GLB,, | Performed by: FAMILY MEDICINE

## 2022-02-04 PROCEDURE — 3008F BODY MASS INDEX DOCD: CPT | Mod: CPTII,S$GLB,, | Performed by: FAMILY MEDICINE

## 2022-02-04 PROCEDURE — 1101F PT FALLS ASSESS-DOCD LE1/YR: CPT | Mod: CPTII,S$GLB,, | Performed by: FAMILY MEDICINE

## 2022-02-04 PROCEDURE — 3008F PR BODY MASS INDEX (BMI) DOCUMENTED: ICD-10-PCS | Mod: CPTII,S$GLB,, | Performed by: FAMILY MEDICINE

## 2022-02-04 PROCEDURE — 3051F PR MOST RECENT HEMOGLOBIN A1C LEVEL 7.0 - < 8.0%: ICD-10-PCS | Mod: CPTII,S$GLB,, | Performed by: FAMILY MEDICINE

## 2022-02-04 PROCEDURE — 99499 UNLISTED E&M SERVICE: CPT | Mod: S$GLB,,, | Performed by: FAMILY MEDICINE

## 2022-02-04 PROCEDURE — 3074F PR MOST RECENT SYSTOLIC BLOOD PRESSURE < 130 MM HG: ICD-10-PCS | Mod: CPTII,S$GLB,, | Performed by: FAMILY MEDICINE

## 2022-02-04 PROCEDURE — 3066F NEPHROPATHY DOC TX: CPT | Mod: CPTII,S$GLB,, | Performed by: FAMILY MEDICINE

## 2022-02-04 PROCEDURE — 3051F HG A1C>EQUAL 7.0%<8.0%: CPT | Mod: CPTII,S$GLB,, | Performed by: FAMILY MEDICINE

## 2022-02-04 PROCEDURE — 1159F MED LIST DOCD IN RCRD: CPT | Mod: CPTII,S$GLB,, | Performed by: FAMILY MEDICINE

## 2022-02-04 PROCEDURE — 3078F PR MOST RECENT DIASTOLIC BLOOD PRESSURE < 80 MM HG: ICD-10-PCS | Mod: CPTII,S$GLB,, | Performed by: FAMILY MEDICINE

## 2022-02-04 PROCEDURE — 3061F PR NEG MICROALBUMINURIA RESULT DOCUMENTED/REVIEW: ICD-10-PCS | Mod: CPTII,S$GLB,, | Performed by: FAMILY MEDICINE

## 2022-02-04 PROCEDURE — 3288F FALL RISK ASSESSMENT DOCD: CPT | Mod: CPTII,S$GLB,, | Performed by: FAMILY MEDICINE

## 2022-02-04 PROCEDURE — 99999 PR PBB SHADOW E&M-EST. PATIENT-LVL IV: CPT | Mod: PBBFAC,,, | Performed by: FAMILY MEDICINE

## 2022-02-04 PROCEDURE — 3078F DIAST BP <80 MM HG: CPT | Mod: CPTII,S$GLB,, | Performed by: FAMILY MEDICINE

## 2022-02-04 PROCEDURE — 1101F PR PT FALLS ASSESS DOC 0-1 FALLS W/OUT INJ PAST YR: ICD-10-PCS | Mod: CPTII,S$GLB,, | Performed by: FAMILY MEDICINE

## 2022-02-04 PROCEDURE — 1125F AMNT PAIN NOTED PAIN PRSNT: CPT | Mod: CPTII,S$GLB,, | Performed by: FAMILY MEDICINE

## 2022-02-04 PROCEDURE — 99999 PR PBB SHADOW E&M-EST. PATIENT-LVL IV: ICD-10-PCS | Mod: PBBFAC,,, | Performed by: FAMILY MEDICINE

## 2022-02-04 PROCEDURE — 99397 PER PM REEVAL EST PAT 65+ YR: CPT | Mod: S$GLB,,, | Performed by: FAMILY MEDICINE

## 2022-02-04 PROCEDURE — 3288F PR FALLS RISK ASSESSMENT DOCUMENTED: ICD-10-PCS | Mod: CPTII,S$GLB,, | Performed by: FAMILY MEDICINE

## 2022-02-04 PROCEDURE — 3074F SYST BP LT 130 MM HG: CPT | Mod: CPTII,S$GLB,, | Performed by: FAMILY MEDICINE

## 2022-02-04 PROCEDURE — 3061F NEG MICROALBUMINURIA REV: CPT | Mod: CPTII,S$GLB,, | Performed by: FAMILY MEDICINE

## 2022-02-04 PROCEDURE — 1160F PR REVIEW ALL MEDS BY PRESCRIBER/CLIN PHARMACIST DOCUMENTED: ICD-10-PCS | Mod: CPTII,S$GLB,, | Performed by: FAMILY MEDICINE

## 2022-02-04 PROCEDURE — 3066F PR DOCUMENTATION OF TREATMENT FOR NEPHROPATHY: ICD-10-PCS | Mod: CPTII,S$GLB,, | Performed by: FAMILY MEDICINE

## 2022-02-04 PROCEDURE — 1160F RVW MEDS BY RX/DR IN RCRD: CPT | Mod: CPTII,S$GLB,, | Performed by: FAMILY MEDICINE

## 2022-02-04 PROCEDURE — 99397 PR PREVENTIVE VISIT,EST,65 & OVER: ICD-10-PCS | Mod: S$GLB,,, | Performed by: FAMILY MEDICINE

## 2022-02-04 PROCEDURE — 1159F PR MEDICATION LIST DOCUMENTED IN MEDICAL RECORD: ICD-10-PCS | Mod: CPTII,S$GLB,, | Performed by: FAMILY MEDICINE

## 2022-03-07 DIAGNOSIS — E11.69 DIABETES MELLITUS TYPE 2 IN OBESE: ICD-10-CM

## 2022-03-07 DIAGNOSIS — E66.9 DIABETES MELLITUS TYPE 2 IN OBESE: ICD-10-CM

## 2022-03-07 NOTE — TELEPHONE ENCOUNTER
No new care gaps identified.  Powered by [employer]. Reference number: [id number].   3/07/2022 10:38:48 AM CST

## 2022-03-12 RX ORDER — METFORMIN HYDROCHLORIDE 850 MG/1
TABLET ORAL
Qty: 90 TABLET | Refills: 1 | Status: SHIPPED | OUTPATIENT
Start: 2022-03-12 | End: 2022-09-15

## 2022-03-12 NOTE — TELEPHONE ENCOUNTER
Refill Routing Note   Medication(s) are not appropriate for processing by [facility] for the following reason(s):      - Drug-Disease Interaction (metFORMIN and Transaminitis)    [facility] action(s):  Defer Medication-related problems identified: Drug-disease interaction        --->Care Gap information included in message below if applicable.   Medication reconciliation completed: No   Automatic Epic Generated Protocol Data:        Requested Prescriptions   Pending Prescriptions Disp Refills    metFORMIN (GLUCOPHAGE) 850 MG tablet [Pharmacy Med Name: metFORMIN HCl 850 MG Oral Tablet] 90 tablet 1     Sig: Take 1 tablet by mouth once daily with breakfast       Endocrinology:  Diabetes - Biguanides Passed - 3/7/2022 10:38 AM        Passed - Patient is at least 18 years old        Passed - Valid encounter within last 15 months     Recent Visits  Date Type Provider Dept   02/04/22 Office Visit Neeta Estrada MD [facility]   01/11/22 Office Visit Neeta Estrada MD [facility]   08/03/21 Office Visit Neeta Estrada MD [facility]   03/11/21 Office Visit Neeta Estrada MD [facility]   02/02/21 Office Visit Neeta Estrada MD [facility]   06/30/20 Office Visit Neeta Estrada MD [facility]   Showing recent visits within past 720 days and meeting all other requirements  Future Appointments  No visits were found meeting these conditions.  Showing future appointments within next 150 days and meeting all other requirements      Future Appointments              In 3 days Jordy Ball, [facility]    In 4 months [facility] [place]    [facility]                Passed - Cr is 1.39 or below and within 360 days     Lab Results   Component Value Date    CREATININE 1.1 01/27/2022    CREATININE 1.0  07/30/2021    CREATININE 0.9 03/05/2021              Passed - HBA1C within 180 days     Lab Results   Component Value Date    HGBA1C 7.7 (H) 01/27/2022    HGBA1C 7.2 (H) 07/30/2021    HGBA1C 7.1 (H) 02/18/2021              Passed - eGFR is 45 or above and within 360 days     Lab Results   Component Value Date    EGFRNONAA 51 (A) 01/27/2022    EGFRNONAA 58 (A) 07/30/2021    EGFRNONAA >60 03/05/2021                      Appointments  past 12m or future 3m with PCP    Date Provider   Last Visit   2/4/2022 Neeta Estrada MD   Next Visit   8/8/2022 Neeta Estrada MD   ED visits in past 90 days: 0        Note composed:8:21 PM 03/11/2022

## 2022-03-14 ENCOUNTER — CLINICAL SUPPORT (OUTPATIENT)
Dept: REHABILITATION | Facility: HOSPITAL | Age: 72
End: 2022-03-14
Attending: ORTHOPAEDIC SURGERY
Payer: MEDICARE

## 2022-03-14 DIAGNOSIS — M25.661 DECREASED RANGE OF MOTION OF RIGHT KNEE: ICD-10-CM

## 2022-03-14 DIAGNOSIS — M62.81 QUADRICEPS WEAKNESS: ICD-10-CM

## 2022-03-14 PROCEDURE — 97110 THERAPEUTIC EXERCISES: CPT | Mod: PN

## 2022-03-14 PROCEDURE — 97161 PT EVAL LOW COMPLEX 20 MIN: CPT | Mod: PN
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
## 2022-03-22 ENCOUNTER — CLINICAL SUPPORT (OUTPATIENT)
Dept: REHABILITATION | Facility: HOSPITAL | Age: 72
End: 2022-03-22
Attending: ORTHOPAEDIC SURGERY
Payer: MEDICARE

## 2022-03-22 DIAGNOSIS — M25.661 DECREASED RANGE OF MOTION OF RIGHT KNEE: Primary | ICD-10-CM

## 2022-03-22 DIAGNOSIS — M62.81 QUADRICEPS WEAKNESS: ICD-10-CM

## 2022-03-22 PROCEDURE — 97110 THERAPEUTIC EXERCISES: CPT | Mod: PN

## 2022-03-22 NOTE — PROGRESS NOTES
Physical Therapy Daily Treatment Note     Name: Bette Johnson  Clinic Number: [id number]    Therapy Diagnosis:   Encounter Diagnoses   Name Primary?    Decreased range of motion of right knee Yes    Quadriceps weakness      Physician: Alber Álvarez MD    Visit Date: 3/22/2022    Physician Orders: PT Eval and Treat   Medical Diagnosis from Referral: M25.561 (ICD-10-CM) - Right knee pain, unspecified chronicity  Evaluation Date: 3/14/2022  Plan of Care Expiration: 6/14/22     Authorization Period Expiration: 2/4/23  Visit # / Visits authorized: 1/ 1       Time In: 1045  Time Out: 1130    Total Billable Time: 45 minutes    Precautions: Standard    Subjective     Pt reports: the knee is doing a little better. C/o some stiffness today.  She was compliant with home exercise program.  Response to previous treatment:  good  Functional change: none    Pain: 2/10  Location: right knee     Objective     Bette received therapeutic exercises to develop strength, endurance, ROM, flexibility, posture and core stabilization for 40 minutes including:     -quad set 2 x 10  -SLR 2 x 10  -hip flexor stretch 30 sec x 4  -seated hamstring stretch with towel 20 sec x 4  -right LE sciatica stretch 20 sec x 4  -Nu step x 6 min  -gastroc stretch with wedge 30 sec x 4  -ball squeeze 2 x 10  -supine resisted hip abd 3 x 10 RTB     Bette received the following manual therapy techniques:  were applied to the: right knee for 5 minutes, including:  Patellar mobilization, tibiofemoral distraction    Education provided:   - HEP compliance    Written Home Exercises Provided: Patient instructed to cont prior HEP.  Exercises were reviewed and Bette was able to demonstrate them prior to the end of the session.  Bette demonstrated good  understanding of the education provided.     See EMR under Patient Instructions for exercises provided 3/22/2022.    Assessment     Pt ambulates today with a slight decrease in stance on right LE, right  trunk lean.  No c/o increased discomfort with prescribed activities. Good response to exercise progression. Bette is progressing well towards her goals.     Pt prognosis is Good.     Pt will continue to benefit from skilled outpatient physical therapy to address the deficits listed in the problem list box on initial evaluation, provide pt/family education and to maximize pt's level of independence in the home and community environment.     Pt's spiritual, cultural and educational needs considered and pt agreeable to plan of care and goals.     Anticipated barriers to physical therapy: none    Short Term Goals (4 Weeks):     1.Pt to increase strength by a 1/2 grade of muscles test to allow for improvement in functional activities such as performing chores.  2.Pt to improve range of motion by 25% to allow for improved functional mobility to allow for improvement in IADLs.   3.Pt to report compliance with HEP and demonstrate proper exercise technique to PT to show competence with self management of condition.  4.Decrease pain by 25% during functional activities.    Long Term Goals (12 Weeks):     1. Increase ROM to allow improved joint biomechanics during functional activities.   2.Increase trunk and lower extremity strength to within normal limits during functional activities.   3. Independent with home exercise program.   4. Full return to functional activities with manageable complaints.  5. Patient to demonstrate improved posture and body mechanics.  6. Decrease pain by 75% during functional activities.         Plan       Recommended Treatment Plan: 2-3 times per week for 12 weeks with treatments to consist of:  Neuromuscular and postural re-education,  training, therapeutic exercise, therapeutic activities,balance training, gait training, manual therapy, soft tissue mobilization, ROM exercises, Cardiovascular,  Postural stabilization, manual traction, spinal mobilization, moist heat, cryotherapy,  electrical stimulation, ultrasound, home exercise education and planning.    Continue with established  PT Plan of Care towards patient goals.     Jrody Ball, PT

## 2022-04-05 ENCOUNTER — CLINICAL SUPPORT (OUTPATIENT)
Dept: REHABILITATION | Facility: HOSPITAL | Age: 72
End: 2022-04-05
Attending: ORTHOPAEDIC SURGERY
Payer: MEDICARE

## 2022-04-05 DIAGNOSIS — M25.661 DECREASED RANGE OF MOTION OF RIGHT KNEE: Primary | ICD-10-CM

## 2022-04-05 DIAGNOSIS — M62.81 QUADRICEPS WEAKNESS: ICD-10-CM

## 2022-04-05 PROCEDURE — 97110 THERAPEUTIC EXERCISES: CPT | Mod: PN

## 2022-04-05 NOTE — PROGRESS NOTES
Physical Therapy Daily Treatment Note     Name: Bette Johnson  Clinic Number: [id number]    Therapy Diagnosis:   Encounter Diagnoses   Name Primary?    Decreased range of motion of right knee Yes    Quadriceps weakness      Physician: Alber Álvarez MD    Visit Date: 4/5/2022    Physician Orders: PT Eval and Treat   Medical Diagnosis from Referral: M25.561 (ICD-10-CM) - Right knee pain, unspecified chronicity  Evaluation Date: 3/14/2022  Plan of Care Expiration: 6/14/22     Authorization Period Expiration: 2/4/23  Visit # / Visits authorized: 1/ 1       Time In: 0915  Time Out: 1000    Total Billable Time: 45 minutes    Precautions: Standard    Subjective     Pt reports: she is doing better today.   She was compliant with home exercise program.  Response to previous treatment:  good  Functional change: none    Pain: 2/10  Location: right knee     Objective     Bette received therapeutic exercises to develop strength, endurance, ROM, flexibility, posture and core stabilization for 40 minutes including:     -Nu step x 7 min  -quad set 2 x 10  -SLR 2 x 10  -hip flexor stretch 30 sec x 4  -supine hamstring stretch with strap 30 sec x 3  -right LE sciatica stretch 20 sec x 4  -gastroc stretch with wedge 30 sec x 4  -ball squeeze 2 x 10  -supine resisted hip abd 3 x 10 RTB  -shuttle squats 3 x 10 1.5 straps  -matrix hip abd 3 x 10 25lbs     Bette received the following manual therapy techniques:  were applied to the: right knee for 5 minutes, including:  Patellar mobilization, tibiofemoral distraction    Education provided:   - HEP compliance    Written Home Exercises Provided: Patient instructed to cont prior HEP.  Exercises were reviewed and Bette was able to demonstrate them prior to the end of the session.  Bette demonstrated good  understanding of the education provided.     See EMR under Patient Instructions for exercises provided 4/5/2022.    Assessment     Pt ambulates today with an improved gait,  slight decrease in stance on right LE, right trunk lean.  No c/o increased discomfort with prescribed activities. Good response to exercise progression including initiation of CKC and isotonic strengthening.  Bette is progressing well towards her goals.     Pt prognosis is Good.     Pt will continue to benefit from skilled outpatient physical therapy to address the deficits listed in the problem list box on initial evaluation, provide pt/family education and to maximize pt's level of independence in the home and community environment.     Pt's spiritual, cultural and educational needs considered and pt agreeable to plan of care and goals.     Anticipated barriers to physical therapy: none    Short Term Goals (4 Weeks):     1.Pt to increase strength by a 1/2 grade of muscles test to allow for improvement in functional activities such as performing chores.  2.Pt to improve range of motion by 25% to allow for improved functional mobility to allow for improvement in IADLs.   3.Pt to report compliance with HEP and demonstrate proper exercise technique to PT to show competence with self management of condition.  4.Decrease pain by 25% during functional activities.    Long Term Goals (12 Weeks):     1. Increase ROM to allow improved joint biomechanics during functional activities.   2.Increase trunk and lower extremity strength to within normal limits during functional activities.   3. Independent with home exercise program.   4. Full return to functional activities with manageable complaints.  5. Patient to demonstrate improved posture and body mechanics.  6. Decrease pain by 75% during functional activities.         Plan       Recommended Treatment Plan: 2-3 times per week for 12 weeks with treatments to consist of:  Neuromuscular and postural re-education,  training, therapeutic exercise, therapeutic activities,balance training, gait training, manual therapy, soft tissue mobilization, ROM exercises,  Cardiovascular,  Postural stabilization, manual traction, spinal mobilization, moist heat, cryotherapy, electrical stimulation, ultrasound, home exercise education and planning.    Continue with established  PT Plan of Care towards patient goals.     Jordy Ball, PT

## 2022-04-08 ENCOUNTER — PATIENT MESSAGE (OUTPATIENT)
Dept: FAMILY MEDICINE | Facility: CLINIC | Age: 72
End: 2022-04-08
Payer: MEDICARE

## 2022-04-12 ENCOUNTER — CLINICAL SUPPORT (OUTPATIENT)
Dept: REHABILITATION | Facility: HOSPITAL | Age: 72
End: 2022-04-12
Attending: ORTHOPAEDIC SURGERY
Payer: MEDICARE

## 2022-04-12 DIAGNOSIS — M62.81 QUADRICEPS WEAKNESS: ICD-10-CM

## 2022-04-12 DIAGNOSIS — M25.661 DECREASED RANGE OF MOTION OF RIGHT KNEE: Primary | ICD-10-CM

## 2022-04-12 PROCEDURE — 97110 THERAPEUTIC EXERCISES: CPT | Mod: PN,CQ

## 2022-04-12 NOTE — PROGRESS NOTES
Physical Therapy Daily Treatment Note     Name: Bette Johnson  Clinic Number: [id number]    Therapy Diagnosis:   Encounter Diagnoses   Name Primary?    Decreased range of motion of right knee Yes    Quadriceps weakness      Physician: Alber Álvarez MD    Visit Date: 4/12/2022    Physician Orders: PT Eval and Treat   Medical Diagnosis from Referral: M25.561 (ICD-10-CM) - Right knee pain, unspecified chronicity  Evaluation Date: 3/14/2022  Plan of Care Expiration: 6/14/22     Authorization Period Expiration: 2/4/23  Visit # / Visits authorized: 3/20       Time In: 0915AM  Time Out: 0958AM    Total Billable Time: 43 minutes    Precautions: Standard    Subjective     Pt reports: her knees are doing fine, but her back has been bothering her.    She was compliant with home exercise program.  Response to previous treatment:  good  Functional change: none    Pain: 2/10  Location: right knee     Objective     Bette received therapeutic exercises to develop strength, endurance, ROM, flexibility, posture and core stabilization for 45 minutes including:     Nu step      x 7 min  Gastroc stretch w/ wedge    30 sec x 4  Shuttle squats  2.0 straps   3 x 10  Matrix hip abd  25lbs   3 x 10  SLR       2 x 12/ea  Ball squeeze     3 x 10  Bridges     2 x 10  Quad set      3 x 10  Supine hamstring stretch with strap  30 sec x 3/ea      NP:  Supine resisted hip abd  RTB  3 x 10  Hip flexor stretch    30 sec x 4  Right LE sciatica stretch   20 sec x 4      Bette received the following manual therapy techniques:  were applied to the: right knee for 00 minutes, including:  Patellar mobilization, tibiofemoral distraction    Education provided:   - HEP compliance    Written Home Exercises Provided: Patient instructed to cont prior HEP.  Exercises were reviewed and Bette was able to demonstrate them prior to the end of the session.  Bette demonstrated good  understanding of the education provided.     See EMR under Patient  Instructions for exercises provided 4/12/2022.    Assessment     Bette tolerated the above tx session well without any c/o pain. Pt presents reporting pain in low back, but knees are okay. Progressed number of sets on various exercises today, and there were no adverse effects reported. At end of session pt reported no pain in knee or back. Encouraged pt to continue HEP.        Bette is progressing well towards her goals.     Pt prognosis is Good.     Pt will continue to benefit from skilled outpatient physical therapy to address the deficits listed in the problem list box on initial evaluation, provide pt/family education and to maximize pt's level of independence in the home and community environment.     Pt's spiritual, cultural and educational needs considered and pt agreeable to plan of care and goals.     Anticipated barriers to physical therapy: none    Short Term Goals (4 Weeks):     1.Pt to increase strength by a 1/2 grade of muscles test to allow for improvement in functional activities such as performing chores.  2.Pt to improve range of motion by 25% to allow for improved functional mobility to allow for improvement in IADLs.   3.Pt to report compliance with HEP and demonstrate proper exercise technique to PT to show competence with self management of condition.  4.Decrease pain by 25% during functional activities.    Long Term Goals (12 Weeks):     1. Increase ROM to allow improved joint biomechanics during functional activities.   2.Increase trunk and lower extremity strength to within normal limits during functional activities.   3. Independent with home exercise program.   4. Full return to functional activities with manageable complaints.  5. Patient to demonstrate improved posture and body mechanics.  6. Decrease pain by 75% during functional activities.         Plan       Recommended Treatment Plan: 2-3 times per week for 12 weeks with treatments to consist of:  Neuromuscular and postural  re-education,  training, therapeutic exercise, therapeutic activities,balance training, gait training, manual therapy, soft tissue mobilization, ROM exercises, Cardiovascular,  Postural stabilization, manual traction, spinal mobilization, moist heat, cryotherapy, electrical stimulation, ultrasound, home exercise education and planning.    Continue with established  PT Plan of Care towards patient goals.     Sophia Hernandez, PTA   04/12/2022

## 2022-04-13 ENCOUNTER — PES CALL (OUTPATIENT)
Dept: ADMINISTRATIVE | Facility: CLINIC | Age: 72
End: 2022-04-13
Payer: MEDICARE

## 2022-04-19 ENCOUNTER — CLINICAL SUPPORT (OUTPATIENT)
Dept: REHABILITATION | Facility: HOSPITAL | Age: 72
End: 2022-04-19
Attending: ORTHOPAEDIC SURGERY
Payer: MEDICARE

## 2022-04-19 DIAGNOSIS — M25.661 DECREASED RANGE OF MOTION OF RIGHT KNEE: Primary | ICD-10-CM

## 2022-04-19 DIAGNOSIS — M62.81 QUADRICEPS WEAKNESS: ICD-10-CM

## 2022-04-19 PROCEDURE — 97110 THERAPEUTIC EXERCISES: CPT | Mod: PN,CQ

## 2022-04-19 NOTE — PROGRESS NOTES
Physical Therapy Daily Treatment Note     Name: Bette Johnson  Clinic Number: [id number]    Therapy Diagnosis:   Encounter Diagnoses   Name Primary?    Decreased range of motion of right knee Yes    Quadriceps weakness      Physician: Alber Álvarez MD    Visit Date: 4/19/2022    Physician Orders: PT Eval and Treat   Medical Diagnosis from Referral: M25.561 (ICD-10-CM) - Right knee pain, unspecified chronicity  Evaluation Date: 3/14/2022  Plan of Care Expiration: 6/14/22     Authorization Period Expiration: 2/4/23  Visit # / Visits authorized: 4/20       Time In: 0830AM  Time Out: 0915AM    Total Billable Time: 45 minutes    Precautions: Standard    Subjective     Pt reports: her knees are doing fine, and her back is doing much better.    She was compliant with home exercise program.  Response to previous treatment:  good  Functional change: none    Pain: 1/10  Location: right knee     Objective     Bette received therapeutic exercises to develop strength, endurance, ROM, flexibility, posture and core stabilization for 45 minutes including:     Nu step      x 7 min  Gastroc stretch w/ wedge    30 sec x 5  +Standing Marching 2#   2 x 10/ea  +Shuttle squats  2.5 straps   2 x 10  Matrix hip abd 30lbs    2 x 10  +Matrix Knee Ext 10#    2 x 10  +Matrix HS Curls 15#   2 x 10  +Matrix Hip Add 20#    2 x 10   +SL RDL 5# KB & 4 in cone  6x/ea  +Deadlifts 5# KB & 12 in cone  2 x 10  +Step Ups 4in     2 x 10/ea    NP:  SLR      2 x 12/ea  Ball squeeze     3 x 10  Bridges    2 x 10  Quad set     3 x 10  Supine hamstring stretch with strap  30 sec x 3/ea  Supine resisted hip abd  RTB  3 x 10  Hip flexor stretch    30 sec x 4  Right LE sciatica stretch   20 sec x 4      Bette received the following manual therapy techniques:  were applied to the: right knee for 00 minutes, including:  Patellar mobilization, tibiofemoral distraction    Education provided:   - HEP compliance    Written Home Exercises Provided: Patient  instructed to cont prior HEP.  Exercises were reviewed and Bette was able to demonstrate them prior to the end of the session.  Bette demonstrated good  understanding of the education provided.     See EMR under Patient Instructions for exercises provided 4/19/2022.    Assessment     Bette tolerated the above tx session well without any c/o pain. Progressed program this date, and there were no adverse effects reports. Pt displays decreased gluteal and quad strength, but was able to complete to required reps and sets without issue. At end of session pt reported no pain in knee or back. Encouraged pt to continue HEP. NV will be her last visit.         Bette is progressing well towards her goals.     Pt prognosis is Good.     Pt will continue to benefit from skilled outpatient physical therapy to address the deficits listed in the problem list box on initial evaluation, provide pt/family education and to maximize pt's level of independence in the home and community environment.     Pt's spiritual, cultural and educational needs considered and pt agreeable to plan of care and goals.     Anticipated barriers to physical therapy: none    Short Term Goals (4 Weeks):     1.Pt to increase strength by a 1/2 grade of muscles test to allow for improvement in functional activities such as performing chores.  2.Pt to improve range of motion by 25% to allow for improved functional mobility to allow for improvement in IADLs.   3.Pt to report compliance with HEP and demonstrate proper exercise technique to PT to show competence with self management of condition.  4.Decrease pain by 25% during functional activities.    Long Term Goals (12 Weeks):     1. Increase ROM to allow improved joint biomechanics during functional activities.   2.Increase trunk and lower extremity strength to within normal limits during functional activities.   3. Independent with home exercise program.   4. Full return to functional activities with  manageable complaints.  5. Patient to demonstrate improved posture and body mechanics.  6. Decrease pain by 75% during functional activities.         Plan       Recommended Treatment Plan: 2-3 times per week for 12 weeks with treatments to consist of:  Neuromuscular and postural re-education,  training, therapeutic exercise, therapeutic activities,balance training, gait training, manual therapy, soft tissue mobilization, ROM exercises, Cardiovascular,  Postural stabilization, manual traction, spinal mobilization, moist heat, cryotherapy, electrical stimulation, ultrasound, home exercise education and planning.    Continue with established  PT Plan of Care towards patient goals.     Sophia Hernandez, PTA   04/19/2022

## 2022-04-26 ENCOUNTER — CLINICAL SUPPORT (OUTPATIENT)
Dept: REHABILITATION | Facility: HOSPITAL | Age: 72
End: 2022-04-26
Attending: ORTHOPAEDIC SURGERY
Payer: MEDICARE

## 2022-04-26 DIAGNOSIS — M25.661 DECREASED RANGE OF MOTION OF RIGHT KNEE: Primary | ICD-10-CM

## 2022-04-26 DIAGNOSIS — M62.81 QUADRICEPS WEAKNESS: ICD-10-CM

## 2022-04-26 PROCEDURE — 97110 THERAPEUTIC EXERCISES: CPT | Mod: PN

## 2022-04-26 NOTE — PROGRESS NOTES
Physical Therapy Discharge Note     Name: Bette Johnson  Clinic Number: [id number]    Therapy Diagnosis:   No diagnosis found.  Physician: Alber Álvarez MD    Visit Date: 4/26/2022    Physician Orders: PT Eval and Treat   Medical Diagnosis from Referral: M25.561 (ICD-10-CM) - Right knee pain, unspecified chronicity  Evaluation Date: 3/14/2022  Plan of Care Expiration: 6/14/22     Authorization Period Expiration: 2/4/23  Visit # / Visits authorized: 6/20       Time In: 0915  Time Out: 1000    Total Billable Time: 45 minutes    Precautions: Standard    Subjective     Pt reports: her knees are much improved.  States the right knee is 90% of normal.  States she would like this to be her last visit.     She was compliant with home exercise program.  Response to previous treatment:  good  Functional change: none    Pain: 1/10  Location: right knee     Objective          Range of Motion/Strength:   .  Knee  Right    Left        AROM  MMT  AROM  MMT    Flexion  110 4 110 4   Extension  0 4 0 4        Bette received therapeutic exercises to develop strength, endurance, ROM, flexibility, posture and core stabilization for 45 minutes including:     Nu step      x 7 min  Gastroc stretch w/ wedge    30 sec x 5  +Standing Marching 2#   2 x 10/ea  +Shuttle squats  2.5 straps   2 x 10  Matrix hip abd 30lbs    2 x 10  +Matrix Knee Ext 10#    2 x 10  +Matrix HS Curls 15#   2 x 10  Matrix Hip Add 20#    2 x 10   +SL RDL 5# KB & 4 in cone  6x/ea  +Deadlifts 5# KB & 12 in cone  2 x 10  +Step Ups 4in     2 x 10/ea  Sit to sand with 5lb KB 2 x 10    NP:  SLR      2 x 12/ea  Ball squeeze     3 x 10  Bridges    2 x 10  Quad set     3 x 10  Supine hamstring stretch with strap  30 sec x 3/ea  Supine resisted hip abd  RTB  3 x 10  Hip flexor stretch    30 sec x 4  Right LE sciatica stretch   20 sec x 4      Bette received the following manual therapy techniques:  were applied to the: right knee for 5 minutes, including:  Patellar  mobilization, tibiofemoral distraction    Education provided:   - HEP compliance    Written Home Exercises Provided: Patient instructed to cont prior HEP.  Exercises were reviewed and Bette was able to demonstrate them prior to the end of the session.  Bette demonstrated good  understanding of the education provided.     See EMR under Patient Instructions for exercises provided 4/26/2022.    Assessment       Long Term Goals (12 Weeks):   MET    1. Increase ROM to allow improved joint biomechanics during functional activities.   2.Increase trunk and lower extremity strength to within normal limits during functional activities.   3. Independent with home exercise program.   4. Full return to functional activities with manageable complaints.  5. Patient to demonstrate improved posture and body mechanics.  6. Decrease pain by 75% during functional activities.     Ms. Johnson has attended 6 sessions in our clinic beginning 3/14/22 for PT consisting of manual therapy, modalities, ROM activities, therex and HEP instruction. The patient has responded well to physical therapy intervention. Demonstrates a good understanding of home program. Patient will discharged secondary to diminished complaints and goal achievement.       Plan       D/c to comprehensive home program. Pt to follow up with MD if further therapy is warranted.     Jordy Ball, PT   04/26/2022

## 2022-05-16 DIAGNOSIS — I10 ESSENTIAL HYPERTENSION: ICD-10-CM

## 2022-05-16 NOTE — TELEPHONE ENCOUNTER
Care Due:                  Date            Visit Type   Department     Provider  --------------------------------------------------------------------------------                                [facility]                              PRIMARY      MED/ INTERNAL  Last Visit: 02-      CARE ([facility])   MED/ PEDS      Neeta Estrada                              [facility]                              PRIMARY      MED/ INTERNAL  Next Visit: 08-      CARE ([facility])   MED/ PEDS      Neeta Estrada                                                            Last  Test          Frequency    Reason                     Performed    Due Date  --------------------------------------------------------------------------------    HBA1C.......  6 months...  metFORMIN................  01- 07-    [employer] Embedded Care Gaps. Reference number: [id number]. 5/16/2022   8:13:45 AM CDT

## 2022-05-17 RX ORDER — AMLODIPINE BESYLATE 10 MG/1
TABLET ORAL
Qty: 90 TABLET | Refills: 2 | Status: SHIPPED | OUTPATIENT
Start: 2022-05-17 | End: 2023-02-27

## 2022-05-17 NOTE — TELEPHONE ENCOUNTER
Refill Authorization Note   Bette Johnson  is requesting a refill authorization.  Brief Assessment and Rationale for Refill:  Approve     Medication Therapy Plan:  FLOS    Medication Reconciliation Completed: No   Comments:     No Care Gaps recommended.     Note composed:10:29 AM 05/17/2022

## 2022-06-28 DIAGNOSIS — I10 ESSENTIAL HYPERTENSION: ICD-10-CM

## 2022-06-28 RX ORDER — VALSARTAN AND HYDROCHLOROTHIAZIDE 320; 25 MG/1; MG/1
1 TABLET, FILM COATED ORAL DAILY
Qty: 90 TABLET | Refills: 1 | Status: SHIPPED | OUTPATIENT
Start: 2022-06-28 | End: 2023-01-02

## 2022-06-28 NOTE — TELEPHONE ENCOUNTER
Refill Decision Note   Bette Johnson  is requesting a refill authorization.  Brief Assessment and Rationale for Refill:  Approve     Medication Therapy Plan:       Medication Reconciliation Completed: No   Comments:     No Care Gaps recommended.     Note composed:3:35 PM 06/28/2022

## 2022-06-28 NOTE — TELEPHONE ENCOUNTER
No new care gaps identified.  [facility] Embedded Care Gaps. Reference number: [id number]. 6/28/2022   9:25:36 AM CDT

## 2022-08-01 ENCOUNTER — LAB VISIT (OUTPATIENT)
Dept: LAB | Facility: HOSPITAL | Age: 72
End: 2022-08-01
Attending: FAMILY MEDICINE
Payer: MEDICARE

## 2022-08-01 DIAGNOSIS — E11.9 TYPE 2 DIABETES MELLITUS WITHOUT COMPLICATION, WITHOUT LONG-TERM CURRENT USE OF INSULIN: ICD-10-CM

## 2022-08-01 LAB
ALBUMIN SERPL BCP-MCNC: 4.1 G/DL (ref 3.5–5.2)
ALP SERPL-CCNC: 74 U/L (ref 55–135)
ALT SERPL W/O P-5'-P-CCNC: 18 U/L (ref 10–44)
ANION GAP SERPL CALC-SCNC: 10 MMOL/L (ref 8–16)
AST SERPL-CCNC: 21 U/L (ref 10–40)
BASOPHILS # BLD AUTO: 0.06 K/UL (ref 0–0.2)
BASOPHILS NFR BLD: 1.3 % (ref 0–1.9)
BILIRUB SERPL-MCNC: 0.5 MG/DL (ref 0.1–1)
BUN SERPL-MCNC: 16 MG/DL (ref 8–23)
CALCIUM SERPL-MCNC: 9.4 MG/DL (ref 8.7–10.5)
CHLORIDE SERPL-SCNC: 101 MMOL/L (ref 95–110)
CO2 SERPL-SCNC: 26 MMOL/L (ref 23–29)
CREAT SERPL-MCNC: 1 MG/DL (ref 0.5–1.4)
DIFFERENTIAL METHOD: ABNORMAL
EOSINOPHIL # BLD AUTO: 0.2 K/UL (ref 0–0.5)
EOSINOPHIL NFR BLD: 3.2 % (ref 0–8)
ERYTHROCYTE [DISTWIDTH] IN BLOOD BY AUTOMATED COUNT: 13.6 % (ref 11.5–14.5)
EST. GFR  (NO RACE VARIABLE): >60 ML/MIN/1.73 M^2
ESTIMATED AVG GLUCOSE: 174 MG/DL (ref 68–131)
GLUCOSE SERPL-MCNC: 252 MG/DL (ref 70–110)
HBA1C MFR BLD: 7.7 % (ref 4–5.6)
HCT VFR BLD AUTO: 42.6 % (ref 37–48.5)
HGB BLD-MCNC: 13.6 G/DL (ref 12–16)
IMM GRANULOCYTES # BLD AUTO: 0.01 K/UL (ref 0–0.04)
IMM GRANULOCYTES NFR BLD AUTO: 0.2 % (ref 0–0.5)
LYMPHOCYTES # BLD AUTO: 2.1 K/UL (ref 1–4.8)
LYMPHOCYTES NFR BLD: 43.4 % (ref 18–48)
MCH RBC QN AUTO: 28.2 PG (ref 27–31)
MCHC RBC AUTO-ENTMCNC: 31.9 G/DL (ref 32–36)
MCV RBC AUTO: 88 FL (ref 82–98)
MONOCYTES # BLD AUTO: 0.3 K/UL (ref 0.3–1)
MONOCYTES NFR BLD: 6.6 % (ref 4–15)
NEUTROPHILS # BLD AUTO: 2.1 K/UL (ref 1.8–7.7)
NEUTROPHILS NFR BLD: 45.3 % (ref 38–73)
NRBC BLD-RTO: 0 /100 WBC
PLATELET # BLD AUTO: 287 K/UL (ref 150–450)
PMV BLD AUTO: 10.9 FL (ref 9.2–12.9)
POTASSIUM SERPL-SCNC: 3.7 MMOL/L (ref 3.5–5.1)
PROT SERPL-MCNC: 8.2 G/DL (ref 6–8.4)
RBC # BLD AUTO: 4.82 M/UL (ref 4–5.4)
SODIUM SERPL-SCNC: 137 MMOL/L (ref 136–145)
TSH SERPL DL<=0.005 MIU/L-ACNC: 2.27 UIU/ML (ref 0.4–4)
WBC # BLD AUTO: 4.72 K/UL (ref 3.9–12.7)

## 2022-08-01 PROCEDURE — 83036 HEMOGLOBIN GLYCOSYLATED A1C: CPT | Performed by: FAMILY MEDICINE

## 2022-08-01 PROCEDURE — 84443 ASSAY THYROID STIM HORMONE: CPT | Performed by: FAMILY MEDICINE

## 2022-08-01 PROCEDURE — 36415 COLL VENOUS BLD VENIPUNCTURE: CPT | Mod: PO | Performed by: FAMILY MEDICINE

## 2022-08-01 PROCEDURE — 80053 COMPREHEN METABOLIC PANEL: CPT | Performed by: FAMILY MEDICINE

## 2022-08-01 PROCEDURE — 85025 COMPLETE CBC W/AUTO DIFF WBC: CPT | Performed by: FAMILY MEDICINE

## 2022-08-08 ENCOUNTER — OFFICE VISIT (OUTPATIENT)
Dept: FAMILY MEDICINE | Facility: CLINIC | Age: 72
End: 2022-08-08
Payer: MEDICARE

## 2022-08-08 VITALS
BODY MASS INDEX: 42.72 KG/M2 | SYSTOLIC BLOOD PRESSURE: 120 MMHG | HEART RATE: 50 BPM | TEMPERATURE: 98 F | WEIGHT: 250.25 LBS | OXYGEN SATURATION: 95 % | HEIGHT: 64 IN | DIASTOLIC BLOOD PRESSURE: 72 MMHG

## 2022-08-08 DIAGNOSIS — E66.01 MORBID OBESITY WITH BMI OF 40.0-44.9, ADULT: ICD-10-CM

## 2022-08-08 DIAGNOSIS — E11.9 TYPE 2 DIABETES MELLITUS WITHOUT COMPLICATION, WITHOUT LONG-TERM CURRENT USE OF INSULIN: ICD-10-CM

## 2022-08-08 DIAGNOSIS — N18.31 CHRONIC KIDNEY DISEASE, STAGE 3A: ICD-10-CM

## 2022-08-08 DIAGNOSIS — E11.69 DIABETES MELLITUS TYPE 2 IN OBESE: Primary | ICD-10-CM

## 2022-08-08 DIAGNOSIS — I70.0 THORACIC AORTA ATHEROSCLEROSIS: ICD-10-CM

## 2022-08-08 DIAGNOSIS — E66.9 DIABETES MELLITUS TYPE 2 IN OBESE: Primary | ICD-10-CM

## 2022-08-08 DIAGNOSIS — I10 ESSENTIAL HYPERTENSION: ICD-10-CM

## 2022-08-08 PROCEDURE — 3066F NEPHROPATHY DOC TX: CPT | Mod: CPTII,S$GLB,, | Performed by: FAMILY MEDICINE

## 2022-08-08 PROCEDURE — 1126F AMNT PAIN NOTED NONE PRSNT: CPT | Mod: CPTII,S$GLB,, | Performed by: FAMILY MEDICINE

## 2022-08-08 PROCEDURE — 1101F PT FALLS ASSESS-DOCD LE1/YR: CPT | Mod: CPTII,S$GLB,, | Performed by: FAMILY MEDICINE

## 2022-08-08 PROCEDURE — 99214 PR OFFICE/OUTPT VISIT, EST, LEVL IV, 30-39 MIN: ICD-10-PCS | Mod: S$GLB,,, | Performed by: FAMILY MEDICINE

## 2022-08-08 PROCEDURE — 3008F BODY MASS INDEX DOCD: CPT | Mod: CPTII,S$GLB,, | Performed by: FAMILY MEDICINE

## 2022-08-08 PROCEDURE — 3288F PR FALLS RISK ASSESSMENT DOCUMENTED: ICD-10-PCS | Mod: CPTII,S$GLB,, | Performed by: FAMILY MEDICINE

## 2022-08-08 PROCEDURE — 3061F NEG MICROALBUMINURIA REV: CPT | Mod: CPTII,S$GLB,, | Performed by: FAMILY MEDICINE

## 2022-08-08 PROCEDURE — 3074F SYST BP LT 130 MM HG: CPT | Mod: CPTII,S$GLB,, | Performed by: FAMILY MEDICINE

## 2022-08-08 PROCEDURE — 3288F FALL RISK ASSESSMENT DOCD: CPT | Mod: CPTII,S$GLB,, | Performed by: FAMILY MEDICINE

## 2022-08-08 PROCEDURE — 3074F PR MOST RECENT SYSTOLIC BLOOD PRESSURE < 130 MM HG: ICD-10-PCS | Mod: CPTII,S$GLB,, | Performed by: FAMILY MEDICINE

## 2022-08-08 PROCEDURE — 3066F PR DOCUMENTATION OF TREATMENT FOR NEPHROPATHY: ICD-10-PCS | Mod: CPTII,S$GLB,, | Performed by: FAMILY MEDICINE

## 2022-08-08 PROCEDURE — 99999 PR PBB SHADOW E&M-EST. PATIENT-LVL IV: CPT | Mod: PBBFAC,,, | Performed by: FAMILY MEDICINE

## 2022-08-08 PROCEDURE — 1126F PR PAIN SEVERITY QUANTIFIED, NO PAIN PRESENT: ICD-10-PCS | Mod: CPTII,S$GLB,, | Performed by: FAMILY MEDICINE

## 2022-08-08 PROCEDURE — 3078F PR MOST RECENT DIASTOLIC BLOOD PRESSURE < 80 MM HG: ICD-10-PCS | Mod: CPTII,S$GLB,, | Performed by: FAMILY MEDICINE

## 2022-08-08 PROCEDURE — 1160F PR REVIEW ALL MEDS BY PRESCRIBER/CLIN PHARMACIST DOCUMENTED: ICD-10-PCS | Mod: CPTII,S$GLB,, | Performed by: FAMILY MEDICINE

## 2022-08-08 PROCEDURE — 3051F HG A1C>EQUAL 7.0%<8.0%: CPT | Mod: CPTII,S$GLB,, | Performed by: FAMILY MEDICINE

## 2022-08-08 PROCEDURE — 1101F PR PT FALLS ASSESS DOC 0-1 FALLS W/OUT INJ PAST YR: ICD-10-PCS | Mod: CPTII,S$GLB,, | Performed by: FAMILY MEDICINE

## 2022-08-08 PROCEDURE — 3078F DIAST BP <80 MM HG: CPT | Mod: CPTII,S$GLB,, | Performed by: FAMILY MEDICINE

## 2022-08-08 PROCEDURE — 3008F PR BODY MASS INDEX (BMI) DOCUMENTED: ICD-10-PCS | Mod: CPTII,S$GLB,, | Performed by: FAMILY MEDICINE

## 2022-08-08 PROCEDURE — 1159F MED LIST DOCD IN RCRD: CPT | Mod: CPTII,S$GLB,, | Performed by: FAMILY MEDICINE

## 2022-08-08 PROCEDURE — 3051F PR MOST RECENT HEMOGLOBIN A1C LEVEL 7.0 - < 8.0%: ICD-10-PCS | Mod: CPTII,S$GLB,, | Performed by: FAMILY MEDICINE

## 2022-08-08 PROCEDURE — 99999 PR PBB SHADOW E&M-EST. PATIENT-LVL IV: ICD-10-PCS | Mod: PBBFAC,,, | Performed by: FAMILY MEDICINE

## 2022-08-08 PROCEDURE — 99214 OFFICE O/P EST MOD 30 MIN: CPT | Mod: S$GLB,,, | Performed by: FAMILY MEDICINE

## 2022-08-08 PROCEDURE — 1160F RVW MEDS BY RX/DR IN RCRD: CPT | Mod: CPTII,S$GLB,, | Performed by: FAMILY MEDICINE

## 2022-08-08 PROCEDURE — 3061F PR NEG MICROALBUMINURIA RESULT DOCUMENTED/REVIEW: ICD-10-PCS | Mod: CPTII,S$GLB,, | Performed by: FAMILY MEDICINE

## 2022-08-08 PROCEDURE — 1159F PR MEDICATION LIST DOCUMENTED IN MEDICAL RECORD: ICD-10-PCS | Mod: CPTII,S$GLB,, | Performed by: FAMILY MEDICINE

## 2022-08-08 NOTE — PROGRESS NOTES
"Routine Office Visit    Bette Johsnon  1950  [id number]      Subjective     Bette is a 72 y.o. female who presents today for:    1. Diabetes - Patient has noticed fluctuating blood glucose. She has sweats whenever she over exerts herself.   2. Weight gain - Patient has had difficulty with weight loss. She is not motivated to walk or exercise.   3. Right knee pain - chronic problem for patient. Pain can change in severity and is intermittent. Pain is worse with ambulating. Patient is unable to completely bend her she has followed up with ortho. It is difficult for her to exercise. She is not able to kneel in [employer]. She has to make accomodations for her knee.   4. Hypertension - Patient has been taking her medication as prescribed. No side effect from current regimen.       Objective     Review of Systems   Constitutional: Negative for chills and fever.   HENT: Negative for congestion.    Eyes: Negative for blurred vision.   Respiratory: Negative for cough.    Cardiovascular: Negative for chest pain.   Gastrointestinal: Negative for abdominal pain, constipation, diarrhea, heartburn, nausea and vomiting.   Genitourinary: Negative for dysuria.   Musculoskeletal: Negative for myalgias.   Skin: Negative for itching and rash.   Neurological: Negative for dizziness and headaches.   Psychiatric/Behavioral: Negative for depression.       /72 (BP Location: Left arm, Patient Position: Sitting, BP Method: Large (Manual))   Pulse (!) 50   Temp 98.2 °F (36.8 °C) (Oral)   Ht 5' 4" (1.626 m)   Wt 113.5 kg (250 lb 3.6 oz)   LMP  (LMP Unknown)   SpO2 95%   BMI 42.95 kg/m²   Physical Exam  Constitutional:       Appearance: She is well-developed.   HENT:      Head: Normocephalic and atraumatic.   Eyes:      Conjunctiva/sclera: Conjunctivae normal.      Pupils: Pupils are equal, round, and reactive to light.   Cardiovascular:      Rate and Rhythm: Normal rate and regular rhythm.      Heart sounds: Normal heart " "sounds. No murmur heard.    No friction rub. No gallop.   Pulmonary:      Effort: No respiratory distress.      Breath sounds: Normal breath sounds.   Abdominal:      General: Bowel sounds are normal. There is no distension.      Palpations: Abdomen is soft.      Tenderness: There is no abdominal tenderness.   Musculoskeletal:         General: Normal range of motion.      Cervical back: Normal range of motion and neck supple.   Lymphadenopathy:      Cervical: No cervical adenopathy.   Skin:     General: Skin is warm.   Neurological:      Mental Status: She is alert and oriented to person, place, and time.           Assessment     Problem List Items Addressed This Visit        Cardiac/Vascular    Essential hypertension    Thoracic aorta atherosclerosis    Overview     " There is thoracic aortic atherosclerosis" - Xray Chest 11-  Patient with Atherosclerosis of the Aorta.  Stable/asymptomatic. Currently stable on lipid and b/p monitoring.                Renal/    Chronic kidney disease, stage 3a  Noted in chart  Continue to monitor          Endocrine    Diabetes mellitus type 2 in obese - Primary    Relevant Medications    dulaglutide (TRULICITY) 0.75 mg/0.5 mL pen injector  Common side effects of this medication were discussed with the patient. Questions regarding medications were discussed during this visit.    Recommend as a1c has remained elevated despite being on metformin       Other Relevant Orders    CBC Auto Differential    Comprehensive Metabolic Panel    Lipid Panel    Hemoglobin A1C    TSH    Morbid obesity with BMI of 40.0-44.9, adult  The patient is asked to make an attempt to improve diet and exercise patterns to aid in medical management of this problem.      Type 2 diabetes mellitus without complication, without long-term current use of insulin    Relevant Medications    dulaglutide (TRULICITY) 0.75 mg/0.5 mL pen injector     Follow up in about 6 months (around 2/8/2023), or if symptoms " worsen or fail to improve.

## 2022-10-28 ENCOUNTER — PATIENT MESSAGE (OUTPATIENT)
Dept: FAMILY MEDICINE | Facility: CLINIC | Age: 72
End: 2022-10-28
Payer: MEDICARE

## 2022-11-01 ENCOUNTER — TELEPHONE (OUTPATIENT)
Dept: FAMILY MEDICINE | Facility: CLINIC | Age: 72
End: 2022-11-01
Payer: MEDICARE

## 2022-11-01 DIAGNOSIS — Z12.31 BREAST CANCER SCREENING BY MAMMOGRAM: Primary | ICD-10-CM

## 2022-11-01 NOTE — TELEPHONE ENCOUNTER
----- Message from Urszula Davidson sent at 11/1/2022  8:13 AM CDT -----  Regarding: Request for Annual Mammogram  Type:  Mammogram    Caller is requesting to schedule their annual mammogram appointment.  Order is not listed in EPIC.  Please enter order and contact patient to schedule.    Name of Caller: Self     Where would they like the mammogram performed? [facility]    Would the patient rather a call back or a response via [facility]? Call     Best Call Back Number: .[phone]

## 2022-11-11 ENCOUNTER — HOSPITAL ENCOUNTER (OUTPATIENT)
Dept: RADIOLOGY | Facility: HOSPITAL | Age: 72
Discharge: HOME | End: 2022-11-11
Attending: FAMILY MEDICINE
Payer: MEDICARE

## 2022-11-11 VITALS — BODY MASS INDEX: 42.72 KG/M2 | HEIGHT: 64 IN | WEIGHT: 250.25 LBS

## 2022-11-11 DIAGNOSIS — Z12.31 BREAST CANCER SCREENING BY MAMMOGRAM: ICD-10-CM

## 2022-11-11 PROCEDURE — 77063 BREAST TOMOSYNTHESIS BI: CPT | Mod: 26,,, | Performed by: RADIOLOGY

## 2022-11-11 PROCEDURE — 77067 SCR MAMMO BI INCL CAD: CPT | Mod: 26,,, | Performed by: RADIOLOGY

## 2022-11-11 PROCEDURE — 77063 MAMMO DIGITAL SCREENING BILAT WITH TOMO: ICD-10-PCS | Mod: 26,,, | Performed by: RADIOLOGY

## 2022-11-11 PROCEDURE — 77067 MAMMO DIGITAL SCREENING BILAT WITH TOMO: ICD-10-PCS | Mod: 26,,, | Performed by: RADIOLOGY

## 2022-11-11 PROCEDURE — 77063 BREAST TOMOSYNTHESIS BI: CPT | Mod: TC

## 2022-12-12 LAB
LEFT EYE DM RETINOPATHY: NEGATIVE
RIGHT EYE DM RETINOPATHY: NEGATIVE

## 2023-01-18 DIAGNOSIS — E11.9 TYPE 2 DIABETES MELLITUS WITHOUT COMPLICATION, UNSPECIFIED WHETHER LONG TERM INSULIN USE: ICD-10-CM

## 2023-01-23 ENCOUNTER — PATIENT MESSAGE (OUTPATIENT)
Dept: ADMINISTRATIVE | Facility: HOSPITAL | Age: 73
End: 2023-01-23
Payer: MEDICARE

## 2023-01-23 ENCOUNTER — PATIENT OUTREACH (OUTPATIENT)
Dept: ADMINISTRATIVE | Facility: HOSPITAL | Age: 73
End: 2023-01-23
Payer: MEDICARE

## 2023-01-30 ENCOUNTER — LAB VISIT (OUTPATIENT)
Dept: LAB | Facility: HOSPITAL | Age: 73
End: 2023-01-30
Attending: FAMILY MEDICINE
Payer: MEDICARE

## 2023-01-30 DIAGNOSIS — E11.69 DIABETES MELLITUS TYPE 2 IN OBESE: ICD-10-CM

## 2023-01-30 DIAGNOSIS — E66.9 DIABETES MELLITUS TYPE 2 IN OBESE: ICD-10-CM

## 2023-01-30 LAB
ALBUMIN SERPL BCP-MCNC: 4.1 G/DL (ref 3.5–5.2)
ALP SERPL-CCNC: 75 U/L (ref 55–135)
ALT SERPL W/O P-5'-P-CCNC: 22 U/L (ref 10–44)
ANION GAP SERPL CALC-SCNC: 10 MMOL/L (ref 8–16)
AST SERPL-CCNC: 27 U/L (ref 10–40)
BASOPHILS # BLD AUTO: 0.05 K/UL (ref 0–0.2)
BASOPHILS NFR BLD: 1 % (ref 0–1.9)
BILIRUB SERPL-MCNC: 0.5 MG/DL (ref 0.1–1)
BUN SERPL-MCNC: 15 MG/DL (ref 8–23)
CALCIUM SERPL-MCNC: 9.7 MG/DL (ref 8.7–10.5)
CHLORIDE SERPL-SCNC: 106 MMOL/L (ref 95–110)
CHOLEST SERPL-MCNC: 120 MG/DL (ref 120–199)
CHOLEST/HDLC SERPL: 2.9 {RATIO} (ref 2–5)
CO2 SERPL-SCNC: 25 MMOL/L (ref 23–29)
CREAT SERPL-MCNC: 1.1 MG/DL (ref 0.5–1.4)
DIFFERENTIAL METHOD: ABNORMAL
EOSINOPHIL # BLD AUTO: 0.2 K/UL (ref 0–0.5)
EOSINOPHIL NFR BLD: 3.5 % (ref 0–8)
ERYTHROCYTE [DISTWIDTH] IN BLOOD BY AUTOMATED COUNT: 13.2 % (ref 11.5–14.5)
EST. GFR  (NO RACE VARIABLE): 54 ML/MIN/1.73 M^2
ESTIMATED AVG GLUCOSE: 169 MG/DL (ref 68–131)
GLUCOSE SERPL-MCNC: 166 MG/DL (ref 70–110)
HBA1C MFR BLD: 7.5 % (ref 4–5.6)
HCT VFR BLD AUTO: 43.8 % (ref 37–48.5)
HDLC SERPL-MCNC: 41 MG/DL (ref 40–75)
HDLC SERPL: 34.2 % (ref 20–50)
HGB BLD-MCNC: 13.9 G/DL (ref 12–16)
IMM GRANULOCYTES # BLD AUTO: 0.01 K/UL (ref 0–0.04)
IMM GRANULOCYTES NFR BLD AUTO: 0.2 % (ref 0–0.5)
LDLC SERPL CALC-MCNC: 56.6 MG/DL (ref 63–159)
LYMPHOCYTES # BLD AUTO: 2.4 K/UL (ref 1–4.8)
LYMPHOCYTES NFR BLD: 49.2 % (ref 18–48)
MCH RBC QN AUTO: 28.1 PG (ref 27–31)
MCHC RBC AUTO-ENTMCNC: 31.7 G/DL (ref 32–36)
MCV RBC AUTO: 89 FL (ref 82–98)
MONOCYTES # BLD AUTO: 0.3 K/UL (ref 0.3–1)
MONOCYTES NFR BLD: 6.7 % (ref 4–15)
NEUTROPHILS # BLD AUTO: 1.9 K/UL (ref 1.8–7.7)
NEUTROPHILS NFR BLD: 39.4 % (ref 38–73)
NONHDLC SERPL-MCNC: 79 MG/DL
NRBC BLD-RTO: 0 /100 WBC
PLATELET # BLD AUTO: 296 K/UL (ref 150–450)
PMV BLD AUTO: 10.2 FL (ref 9.2–12.9)
POTASSIUM SERPL-SCNC: 4.5 MMOL/L (ref 3.5–5.1)
PROT SERPL-MCNC: 8.3 G/DL (ref 6–8.4)
RBC # BLD AUTO: 4.95 M/UL (ref 4–5.4)
SODIUM SERPL-SCNC: 141 MMOL/L (ref 136–145)
TRIGL SERPL-MCNC: 112 MG/DL (ref 30–150)
TSH SERPL DL<=0.005 MIU/L-ACNC: 3.12 UIU/ML (ref 0.4–4)
WBC # BLD AUTO: 4.92 K/UL (ref 3.9–12.7)

## 2023-01-30 PROCEDURE — 80053 COMPREHEN METABOLIC PANEL: CPT | Performed by: FAMILY MEDICINE

## 2023-01-30 PROCEDURE — 80061 LIPID PANEL: CPT | Performed by: FAMILY MEDICINE

## 2023-01-30 PROCEDURE — 85025 COMPLETE CBC W/AUTO DIFF WBC: CPT | Performed by: FAMILY MEDICINE

## 2023-01-30 PROCEDURE — 84443 ASSAY THYROID STIM HORMONE: CPT | Performed by: FAMILY MEDICINE

## 2023-01-30 PROCEDURE — 36415 COLL VENOUS BLD VENIPUNCTURE: CPT | Performed by: FAMILY MEDICINE

## 2023-01-30 PROCEDURE — 83036 HEMOGLOBIN GLYCOSYLATED A1C: CPT | Performed by: FAMILY MEDICINE

## 2023-01-31 ENCOUNTER — PATIENT OUTREACH (OUTPATIENT)
Dept: ADMINISTRATIVE | Facility: HOSPITAL | Age: 73
End: 2023-01-31
Payer: MEDICARE

## 2023-02-07 DIAGNOSIS — Z00.00 ENCOUNTER FOR MEDICARE ANNUAL WELLNESS EXAM: ICD-10-CM

## 2023-02-09 ENCOUNTER — OFFICE VISIT (OUTPATIENT)
Dept: FAMILY MEDICINE | Facility: CLINIC | Age: 73
End: 2023-02-09
Payer: MEDICARE

## 2023-02-09 VITALS
TEMPERATURE: 98 F | SYSTOLIC BLOOD PRESSURE: 126 MMHG | WEIGHT: 247.38 LBS | OXYGEN SATURATION: 96 % | DIASTOLIC BLOOD PRESSURE: 60 MMHG | HEART RATE: 57 BPM | HEIGHT: 64 IN | BODY MASS INDEX: 42.23 KG/M2

## 2023-02-09 DIAGNOSIS — Z12.11 SCREENING FOR MALIGNANT NEOPLASM OF COLON: ICD-10-CM

## 2023-02-09 DIAGNOSIS — E66.01 MORBID OBESITY WITH BMI OF 40.0-44.9, ADULT: ICD-10-CM

## 2023-02-09 DIAGNOSIS — E11.69 DIABETES MELLITUS TYPE 2 IN OBESE: ICD-10-CM

## 2023-02-09 DIAGNOSIS — I10 ESSENTIAL HYPERTENSION: ICD-10-CM

## 2023-02-09 DIAGNOSIS — M54.40 CHRONIC MIDLINE LOW BACK PAIN WITH SCIATICA, SCIATICA LATERALITY UNSPECIFIED: ICD-10-CM

## 2023-02-09 DIAGNOSIS — I70.0 THORACIC AORTA ATHEROSCLEROSIS: ICD-10-CM

## 2023-02-09 DIAGNOSIS — Z00.00 ENCOUNTER FOR MEDICARE ANNUAL WELLNESS EXAM: ICD-10-CM

## 2023-02-09 DIAGNOSIS — G89.29 CHRONIC MIDLINE LOW BACK PAIN WITH SCIATICA, SCIATICA LATERALITY UNSPECIFIED: ICD-10-CM

## 2023-02-09 DIAGNOSIS — R05.9 COUGH, UNSPECIFIED TYPE: ICD-10-CM

## 2023-02-09 DIAGNOSIS — Z00.00 ANNUAL PHYSICAL EXAM: Primary | ICD-10-CM

## 2023-02-09 DIAGNOSIS — R49.0 HOARSENESS: ICD-10-CM

## 2023-02-09 DIAGNOSIS — E11.9 TYPE 2 DIABETES MELLITUS WITHOUT COMPLICATION, WITHOUT LONG-TERM CURRENT USE OF INSULIN: ICD-10-CM

## 2023-02-09 DIAGNOSIS — E66.9 DIABETES MELLITUS TYPE 2 IN OBESE: ICD-10-CM

## 2023-02-09 DIAGNOSIS — N18.31 CHRONIC KIDNEY DISEASE, STAGE 3A: ICD-10-CM

## 2023-02-09 PROCEDURE — 1159F PR MEDICATION LIST DOCUMENTED IN MEDICAL RECORD: ICD-10-PCS | Mod: CPTII,S$GLB,, | Performed by: FAMILY MEDICINE

## 2023-02-09 PROCEDURE — 3074F SYST BP LT 130 MM HG: CPT | Mod: CPTII,S$GLB,, | Performed by: FAMILY MEDICINE

## 2023-02-09 PROCEDURE — 3288F PR FALLS RISK ASSESSMENT DOCUMENTED: ICD-10-PCS | Mod: CPTII,S$GLB,, | Performed by: FAMILY MEDICINE

## 2023-02-09 PROCEDURE — 1101F PT FALLS ASSESS-DOCD LE1/YR: CPT | Mod: CPTII,S$GLB,, | Performed by: FAMILY MEDICINE

## 2023-02-09 PROCEDURE — 99213 OFFICE O/P EST LOW 20 MIN: CPT | Mod: 25,S$GLB,, | Performed by: FAMILY MEDICINE

## 2023-02-09 PROCEDURE — 1159F MED LIST DOCD IN RCRD: CPT | Mod: CPTII,S$GLB,, | Performed by: FAMILY MEDICINE

## 2023-02-09 PROCEDURE — 3051F PR MOST RECENT HEMOGLOBIN A1C LEVEL 7.0 - < 8.0%: ICD-10-PCS | Mod: CPTII,S$GLB,, | Performed by: FAMILY MEDICINE

## 2023-02-09 PROCEDURE — 1160F PR REVIEW ALL MEDS BY PRESCRIBER/CLIN PHARMACIST DOCUMENTED: ICD-10-PCS | Mod: CPTII,S$GLB,, | Performed by: FAMILY MEDICINE

## 2023-02-09 PROCEDURE — 3008F BODY MASS INDEX DOCD: CPT | Mod: CPTII,S$GLB,, | Performed by: FAMILY MEDICINE

## 2023-02-09 PROCEDURE — 3078F PR MOST RECENT DIASTOLIC BLOOD PRESSURE < 80 MM HG: ICD-10-PCS | Mod: CPTII,S$GLB,, | Performed by: FAMILY MEDICINE

## 2023-02-09 PROCEDURE — 3008F PR BODY MASS INDEX (BMI) DOCUMENTED: ICD-10-PCS | Mod: CPTII,S$GLB,, | Performed by: FAMILY MEDICINE

## 2023-02-09 PROCEDURE — 99999 PR PBB SHADOW E&M-EST. PATIENT-LVL V: CPT | Mod: PBBFAC,,, | Performed by: FAMILY MEDICINE

## 2023-02-09 PROCEDURE — 3074F PR MOST RECENT SYSTOLIC BLOOD PRESSURE < 130 MM HG: ICD-10-PCS | Mod: CPTII,S$GLB,, | Performed by: FAMILY MEDICINE

## 2023-02-09 PROCEDURE — 99397 PR PREVENTIVE VISIT,EST,65 & OVER: ICD-10-PCS | Mod: S$GLB,,, | Performed by: FAMILY MEDICINE

## 2023-02-09 PROCEDURE — 1101F PR PT FALLS ASSESS DOC 0-1 FALLS W/OUT INJ PAST YR: ICD-10-PCS | Mod: CPTII,S$GLB,, | Performed by: FAMILY MEDICINE

## 2023-02-09 PROCEDURE — 3078F DIAST BP <80 MM HG: CPT | Mod: CPTII,S$GLB,, | Performed by: FAMILY MEDICINE

## 2023-02-09 PROCEDURE — 1160F RVW MEDS BY RX/DR IN RCRD: CPT | Mod: CPTII,S$GLB,, | Performed by: FAMILY MEDICINE

## 2023-02-09 PROCEDURE — 99397 PER PM REEVAL EST PAT 65+ YR: CPT | Mod: S$GLB,,, | Performed by: FAMILY MEDICINE

## 2023-02-09 PROCEDURE — 1126F AMNT PAIN NOTED NONE PRSNT: CPT | Mod: CPTII,S$GLB,, | Performed by: FAMILY MEDICINE

## 2023-02-09 PROCEDURE — 3051F HG A1C>EQUAL 7.0%<8.0%: CPT | Mod: CPTII,S$GLB,, | Performed by: FAMILY MEDICINE

## 2023-02-09 PROCEDURE — 99213 PR OFFICE/OUTPT VISIT, EST, LEVL III, 20-29 MIN: ICD-10-PCS | Mod: 25,S$GLB,, | Performed by: FAMILY MEDICINE

## 2023-02-09 PROCEDURE — 99999 PR PBB SHADOW E&M-EST. PATIENT-LVL V: ICD-10-PCS | Mod: PBBFAC,,, | Performed by: FAMILY MEDICINE

## 2023-02-09 PROCEDURE — 1126F PR PAIN SEVERITY QUANTIFIED, NO PAIN PRESENT: ICD-10-PCS | Mod: CPTII,S$GLB,, | Performed by: FAMILY MEDICINE

## 2023-02-09 PROCEDURE — 3288F FALL RISK ASSESSMENT DOCD: CPT | Mod: CPTII,S$GLB,, | Performed by: FAMILY MEDICINE

## 2023-02-09 RX ORDER — ATORVASTATIN CALCIUM 10 MG/1
10 TABLET, FILM COATED ORAL DAILY
Qty: 90 TABLET | Refills: 1 | Status: SHIPPED | OUTPATIENT
Start: 2023-02-09 | End: 2023-08-10 | Stop reason: SDUPTHER

## 2023-02-09 NOTE — PROGRESS NOTES
"Routine Office Visit    Bette Johnson  1950  [id number]      Subjective     Bette is a 72 y.o. female who presents today for:    Annual physical   Modified visit   Hypertension - Patient is doing well on valsartan / hctz. Patient denies any side effects on medication   Hyperlipidemia - Patient feels less energy. Patient has increased vitamin d3 which has helped  Diabetes - Patient takes medications as prescribed.   Patient sings in a choir. She notices she has to clear her throat frequently.  She feels there is something in her throat. She has to drink water or it feels hoarse. She would like evaluation   Low back pain - Patient had an episode of severe back pain. She is not exercise as much as she was previously due to gym closure. She is still very much active but does not exercise as much. She finds it more difficult to bend forward. She has also noticed increasing sciatic pain or burning pain down her legs.     Objective     Review of Systems   Constitutional:  Negative for chills and fever.   HENT:  Negative for congestion.    Eyes:  Negative for blurred vision.   Respiratory:  Negative for cough.    Cardiovascular:  Negative for chest pain.   Gastrointestinal:  Negative for abdominal pain, constipation, diarrhea, heartburn, nausea and vomiting.   Genitourinary:  Negative for dysuria.   Musculoskeletal:  Negative for myalgias.   Skin:  Negative for itching and rash.   Neurological:  Negative for dizziness and headaches.   Psychiatric/Behavioral:  Negative for depression.      /60   Pulse (!) 57   Temp 97.8 °F (36.6 °C) (Oral)   Ht 5' 4" (1.626 m)   Wt 112.2 kg (247 lb 5.7 oz)   LMP  (LMP Unknown)   SpO2 96%   BMI 42.46 kg/m²   Physical Exam  Constitutional:       Appearance: She is well-developed.   HENT:      Head: Normocephalic and atraumatic.   Eyes:      Conjunctiva/sclera: Conjunctivae normal.      Pupils: Pupils are equal, round, and reactive to light.   Cardiovascular:      Rate and " "Rhythm: Normal rate and regular rhythm.      Heart sounds: Normal heart sounds. No murmur heard.    No friction rub. No gallop.   Pulmonary:      Effort: No respiratory distress.      Breath sounds: Normal breath sounds.   Abdominal:      General: Bowel sounds are normal. There is no distension.      Palpations: Abdomen is soft.      Tenderness: There is no abdominal tenderness.   Musculoskeletal:         General: Normal range of motion.      Cervical back: Normal range of motion and neck supple.   Lymphadenopathy:      Cervical: No cervical adenopathy.   Skin:     General: Skin is warm.   Neurological:      Mental Status: She is alert and oriented to person, place, and time.       Protective Sensation (w/ 10 gram monofilament):  Right: Intact  Left: Intact    Visual Inspection:  Normal -  Bilateral    Pedal Pulses:   Right: Present  Left: Present    Posterior tibialis:   Right:Present  Left: Present        Assessment     Health Maintenance         Date Due Completion Date    Foot Exam 01/11/2023 1/11/2022    Override on 1/11/2022: Done    Override on 4/19/2019: Done    Override on 6/11/2018: Done    Colorectal Cancer Screening 01/25/2023 1/25/2018    Diabetes Urine Screening 01/11/2023 1/11/2022    Hemoglobin A1c 07/30/2023 1/30/2023    High Dose Statin 08/30/2023 8/30/2022    Mammogram 11/11/2023 11/11/2022    Override on 7/14/2016: Done    Eye Exam 12/12/2023 12/12/2022    Override on 12/10/2021: Done (Dr. KWON)    Override on 6/19/2017: Done    Lipid Panel 01/30/2024 1/30/2023    TETANUS VACCINE 02/02/2031 2/2/2021              Problem List Items Addressed This Visit          Cardiac/Vascular    Essential hypertension  The current medical regimen is effective;  continue present plan and medications.      Thoracic aorta atherosclerosis    Overview     " There is thoracic aortic atherosclerosis" - Xray Chest 11-  Patient with Atherosclerosis of the Aorta.  Stable/asymptomatic. Currently stable on lipid " and b/p monitoring.              Renal/    Chronic kidney disease, stage 3a  Noted in chart         Endocrine    Diabetes mellitus type 2 in obese  The current medical regimen is effective;  continue present plan and medications.      Morbid obesity with BMI of 40.0-44.9, adult  The patient is asked to make an attempt to improve diet and exercise patterns to aid in medical management of this problem.       Type 2 diabetes mellitus without complication, without long-term current use of insulin    Relevant Medications    atorvastatin (LIPITOR) 10 MG tablet  The current medical regimen is effective;  continue present plan and medications.  Take with coQ10 100mg     Other Relevant Orders    Comprehensive Metabolic Panel    Hemoglobin A1C    Urinalysis Microscopic    Microalbumin/Creatinine Ratio, Urine     Other Visit Diagnoses       Annual physical exam    -  Primary  I addressed all major concerns as it related to health maintenance.  All were ordered and scheduled based on the patients wishes.  Any additional health maintenance will be readdressed at the next physical if declined or deferred by the patient.     Modified visit - topics discussed outside of annual visit   Hypertension - Patient is doing well on valsartan / hctz. Patient denies any side effects on medication   Hyperlipidemia - Patient feels less energy. Patient has increased vitamin d3 which has helped  Diabetes - Patient takes medications as prescribed.   Patient sings in a choir. She notices she has to clear her throat frequently.  She feels there is something in her throat. She has to drink water or it feels hoarse. She would like evaluation   Low back pain - Patient had an episode of severe back pain. She is not exercise as much as she was previously due to gym closure. She is still very much active but does not exercise as much. She finds it more difficult to bend forward. She has also noticed increasing sciatic pain or burning pain down her  legs.   PE: as above     Chronic midline low back pain with sciatica, sciatica laterality unspecified        Relevant Orders    Ambulatory referral/consult to [facility]    Screening for malignant neoplasm of colon        Relevant Orders    Ambulatory referral/consult to Endo Procedure     Hoarseness        Relevant Orders    Ambulatory referral/consult to ENT    Cough, unspecified type        Relevant Orders    Ambulatory referral/consult to ENT                  Follow up in about 6 months (around 8/9/2023), or if symptoms worsen or fail to improve.

## 2023-02-13 ENCOUNTER — PES CALL (OUTPATIENT)
Dept: ADMINISTRATIVE | Facility: CLINIC | Age: 73
End: 2023-02-13
Payer: MEDICARE

## 2023-02-27 ENCOUNTER — CLINICAL SUPPORT (OUTPATIENT)
Dept: REHABILITATION | Facility: HOSPITAL | Age: 73
End: 2023-02-27
Attending: FAMILY MEDICINE
Payer: MEDICARE

## 2023-02-27 DIAGNOSIS — M54.40 CHRONIC MIDLINE LOW BACK PAIN WITH SCIATICA, SCIATICA LATERALITY UNSPECIFIED: ICD-10-CM

## 2023-02-27 DIAGNOSIS — G89.29 CHRONIC MIDLINE LOW BACK PAIN WITH SCIATICA, SCIATICA LATERALITY UNSPECIFIED: ICD-10-CM

## 2023-02-27 DIAGNOSIS — R29.898 DECREASED STRENGTH OF TRUNK AND BACK: ICD-10-CM

## 2023-02-27 PROCEDURE — 97161 PT EVAL LOW COMPLEX 20 MIN: CPT | Mod: PN

## 2023-02-27 PROCEDURE — 97110 THERAPEUTIC EXERCISES: CPT | Mod: PN

## 2023-02-27 NOTE — PLAN OF CARE
"  [facility] - PHYSICAL THERAPY LUMBAR EVALUATION     Name: Bette Johnson  Clinic Number: [id number]    Therapy Diagnosis:   Encounter Diagnoses   Name Primary?    Chronic midline low back pain with sciatica, sciatica laterality unspecified     Decreased strength of trunk and back      Physician: Neeta Estrada MD    Physician Orders: PT Eval and Treat   Medical Diagnosis from Referral: Chronic midline low back pain with sciatica, sciatica laterality unspecified  Evaluation Date: 2/27/2023  Authorization Period Expiration: 2/9/2024  Plan of Care Expiration: 5/27/2023  Reassessment Due: 3/27/2023  Visit # / Visits authorized: 1/ 1    Time In: 1000  Time Out: 1130  Total Billable Time: 90 minutes  Insurance:Fee for service Insurance Patient      Precautions: Standard    Pattern of pain determined: 1 (future PEN)      Subjective   Date of onset: Several decades, ever since my second child  History of current condition - Bette reports: Had a second child, "threw my back out" picking up a clothes basket a few weeks after I'd gotten home, and ever since then I've had off and on pain that has been worse recently as well as pain that was originally radicular into her RLE but now are LLE - though she feels that has improved ever since she started supplementing Vitamin D. Tries to avoid anything that will exacerbate her low back because she is worried about reinjury.    MD NOTE 2/9/2023    Bette is a 72 y.o. female who presents today for:     Annual physical   Modified visit   Hypertension - Patient is doing well on valsartan / hctz. Patient denies any side effects on medication   Hyperlipidemia - Patient feels less energy. Patient has increased vitamin d3 which has helped  Diabetes - Patient takes medications as prescribed.   Patient sings in a choir. She notices she has to clear her throat frequently.  She feels there is something in her throat. She has to drink water or it feels hoarse. She would like " evaluation   Low back pain - Patient had an episode of severe back pain. She is not exercise as much as she was previously due to gym closure. She is still very much active but does not exercise as much. She finds it more difficult to bend forward. She has also noticed increasing sciatic pain or burning pain down her legs.      Medical History:   Past Medical History:   Diagnosis Date    Chronic kidney disease, stage 3a 2/4/2022    Diabetes mellitus, type 2     Hypertension     Severe obesity (BMI >= 40) 7/12/2017       Surgical History:   Bette Johnson  has a past surgical history that includes Cholecystectomy; Colonoscopy (N/A, 1/25/2018); and ERCP (N/A, 2/17/2021).    Medications:   Bette has a current medication list which includes the following prescription(s): amlodipine, aspirin, atorvastatin, cholecalciferol (vitamin d3), coenzyme q10, lidocaine, loratadine, metformin, multivit-minerals/folic acid, and valsartan-hydrochlorothiazide.    Allergies:   Review of patient's allergies indicates:   Allergen Reactions    Iodine and iodide containing products Shortness Of Breath and Other (See Comments)     Fever, blisters around mouth, flu like symptoms  Fever, blisters around mouth, flu like symptoms      Penicillins Other (See Comments) and Anaphylaxis     Loss of consciousness  Other reaction(s): Penicillins (Fatal)  Note:  Allergic Reaction: Anaphylaxis    Caffeine Other (See Comments)     Hyper then  Causes low  Hyperactivity, followed by sleepiness  Hyperactivity, followed by sleepiness    Note: hyperactivity/ crash     Iodine      Note: fever blisters, elevated temp.     Latex, natural rubber Other (See Comments)     Dental work cause mouth ulcers  Ulcers in mouth when used during dental work  Ulcers in mouth when used during dental work    Note: sores         Imaging, none relevant to this episode    Prior Therapy: Yes but for her knee, never for her low back  Prior Treatment: None  Social History:   lives alone  Occupation: Retired  Leisure: Spending time with family, walking      Prior Level of Function: Independent  Current Level of Function: Independent but with greater functional limitation  DME owned/used: None        Pain:  Current 5/10, worst 8/10, best 3/10   Location: central back  and had previously been R back and leg, then L back and leg, but not currently   Description: Aching, Grabbing, and Tight  Aggravating Factors: Sitting, Standing, Bending, Walking, and Lifting  Easing Factors: pain medication and rest  Disturbed Sleep: None        Pattern of pain questions:  1.  Where is your pain the worst? Central low back  2.  Is your pain constant or intermittent? Constant   3.  Does bending forward make your typical pain worse? Yes  4.  Since the start of your back pain, has there been a change in your bowel or bladder? No  5.  What can't you do now that you use to be able to do? Any prolonged sitting, standing, or really any lifting      Pts goals: Sweep and mop without any issues      Red Flag Screening:   Cough  Sneeze  Strain: (--)  Bladder/ bowel: (--)  Falls: (--)  Night pain: (--)  Unexplained weight loss: (--)  General health: Good    OBJECTIVE     Postural examination/scapula alignment: Head forward and Slouched posture  Joint integrity: Relative hypermobility at L4/5  Skin integrity: Intact  Edema: None  Sitting: Good  Standing: Fair  Correction of posture: better with lumbar roll      MOVEMENT LOSS Back   Norms ROM Loss Initial   Flexion Fingers touch toes, sacral angle >/= 70 deg, uniform spinal curvature, posterior weight shift  minimal loss   Extension ASIS surpasses toes, spine of scapulae surpasses heels, uniform spinal curve moderate loss   Side glide Right  minimal loss   Side glide Left  minimal loss   Rotation Right PT observes contralateral shoulder moderate loss   Rotation Left PT observes contralateral shoulder moderate loss       Lower Extremity Strength  Right LE  Left LE     Hip flexion: 5/5 Hip flexion: 5/5   Hip extension:  4-/5 Hip extension: 4-/5   Hip abduction: 4/5 Hip abduction: 4/5   Hip adduction:  4+/5 Hip adduction:  4+/5   Hip External Rotation 4/5 Hip External Rotation 4/5   Hip Internal rotation   4/5 Hip Internal rotation 4/5   Knee Flexion 4+/5 Knee Flexion 5/5   Knee Extension 4+/5 Knee Extension 5/5   Ankle dorsiflexion: 5/5 Ankle dorsiflexion: 5/5   Ankle plantarflexion: 5/5 Ankle plantarflexion: 5/5       GAIT:  Assistive Device used: none  Level of Assistance: independent  Patient displays the following gait deviations:  decreased step length, increased base of support, and antalgic gait.     Special Tests:   Test Name  Test Result   Prone Instability Test (--)   SI Joint Provocation Test (--)   Straight Leg Raise (--)   Neural Tension Test (--)   Crossed Straight Leg Raise (--)   Walking on toes (--)   Walking on heels  (--)       NEUROLOGICAL SCREENING     Sensory deficit: Intact L/T    Reflexes:    Left Right   Patella Tendon 2+ 2+   Achilles Tendon 2+ 2+   Babinski  (--) (--)   Clonus (--) (--)     REPEATED TEST MOVEMENTS:    Baseline symptoms:  Repeated Flexion in Standing end range pain  no worse   Repeated Extension in Standing end range pain  better   Repeated Flexion in lying no effect   Repeated Extension in lying  no worse  no better       STATIC TESTS and other movements:   Baseline symptoms:  Prone lie no worse  no better   Prone lie on elbows end range pain  no worse   Sustained prone with  using mat NT   Sustained flexion no effect   Sitting slouched  worse   Sitting erect better   Standing slouched better   Standing erect  worse   Lying prone in extension  no effect   Long sitting   no effect   Other tests Repeated Test Movements:[id number]}       Baseline Isometric Testing on Med X equipment: Testing administered by PT  Date of testin2023  ROM 0-36 deg   Max Peak Torque 105    Min Peak Torque 56    Flex/Ext Ratio 2:1   % below normative data 51          Limitation/Restriction for FOTO 2/27/2023 Survey    Therapist reviewed FOTO scores for Bette Johnson on 2/27/2023.   FOTO documents entered into [employer] - see Media section.    Limitation Score: 52%             Treatment   Treatment Time In: 1100  Treatment Time Out: 1130  Total Treatment time separate from Evaluation: 30 minutes      Bette received therapeutic exercises to develop/improve posture, lumbar/cervical ROM, strength and muscular endurance for 30 minutes including the following exercises:   Initial MedX testing and results    [facility] 2/27/2023   Visit Number 1   VAS Pain Rating 5   Lumbar Extension Seat Pad 0   Femur Restraint 6   Top Dead Center 24   Counterweight 209   Lumbar Flexion 36   Lumbar Extension 0   Lumbar Peak Torque 105   Min Torque 56   Test Percent Below Normative Data 51   Ice - Z Lie (in min.) 0      Med x dynamic exercise and baseline IM test performed with instructions to guide the patient safely through the isometric testing.  Patient informed to perform isometric test correctly, and safely, building best force they safely can and not pushing through pain.  Patient demonstrated understanding of information     Written Home Exercises Provided: yes.  HEP AS FOLLOWS:  HSS, DKTC, LTR, EIS, EIL (within comfortable range)  Exercises were reviewed and Bette was able to demonstrate them prior to the end of the session.  Bette demonstrated good  understanding of the education provided.     See EMR under Patient Instructions for exercises provided 2/27/2023.      Education provided:   - Patient received education regarding proper posture and body mechanics.  Patient was given top [facility] Visit 1 handouts which discuss what to expect in therapy, the purpose and opportunity for health coaching, the program,  wellness when discharged from therapy, back education and care specifically for posture seated, standing, lifting correctly, components of exercise,  importance of nutrition and hydration, and importance of sleep.   Information on lumbar rolls provided.  - Michelet roll tried, recommended, and purchase information was provided.  - Patient received a handout regarding anticipated muscular soreness following the isometric test and strategies for management were reviewed with patient including stretching, using ice and scheduled rest.   - Patient received education on the [facility] program, purpose of the isometric test, progression of back strengthening as well as wellness approach and systemic strengthening.  Details of the program were discussed.  Reviewed that patient should feel support/pressure from med ex restraints but no pain or discomfort and patient expressed understanding.  Med x dynamic exercise and baseline IM test performed with instructions to guide the patient safely through the isometric testing.  Patient informed to perform isometric test correctly, and safely, building best force they safely can and not pushing through pain.  Patient demonstrated understanding of information      Assessment   Bette is a 72 y.o. female referred to [facility] with a medical diagnosis of Chronic midline low back pain with sciatica, sciatica laterality unspecified. Pt presents with symptoms consistent with diagnosis, some end-range tension but not pain was produced with standing and prone extension; significant L4/5 hinging both in standing posture and in standing extension, manual stabilization decreased motion and symptoms, after which repeat extension started decreasing extension. Bette may not yet be ready for regular extension based exercises but should definitely be appropriate in the near future. Tested well below norms in lumbar strength and is a good candidate for the program to focus on lumbopelvic strength and stability in order to perform daily household and recreational activities with les pain and dysfunction.    Pain Pattern: 1 (future  PEN)       Pt prognosis is Good.   Pt will benefit from skilled outpatient Physical Therapy to address the deficits stated above and in the chart below, provide pt/family education, and to maximize pt's level of independence. Based on the above history and physical examination an active physical therapy program is recommended.  Pt will continue to benefit from skilled outpatient physical therapy to address the deficits listed below in the chart, provide pt/family education and to maximize pt's level of independence in the home and community environment. .       Plan of care discussed with patient: Yes  Pt's spiritual, cultural and educational needs considered and patient is agreeable to the plan of care and goals as stated below:     Anticipated Barriers for therapy: None    PT Evaluation Completed? Yes    Medical necessity is demonstrated by the following problem list.    Pt presents with the following impairments:     History  Co-morbidities and personal factors that may impact the plan of care Co-morbidities:   CKD stage 3a, diabetes, high BMI, HTN, and level of undertstanding of current condition    Personal Factors:   coping style  lifestyle  attitudes     moderate   Examination  Body Structures and Functions, activity limitations and participation restrictions that may impact the plan of care Body Regions:   back  trunk    Body Systems:    ROM  strength    Participation Restrictions:   Decreased activity tolerance secondary to pain    Activity limitations:   Learning and applying knowledge  no deficits    General Tasks and Commands  no deficits    Communication  no deficits    Mobility  lifting and carrying objects  walking  driving (bike, car, motorcycle)    Self care  no deficits    Domestic Life  shopping  cooking  doing house work (cleaning house, washing dishes, laundry)  assisting others    Interactions/Relationships  no deficits    Life Areas  no deficits    Community and Social Life  community  life  recreation and leisure         low   Clinical Presentation stable and uncomplicated low   Decision Making/ Complexity Score: low       GOALS: Pt is in agreement with the following goals.    Short term goals:  6 weeks or 10 visits   1.  Pt will demonstrate increased lumbar ROM by at least 6 degrees from the initial ROM value with improvements noted in functional ROM and ability to perform ADLs.  (approp and ongoing)  2.  Pt will demonstrate increased MedX average isometric strength value  by 30% from initial test resulting in improved ability to perform bending, lifting, and carrying activities safely, confidently.  (approp and ongoing)  3.  Patient report a reduction in worst pain score by 1-2 points for improved tolerance for standing and walking in and around her house.  (approp and ongoing)  4.  Pt able to perform HEP correctly with minimal cueing or supervision from therapist to encourage independent management of symptoms. (approp and ongoing)      Long term goals: 10 weeks or 20 visits   1. Pt will demonstrate increased lumbar ROM by at least 9 degrees from initial ROM value, resulting in improved ability to perform functional fwd bending while standing and sitting. (approp and ongoing)  2. Pt will demonstrate increased MedX average isometric strength value  by 50% from initial test resulting in improved ability to perform bending, lifting, and carrying activities safely, confidently.  (approp and ongoing)  3. Pt to demonstrate ability to independently control and reduce their pain through posture positioning and mechanical movements throughout a typical day.  (approp and ongoing)  4.  Pt will demonstrate reduced pain and improved functional outcomes as reported on the FOTO by reaching a limitation score of < or = 40% or less in order to demonstrate subjective improvement in pt's condition.    (approp and ongoing)  5. Pt will demonstrate independence with the HEP at discharge  (approp and ongoing)  6.   "Patient will report being able to do as much sweeping and mopping as she needs to each week with no greater than 3/10 low back pain during or after  (approp and ongoing)      Plan   Outpatient physical therapy 2x week for 10 weeks or 20 visits to include the following:   - Patient education  - Therapeutic exercise  - Manual therapy  - Performance testing   - Neuromuscular Re-education  - Therapeutic activity   - Modalities    Pt may be seen by PTA as part of the rehabilitation team.     Therapist: Kp Hernandez, PT  2/27/2023    "I certify the need for these services furnished under this plan of treatment and while under my care."    ____________________________________  Physician/Referring Practitioner    _______________  Date of Signature          "

## 2023-03-07 ENCOUNTER — OFFICE VISIT (OUTPATIENT)
Dept: OTOLARYNGOLOGY | Facility: CLINIC | Age: 73
End: 2023-03-07
Payer: MEDICARE

## 2023-03-07 VITALS — HEIGHT: 64 IN | BODY MASS INDEX: 42.73 KG/M2 | WEIGHT: 250.31 LBS

## 2023-03-07 DIAGNOSIS — J34.2 DEVIATED NASAL SEPTUM: ICD-10-CM

## 2023-03-07 DIAGNOSIS — R09.89 THROAT CLEARING: ICD-10-CM

## 2023-03-07 DIAGNOSIS — R49.0 HOARSENESS: ICD-10-CM

## 2023-03-07 DIAGNOSIS — R05.9 COUGH, UNSPECIFIED TYPE: ICD-10-CM

## 2023-03-07 DIAGNOSIS — J34.3 HYPERTROPHY OF INFERIOR NASAL TURBINATE: Primary | ICD-10-CM

## 2023-03-07 PROCEDURE — 1101F PT FALLS ASSESS-DOCD LE1/YR: CPT | Mod: CPTII,S$GLB,, | Performed by: OTOLARYNGOLOGY

## 2023-03-07 PROCEDURE — 99203 OFFICE O/P NEW LOW 30 MIN: CPT | Mod: 25,S$GLB,, | Performed by: OTOLARYNGOLOGY

## 2023-03-07 PROCEDURE — 31575 DIAGNOSTIC LARYNGOSCOPY: CPT | Mod: S$GLB,,, | Performed by: OTOLARYNGOLOGY

## 2023-03-07 PROCEDURE — 3051F HG A1C>EQUAL 7.0%<8.0%: CPT | Mod: CPTII,S$GLB,, | Performed by: OTOLARYNGOLOGY

## 2023-03-07 PROCEDURE — 3288F PR FALLS RISK ASSESSMENT DOCUMENTED: ICD-10-PCS | Mod: CPTII,S$GLB,, | Performed by: OTOLARYNGOLOGY

## 2023-03-07 PROCEDURE — 3008F BODY MASS INDEX DOCD: CPT | Mod: CPTII,S$GLB,, | Performed by: OTOLARYNGOLOGY

## 2023-03-07 PROCEDURE — 1126F AMNT PAIN NOTED NONE PRSNT: CPT | Mod: CPTII,S$GLB,, | Performed by: OTOLARYNGOLOGY

## 2023-03-07 PROCEDURE — 1159F PR MEDICATION LIST DOCUMENTED IN MEDICAL RECORD: ICD-10-PCS | Mod: CPTII,S$GLB,, | Performed by: OTOLARYNGOLOGY

## 2023-03-07 PROCEDURE — 3051F PR MOST RECENT HEMOGLOBIN A1C LEVEL 7.0 - < 8.0%: ICD-10-PCS | Mod: CPTII,S$GLB,, | Performed by: OTOLARYNGOLOGY

## 2023-03-07 PROCEDURE — 3008F PR BODY MASS INDEX (BMI) DOCUMENTED: ICD-10-PCS | Mod: CPTII,S$GLB,, | Performed by: OTOLARYNGOLOGY

## 2023-03-07 PROCEDURE — 1126F PR PAIN SEVERITY QUANTIFIED, NO PAIN PRESENT: ICD-10-PCS | Mod: CPTII,S$GLB,, | Performed by: OTOLARYNGOLOGY

## 2023-03-07 PROCEDURE — 31575 PR LARYNGOSCOPY, FLEXIBLE; DIAGNOSTIC: ICD-10-PCS | Mod: S$GLB,,, | Performed by: OTOLARYNGOLOGY

## 2023-03-07 PROCEDURE — 3288F FALL RISK ASSESSMENT DOCD: CPT | Mod: CPTII,S$GLB,, | Performed by: OTOLARYNGOLOGY

## 2023-03-07 PROCEDURE — 1159F MED LIST DOCD IN RCRD: CPT | Mod: CPTII,S$GLB,, | Performed by: OTOLARYNGOLOGY

## 2023-03-07 PROCEDURE — 99203 PR OFFICE/OUTPT VISIT, NEW, LEVL III, 30-44 MIN: ICD-10-PCS | Mod: 25,S$GLB,, | Performed by: OTOLARYNGOLOGY

## 2023-03-07 PROCEDURE — 1101F PR PT FALLS ASSESS DOC 0-1 FALLS W/OUT INJ PAST YR: ICD-10-PCS | Mod: CPTII,S$GLB,, | Performed by: OTOLARYNGOLOGY

## 2023-03-07 NOTE — PROGRESS NOTES
OTOLARYNGOLOGY CLINIC NOTE  Date:  03/07/2023     Chief complaint:  Chief Complaint   Patient presents with    Other     CONSTANTLY CLEARING THROAT, DENIES VOICE CHANGE       History of Present Illness  Bette Johnson is a 73 y.o. female  presenting today for a new evaluation and   treatment of throat clearing    No hoarseness  No issues with swallowing  Has heartburn but does not take any medication , takes tums otc   Does not drink a lot of caffeine , drinks water  No dry mouth   When grew up would get laryngitis a lot q 3 months but took sinus tablets and has not had issue with that since that ; last time lost voice was in 2020 with covid.     Has to strain some with singing but voice not problem with singing, after voice rest she is ok       Past Medical History  Past Medical History:   Diagnosis Date    Chronic kidney disease, stage 3a 2/4/2022    Diabetes mellitus, type 2     Hypertension     Severe obesity (BMI >= 40) 7/12/2017        Past Surgical History  Past Surgical History:   Procedure Laterality Date    CHOLECYSTECTOMY      COLONOSCOPY N/A 1/25/2018    Procedure: COLONOSCOPY;  Surgeon: Jeffrey Solano MD;  Location: [facility];  Service: Endoscopy;  Laterality: N/A;    ERCP N/A 2/17/2021    Procedure: ERCP (ENDOSCOPIC RETROGRADE CHOLANGIOPANCREATOGRAPHY);  Surgeon: Emanuel Turner MD;  Location: [facility] ([address] [place] [address]);  Service: Endoscopy;  Laterality: N/A;        Medications  Current Outpatient Medications on File Prior to Visit   Medication Sig Dispense Refill    amLODIPine (NORVASC) 10 MG tablet Take 1 tablet by mouth once daily 90 tablet 3    aspirin (ECOTRIN) 81 MG EC tablet Take 81 mg by mouth once daily.      atorvastatin (LIPITOR) 10 MG tablet Take 1 tablet (10 mg total) by mouth once daily. 90 tablet 1    cholecalciferol, vitamin D3, (VITAMIN D3) 50 mcg (2,000 unit) Cap Take 1 capsule by mouth once daily. gummy      coenzyme Q10 100 mg capsule Take 200 mg by mouth once daily.      LIDOcaine  "(LIDODERM) 5 % Place 1 patch onto the skin once daily. Remove & Discard patch within 12 hours or as directed by MD 15 patch 0    loratadine (CLARITIN) 10 mg tablet Take 10 mg by mouth once daily.      metFORMIN (GLUCOPHAGE) 850 MG tablet Take 1 tablet by mouth once daily with breakfast 90 tablet 0    multivit-minerals/folic acid (WOMEN'S MULTIVITAMIN GUMMIES ORAL) Take by mouth.      valsartan-hydrochlorothiazide (DIOVAN-HCT) 320-25 mg per tablet Take 1 tablet by mouth once daily. 90 tablet 1     No current facility-administered medications on file prior to visit.       Review of Systems  Review of Systems   Constitutional:  Positive for malaise/fatigue.   Eyes: Negative.    Respiratory: Negative.     Cardiovascular: Negative.    Gastrointestinal:  Positive for abdominal pain and constipation.   Genitourinary: Negative.    Musculoskeletal:  Positive for back pain.   Skin: Negative.    Neurological: Negative.    Psychiatric/Behavioral: Negative.      Answers submitted by the patient for this visit:  Review of Symptoms Questionnaire  (Submitted on 3/3/2023)  Food Allergies?: Yes  None of these : Yes  Social History   reports that she has never smoked. She has never used smokeless tobacco. She reports current alcohol use. She reports that she does not use drugs.     Family History  Family History   Problem Relation Age of Onset    Diabetes Mother     Diabetes Sister     Diabetes Brother     Kidney disease Brother     Diabetes Sister     Hypertension Sister     Diabetes Sister     Hypertension Sister     Hyperlipidemia Sister         Physical Exam   There were no vitals filed for this visit. Body mass index is 42.97 kg/m².  Weight: 113.6 kg (250 lb 5.3 oz)   Height: 5' 4" (162.6 cm)     GENERAL: no acute distress.  HEAD: normocephalic.   EYES: lids and lashes normal. No scleral icterus  EARS: external ear without lesion, normal pinna shape and position.  External auditory canal with normal cerumen, tympanic membrane " fully visible, no perforation , no retraction. No middle ear effusion. Ossicles intact.   NOSE: external nose without significant bony abnormality  ORAL CAVITY/OROPHARYNX: tongue midline and mobile. Symmetric palate rise. Uvula midline.   NECK: trachea midline.   LYMPH NODES:No cervical lymphadenopathy.  RESPIRATORY: no stridor, no stertor. Voice normal. Respirations nonlabored.  NEURO: alert, responds to questions appropriately.   PSYCH:mood appropriate    PROCEDURE NOTE  NAME OF PROCEDURE: Flexible Laryngoscopy, diagnostic  INDICATIONS: gag reflex precludes mirror exam,throat clearing/cough  FINDINGS: Deviated septum to right; spur on caudal septum left side  No pseudosuclus    Consent: After procedure was explained in detail and all questions answered, verbal consent was obtained for performing flexible laryngoscopy.  Anesthesia: topical 4% lidocaine and neosynephrine  Procedure: With patient in seated position, the scope was inserted into the bilateral nasal passageway and advanced atraumatically into the nasopharynx to examine the following structures:  Nasal cavity: Turbinates with moderate hypertrophy. no middle meatal edema. No purulent drainage. Deviated septum to right; spur on caudal septum left side  Nasopharynx: no mass or lesion noted in nasopharynx.   Oropharynx: base of tongue without  mass or ulceration. Lingual tonsils normal in appearance  Hypopharynx: posterior pharyngeal wall without mass or lesion. No pooling of secretions. Pyriform sinuses visible without mass or lesion  Larynx: epiglottis normal without lesion. False vocal folds without edema/erythema/lesion. True vocal folds mobile and without lesion. Mild interarytenoid edema no erythema . Postcricoid region with mild edema no lesion No pseudosuclus  Subglottis: visualized portion of subglottis normal in appearance    After examination performed, the scope was removed atraumatically . The patient tolerated the procedure well.  Photodocumentation obtained with representative images below, all images and/or videos uploaded in media section of epic.                Imaging:  The patient does not have any pertinent and/or recent imaging of the head and neck.     Labs:  CBC  Recent Labs   Lab 07/30/21  0811 08/01/22  0903 01/30/23  0736   WBC 5.05 4.72 4.92   Hemoglobin 13.5 13.6 13.9   Hematocrit 41.0 42.6 43.8   MCV 87 88 89   Platelets 310 287 296     BMP  Recent Labs   Lab 02/18/21  1322 02/19/21  0624 02/20/21  0511 03/05/21  0933 01/27/22  0816 08/01/22  0903 01/30/23  0736   Glucose 149 H 127 H 117 H   < > 170 H 252 H 166 H   Sodium 138 142 138   < > 137 137 141   Potassium 3.5 3.8 3.8   < > 4.0 3.7 4.5   Chloride 103 106 102   < > 100 101 106   CO2 27 28 27   < > 28 26 25   BUN 13 11 10   < > 16 16 15   Creatinine 1.0 0.9 0.9   < > 1.1 1.0 1.1   Calcium 8.6 L 9.1 8.7   < > 8.9 9.4 9.7   Phosphorus 1.9 L 3.5 3.7  --   --   --   --    Magnesium 1.9 1.9 1.9  --   --   --   --     < > = values in this interval not displayed.     COAGS  Recent Labs   Lab 02/18/21  1322 02/19/21  0625 02/20/21  0511   INR 1.0 1.0 1.0       Assessment  1. Throat clearing  - Ambulatory referral/consult to ENT    2. Cough, unspecified type  - Ambulatory referral/consult to ENT    3. Hypertrophy of inferior nasal turbinate    4. Deviated nasal septum       Plan:  Discussed plan of care with patient in detail and all questions answered. Patient reported understanding of plan of care.     Discussed about pathophysiology of throat clearing and differential diagnosis and mgmt/workup options.     Does not want to try nasal sprays       Does not want to try ppi or nasal sprays she is trying to stay away from meds  Gaviscon instead of tums  Vocal hygiene techniques and otc meds/gargles etc discussed  Offered visit with slp, she prefers to hold on slp for now, will message me if not better and can do slp. Counseled to message if ever gets hoarse and not resolving in 2  weeks for repeat scope       I spent a total of 35 minutes on the day of the visit.  This includes face to face time and non-face to face time preparing to see the patient (eg, review of tests), obtaining and/or reviewing separately obtained history, documenting clinical information in the electronic or other health record, independently interpreting results and communicating results to the patient/family/caregiver, or care coordinator.    Please be aware that this note has been generated with the assistance of MModal voice-to-text.  Please excuse any spelling or grammatical errors.

## 2023-03-07 NOTE — PATIENT INSTRUCTIONS
Clearing your throat leads to more swelling in the voice box.  This will worsen your symptoms.  You should try to minimize throat clearing as much as possible.    Can try gaviscon liquid over the counter - take 15 minutes after a meal as needed for throat phlegm    Avoid mouthwash with alcohol such as listerine. You should use biotene mouth wash    Can sleep with bedside humidifier     You should aim to drink 2 to 3 liters of water daily if you are drinking one cup of coffee.  If you have trouble drinking water, you can cut back or cut out caffeine. If you have trouble drinking water, you can cut back or cut out caffeine.    Lozenges:  Halls Breezers - sold with cough drops, Can try sugar free lozenges with pectin ,  such as halls fruit breezers    Xylimelts, on toothpaste aisle, these are convenient for when you are talking and or sleeping - they stick to gumline and provide moisture - [employer] or Amazon        Steam and gargle - 3x day  You may consider getting a facial steamer, breathe in warm moist air  through mouth and nose to hydrate vocal folds. On amazon, I like the [employer]air version that is under $25.        Gargle:   1/2 tsp each salt, baking soda, clear corn syrup, 6 oz warm water  Sip, gargle quietly, spit, repeat until gone, don't eat/drink for 30 minutes after.      Chew gum with baking soda after meals, Orbit White     Order online, when it's time to get more Gaviscon, either Alginate therapy such as Reflux Gourmet  or Gaviscon Advance can be used following meals and at bedtime for reflux coverage. The Acid Watcher Diet by Dr Rodriguez as well as the Chronic Cough Dupont by Dr Cope are good reads to gain improved understanding of dietary and behavioral changes that can aid in reduction of LPRD, and to better understand cough and cough control     www.acidwatcher.com

## 2023-03-08 ENCOUNTER — CLINICAL SUPPORT (OUTPATIENT)
Dept: REHABILITATION | Facility: HOSPITAL | Age: 73
End: 2023-03-08
Attending: FAMILY MEDICINE
Payer: MEDICARE

## 2023-03-08 DIAGNOSIS — R29.898 DECREASED STRENGTH OF TRUNK AND BACK: Primary | ICD-10-CM

## 2023-03-08 PROCEDURE — 97112 NEUROMUSCULAR REEDUCATION: CPT | Mod: PN,CQ

## 2023-03-08 PROCEDURE — 97110 THERAPEUTIC EXERCISES: CPT | Mod: PN,CQ

## 2023-03-08 NOTE — PROGRESS NOTES
[facility]      Name: Bette Johnson  Clinic Number: [id number]    Therapy Diagnosis:   Encounter Diagnosis   Name Primary?    Decreased strength of trunk and back Yes     Physician: Neeta Estrada MD    Visit Date: 3/8/2023    Physician Orders: PT Eval and Treat   Medical Diagnosis from Referral: Chronic midline low back pain with sciatica, sciatica laterality unspecified  Evaluation Date: 2023  Authorization Period Expiration: 2024  Plan of Care Expiration: 2023  Reassessment Due: 3/27/2023  Visit # / Visits authorized:       Time In: 1000AM  Time Out: 1051AM  Total Billable Time: 51 minutes  Insurance type:  Fee for service Insurance Patient    Precautions: Standard     Pattern of pain determined: 1 (future PEN)    Subjective   Bette reports she really only gets pain with transitional movements like getting up from a chair. Her back will also tighten up when she is walking. She had some pain down her left leg when she got up but when she moves it gets better. She has not started her HEP yet.     Patient reports tolerating previous visit fine.  Patient reports their pain to be  0 /10 on a 0-10 scale with 0 being no pain and 10 being the worst pain imaginable.  Pain Location: across low back or down L leg      Occupation: Retired  Leisure: Spending time with family, walking  Pts goals: Sweep and mop without any issues     Objective     Baseline IM Testing Results:   Baseline Isometric Testing on Med X equipment: Testing administered by PT  Date of testin2023  ROM 0-36 deg   Max Peak Torque 105    Min Peak Torque 56    Flex/Ext Ratio 2:1   % below normative data 51            Limitation/Restriction for FOTO 2023 Survey     Therapist reviewed FOTO scores for Bette Johnson on 2023.   FOTO documents entered into [employer] - see Media section.     Limitation Score: 52%             Treatment    Bette received the treatments listed below:   "      Bette received neuromuscular education  to isolate and engage spinal stabilization musculature correctly for motor control and coordination to aid in function and posture for 15 minutes on the Medical Medx Machine.  Patient performed MedX dynamic exercise with emphasis on spinal muscular control using pacer throughout  active range of motion. Therapist assisted patient in achieving optimal exertion for neural reeducation and endurance training by using the  Tatiana Exertion Rating scale, by instructing the patient to aim for mid range of exertion, performing 15-20 repetitions, slowly, correctly,and safely.   Additional neuromuscular activities  to improve: Coordination and Posture included:    [facility] 3/8/2023   Visit Number 2   VAS Pain Rating 0   Treadmill Time (in min.) 6   Lumbar Extension Seat Pad -   Femur Restraint -   Top Dead Center -   Counterweight -   Lumbar Flexion -   Lumbar Extension -   Lumbar Peak Torque -   Min Torque -   Test Percent Below Normative Data -   Lumbar Weight 52   Repetitions 15   Rating of Perceived Exertion 3   Ice - Z Lie (in min.) -       therapeutic exercises to develop strength, endurance, ROM, flexibility, posture, and core stabilization for 36 minutes including:  HSS 2 x 30"  DKTC, 10 x 10"  LTR, x 10 ea   EIS, 10 x 3"  EIL x 10    Peripheral muscle strengthening which included 1 set of 15-20 repetitions at a slow, controlled 10-13 second per rep pace focused on strengthening supporting musculature for improved body mechanics and functional mobility.  Pt and therapist focused on proper form during treatment to ensure optimal strengthening of each targeted muscle group.  Machines were utilized including torso rotation, leg extension, leg curl, chest press, upright row. Tricep extension, bicep curl, leg press, and hip abduction added visit 3    therapeutic activities to improve functional performance for 00 minutes, including:    manual therapy techniques: " Null          Home Exercises Provided and Patient Education Provided   Stretching: EIL, EIS, LTR, DKTC, HSS  Strengthening:   Cardio program (V5):   Lifting education (V11):  Posture/ Using Lumbar Roll:  Fridge Magnet Discharge handout (date given):    Education provided:   - HEP compliance  -program progression      Written Home Exercises Provided: yes.  HEP AS FOLLOWS:  HSS, DKTC, LTR, EIS, EIL (within comfortable range)  Exercises were reviewed and Bette was able to demonstrate them prior to the end of the session.  Bette demonstrated good  understanding of the education provided.      See EMR under Patient Instructions for exercises provided 2/27/2023.  Assessment   Bette tolerated first treatment visit well. Reviewed HEP and stressed the importance of HEP compliance to maximize therapeutic outcomes. MedX was performed at 52ft lbs with 15 reps performed at an exertion rating of 3/10. Pt requires verbal cues for speed and tempo, for proper breathing, using lumbar extensors vs LE and for proper foot placement. Introduced pt to Matrix upper extremity strengthening without issue. Will progress as tolerated.         Patient is making good progress towards established goals.  Pt will continue to benefit from skilled outpatient physical therapy to address the deficits stated in the impairment chart, provide pt/family education and to maximize pt's level of independence in the home and community environment.     Anticipated Barriers for therapy: None   Pt's spiritual, cultural and educational needs considered and pt agreeable to plan of care and goals as stated below:       GOALS: Pt is in agreement with the following goals.     Short term goals:  6 weeks or 10 visits   1.  Pt will demonstrate increased lumbar ROM by at least 6 degrees from the initial ROM value with improvements noted in functional ROM and ability to perform ADLs.  (approp and ongoing)  2.  Pt will demonstrate increased MedX average isometric  strength value  by 30% from initial test resulting in improved ability to perform bending, lifting, and carrying activities safely, confidently.  (approp and ongoing)  3.  Patient report a reduction in worst pain score by 1-2 points for improved tolerance for standing and walking in and around her house.  (approp and ongoing)  4.  Pt able to perform HEP correctly with minimal cueing or supervision from therapist to encourage independent management of symptoms. (approp and ongoing)        Long term goals: 10 weeks or 20 visits   1. Pt will demonstrate increased lumbar ROM by at least 9 degrees from initial ROM value, resulting in improved ability to perform functional fwd bending while standing and sitting. (approp and ongoing)  2. Pt will demonstrate increased MedX average isometric strength value  by 50% from initial test resulting in improved ability to perform bending, lifting, and carrying activities safely, confidently.  (approp and ongoing)  3. Pt to demonstrate ability to independently control and reduce their pain through posture positioning and mechanical movements throughout a typical day.  (approp and ongoing)  4.  Pt will demonstrate reduced pain and improved functional outcomes as reported on the FOTO by reaching a limitation score of < or = 40% or less in order to demonstrate subjective improvement in pt's condition.    (approp and ongoing)  5. Pt will demonstrate independence with the HEP at discharge  (approp and ongoing)  6.  Patient will report being able to do as much sweeping and mopping as she needs to each week with no greater than 3/10 low back pain during or after  (approp and ongoing)  Plan   Continue with established Plan of Care towards established PT goals.       Therapist: Bhaskar Calvillo, PTA  3/8/2023

## 2023-03-16 ENCOUNTER — PATIENT MESSAGE (OUTPATIENT)
Dept: ADMINISTRATIVE | Facility: OTHER | Age: 73
End: 2023-03-16
Payer: MEDICARE

## 2023-03-16 ENCOUNTER — OFFICE VISIT (OUTPATIENT)
Dept: FAMILY MEDICINE | Facility: CLINIC | Age: 73
End: 2023-03-16
Payer: MEDICARE

## 2023-03-16 VITALS
HEIGHT: 64 IN | WEIGHT: 250.44 LBS | OXYGEN SATURATION: 97 % | TEMPERATURE: 98 F | HEART RATE: 56 BPM | DIASTOLIC BLOOD PRESSURE: 60 MMHG | BODY MASS INDEX: 42.76 KG/M2 | SYSTOLIC BLOOD PRESSURE: 118 MMHG | RESPIRATION RATE: 17 BRPM

## 2023-03-16 DIAGNOSIS — Z74.09 OTHER REDUCED MOBILITY: ICD-10-CM

## 2023-03-16 DIAGNOSIS — I70.0 THORACIC AORTA ATHEROSCLEROSIS: ICD-10-CM

## 2023-03-16 DIAGNOSIS — Z00.00 ENCOUNTER FOR PREVENTIVE HEALTH EXAMINATION: Primary | ICD-10-CM

## 2023-03-16 DIAGNOSIS — E66.01 MORBID OBESITY WITH BMI OF 40.0-44.9, ADULT: ICD-10-CM

## 2023-03-16 DIAGNOSIS — E11.22 TYPE 2 DIABETES MELLITUS WITH STAGE 3A CHRONIC KIDNEY DISEASE, WITHOUT LONG-TERM CURRENT USE OF INSULIN: ICD-10-CM

## 2023-03-16 DIAGNOSIS — I10 ESSENTIAL HYPERTENSION: ICD-10-CM

## 2023-03-16 DIAGNOSIS — N18.31 TYPE 2 DIABETES MELLITUS WITH STAGE 3A CHRONIC KIDNEY DISEASE, WITHOUT LONG-TERM CURRENT USE OF INSULIN: ICD-10-CM

## 2023-03-16 PROBLEM — R78.81 BACTEREMIA DUE TO GRAM-NEGATIVE BACTERIA: Status: RESOLVED | Noted: 2021-02-17 | Resolved: 2023-03-16

## 2023-03-16 PROCEDURE — 1159F PR MEDICATION LIST DOCUMENTED IN MEDICAL RECORD: ICD-10-PCS | Mod: CPTII,S$GLB,, | Performed by: PHYSICIAN ASSISTANT

## 2023-03-16 PROCEDURE — G0439 PPPS, SUBSEQ VISIT: HCPCS | Mod: S$GLB,,, | Performed by: PHYSICIAN ASSISTANT

## 2023-03-16 PROCEDURE — 1170F FXNL STATUS ASSESSED: CPT | Mod: CPTII,S$GLB,, | Performed by: PHYSICIAN ASSISTANT

## 2023-03-16 PROCEDURE — 3288F PR FALLS RISK ASSESSMENT DOCUMENTED: ICD-10-PCS | Mod: CPTII,S$GLB,, | Performed by: PHYSICIAN ASSISTANT

## 2023-03-16 PROCEDURE — 1170F PR FUNCTIONAL STATUS ASSESSED: ICD-10-PCS | Mod: CPTII,S$GLB,, | Performed by: PHYSICIAN ASSISTANT

## 2023-03-16 PROCEDURE — 3288F FALL RISK ASSESSMENT DOCD: CPT | Mod: CPTII,S$GLB,, | Performed by: PHYSICIAN ASSISTANT

## 2023-03-16 PROCEDURE — 3008F BODY MASS INDEX DOCD: CPT | Mod: CPTII,S$GLB,, | Performed by: PHYSICIAN ASSISTANT

## 2023-03-16 PROCEDURE — 3074F SYST BP LT 130 MM HG: CPT | Mod: CPTII,S$GLB,, | Performed by: PHYSICIAN ASSISTANT

## 2023-03-16 PROCEDURE — 3074F PR MOST RECENT SYSTOLIC BLOOD PRESSURE < 130 MM HG: ICD-10-PCS | Mod: CPTII,S$GLB,, | Performed by: PHYSICIAN ASSISTANT

## 2023-03-16 PROCEDURE — 3078F PR MOST RECENT DIASTOLIC BLOOD PRESSURE < 80 MM HG: ICD-10-PCS | Mod: CPTII,S$GLB,, | Performed by: PHYSICIAN ASSISTANT

## 2023-03-16 PROCEDURE — 1160F RVW MEDS BY RX/DR IN RCRD: CPT | Mod: CPTII,S$GLB,, | Performed by: PHYSICIAN ASSISTANT

## 2023-03-16 PROCEDURE — 99999 PR PBB SHADOW E&M-EST. PATIENT-LVL V: CPT | Mod: PBBFAC,,, | Performed by: PHYSICIAN ASSISTANT

## 2023-03-16 PROCEDURE — 3051F PR MOST RECENT HEMOGLOBIN A1C LEVEL 7.0 - < 8.0%: ICD-10-PCS | Mod: CPTII,S$GLB,, | Performed by: PHYSICIAN ASSISTANT

## 2023-03-16 PROCEDURE — 3051F HG A1C>EQUAL 7.0%<8.0%: CPT | Mod: CPTII,S$GLB,, | Performed by: PHYSICIAN ASSISTANT

## 2023-03-16 PROCEDURE — G0439 PR MEDICARE ANNUAL WELLNESS SUBSEQUENT VISIT: ICD-10-PCS | Mod: S$GLB,,, | Performed by: PHYSICIAN ASSISTANT

## 2023-03-16 PROCEDURE — 3078F DIAST BP <80 MM HG: CPT | Mod: CPTII,S$GLB,, | Performed by: PHYSICIAN ASSISTANT

## 2023-03-16 PROCEDURE — 99999 PR PBB SHADOW E&M-EST. PATIENT-LVL V: ICD-10-PCS | Mod: PBBFAC,,, | Performed by: PHYSICIAN ASSISTANT

## 2023-03-16 PROCEDURE — 1101F PR PT FALLS ASSESS DOC 0-1 FALLS W/OUT INJ PAST YR: ICD-10-PCS | Mod: CPTII,S$GLB,, | Performed by: PHYSICIAN ASSISTANT

## 2023-03-16 PROCEDURE — 3008F PR BODY MASS INDEX (BMI) DOCUMENTED: ICD-10-PCS | Mod: CPTII,S$GLB,, | Performed by: PHYSICIAN ASSISTANT

## 2023-03-16 PROCEDURE — 1101F PT FALLS ASSESS-DOCD LE1/YR: CPT | Mod: CPTII,S$GLB,, | Performed by: PHYSICIAN ASSISTANT

## 2023-03-16 PROCEDURE — 1160F PR REVIEW ALL MEDS BY PRESCRIBER/CLIN PHARMACIST DOCUMENTED: ICD-10-PCS | Mod: CPTII,S$GLB,, | Performed by: PHYSICIAN ASSISTANT

## 2023-03-16 PROCEDURE — 1159F MED LIST DOCD IN RCRD: CPT | Mod: CPTII,S$GLB,, | Performed by: PHYSICIAN ASSISTANT

## 2023-03-16 NOTE — PATIENT INSTRUCTIONS
Counseling and Referral of Other Preventative  (Italic type indicates deductible and co-insurance are waived)    Patient Name: Bette Johnson  Today's Date: 3/16/2023    Health Maintenance       Date Due Completion Date    Diabetes Urine Screening 01/11/2023 1/11/2022    Colorectal Cancer Screening 01/25/2023 1/25/2018    Hemoglobin A1c 07/30/2023 1/30/2023    Mammogram 11/11/2023 11/11/2022    Override on 7/14/2016: Done    Eye Exam 12/12/2023 12/12/2022    Override on 12/10/2021: Done (Dr. KWON)    Override on 6/19/2017: Done    Lipid Panel 01/30/2024 1/30/2023    Foot Exam 02/09/2024 2/9/2023    Override on 2/9/2023: Done    Override on 1/11/2022: Done    Override on 4/19/2019: Done    Override on 6/11/2018: Done    High Dose Statin 03/16/2024 3/16/2023    TETANUS VACCINE 02/02/2031 2/2/2021        No orders of the defined types were placed in this encounter.    The following information is provided to all patients.  This information is to help you find resources for any of the problems found today that may be affecting your health:                Living healthy guide: www.[facility].[place].gov      Understanding Diabetes: www.diabetes.org      Eating healthy: www.cdc.gov/healthyweight      CDC home safety checklist: www.cdc.gov/steadi/patient.html      Agency on Aging: www.goea.[place].[facility]      Alcoholics anonymous (AA): www.aa.org      Physical Activity: www.allie.nih.gov/ib6sgqv      Tobacco use: www.quitwithusla.org

## 2023-03-16 NOTE — PROGRESS NOTES
"  Bette Johnson presented for a  Medicare AWV and comprehensive Health Risk Assessment today. The following components were reviewed and updated:    Medical history  Family History  Social history  Allergies and Current Medications  Health Risk Assessment  Health Maintenance  Care Team         ** See Completed Assessments for Annual Wellness Visit within the encounter summary.**         The following assessments were completed:  Living Situation  CAGE  Depression Screening  Timed Get Up and Go  Whisper Test  Cognitive Function Screening  Nutrition Screening  ADL Screening  PAQ Screening        Vitals:    03/16/23 0923   BP: 118/60   BP Location: Left arm   Patient Position: Sitting   BP Method: Large (Manual)   Pulse: (!) 56   Resp: 17   Temp: 97.9 °F (36.6 °C)   TempSrc: Oral   SpO2: 97%   Weight: 113.6 kg (250 lb 7.1 oz)   Height: 5' 4" (1.626 m)     Body mass index is 42.99 kg/m².  Physical Exam          Diagnoses and health risks identified today and associated recommendations/orders:    1. Encounter for preventive health examination  Provided Bette with a 5-10 year written screening schedule and personal prevention plan. Recommendations were developed using the USPSTF age appropriate recommendations. Education, counseling, and referrals were provided as needed. After Visit Summary printed and given to patient which includes a list of additional screenings\tests needed.    2. Other reduced mobility      3. Type 2 diabetes mellitus with stage 3a chronic kidney disease, without long-term current use of insulin  Stable followed by pcp  - Diabetes Digital Medicine (DDMP) Enrollment Order  - Diabetes Digital Medicine (DDMP): Assign Onboarding Questionnaires    4. Thoracic aorta atherosclerosis  Continue statin    5. Morbid obesity with BMI of 40.0-44.9, adult  Diet and exercise    6. Essential hypertension  - Hypertension Digital Medicine (HDMP) Enrollment Order  - Hypertension Digital Medicine (HDMP): Assign " Onboarding Questionnaires      Review for Opioid Screening: Patient does not have rx for Opioids.    Review for Substance Use Disorders: Patient does not use substance.      No follow-ups on file.    Mray Dillard PA-C  I offered to discuss advanced care planning, including how to pick a person who would make decisions for you if you were unable to make them for yourself, called a health care power of , and what kind of decisions you might make such as use of life sustaining treatments such as ventilators and tube feeding when faced with a life limiting illness recorded on a living will that they will need to know. (How you want to be cared for as you near the end of your natural life)     X Patient is interested in learning more about how to make advanced directives.  I provided them paperwork and offered to discuss this with them.

## 2023-03-22 ENCOUNTER — CLINICAL SUPPORT (OUTPATIENT)
Dept: REHABILITATION | Facility: HOSPITAL | Age: 73
End: 2023-03-22
Attending: FAMILY MEDICINE
Payer: MEDICARE

## 2023-03-22 DIAGNOSIS — R29.898 DECREASED STRENGTH OF TRUNK AND BACK: Primary | ICD-10-CM

## 2023-03-22 PROCEDURE — 97112 NEUROMUSCULAR REEDUCATION: CPT | Mod: PN | Performed by: PHYSICAL MEDICINE & REHABILITATION

## 2023-03-22 PROCEDURE — 97110 THERAPEUTIC EXERCISES: CPT | Mod: PN | Performed by: PHYSICAL MEDICINE & REHABILITATION

## 2023-03-22 NOTE — PROGRESS NOTES
[facility]      Name: Bette Johnson  Clinic Number: [id number]    Therapy Diagnosis:   Encounter Diagnosis   Name Primary?    Decreased strength of trunk and back Yes       Physician: Neeta Estrada MD    Visit Date: 3/22/2023    Physician Orders: PT Eval and Treat   Medical Diagnosis from Referral: Chronic midline low back pain with sciatica, sciatica laterality unspecified  Evaluation Date: 2023  Authorization Period Expiration: 2024  Plan of Care Expiration: 2023  Reassessment Due: 23  Visit # / Visits authorized: 3/20      Time In: 1000AM  Time Out: 1100AM  Total Billable Time: 59 minutes  Insurance type:  Fee for service Insurance Patient    Precautions: Standard     Pattern of pain determined: 1 (future PEN)    Subjective   Bette reports she has not done her exercises.   She isn't motivated.  We discussed could she commit to just one and she said yes.  We reviewed HEP and she decided to commit to pelvic tilts and seated flexion, both of which gave her some relief today.  She gets pain with transitional movements like getting up from a chair, and with standing/walking long distances. Her back will also tighten up when she is walking. She had some pain down her left leg when she got up but when she moves it gets better. She has not started her HEP yet.     Patient reports tolerating previous visit fine.  Patient reports their pain to be  0 /10 on a 0-10 scale with 0 being no pain and 10 being the worst pain imaginable.  Pain Location: across low back or down L leg      Occupation: Retired  Leisure: Spending time with family, walking  Pts goals: Sweep and mop without any issues     Objective     Baseline IM Testing Results:   Baseline Isometric Testing on Med X equipment: Testing administered by PT  Date of testin2023  ROM 0-36 deg   Max Peak Torque 105    Min Peak Torque 56    Flex/Ext Ratio 2:1   % below normative data 51            MOVEMENT  LOSS Back    Norms ROM Loss Initial 3/22/23   Flexion Fingers touch toes, sacral angle >/= 70 deg, uniform spinal curvature, posterior weight shift  minimal loss Min loss   Extension ASIS surpasses toes, spine of scapulae surpasses heels, uniform spinal curve moderate loss Min loss   Side glide Right   minimal loss Min loss   Side glide Left   minimal loss Min loss   Rotation Right PT observes contralateral shoulder moderate loss Min loss   Rotation Left PT observes contralateral shoulder moderate loss Min loss      Limitation/Restriction for FOTO 2/27/2023 Survey     Therapist reviewed FOTO scores for Bette Johnson on 2/27/2023.   FOTO documents entered into [employer] - see Media section.     Limitation Score: 52%             Treatment    Bette received the treatments listed below:        Bette received neuromuscular education  to isolate and engage spinal stabilization musculature correctly for motor control and coordination to aid in function and posture for 15 minutes on the Medical [employer] Machine.  Patient performed MedX dynamic exercise with emphasis on spinal muscular control using pacer throughout  active range of motion. Therapist assisted patient in achieving optimal exertion for neural reeducation and endurance training by using the  Tatiana Exertion Rating scale, by instructing the patient to aim for mid range of exertion, performing 15-20 repetitions, slowly, correctly,and safely.   Additional neuromuscular activities  to improve: Coordination and Posture included:    [facility] 3/22/2023   Visit Number 3   VAS Pain Rating 0   Treadmill Time (in min.) 7   Lumbar Stretches - Slouch Overcorrection 10   Flexion in Lying 10   Flexion in Sitting 10   Lumbar Extension Seat Pad -   Femur Restraint -   Top Dead Center -   Counterweight -   Lumbar Flexion -   Lumbar Extension -   Lumbar Peak Torque -   Min Torque -   Test Percent Below Normative Data -   Lumbar Weight 52   Repetitions 20   Rating of  "Perceived Exertion 3   Ice - Z Lie (in min.) 5              therapeutic exercises to develop strength, endurance, ROM, flexibility, posture, and core stabilization for 42 minutes including:  HSS 2 x 30"  DKTC, 10 x 10"  LTR, x 10 ea   EIS, 10 x 3"  EIL x 10  Pelvic tilts 10 reps (felt good and she agreed to doing at home)  Seated flexion 10 reps  (abolished back pain after treadmill and she agreed to doing at home)      Peripheral muscle strengthening which included 1 set of 15-20 repetitions at a slow, controlled 10-13 second per rep pace focused on strengthening supporting musculature for improved body mechanics and functional mobility.  Pt and therapist focused on proper form during treatment to ensure optimal strengthening of each targeted muscle group.  Machines were utilized including torso rotation, leg extension, leg curl, chest press, upright row. Tricep extension, bicep curl, leg press, and hip abduction and hip add    therapeutic activities to improve functional performance for 00 minutes, including:    manual therapy techniques: Null          Home Exercises Provided and Patient Education Provided   Stretching: EIL, EIS, LTR, DKTC, HSS given at [facility].  She has not done 3/22/23 and we updated HEP to flexion seated and pelvic tilts  Strengthening:   Cardio program (V5):   Lifting education (V11):  Posture/ Using Lumbar Roll:  [employer] Discharge handout (date given):    Education provided:   - HEP compliance, changing HEP to what she feels she can do at this time, progress in future  -program progression      Written Home Exercises Provided: yes.  HEP AS FOLLOWS:  Pelvic tilts, and seated flexion at HEP and continue here other exercises  Exercises were reviewed and Bette was able to demonstrate them prior to the end of the session.  Bette demonstrated good  understanding of the education provided.      See EMR under Patient Instructions for exercises provided 2/27/2023.  Assessment   Bette " tolerated  treatment visit well.  She was honest to admit she wasn't doing HEP and wasn't motivated to do a lot of exercises. However flexion seated abolished back pain after walking and she agreed to do this at home.  Pelvic tilts also gave good stretch and she agreed to these.  MedX was performed at 52ft lbs with 20 reps performed at an exertion rating of 3/10. Pt requires verbal cues for speed and tempo, for proper breathing, using lumbar extensors vs LE and for proper foot placement. Introduced pt to Matrix lower extremity and continued upper extremity strengthening without issue. Will progress as tolerated.   She will need continued support to make changes to have healthy back        Patient is making good progress towards established goals.  Pt will continue to benefit from skilled outpatient physical therapy to address the deficits stated in the impairment chart, provide pt/family education and to maximize pt's level of independence in the home and community environment.     Anticipated Barriers for therapy: None   Pt's spiritual, cultural and educational needs considered and pt agreeable to plan of care and goals as stated below:       GOALS: Pt is in agreement with the following goals.     Short term goals:  6 weeks or 10 visits   1.  Pt will demonstrate increased lumbar ROM by at least 6 degrees from the initial ROM value with improvements noted in functional ROM and ability to perform ADLs.  (approp and ongoing)  2.  Pt will demonstrate increased MedX average isometric strength value  by 30% from initial test resulting in improved ability to perform bending, lifting, and carrying activities safely, confidently.  (approp and ongoing)  3.  Patient report a reduction in worst pain score by 1-2 points for improved tolerance for standing and walking in and around her house.  (approp and ongoing)  4.  Pt able to perform HEP correctly with minimal cueing or supervision from therapist to encourage independent  management of symptoms. (approp and ongoing)        Long term goals: 10 weeks or 20 visits   1. Pt will demonstrate increased lumbar ROM by at least 9 degrees from initial ROM value, resulting in improved ability to perform functional fwd bending while standing and sitting. (approp and ongoing)  2. Pt will demonstrate increased MedX average isometric strength value  by 50% from initial test resulting in improved ability to perform bending, lifting, and carrying activities safely, confidently.  (approp and ongoing)  3. Pt to demonstrate ability to independently control and reduce their pain through posture positioning and mechanical movements throughout a typical day.  (approp and ongoing)  4.  Pt will demonstrate reduced pain and improved functional outcomes as reported on the FOTO by reaching a limitation score of < or = 40% or less in order to demonstrate subjective improvement in pt's condition.    (approp and ongoing)  5. Pt will demonstrate independence with the HEP at discharge  (approp and ongoing)  6.  Patient will report being able to do as much sweeping and mopping as she needs to each week with no greater than 3/10 low back pain during or after  (approp and ongoing)  Plan   Continue with established Plan of Care towards established PT goals.       Therapist: Reny Arshad, PT  3/22/2023

## 2023-03-29 ENCOUNTER — CLINICAL SUPPORT (OUTPATIENT)
Dept: REHABILITATION | Facility: HOSPITAL | Age: 73
End: 2023-03-29
Attending: FAMILY MEDICINE
Payer: MEDICARE

## 2023-03-29 DIAGNOSIS — R29.898 DECREASED STRENGTH OF TRUNK AND BACK: Primary | ICD-10-CM

## 2023-03-29 PROCEDURE — 97110 THERAPEUTIC EXERCISES: CPT | Mod: PN | Performed by: PHYSICAL MEDICINE & REHABILITATION

## 2023-03-29 PROCEDURE — 97112 NEUROMUSCULAR REEDUCATION: CPT | Mod: PN | Performed by: PHYSICAL MEDICINE & REHABILITATION

## 2023-03-29 NOTE — PROGRESS NOTES
[facility]      Name: Bette Johnson  Clinic Number: [id number]    Therapy Diagnosis:   Encounter Diagnosis   Name Primary?    Decreased strength of trunk and back Yes         Physician: Neeta Estrada MD    Visit Date: 3/29/2023    Physician Orders: PT Eval and Treat   Medical Diagnosis from Referral: Chronic midline low back pain with sciatica, sciatica laterality unspecified  Evaluation Date: 2023  Authorization Period Expiration: 2024  Plan of Care Expiration: 2023  Reassessment Due: 23  Visit # / Visits authorized:       Time In: 1015 AM  Time Out: 1120 AM  Total Billable Time: 59 minutes  Insurance type:  Fee for service Insurance Patient    Precautions: Standard     Pattern of pain determined: 1 (future PEN)    Subjective   Bette reports she has been a little more consistent with stretches.  We discussed motivation previously.  She has committed to pelvic tilts and seated flexion, both of which give  her some relief.  She had no symptoms today.  She found stretching today felt good and she is moving better.  She gets pain with transitional movements like getting up from a chair, and with standing/walking long distances. Her back will also tighten up when she is walking.     Patient reports tolerating previous visit fine.  Patient reports their pain to be  0 /10 on a 0-10 scale with 0 being no pain and 10 being the worst pain imaginable.  Pain Location: across low back or down L leg      Occupation: Retired  Leisure: Spending time with family, walking  Pts goals: Sweep and mop without any issues     Objective     Baseline IM Testing Results:   Baseline Isometric Testing on Med X equipment: Testing administered by PT  Date of testin2023  ROM 0-36 deg increased to 0-39 on 3/29/23   Max Peak Torque 105    Min Peak Torque 56    Flex/Ext Ratio 2:1   % below normative data 51            MOVEMENT LOSS Back    Norms ROM Loss Initial 3/22/23    Flexion Fingers touch toes, sacral angle >/= 70 deg, uniform spinal curvature, posterior weight shift  minimal loss Min loss   Extension ASIS surpasses toes, spine of scapulae surpasses heels, uniform spinal curve moderate loss Min loss   Side glide Right   minimal loss Min loss   Side glide Left   minimal loss Min loss   Rotation Right PT observes contralateral shoulder moderate loss Min loss   Rotation Left PT observes contralateral shoulder moderate loss Min loss      Limitation/Restriction for FOTO 2/27/2023 Survey     Therapist reviewed FOTO scores for Bette Johnson on 2/27/2023.   FOTO documents entered into [employer] - see Media section.     Limitation Score: 52%             Treatment    Bette received the treatments listed below:        Bette received neuromuscular education  to isolate and engage spinal stabilization musculature correctly for motor control and coordination to aid in function and posture for 10 minutes on the Medical [employer] Machine.  Patient performed MedX dynamic exercise with emphasis on spinal muscular control using pacer throughout  active range of motion. Therapist assisted patient in achieving optimal exertion for neural reeducation and endurance training by using the  Tatiana Exertion Rating scale, by instructing the patient to aim for mid range of exertion, performing 15-20 repetitions, slowly, correctly,and safely.   Additional neuromuscular activities  to improve: Coordination and Posture included:    [facility] 3/29/2023   Visit Number 4   VAS Pain Rating 0   Treadmill Time (in min.) 5   Lumbar Stretches - Slouch Overcorrection 10   Flexion in Lying 10   Flexion in Sitting 10   Lumbar Extension Seat Pad -   Femur Restraint -   Top Dead Center -   Counterweight -   Lumbar Flexion 39   Lumbar Extension 0   Lumbar Peak Torque -   Min Torque -   Test Percent Below Normative Data -   Lumbar Weight 56   Repetitions 20   Rating of Perceived Exertion 3   Ice - Z Lie (in min.) 10  "       therapeutic exercises to develop strength, endurance, ROM, flexibility, posture, and core stabilization for 49 minutes including:  HSS 3 x 30"  Hip ext rotation 3 sets 10 sec stretch supine  Piriformis stretch (using towel) 10 sec 3 reps  DKTC, 10 x 10" using ball and towel  LTR, x 10 ea   EIS, 10 x 3" with towel for support  EIL x 10  Pelvic tilts 10 reps    Seated flexion 10 reps      Peripheral muscle strengthening which included 1 set of 15-20 repetitions at a slow, controlled 10-13 second per rep pace focused on strengthening supporting musculature for improved body mechanics and functional mobility.  Pt and therapist focused on proper form during treatment to ensure optimal strengthening of each targeted muscle group.  Machines were utilized including torso rotation, leg extension, leg curl, chest press, upright row. Tricep extension, bicep curl, leg press, and hip abduction and hip add    therapeutic activities to improve functional performance for 00 minutes, including:    manual therapy techniques: Null          Home Exercises Provided and Patient Education Provided   Stretching: EIL, EIS, LTR, DKTC, HSS given at [facility].  She has not done 3/22/23 and we updated HEP to flexion seated and pelvic tilts  Strengthening:   Cardio program (V5):   Lifting education (V11):  Posture/ Using Lumbar Roll: she has small pillow  [employer] Discharge handout (date given):    Education provided:   - HEP compliance, changing HEP to what she feels she can do at this time, progress in future  -program progression      Written Home Exercises Provided: yes.  HEP AS FOLLOWS:  Pelvic tilts, and seated flexion at HEP and continue here other exercises  Exercises were reviewed and Bette was able to demonstrate them prior to the end of the session.  Bette demonstrated good  understanding of the education provided.      See EMR under Patient Instructions for exercises provided 2/27/2023.  Assessment   Bette tolerated  " treatment visit well.  She has been able to be more consistent with fewer exercises at home, including  flexion seated and pelvic tilts.   She notes she feels better on the treadmill than previous.   MedX was performed at increased range 0-39 and increased weight 56 ft lbs with 20 reps performed at an exertion rating of 3/10. Pt requires verbal cues for speed and tempo, for proper breathing, using lumbar extensors vs LE and for proper foot placement. Continued  Matrix lower extremity and continued upper extremity strengthening without issue. Will progress as tolerated.   She will need continued support to make changes to have healthy back        Patient is making good progress towards established goals.  Pt will continue to benefit from skilled outpatient physical therapy to address the deficits stated in the impairment chart, provide pt/family education and to maximize pt's level of independence in the home and community environment.     Anticipated Barriers for therapy: None   Pt's spiritual, cultural and educational needs considered and pt agreeable to plan of care and goals as stated below:       GOALS: Pt is in agreement with the following goals.     Short term goals:  6 weeks or 10 visits   1.  Pt will demonstrate increased lumbar ROM by at least 6 degrees from the initial ROM value with improvements noted in functional ROM and ability to perform ADLs.  (approp and ongoing)  2.  Pt will demonstrate increased MedX average isometric strength value  by 30% from initial test resulting in improved ability to perform bending, lifting, and carrying activities safely, confidently.  (approp and ongoing)  3.  Patient report a reduction in worst pain score by 1-2 points for improved tolerance for standing and walking in and around her house.  (approp and ongoing)  4.  Pt able to perform HEP correctly with minimal cueing or supervision from therapist to encourage independent management of symptoms. (approp and ongoing)         Long term goals: 10 weeks or 20 visits   1. Pt will demonstrate increased lumbar ROM by at least 9 degrees from initial ROM value, resulting in improved ability to perform functional fwd bending while standing and sitting. (approp and ongoing)  2. Pt will demonstrate increased MedX average isometric strength value  by 50% from initial test resulting in improved ability to perform bending, lifting, and carrying activities safely, confidently.  (approp and ongoing)  3. Pt to demonstrate ability to independently control and reduce their pain through posture positioning and mechanical movements throughout a typical day.  (approp and ongoing)  4.  Pt will demonstrate reduced pain and improved functional outcomes as reported on the FOTO by reaching a limitation score of < or = 40% or less in order to demonstrate subjective improvement in pt's condition.    (approp and ongoing)  5. Pt will demonstrate independence with the HEP at discharge  (approp and ongoing)  6.  Patient will report being able to do as much sweeping and mopping as she needs to each week with no greater than 3/10 low back pain during or after  (approp and ongoing)  Plan   Continue with established Plan of Care towards established PT goals.       Therapist: Reny Arshad, PT  3/29/2023

## 2023-04-05 ENCOUNTER — CLINICAL SUPPORT (OUTPATIENT)
Dept: REHABILITATION | Facility: HOSPITAL | Age: 73
End: 2023-04-05
Attending: FAMILY MEDICINE
Payer: MEDICARE

## 2023-04-05 DIAGNOSIS — R29.898 DECREASED STRENGTH OF TRUNK AND BACK: Primary | ICD-10-CM

## 2023-04-05 PROCEDURE — 97112 NEUROMUSCULAR REEDUCATION: CPT | Mod: PN | Performed by: PHYSICAL MEDICINE & REHABILITATION

## 2023-04-05 PROCEDURE — 97110 THERAPEUTIC EXERCISES: CPT | Mod: PN | Performed by: PHYSICAL MEDICINE & REHABILITATION

## 2023-04-05 NOTE — PROGRESS NOTES
[facility]      Name: Bette Johnson  Clinic Number: [id number]    Therapy Diagnosis:   Encounter Diagnosis   Name Primary?    Decreased strength of trunk and back Yes           Physician: Neeta Estrada MD    Visit Date: 2023    Physician Orders: PT Eval and Treat   Medical Diagnosis from Referral: Chronic midline low back pain with sciatica, sciatica laterality unspecified  Evaluation Date: 2023  Authorization Period Expiration: 2024  Plan of Care Expiration: 2023  Reassessment Due: 23  Visit # / Visits authorized:       Time In: 10:00 AM  Time Out: 1110 AM  Total Billable Time: 65 minutes  Insurance type:  Fee for service Insurance Patient    Precautions: Standard     Pattern of pain determined: 1 (future PEN)    Subjective   Bette reports she has been a little more consistent with stretches.   She has committed to pelvic tilts and seated flexion, both of which give  her some relief.  She had no symptoms today.  She found stretching today felt good and she is moving better.  She gets pain with transitional movements like getting up from a chair, and with standing/walking long distances. Her back will also tighten up when she is walking.     Patient reports tolerating previous visit fine.  Patient reports their pain to be  0 /10 on a 0-10 scale with 0 being no pain and 10 being the worst pain imaginable.  Pain Location: across low back or down L leg      Occupation: Retired  Leisure: Spending time with family, walking  Pts goals: Sweep and mop without any issues     Objective     Baseline IM Testing Results:   Baseline Isometric Testing on Med X equipment: Testing administered by PT  Date of testin2023  ROM 0-36 deg increased to 0-39 on 3/29/23   Max Peak Torque 105    Min Peak Torque 56    Flex/Ext Ratio 2:1   % below normative data 51            MOVEMENT LOSS Back    Norms ROM Loss Initial 23   Flexion Fingers touch toes, sacral angle  >/= 70 deg, uniform spinal curvature, posterior weight shift  minimal loss Min loss, difficulty getting full curve reversal   Extension ASIS surpasses toes, spine of scapulae surpasses heels, uniform spinal curve moderate loss Min loss   Side glide Right   minimal loss Min loss   Side glide Left   minimal loss Min loss   Rotation Right PT observes contralateral shoulder moderate loss Min loss   Rotation Left PT observes contralateral shoulder moderate loss Min loss      Limitation/Restriction for FOTO 2/27/2023 Survey     Therapist reviewed FOTO scores for Bette Johnson on 2/27/2023.   FOTO documents entered into [employer] - see Media section.     Limitation Score: 52% limitation  Visit 5:42% limitation             Treatment    Bette received the treatments listed below:        Bette received neuromuscular education  to isolate and engage spinal stabilization musculature correctly for motor control and coordination to aid in function and posture for 10 minutes on the [employer] Machine.  Patient performed MedX dynamic exercise with emphasis on spinal muscular control using pacer throughout  active range of motion. Therapist assisted patient in achieving optimal exertion for neural reeducation and endurance training by using the  Tatiana Exertion Rating scale, by instructing the patient to aim for mid range of exertion, performing 15-20 repetitions, slowly, correctly,and safely.   Additional neuromuscular activities  to improve: Coordination and Posture included:    [facility] 4/5/2023   Visit Number 5   VAS Pain Rating 0   Treadmill Time (in min.) 5   Lumbar Stretches - Slouch Overcorrection 10   Flexion in Lying 10   Flexion in Sitting 10   Lumbar Extension Seat Pad -   Femur Restraint -   Top Dead Center -   Counterweight -   Lumbar Flexion -   Lumbar Extension -   Lumbar Peak Torque -   Min Torque -   Test Percent Below Normative Data -   Lumbar Weight 60   Repetitions 18   Rating of Perceived  "Exertion 3   Ice - Z Lie (in min.) 5      therapeutic exercises to develop strength, endurance, ROM, flexibility, posture, and core stabilization for 49 minutes including:  HSS 3 x 30"  Hip ext rotation 3 sets 10 sec stretch supine  Piriformis stretch (using towel) 10 sec 3 reps  DKTC, 10 x 10" using ball and towel  LTR, x 10 ea   EIS, 10 x 3" with towel for support  EIL x 10  Pelvic tilts 10 reps sitting and standing against wall    Seated flexion 10 reps    Glut bridge 10 reps  Bridge with pelvic tilt and rolling up 10 reps    .hbth  Peripheral muscle strengthening which included 1 set of 15-20 repetitions at a slow, controlled 10-13 second per rep pace focused on strengthening supporting musculature for improved body mechanics and functional mobility.  Pt and therapist focused on proper form during treatment to ensure optimal strengthening of each targeted muscle group.  Machines were utilized including torso rotation, leg extension, leg curl, chest press, upright row. Tricep extension, bicep curl, leg press, and hip abduction and hip add    therapeutic activities to improve functional performance for 00 minutes, including:    manual therapy techniques: Null          Home Exercises Provided and Patient Education Provided   Stretching: EIL, EIS, LTR, DKTC, HSS given at [facility].  She has not done 3/22/23 and we updated HEP to flexion seated and pelvic tilts  Strengthening:   Cardio program (V5): started 4/5/23  Lifting education (V11):  Posture/ Using Lumbar Roll: she has small pillow  Fridge Magnet Discharge handout (date given):    Education provided:   - HEP compliance, changing HEP to what she feels she can do at this time, progress in future  -program progression  -cardio information given and discussed.  She will aim to walk 5-10 min 2/week when not attending here      Written Home Exercises Provided: yes.  HEP AS FOLLOWS:  Pelvic tilts, and seated flexion at HEP and continue here other exercises  Exercises were " reviewed and Bette was able to demonstrate them prior to the end of the session.  Bette demonstrated good  understanding of the education provided.      See EMR under Patient Instructions for exercises provided 2/27/2023.  Assessment   Bette tolerated  treatment visit well.  She has been able to be more consistent with fewer exercises at home, including  flexion seated and pelvic tilts.   She notes she feels better on the treadmill than previous.  FOTO scores improved from 54% limited to 42% limited.  Cardio started and she set goals of 2 walks/week at home. Pt requires verbal cues for speed and tempo, for proper breathing, using lumbar extensors vs LE and for proper foot placement. Continued  Matrix lower extremity and continued upper extremity strengthening without issue. Will progress as tolerated.   She will need continued support to make changes to have healthy back        Patient is making good progress towards established goals.  Pt will continue to benefit from skilled outpatient physical therapy to address the deficits stated in the impairment chart, provide pt/family education and to maximize pt's level of independence in the home and community environment.     Anticipated Barriers for therapy: None   Pt's spiritual, cultural and educational needs considered and pt agreeable to plan of care and goals as stated below:       GOALS: Pt is in agreement with the following goals.     Short term goals:  6 weeks or 10 visits   1.  Pt will demonstrate increased lumbar ROM by at least 6 degrees from the initial ROM value with improvements noted in functional ROM and ability to perform ADLs.  (approp and ongoing)  2.  Pt will demonstrate increased MedX average isometric strength value  by 30% from initial test resulting in improved ability to perform bending, lifting, and carrying activities safely, confidently.  (approp and ongoing)  3.  Patient report a reduction in worst pain score by 1-2 points for improved  tolerance for standing and walking in and around her house.  (approp and ongoing)  4.  Pt able to perform HEP correctly with minimal cueing or supervision from therapist to encourage independent management of symptoms. (approp and ongoing)        Long term goals: 10 weeks or 20 visits   1. Pt will demonstrate increased lumbar ROM by at least 9 degrees from initial ROM value, resulting in improved ability to perform functional fwd bending while standing and sitting. (approp and ongoing)  2. Pt will demonstrate increased MedX average isometric strength value  by 50% from initial test resulting in improved ability to perform bending, lifting, and carrying activities safely, confidently.  (approp and ongoing)  3. Pt to demonstrate ability to independently control and reduce their pain through posture positioning and mechanical movements throughout a typical day.  (approp and ongoing)  4.  Pt will demonstrate reduced pain and improved functional outcomes as reported on the FOTO by reaching a limitation score of < or = 40% or less in order to demonstrate subjective improvement in pt's condition.    (approp and ongoing)  5. Pt will demonstrate independence with the HEP at discharge  (approp and ongoing)  6.  Patient will report being able to do as much sweeping and mopping as she needs to each week with no greater than 3/10 low back pain during or after  (approp and ongoing)  Plan   Continue with established Plan of Care towards established PT goals.       Therapist: Reny Arshad, PT  4/5/2023

## 2023-04-11 NOTE — PROGRESS NOTES
"[facility] Physical Therapy Treatment      Name: Bette Johnson  Clinic Number: [id number]    Therapy Diagnosis:   Encounter Diagnosis   Name Primary?    Decreased strength of trunk and back Yes       Physician: Neeta Estrada MD    Visit Date: 2023    Physician Orders: PT Eval and Treat   Medical Diagnosis from Referral: Chronic midline low back pain with sciatica, sciatica laterality unspecified  Evaluation Date: 2023  Authorization Period Expiration: 2024  Plan of Care Expiration: 2023  Reassessment Due: 23  Visit # / Visits authorized:       Time In: 9:35 am   Time Out: 10:35 am  Total Billable Time: 60 minutes  Insurance type:  Fee for service Insurance Patient    Precautions: Standard     Pattern of pain determined: 1 (future PEN)    Subjective   Bette reports cancelling Tuesday appointment because she "was too drained" from Holy Week [employer] events.  Patient report [employer] events included extended standing /c choir. Patient report inc stressors over the weekend too /c planning the events.  Patient clarify that despite inc activity and inc fatigue she did not experience inc in pain.  However, patient note that she is still at 5 min tolerance for sweep/mopping activity.    Patient report she has not started cardio fitness regime and notes her limited motivation is her barrier.  Patient admit to non-adherence to HEP noting often she is too tired to perform them.      Patient reports tolerating previous visit well /c no c/o of excess discomfort.  Patient reports their pain to be  0 /10 on a 0-10 scale with 0 being no pain and 10 being the worst pain imaginable.  Pain Location: across low back or down L leg      Occupation: Retired  Leisure: Spending time with family, walking  Pts goals: Sweep and mop without any issues     Objective     Baseline IM Testing Results:   Baseline Isometric Testing on Med X equipment: Testing administered by PT  Date of testin2023  ROM " 0-36 deg increased to 0-39 on 3/29/23   Max Peak Torque 105    Min Peak Torque 56    Flex/Ext Ratio 2:1   % below normative data 51            MOVEMENT LOSS Back    Norms ROM Loss Initial 4/5/23   Flexion Fingers touch toes, sacral angle >/= 70 deg, uniform spinal curvature, posterior weight shift  minimal loss Min loss, difficulty getting full curve reversal   Extension ASIS surpasses toes, spine of scapulae surpasses heels, uniform spinal curve moderate loss Min loss   Side glide Right   minimal loss Min loss   Side glide Left   minimal loss Min loss   Rotation Right PT observes contralateral shoulder moderate loss Min loss   Rotation Left PT observes contralateral shoulder moderate loss Min loss      Limitation/Restriction for FOTO 2/27/2023 Survey     Therapist reviewed FOTO scores for Bette Johnson on 2/27/2023.   FOTO documents entered into [employer] - see Media section.     Limitation Score: 52% limitation  Visit 5:42% limitation             Treatment    Bette received the treatments listed below:        Bette received neuromuscular education  to isolate and engage spinal stabilization musculature correctly for motor control and coordination to aid in function and posture for 10 minutes on the [employer] Machine.  Patient performed MedX dynamic exercise with emphasis on spinal muscular control using pacer throughout  active range of motion. Therapist assisted patient in achieving optimal exertion for neural reeducation and endurance training by using the  Tatiana Exertion Rating scale, by instructing the patient to aim for mid range of exertion, performing 15-20 repetitions, slowly, correctly,and safely.   Additional neuromuscular activities  to improve: Coordination and Posture included:      [facility] 4/13/2023   Visit Number 6   VAS Pain Rating 0   Treadmill Time (in min.) -   Time 5   Lumbar Stretches - Slouch Overcorrection -   Extension in Standing 20   Flexion in Lying 10   Flexion in  "Sitting -   Lumbar Extension Seat Pad -   Femur Restraint -   Top Dead Center -   Counterweight -   Lumbar Flexion 42   Lumbar Extension 0   Lumbar Peak Torque -   Min Torque -   Test Percent Below Normative Data -   Lumbar Weight 60   Repetitions 20   Rating of Perceived Exertion 3   Ice - Z Lie (in min.) 5       therapeutic exercises to develop strength, endurance, ROM, flexibility, posture, and core stabilization for 50 minutes including:    Standing lumbar ext x 20 cue for end range   LTR x 20   DKTC x 10 5 sec hold   Piriformis stretch towel assist 3 x 20 sec hold     PPT x 20 cue for dec BLE activation  Bridges /c PPT x 20 min elevation        NP:   REIL x 20   HSS 3 x 30"  Hip ext rotation 3 sets 10 sec stretch supine  Seated flexion 10 reps        Peripheral muscle strengthening which included 1 set of 15-20 repetitions at a slow, controlled 10-13 second per rep pace focused on strengthening supporting musculature for improved body mechanics and functional mobility.  Pt and therapist focused on proper form during treatment to ensure optimal strengthening of each targeted muscle group.  Machines were utilized including torso rotation, leg extension, leg curl, chest press, upright row. Tricep extension, bicep curl, leg press, and hip abduction and hip add      therapeutic activities to improve functional performance for 00 minutes, including:    manual therapy techniques: Null          Home Exercises Provided and Patient Education Provided   Stretching: EIL, EIS, LTR, DKTC, HSS given at [facility].  She has not done 3/22/23 and we updated HEP to flexion seated and pelvic tilts  Strengthening:   Cardio program (V5): started 4/5/23  Lifting education (V11):  Posture/ Using Lumbar Roll: she has small pillow  Fridge Magn[employer] Discharge handout (date given):    Education provided:       Exercises were reviewed and Bette was able to demonstrate them prior to the end of the session.  Bette demonstrated good  understanding " of the education provided.      See EMR under Patient Instructions for exercises provided 2/27/2023.  Assessment     Patient subjective report indicate min progression /c HEP and cardio programming.  However, patient able to perform HEP /c min cueing and able to complete /s inc discomfort indicating appropriateness of HEP. Patient report primary limitation to inc HEP participation revolves around daily fatigue, therefore, extended time spent discussing physiological advantages to her HEP which will result in inc ease of tasks and inc energy.  Patient will need continue f/u for adherence.  Lumbar MedX weight maintained per pt tolerance last visit.  However, patient demonstrate comfort /c inc flex range during MedX set up, therefore, inc flex ROM for mobility challenge.  Today pt perform 20 reps at 3 RPE indicating inc strength and mobility.  Progress per HB protocol.  Patient continue to tolerance peripheral strengthening including progressions /s issue.  Progress per protocol.        Patient is making good progress towards established goals.  Pt will continue to benefit from skilled outpatient physical therapy to address the deficits stated in the impairment chart, provide pt/family education and to maximize pt's level of independence in the home and community environment.     Anticipated Barriers for therapy: None   Pt's spiritual, cultural and educational needs considered and pt agreeable to plan of care and goals as stated below:       GOALS: Pt is in agreement with the following goals.     Short term goals:  6 weeks or 10 visits   1.  Pt will demonstrate increased lumbar ROM by at least 6 degrees from the initial ROM value with improvements noted in functional ROM and ability to perform ADLs.  (approp and ongoing)  2.  Pt will demonstrate increased MedX average isometric strength value  by 30% from initial test resulting in improved ability to perform bending, lifting, and carrying activities safely,  confidently.  (approp and ongoing)  3.  Patient report a reduction in worst pain score by 1-2 points for improved tolerance for standing and walking in and around her house.  (approp and ongoing)  4.  Pt able to perform HEP correctly with minimal cueing or supervision from therapist to encourage independent management of symptoms. (approp and ongoing)        Long term goals: 10 weeks or 20 visits   1. Pt will demonstrate increased lumbar ROM by at least 9 degrees from initial ROM value, resulting in improved ability to perform functional fwd bending while standing and sitting. (approp and ongoing)  2. Pt will demonstrate increased MedX average isometric strength value  by 50% from initial test resulting in improved ability to perform bending, lifting, and carrying activities safely, confidently.  (approp and ongoing)  3. Pt to demonstrate ability to independently control and reduce their pain through posture positioning and mechanical movements throughout a typical day.  (approp and ongoing)  4.  Pt will demonstrate reduced pain and improved functional outcomes as reported on the FOTO by reaching a limitation score of < or = 40% or less in order to demonstrate subjective improvement in pt's condition.    (approp and ongoing)  5. Pt will demonstrate independence with the HEP at discharge  (approp and ongoing)  6.  Patient will report being able to do as much sweeping and mopping as she needs to each week with no greater than 3/10 low back pain during or after  (approp and ongoing)      Plan   Continue with established Plan of Care towards established PT goals.       Therapist: Savage Gale, PT  4/13/2023

## 2023-04-13 ENCOUNTER — CLINICAL SUPPORT (OUTPATIENT)
Dept: REHABILITATION | Facility: HOSPITAL | Age: 73
End: 2023-04-13
Attending: FAMILY MEDICINE
Payer: MEDICARE

## 2023-04-13 DIAGNOSIS — R29.898 DECREASED STRENGTH OF TRUNK AND BACK: Primary | ICD-10-CM

## 2023-04-13 PROCEDURE — 97110 THERAPEUTIC EXERCISES: CPT | Mod: PN

## 2023-04-13 PROCEDURE — 97112 NEUROMUSCULAR REEDUCATION: CPT | Mod: PN

## 2023-04-18 ENCOUNTER — CLINICAL SUPPORT (OUTPATIENT)
Dept: REHABILITATION | Facility: HOSPITAL | Age: 73
End: 2023-04-18
Attending: FAMILY MEDICINE
Payer: MEDICARE

## 2023-04-18 DIAGNOSIS — R29.898 DECREASED STRENGTH OF TRUNK AND BACK: Primary | ICD-10-CM

## 2023-04-18 PROCEDURE — 97112 NEUROMUSCULAR REEDUCATION: CPT | Mod: PN | Performed by: PHYSICAL MEDICINE & REHABILITATION

## 2023-04-18 PROCEDURE — 97110 THERAPEUTIC EXERCISES: CPT | Mod: PN | Performed by: PHYSICAL MEDICINE & REHABILITATION

## 2023-04-18 NOTE — PROGRESS NOTES
[facility]      Name: Bette Johnson  Clinic Number: [id number]    Therapy Diagnosis:   Encounter Diagnosis   Name Primary?    Decreased strength of trunk and back Yes         Physician: Neeta Estrada MD    Visit Date: 2023    Physician Orders: PT Eval and Treat   Medical Diagnosis from Referral: Chronic midline low back pain with sciatica, sciatica laterality unspecified  Evaluation Date: 2023  Authorization Period Expiration: 2024  Plan of Care Expiration: 2023  Reassessment Due: 23  Visit # / Visits authorized:       Time In: 1000 am   Time Out: 1120 am  Total Billable Time: 66 minutes  Insurance type:  Fee for service Insurance Patient    Precautions: Standard     Pattern of pain determined: 1 (future PEN)    Subjective   Bette reports feeling pretty good today. She reports she had left leg discomfort earlier but she knew as soon as she moved it would reduce.  Praised her for using tools and movement to reduce symptoms.  She is slowly improving in her functional tolerance at home for things like sweep/mopping activity.    Patient reports compliance and motivation can be challenging for her.        Patient reports tolerating previous visit well /c no c/o of excess discomfort.  Patient reports their pain to be  0 /10 on a 0-10 scale with 0 being no pain and 10 being the worst pain imaginable.  Pain Location: across low back or down L leg      Occupation: Retired  Leisure: Spending time with family, walking  Pts goals: Sweep and mop without any issues     Objective     Baseline IM Testing Results:   Baseline Isometric Testing on Med X equipment: Testing administered by PT  Date of testin2023  ROM 0-36 deg increased to 0-39 on 3/29/23   Max Peak Torque 105    Min Peak Torque 56    Flex/Ext Ratio 2:1   % below normative data 51            MOVEMENT LOSS Back    Norms ROM Loss Initial 23   Flexion Fingers touch toes, sacral angle >/= 70  deg, uniform spinal curvature, posterior weight shift  minimal loss Min loss, difficulty getting full curve reversal   Extension ASIS surpasses toes, spine of scapulae surpasses heels, uniform spinal curve moderate loss Min loss   Side glide Right   minimal loss Min loss   Side glide Left   minimal loss Min loss   Rotation Right PT observes contralateral shoulder moderate loss Min loss   Rotation Left PT observes contralateral shoulder moderate loss Min loss      Limitation/Restriction for FOTO 2/27/2023 Survey     Therapist reviewed FOTO scores for Bette Johnson on 2/27/2023.   FOTO documents entered into [id number] - see Media section.     Limitation Score: 52% limitation  Visit 5:42% limitation             Treatment    Bette received the treatments listed below:        Bette received neuromuscular education  to isolate and engage spinal stabilization musculature correctly for motor control and coordination to aid in function and posture for 8 minutes on the [email or web address] Machine.  Patient performed MedX dynamic exercise with emphasis on spinal muscular control using pacer throughout  active range of motion. Therapist assisted patient in achieving optimal exertion for neural reeducation and endurance training by using the  Tatiana Exertion Rating scale, by instructing the patient to aim for mid range of exertion, performing 15-20 repetitions, slowly, correctly,and safely.   Additional neuromuscular activities  to improve: Coordination and Posture included:    [facility] 4/18/2023   Visit Number 7   VAS Pain Rating 0   Treadmill Time (in min.) 8   Time -   Lumbar Stretches - Slouch Overcorrection -   Extension in Standing 20   Flexion in Lying 10   Flexion in Sitting -   Lumbar Extension Seat Pad -   Femur Restraint -   Top Dead Center -   Counterweight -   Lumbar Flexion -   Lumbar Extension -   Lumbar Peak Torque -   Min Torque -   Test Percent Below Normative Data -   Lumbar Weight 64   Repetitions 15    Rating of Perceived Exertion 4   Ice - Z Lie (in min.) 5        therapeutic exercises to develop strength, endurance, ROM, flexibility, posture, and core stabilization for 50 minutes including:    Standing lumbar ext x 20 cue for end range   Standing lateral trunk stretch with hip shift opposite 10 reps  Open books standing for balance and stretch 10 reps by mat and 5 by wall for support  Standing at mat, hip hinge 10 reps, teaching move for picking up items that are close without bending (therapeutic activity)  LTR x 10 reps followed by 5 more with hands behind head and elbows down for upper stretch  DKTC x 10 5 sec hold   Piriformis stretch towel assist 3 x 20 sec hold   Hamstring stretch with towel assist 5 sec 5 reps followed by neural stretch 5 sets 5 sec (adding foot df)  PPT x 20 cue for dec BLE activation  Bridges /c PPT x 20 min elevation    Hip ext rotation 3 sets 10 sec stretch supine      Peripheral muscle strengthening which included 1 set of 15-20 repetitions at a slow, controlled 10-13 second per rep pace focused on strengthening supporting musculature for improved body mechanics and functional mobility.  Pt and therapist focused on proper form during treatment to ensure optimal strengthening of each targeted muscle group.  Machines were utilized including torso rotation, leg extension, leg curl, chest press, upright row. Tricep extension, bicep curl, leg press, and hip abduction and hip add      therapeutic activities to improve functional performance for 00 minutes, including:    manual therapy techniques: Null          Home Exercises Provided and Patient Education Provided   Stretching: EIL, EIS, LTR, DKTC, HSS given at [facility].  She has not done 3/22/23 and we updated HEP to flexion seated and pelvic tilts  Strengthening:   Cardio program (V5): started 4/5/23  Lifting education (V11):  Posture/ Using Lumbar Roll: she has small pillow  Fridge Magnet Discharge handout (date given):    Education  provided:       Exercises were reviewed and Bette was able to demonstrate them prior to the end of the session.  Bette demonstrated good  understanding of the education provided.      See EMR under Patient Instructions for exercises provided 2/27/2023.  Assessment     Patient subjective report indicate min progression /c HEP and cardio programming.  However, patient able to perform HEP /c min cueing and is reporting improved function at home.  Added hip hinge at mat to begin to work on functional strength for activities.  Continued lumbar and hip stretching to address mobility issues in back and hips.  Patient report primary limitation to inc HEP participation revolves around daily fatigue, and commitment to exercise.    Patient will need continue f/u for adherence.  Lumbar MedX weight increased performing 15 reps at 4 RPE indicating inc strength and mobility.  Progress per HB protocol.  Patient continue to tolerance peripheral strengthening including progressions /s issue.  Progress per protocol.        Patient is making good progress towards established goals.  Pt will continue to benefit from skilled outpatient physical therapy to address the deficits stated in the impairment chart, provide pt/family education and to maximize pt's level of independence in the home and community environment.     Anticipated Barriers for therapy: None   Pt's spiritual, cultural and educational needs considered and pt agreeable to plan of care and goals as stated below:       GOALS: Pt is in agreement with the following goals.     Short term goals:  6 weeks or 10 visits   1.  Pt will demonstrate increased lumbar ROM by at least 6 degrees from the initial ROM value with improvements noted in functional ROM and ability to perform ADLs.  (approp and ongoing)  2.  Pt will demonstrate increased MedX average isometric strength value  by 30% from initial test resulting in improved ability to perform bending, lifting, and carrying  activities safely, confidently.  (approp and ongoing)  3.  Patient report a reduction in worst pain score by 1-2 points for improved tolerance for standing and walking in and around her house.  (approp and ongoing)  4.  Pt able to perform HEP correctly with minimal cueing or supervision from therapist to encourage independent management of symptoms. (approp and ongoing)        Long term goals: 10 weeks or 20 visits   1. Pt will demonstrate increased lumbar ROM by at least 9 degrees from initial ROM value, resulting in improved ability to perform functional fwd bending while standing and sitting. (approp and ongoing)  2. Pt will demonstrate increased MedX average isometric strength value  by 50% from initial test resulting in improved ability to perform bending, lifting, and carrying activities safely, confidently.  (approp and ongoing)  3. Pt to demonstrate ability to independently control and reduce their pain through posture positioning and mechanical movements throughout a typical day.  (approp and ongoing)  4.  Pt will demonstrate reduced pain and improved functional outcomes as reported on the FOTO by reaching a limitation score of < or = 40% or less in order to demonstrate subjective improvement in pt's condition.    (approp and ongoing)  5. Pt will demonstrate independence with the HEP at discharge  (approp and ongoing)  6.  Patient will report being able to do as much sweeping and mopping as she needs to each week with no greater than 3/10 low back pain during or after  (approp and ongoing)      Plan   Continue with established Plan of Care towards established PT goals.       Therapist: Reny Arshad, PT  4/18/2023

## 2023-04-21 ENCOUNTER — CLINICAL SUPPORT (OUTPATIENT)
Dept: REHABILITATION | Facility: HOSPITAL | Age: 73
End: 2023-04-21
Attending: FAMILY MEDICINE
Payer: MEDICARE

## 2023-04-21 DIAGNOSIS — R29.898 DECREASED STRENGTH OF TRUNK AND BACK: Primary | ICD-10-CM

## 2023-04-21 PROCEDURE — 97112 NEUROMUSCULAR REEDUCATION: CPT | Mod: PN,CQ

## 2023-04-21 PROCEDURE — 97110 THERAPEUTIC EXERCISES: CPT | Mod: PN,CQ

## 2023-04-21 NOTE — PROGRESS NOTES
[facility]      Name: Bette Johnson  Clinic Number: [id number]    Therapy Diagnosis:   Encounter Diagnosis   Name Primary?    Decreased strength of trunk and back Yes         Physician: Neeta Estrada MD    Visit Date: 2023    Physician Orders: PT Eval and Treat   Medical Diagnosis from Referral: Chronic midline low back pain with sciatica, sciatica laterality unspecified  Evaluation Date: 2023  Authorization Period Expiration: 2024  Plan of Care Expiration: 2023  Reassessment Due: 23  Visit # / Visits authorized:       Time In: 1055 AM   Time Out: 1155AM   Total Billable Time: 60 minutes  Insurance type:  Fee for service Insurance Patient    Precautions: Standard     Pattern of pain determined: 1 (future PEN)    Subjective   Bette reports she woke up a little stiff this morning but she walked it out. She has some pain or a nick horse in L leg. It's always the L side that bothers her.     Patient reports tolerating previous visit well with some soreness the following day.   Patient reports their pain to be 2/10 on a 0-10 scale with 0 being no pain and 10 being the worst pain imaginable.  Pain Location: across low back or down L leg      Occupation: Retired  Leisure: Spending time with family, walking  Pts goals: Sweep and mop without any issues     Objective     Baseline IM Testing Results:   Baseline Isometric Testing on Med X equipment: Testing administered by PT  Date of testin2023  ROM 0-36 deg increased to 0-39 on 3/29/23   Max Peak Torque 105    Min Peak Torque 56    Flex/Ext Ratio 2:1   % below normative data 51            MOVEMENT LOSS Back    Norms ROM Loss Initial 23   Flexion Fingers touch toes, sacral angle >/= 70 deg, uniform spinal curvature, posterior weight shift  minimal loss Min loss, difficulty getting full curve reversal   Extension ASIS surpasses toes, spine of scapulae surpasses heels, uniform spinal curve  moderate loss Min loss   Side glide Right   minimal loss Min loss   Side glide Left   minimal loss Min loss   Rotation Right PT observes contralateral shoulder moderate loss Min loss   Rotation Left PT observes contralateral shoulder moderate loss Min loss      Limitation/Restriction for FOTO 2/27/2023 Survey     Therapist reviewed FOTO scores for Bette Johnson on 2/27/2023.   FOTO documents entered into [employer] - see Media section.     Limitation Score: 52% limitation  Visit 5:42% limitation             Treatment    Bette received the treatments listed below:        Bette received neuromuscular education  to isolate and engage spinal stabilization musculature correctly for motor control and coordination to aid in function and posture for 8 minutes on the Medical Medx Machine.  Patient performed MedX dynamic exercise with emphasis on spinal muscular control using pacer throughout  active range of motion. Therapist assisted patient in achieving optimal exertion for neural reeducation and endurance training by using the  Tatiana Exertion Rating scale, by instructing the patient to aim for mid range of exertion, performing 15-20 repetitions, slowly, correctly,and safely.   Additional neuromuscular activities  to improve: Coordination and Posture included:    [facility] 4/21/2023   Visit Number 8   VAS Pain Rating 2   Treadmill Time (in min.) 8   Speed 1.5   Time -   Lumbar Stretches - Slouch Overcorrection -   Extension in Standing 20   Flexion in Lying 10   Flexion in Sitting -   Lumbar Extension Seat Pad -   Femur Restraint -   Top Dead Center -   Counterweight -   Lumbar Flexion -   Lumbar Extension -   Lumbar Peak Torque -   Min Torque -   Test Percent Below Normative Data -   Lumbar Weight 64   Repetitions 18   Rating of Perceived Exertion 3   Ice - Z Lie (in min.) -         therapeutic exercises to develop strength, endurance, ROM, flexibility, posture, and core stabilization for 52 minutes  including:    Standing lumbar ext x 20 cue for end range   Standing lateral trunk stretch with hip shift opposite 10 reps  Open books standing for balance and stretch 10 reps by mat and 5 by wall for support  Standing at mat, hip hinge 10 reps, teaching move for picking up items that are close without bending (therapeutic activity)  LTR x 10 reps with hands behind head and elbows down for upper stretch  DKTC x 10 5 sec hold   Piriformis stretch towel assist 3 x 20 sec hold   Hamstring stretch with towel assist 5 sec 5 reps followed by neural stretch 5 sets 5 sec (adding foot df)  PPT x 20 cue for dec BLE activation  Bridges /c PPT x 20 min elevation    Hip ext rotation 3 sets 10 sec stretch supine      Peripheral muscle strengthening which included 1 set of 15-20 repetitions at a slow, controlled 10-13 second per rep pace focused on strengthening supporting musculature for improved body mechanics and functional mobility.  Pt and therapist focused on proper form during treatment to ensure optimal strengthening of each targeted muscle group.  Machines were utilized including torso rotation, leg extension, leg curl, chest press, upright row. Tricep extension, bicep curl, leg press, and hip abduction and hip add      therapeutic activities to improve functional performance for 00 minutes, including:        Home Exercises Provided and Patient Education Provided   Stretching: EIL, EIS, LTR, DKTC, HSS given at [facility].  She has not done 3/22/23 and we updated HEP to flexion seated and pelvic tilts  Strengthening:   Cardio program (V5): started 4/5/23  Lifting education (V11):  Posture/ Using Lumbar Roll: she has small pillow  Fridge Magnet Discharge handout (date given):    Education provided:     Exercises were reviewed and Bette was able to demonstrate them prior to the end of the session.  Bette demonstrated good  understanding of the education provided.      See EMR under Patient Instructions for exercises provided  2/27/2023.  Assessment   Bette tolerated session well. Continued emphasis on HEP compliance. Pt requires verbal and tactile cues for performance. MedX was performed at 52ft lbs with 18 reps performed at an exertion rating of 3/10. No issues with Matrix peripheral strengthening.         Patient is making good progress towards established goals.  Pt will continue to benefit from skilled outpatient physical therapy to address the deficits stated in the impairment chart, provide pt/family education and to maximize pt's level of independence in the home and community environment.     Anticipated Barriers for therapy: None   Pt's spiritual, cultural and educational needs considered and pt agreeable to plan of care and goals as stated below:       GOALS: Pt is in agreement with the following goals.     Short term goals:  6 weeks or 10 visits   1.  Pt will demonstrate increased lumbar ROM by at least 6 degrees from the initial ROM value with improvements noted in functional ROM and ability to perform ADLs.  (approp and ongoing)  2.  Pt will demonstrate increased MedX average isometric strength value  by 30% from initial test resulting in improved ability to perform bending, lifting, and carrying activities safely, confidently.  (approp and ongoing)  3.  Patient report a reduction in worst pain score by 1-2 points for improved tolerance for standing and walking in and around her house.  (approp and ongoing)  4.  Pt able to perform HEP correctly with minimal cueing or supervision from therapist to encourage independent management of symptoms. (approp and ongoing)        Long term goals: 10 weeks or 20 visits   1. Pt will demonstrate increased lumbar ROM by at least 9 degrees from initial ROM value, resulting in improved ability to perform functional fwd bending while standing and sitting. (approp and ongoing)  2. Pt will demonstrate increased MedX average isometric strength value  by 50% from initial test resulting in  improved ability to perform bending, lifting, and carrying activities safely, confidently.  (approp and ongoing)  3. Pt to demonstrate ability to independently control and reduce their pain through posture positioning and mechanical movements throughout a typical day.  (approp and ongoing)  4.  Pt will demonstrate reduced pain and improved functional outcomes as reported on the FOTO by reaching a limitation score of < or = 40% or less in order to demonstrate subjective improvement in pt's condition.    (approp and ongoing)  5. Pt will demonstrate independence with the HEP at discharge  (approp and ongoing)  6.  Patient will report being able to do as much sweeping and mopping as she needs to each week with no greater than 3/10 low back pain during or after  (approp and ongoing)      Plan   Continue with established Plan of Care towards established PT goals.       Therapist: Bhaskar Calvillo, PTA  4/21/2023

## 2023-04-25 ENCOUNTER — PATIENT MESSAGE (OUTPATIENT)
Dept: FAMILY MEDICINE | Facility: CLINIC | Age: 73
End: 2023-04-25
Payer: MEDICARE

## 2023-04-26 ENCOUNTER — CLINICAL SUPPORT (OUTPATIENT)
Dept: REHABILITATION | Facility: HOSPITAL | Age: 73
End: 2023-04-26
Attending: FAMILY MEDICINE
Payer: MEDICARE

## 2023-04-26 DIAGNOSIS — R29.898 DECREASED STRENGTH OF TRUNK AND BACK: Primary | ICD-10-CM

## 2023-04-26 PROCEDURE — 97110 THERAPEUTIC EXERCISES: CPT | Mod: PN,CQ

## 2023-04-26 PROCEDURE — 97112 NEUROMUSCULAR REEDUCATION: CPT | Mod: PN,CQ

## 2023-04-26 NOTE — PROGRESS NOTES
[facility]      Name: Bette Johnson  Clinic Number: [id number]    Therapy Diagnosis:   Encounter Diagnosis   Name Primary?    Decreased strength of trunk and back Yes         Physician: Neeta Estrada MD    Visit Date: 2023    Physician Orders: PT Eval and Treat   Medical Diagnosis from Referral: Chronic midline low back pain with sciatica, sciatica laterality unspecified  Evaluation Date: 2023  Authorization Period Expiration: 2024  Plan of Care Expiration: 2023  Reassessment Due: 23  Visit # / Visits authorized:       Time In: 1130AM  Time Out: 1228AM  Total Billable Time: 58 minutes  Insurance type:  Fee for service Insurance Patient    Precautions: Standard     Pattern of pain determined: 1 (future PEN)    Subjective   Bette reports she feels good. No complaints. She was tired after last visit and then was sore a day or so later but she is getting used to it.     Patient reports tolerating previous visit well with some soreness the following day.   Patient reports their pain to be 0/10 on a 0-10 scale with 0 being no pain and 10 being the worst pain imaginable.  Pain Location: across low back or down L leg      Occupation: Retired  Leisure: Spending time with family, walking  Pts goals: Sweep and mop without any issues     Objective     Baseline IM Testing Results:   Baseline Isometric Testing on Med X equipment: Testing administered by PT  Date of testin2023  ROM 0-36 deg increased to 0-39 on 3/29/23   Max Peak Torque 105    Min Peak Torque 56    Flex/Ext Ratio 2:1   % below normative data 51            MOVEMENT LOSS Back    Norms ROM Loss Initial 23   Flexion Fingers touch toes, sacral angle >/= 70 deg, uniform spinal curvature, posterior weight shift  minimal loss Min loss, difficulty getting full curve reversal   Extension ASIS surpasses toes, spine of scapulae surpasses heels, uniform spinal curve moderate loss Min loss    Side glide Right   minimal loss Min loss   Side glide Left   minimal loss Min loss   Rotation Right PT observes contralateral shoulder moderate loss Min loss   Rotation Left PT observes contralateral shoulder moderate loss Min loss      Limitation/Restriction for FOTO 2/27/2023 Survey     Therapist reviewed FOTO scores for Bette Johnson on 2/27/2023.   FOTO documents entered into [employer] - see Media section.     Limitation Score: 52% limitation  Visit 5:42% limitation             Treatment    Bette received the treatments listed below:        Bette received neuromuscular education  to isolate and engage spinal stabilization musculature correctly for motor control and coordination to aid in function and posture for 10 minutes on the Medical Medx Machine.  Patient performed MedX dynamic exercise with emphasis on spinal muscular control using pacer throughout  active range of motion. Therapist assisted patient in achieving optimal exertion for neural reeducation and endurance training by using the  Tatiana Exertion Rating scale, by instructing the patient to aim for mid range of exertion, performing 15-20 repetitions, slowly, correctly,and safely.   Additional neuromuscular activities  to improve: Coordination and Posture included:    [facility] 4/26/2023   Visit Number 9   VAS Pain Rating 0   Treadmill Time (in min.) 8   Speed 1.5   Time -   Lumbar Stretches - Slouch Overcorrection -   Extension in Standing 20   Flexion in Lying 10   Flexion in Sitting -   Lumbar Extension Seat Pad -   Femur Restraint -   Top Dead Center -   Counterweight -   Lumbar Flexion -   Lumbar Extension -   Lumbar Peak Torque -   Min Torque -   Test Percent Below Normative Data -   Lumbar Weight 64   Repetitions 20   Rating of Perceived Exertion 3   Ice - Z Lie (in min.) 5         therapeutic exercises to develop strength, endurance, ROM, flexibility, posture, and core stabilization for 48 minutes including:    Standing lumbar  ext x 20 cue for end range   Standing lateral trunk stretch with hip shift opposite 10 reps  Open books standing for balance and stretch 10 reps by mat and 5 by wall for support  Standing at mat, hip hinge 10 reps, teaching move for picking up items that are close without bending (therapeutic activity)  LTR x 10 reps with hands behind head and elbows down for upper stretch  DKTC x 10 5 sec hold   Piriformis stretch towel assist 3 x 20 sec hold   PPT x 20 cue for dec BLE activation  Bridges /c PPT x 20 min elevation    Hip ext rotation 3 sets 10 sec stretch supine      Peripheral muscle strengthening which included 1 set of 15-20 repetitions at a slow, controlled 10-13 second per rep pace focused on strengthening supporting musculature for improved body mechanics and functional mobility.  Pt and therapist focused on proper form during treatment to ensure optimal strengthening of each targeted muscle group.  Machines were utilized including torso rotation, leg extension, leg curl, chest press, upright row. Tricep extension, bicep curl, leg press, and hip abduction and hip add      therapeutic activities to improve functional performance for 00 minutes, including:        Home Exercises Provided and Patient Education Provided   Stretching: EIL, EIS, LTR, DKTC, HSS given at [facility].  She has not done 3/22/23 and we updated HEP to flexion seated and pelvic tilts  Strengthening:   Cardio program (V5): started 4/5/23  Lifting education (V11):  Posture/ Using Lumbar Roll: she has small pillow  Fridge Magnet Discharge handout (date given):    Education provided:     Exercises were reviewed and Bette was able to demonstrate them prior to the end of the session.  Bette demonstrated good  understanding of the education provided.      See EMR under Patient Instructions for exercises provided 2/27/2023.  Assessment   Bette tolerated session well. Pt presents to session without c/o pain. Continued previously prescribed without  issue. MedX was performed at 64ft lbs with 20 reps performed at an exertion rating of 3/10. No issues with Matrix peripheral strengthening. Will continue to progress per healthy back protocol.           Patient is making good progress towards established goals.  Pt will continue to benefit from skilled outpatient physical therapy to address the deficits stated in the impairment chart, provide pt/family education and to maximize pt's level of independence in the home and community environment.     Anticipated Barriers for therapy: None   Pt's spiritual, cultural and educational needs considered and pt agreeable to plan of care and goals as stated below:       GOALS: Pt is in agreement with the following goals.     Short term goals:  6 weeks or 10 visits   1.  Pt will demonstrate increased lumbar ROM by at least 6 degrees from the initial ROM value with improvements noted in functional ROM and ability to perform ADLs.  (approp and ongoing)  2.  Pt will demonstrate increased MedX average isometric strength value  by 30% from initial test resulting in improved ability to perform bending, lifting, and carrying activities safely, confidently.  (approp and ongoing)  3.  Patient report a reduction in worst pain score by 1-2 points for improved tolerance for standing and walking in and around her house.  (approp and ongoing)  4.  Pt able to perform HEP correctly with minimal cueing or supervision from therapist to encourage independent management of symptoms. (approp and ongoing)        Long term goals: 10 weeks or 20 visits   1. Pt will demonstrate increased lumbar ROM by at least 9 degrees from initial ROM value, resulting in improved ability to perform functional fwd bending while standing and sitting. (approp and ongoing)  2. Pt will demonstrate increased MedX average isometric strength value  by 50% from initial test resulting in improved ability to perform bending, lifting, and carrying activities safely, confidently.   (approp and ongoing)  3. Pt to demonstrate ability to independently control and reduce their pain through posture positioning and mechanical movements throughout a typical day.  (approp and ongoing)  4.  Pt will demonstrate reduced pain and improved functional outcomes as reported on the FOTO by reaching a limitation score of < or = 40% or less in order to demonstrate subjective improvement in pt's condition.    (approp and ongoing)  5. Pt will demonstrate independence with the HEP at discharge  (approp and ongoing)  6.  Patient will report being able to do as much sweeping and mopping as she needs to each week with no greater than 3/10 low back pain during or after  (approp and ongoing)      Plan   Continue with established Plan of Care towards established PT goals.       Therapist: Bhaskar Calvillo, PTA  4/26/2023

## 2023-04-28 ENCOUNTER — CLINICAL SUPPORT (OUTPATIENT)
Dept: REHABILITATION | Facility: HOSPITAL | Age: 73
End: 2023-04-28
Attending: FAMILY MEDICINE
Payer: MEDICARE

## 2023-04-28 DIAGNOSIS — R29.898 DECREASED STRENGTH OF TRUNK AND BACK: Primary | ICD-10-CM

## 2023-04-28 PROCEDURE — 97110 THERAPEUTIC EXERCISES: CPT | Mod: PN,CQ

## 2023-04-28 PROCEDURE — 97530 THERAPEUTIC ACTIVITIES: CPT | Mod: PN,CQ

## 2023-04-28 NOTE — PROGRESS NOTES
[facility]      Name: Bette Johnson  Clinic Number: [id number]    Therapy Diagnosis:   Encounter Diagnosis   Name Primary?    Decreased strength of trunk and back Yes         Physician: Neeta Estrada MD    Visit Date: 2023    Physician Orders: PT Eval and Treat   Medical Diagnosis from Referral: Chronic midline low back pain with sciatica, sciatica laterality unspecified  Evaluation Date: 2023  Authorization Period Expiration: 2024  Plan of Care Expiration: 2023  Reassessment Due: 23  Visit # / Visits authorized: 10/ 20      Time In:1000AM  Time Out: 1100AM  Total Billable Time: 60 minutes  Insurance type:  Fee for service Insurance Patient    Precautions: Standard     Pattern of pain determined: 1 (future PEN)    Subjective   Bette reports she feels good. Her back feels good she does have some pain in her leg in the mornings but it doesn't last. She has had some neck pain but she thinks she may have slept wrong.     Patient reports tolerating previous visit well with some soreness the following day.   Patient reports their pain to be 0/10 on a 0-10 scale with 0 being no pain and 10 being the worst pain imaginable.  Pain Location: across low back or down L leg      Occupation: Retired  Leisure: Spending time with family, walking  Pts goals: Sweep and mop without any issues     Objective     Baseline IM Testing Results:   Baseline Isometric Testing on Med X equipment: Testing administered by PT  Date of testin2023  ROM 0-36 deg increased to 0-39 on 3/29/23   Max Peak Torque 105    Min Peak Torque 56    Flex/Ext Ratio 2:1   % below normative data 51            MOVEMENT LOSS Back    Norms ROM Loss Initial 23   Flexion Fingers touch toes, sacral angle >/= 70 deg, uniform spinal curvature, posterior weight shift  minimal loss Min loss, difficulty getting full curve reversal   Extension ASIS surpasses toes, spine of scapulae surpasses heels,  uniform spinal curve moderate loss Min loss   Side glide Right   minimal loss Min loss   Side glide Left   minimal loss Min loss   Rotation Right PT observes contralateral shoulder moderate loss Min loss   Rotation Left PT observes contralateral shoulder moderate loss Min loss      Limitation/Restriction for FOTO 2/27/2023 Survey     Therapist reviewed FOTO scores for Bette Johnson on 2/27/2023.   FOTO documents entered into [employer] - see Media section.     Limitation Score: 52% limitation  Visit 5:42% limitation             Treatment    Bette received the treatments listed below:        Bette received neuromuscular education  to isolate and engage spinal stabilization musculature correctly for motor control and coordination to aid in function and posture for 10 minutes on the Medical Medx Machine.  Patient performed MedX dynamic exercise with emphasis on spinal muscular control using pacer throughout  active range of motion. Therapist assisted patient in achieving optimal exertion for neural reeducation and endurance training by using the  Tatiana Exertion Rating scale, by instructing the patient to aim for mid range of exertion, performing 15-20 repetitions, slowly, correctly,and safely.   Additional neuromuscular activities  to improve: Coordination and Posture included:    [facility] 4/28/2023   Visit Number 10   VAS Pain Rating 0   Treadmill Time (in min.) 8   Speed -   Time -   Lumbar Stretches - Slouch Overcorrection -   Extension in Standing -   Flexion in Lying -   Flexion in Sitting -   Lumbar Extension Seat Pad -   Femur Restraint -   Top Dead Center -   Counterweight -   Lumbar Flexion -   Lumbar Extension -   Lumbar Peak Torque -   Min Torque -   Test Percent Below Normative Data -   Lumbar Weight 67   Repetitions 15   Rating of Perceived Exertion 3   Ice - Z Lie (in min.) 5         therapeutic exercises to develop strength, endurance, ROM, flexibility, posture, and core stabilization for 28  minutes including:  Treadmill x 8'        Peripheral muscle strengthening which included 1 set of 15-20 repetitions at a slow, controlled 10-13 second per rep pace focused on strengthening supporting musculature for improved body mechanics and functional mobility.  Pt and therapist focused on proper form during treatment to ensure optimal strengthening of each targeted muscle group.  Machines were utilized including torso rotation, leg extension, leg curl, chest press, upright row. Tricep extension, bicep curl, leg press, and hip abduction and hip add      therapeutic activities to improve functional performance for 22 minutes, including:  Lifting mechanics:  Hip hinge   Golfers lift  Box lift  Dead lift from 12 in box 10KB 2 x 10  STS from 18 in 7.5KB 2 x 10    Home Exercises Provided and Patient Education Provided   Stretching: EIL, EIS, LTR, DKTC, HSS given at [facility].  She has not done 3/22/23 and we updated HEP to flexion seated and pelvic tilts  Strengthening:   Cardio program (V5): started 4/5/23  Lifting education (V11):  Posture/ Using Lumbar Roll: she has small pillow  Fridge Magnet Discharge handout (date given):    Education provided:     Exercises were reviewed and Bette was able to demonstrate them prior to the end of the session.  Bette demonstrated good  understanding of the education provided.      See EMR under Patient Instructions for exercises provided 2/27/2023.  Assessment   Bette tolerated session well. Introduced lifting education for functional carryover including box lifts, dead lifting and squatting with good performance though Mod tactile and verbal required. Pt to be tested next visit. MedX was performed at 67ft lbs with 15 reps performed at an exertion rating of 3/10. No issues with matrix peripheral strengthening.         Patient is making good progress towards established goals.  Pt will continue to benefit from skilled outpatient physical therapy to address the deficits stated in  the impairment chart, provide pt/family education and to maximize pt's level of independence in the home and community environment.     Anticipated Barriers for therapy: None   Pt's spiritual, cultural and educational needs considered and pt agreeable to plan of care and goals as stated below:       GOALS: Pt is in agreement with the following goals.     Short term goals:  6 weeks or 10 visits   1.  Pt will demonstrate increased lumbar ROM by at least 6 degrees from the initial ROM value with improvements noted in functional ROM and ability to perform ADLs.  (approp and ongoing)  2.  Pt will demonstrate increased MedX average isometric strength value  by 30% from initial test resulting in improved ability to perform bending, lifting, and carrying activities safely, confidently.  (approp and ongoing)  3.  Patient report a reduction in worst pain score by 1-2 points for improved tolerance for standing and walking in and around her house.  (approp and ongoing)  4.  Pt able to perform HEP correctly with minimal cueing or supervision from therapist to encourage independent management of symptoms. (approp and ongoing)        Long term goals: 10 weeks or 20 visits   1. Pt will demonstrate increased lumbar ROM by at least 9 degrees from initial ROM value, resulting in improved ability to perform functional fwd bending while standing and sitting. (approp and ongoing)  2. Pt will demonstrate increased MedX average isometric strength value  by 50% from initial test resulting in improved ability to perform bending, lifting, and carrying activities safely, confidently.  (approp and ongoing)  3. Pt to demonstrate ability to independently control and reduce their pain through posture positioning and mechanical movements throughout a typical day.  (approp and ongoing)  4.  Pt will demonstrate reduced pain and improved functional outcomes as reported on the FOTO by reaching a limitation score of < or = 40% or less in order to  demonstrate subjective improvement in pt's condition.    (approp and ongoing)  5. Pt will demonstrate independence with the HEP at discharge  (approp and ongoing)  6.  Patient will report being able to do as much sweeping and mopping as she needs to each week with no greater than 3/10 low back pain during or after  (approp and ongoing)      Plan   Continue with established Plan of Care towards established PT goals.       Therapist: Bhaskar Calvillo, PTA  4/28/2023

## 2023-05-09 ENCOUNTER — OFFICE VISIT (OUTPATIENT)
Dept: FAMILY MEDICINE | Facility: CLINIC | Age: 73
End: 2023-05-09
Payer: MEDICARE

## 2023-05-09 VITALS
HEART RATE: 52 BPM | WEIGHT: 246.94 LBS | RESPIRATION RATE: 20 BRPM | DIASTOLIC BLOOD PRESSURE: 74 MMHG | TEMPERATURE: 98 F | BODY MASS INDEX: 42.16 KG/M2 | OXYGEN SATURATION: 98 % | SYSTOLIC BLOOD PRESSURE: 134 MMHG | HEIGHT: 64 IN

## 2023-05-09 DIAGNOSIS — G89.29 CHRONIC PAIN OF RIGHT KNEE: Primary | ICD-10-CM

## 2023-05-09 DIAGNOSIS — M25.561 CHRONIC PAIN OF RIGHT KNEE: Primary | ICD-10-CM

## 2023-05-09 DIAGNOSIS — M25.562 ACUTE PAIN OF LEFT KNEE: ICD-10-CM

## 2023-05-09 PROCEDURE — 1160F RVW MEDS BY RX/DR IN RCRD: CPT | Mod: CPTII,S$GLB,, | Performed by: PHYSICIAN ASSISTANT

## 2023-05-09 PROCEDURE — 3288F FALL RISK ASSESSMENT DOCD: CPT | Mod: CPTII,S$GLB,, | Performed by: PHYSICIAN ASSISTANT

## 2023-05-09 PROCEDURE — 3051F PR MOST RECENT HEMOGLOBIN A1C LEVEL 7.0 - < 8.0%: ICD-10-PCS | Mod: CPTII,S$GLB,, | Performed by: PHYSICIAN ASSISTANT

## 2023-05-09 PROCEDURE — 1159F PR MEDICATION LIST DOCUMENTED IN MEDICAL RECORD: ICD-10-PCS | Mod: CPTII,S$GLB,, | Performed by: PHYSICIAN ASSISTANT

## 2023-05-09 PROCEDURE — 1160F PR REVIEW ALL MEDS BY PRESCRIBER/CLIN PHARMACIST DOCUMENTED: ICD-10-PCS | Mod: CPTII,S$GLB,, | Performed by: PHYSICIAN ASSISTANT

## 2023-05-09 PROCEDURE — 3078F DIAST BP <80 MM HG: CPT | Mod: CPTII,S$GLB,, | Performed by: PHYSICIAN ASSISTANT

## 2023-05-09 PROCEDURE — 1125F PR PAIN SEVERITY QUANTIFIED, PAIN PRESENT: ICD-10-PCS | Mod: CPTII,S$GLB,, | Performed by: PHYSICIAN ASSISTANT

## 2023-05-09 PROCEDURE — 3075F PR MOST RECENT SYSTOLIC BLOOD PRESS GE 130-139MM HG: ICD-10-PCS | Mod: CPTII,S$GLB,, | Performed by: PHYSICIAN ASSISTANT

## 2023-05-09 PROCEDURE — 3008F BODY MASS INDEX DOCD: CPT | Mod: CPTII,S$GLB,, | Performed by: PHYSICIAN ASSISTANT

## 2023-05-09 PROCEDURE — 3288F PR FALLS RISK ASSESSMENT DOCUMENTED: ICD-10-PCS | Mod: CPTII,S$GLB,, | Performed by: PHYSICIAN ASSISTANT

## 2023-05-09 PROCEDURE — 1125F AMNT PAIN NOTED PAIN PRSNT: CPT | Mod: CPTII,S$GLB,, | Performed by: PHYSICIAN ASSISTANT

## 2023-05-09 PROCEDURE — 1159F MED LIST DOCD IN RCRD: CPT | Mod: CPTII,S$GLB,, | Performed by: PHYSICIAN ASSISTANT

## 2023-05-09 PROCEDURE — 3075F SYST BP GE 130 - 139MM HG: CPT | Mod: CPTII,S$GLB,, | Performed by: PHYSICIAN ASSISTANT

## 2023-05-09 PROCEDURE — 3008F PR BODY MASS INDEX (BMI) DOCUMENTED: ICD-10-PCS | Mod: CPTII,S$GLB,, | Performed by: PHYSICIAN ASSISTANT

## 2023-05-09 PROCEDURE — 3051F HG A1C>EQUAL 7.0%<8.0%: CPT | Mod: CPTII,S$GLB,, | Performed by: PHYSICIAN ASSISTANT

## 2023-05-09 PROCEDURE — 99999 PR PBB SHADOW E&M-EST. PATIENT-LVL IV: CPT | Mod: PBBFAC,,, | Performed by: PHYSICIAN ASSISTANT

## 2023-05-09 PROCEDURE — 99214 PR OFFICE/OUTPT VISIT, EST, LEVL IV, 30-39 MIN: ICD-10-PCS | Mod: S$GLB,,, | Performed by: PHYSICIAN ASSISTANT

## 2023-05-09 PROCEDURE — 99214 OFFICE O/P EST MOD 30 MIN: CPT | Mod: S$GLB,,, | Performed by: PHYSICIAN ASSISTANT

## 2023-05-09 PROCEDURE — 99999 PR PBB SHADOW E&M-EST. PATIENT-LVL IV: ICD-10-PCS | Mod: PBBFAC,,, | Performed by: PHYSICIAN ASSISTANT

## 2023-05-09 PROCEDURE — 1101F PR PT FALLS ASSESS DOC 0-1 FALLS W/OUT INJ PAST YR: ICD-10-PCS | Mod: CPTII,S$GLB,, | Performed by: PHYSICIAN ASSISTANT

## 2023-05-09 PROCEDURE — 1101F PT FALLS ASSESS-DOCD LE1/YR: CPT | Mod: CPTII,S$GLB,, | Performed by: PHYSICIAN ASSISTANT

## 2023-05-09 PROCEDURE — 3078F PR MOST RECENT DIASTOLIC BLOOD PRESSURE < 80 MM HG: ICD-10-PCS | Mod: CPTII,S$GLB,, | Performed by: PHYSICIAN ASSISTANT

## 2023-05-09 RX ORDER — DICLOFENAC SODIUM 10 MG/G
2 GEL TOPICAL 2 TIMES DAILY
Qty: 100 G | Refills: 0 | Status: SHIPPED | OUTPATIENT
Start: 2023-05-09
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
## 2023-05-11 ENCOUNTER — CLINICAL SUPPORT (OUTPATIENT)
Dept: FAMILY MEDICINE | Facility: CLINIC | Age: 73
End: 2023-05-11
Payer: MEDICARE

## 2023-05-11 ENCOUNTER — PATIENT MESSAGE (OUTPATIENT)
Dept: FAMILY MEDICINE | Facility: CLINIC | Age: 73
End: 2023-05-11

## 2023-05-11 DIAGNOSIS — G89.29 CHRONIC PAIN OF RIGHT KNEE: Primary | ICD-10-CM

## 2023-05-11 DIAGNOSIS — M25.561 CHRONIC PAIN OF RIGHT KNEE: Primary | ICD-10-CM

## 2023-05-11 DIAGNOSIS — M25.562 ACUTE PAIN OF LEFT KNEE: ICD-10-CM

## 2023-05-11 PROCEDURE — 99999 PR PBB SHADOW E&M-EST. PATIENT-LVL I: ICD-10-PCS | Mod: PBBFAC,,,

## 2023-05-11 PROCEDURE — 96372 THER/PROPH/DIAG INJ SC/IM: CPT | Mod: S$GLB,,, | Performed by: PHYSICIAN ASSISTANT

## 2023-05-11 PROCEDURE — 96372 PR INJECTION,THERAP/PROPH/DIAG2ST, IM OR SUBCUT: ICD-10-PCS | Mod: S$GLB,,, | Performed by: PHYSICIAN ASSISTANT

## 2023-05-11 PROCEDURE — 99999 PR PBB SHADOW E&M-EST. PATIENT-LVL I: CPT | Mod: PBBFAC,,,

## 2023-05-16 ENCOUNTER — OFFICE VISIT (OUTPATIENT)
Dept: ORTHOPEDICS | Facility: CLINIC | Age: 73
End: 2023-05-16
Payer: MEDICARE

## 2023-05-16 DIAGNOSIS — M17.0 PRIMARY OSTEOARTHRITIS OF BOTH KNEES: Primary | ICD-10-CM

## 2023-05-16 PROCEDURE — 20610 DRAIN/INJ JOINT/BURSA W/O US: CPT | Mod: 50,,, | Performed by: ORTHOPAEDIC SURGERY

## 2023-05-16 PROCEDURE — 20610 LARGE JOINT ASPIRATION/INJECTION: BILATERAL KNEE: ICD-10-PCS | Mod: 50,,, | Performed by: ORTHOPAEDIC SURGERY

## 2023-05-16 PROCEDURE — 3288F FALL RISK ASSESSMENT DOCD: CPT | Mod: CPTII,,, | Performed by: ORTHOPAEDIC SURGERY

## 2023-05-16 PROCEDURE — 1159F PR MEDICATION LIST DOCUMENTED IN MEDICAL RECORD: ICD-10-PCS | Mod: CPTII,,, | Performed by: ORTHOPAEDIC SURGERY

## 2023-05-16 PROCEDURE — 1101F PR PT FALLS ASSESS DOC 0-1 FALLS W/OUT INJ PAST YR: ICD-10-PCS | Mod: CPTII,,, | Performed by: ORTHOPAEDIC SURGERY

## 2023-05-16 PROCEDURE — 99214 OFFICE O/P EST MOD 30 MIN: CPT | Mod: 25,,, | Performed by: ORTHOPAEDIC SURGERY

## 2023-05-16 PROCEDURE — 3051F PR MOST RECENT HEMOGLOBIN A1C LEVEL 7.0 - < 8.0%: ICD-10-PCS | Mod: CPTII,,, | Performed by: ORTHOPAEDIC SURGERY

## 2023-05-16 PROCEDURE — 99214 PR OFFICE/OUTPT VISIT, EST, LEVL IV, 30-39 MIN: ICD-10-PCS | Mod: 25,,, | Performed by: ORTHOPAEDIC SURGERY

## 2023-05-16 PROCEDURE — 3051F HG A1C>EQUAL 7.0%<8.0%: CPT | Mod: CPTII,,, | Performed by: ORTHOPAEDIC SURGERY

## 2023-05-16 PROCEDURE — 99999 PR PBB SHADOW E&M-EST. PATIENT-LVL III: CPT | Mod: PBBFAC,,, | Performed by: ORTHOPAEDIC SURGERY

## 2023-05-16 PROCEDURE — 3288F PR FALLS RISK ASSESSMENT DOCUMENTED: ICD-10-PCS | Mod: CPTII,,, | Performed by: ORTHOPAEDIC SURGERY

## 2023-05-16 PROCEDURE — 1101F PT FALLS ASSESS-DOCD LE1/YR: CPT | Mod: CPTII,,, | Performed by: ORTHOPAEDIC SURGERY

## 2023-05-16 PROCEDURE — 1159F MED LIST DOCD IN RCRD: CPT | Mod: CPTII,,, | Performed by: ORTHOPAEDIC SURGERY

## 2023-05-16 PROCEDURE — 1125F AMNT PAIN NOTED PAIN PRSNT: CPT | Mod: CPTII,,, | Performed by: ORTHOPAEDIC SURGERY

## 2023-05-16 PROCEDURE — 99999 PR PBB SHADOW E&M-EST. PATIENT-LVL III: ICD-10-PCS | Mod: PBBFAC,,, | Performed by: ORTHOPAEDIC SURGERY

## 2023-05-16 PROCEDURE — 1125F PR PAIN SEVERITY QUANTIFIED, PAIN PRESENT: ICD-10-PCS | Mod: CPTII,,, | Performed by: ORTHOPAEDIC SURGERY

## 2023-05-16 RX ORDER — TRIAMCINOLONE ACETONIDE 40 MG/ML
40 INJECTION, SUSPENSION INTRA-ARTICULAR; INTRAMUSCULAR
Status: DISCONTINUED | OUTPATIENT
Start: 2023-05-16 | End: 2023-05-16 | Stop reason: HOSPADM

## 2023-05-16 RX ADMIN — TRIAMCINOLONE ACETONIDE 40 MG: 40 INJECTION, SUSPENSION INTRA-ARTICULAR; INTRAMUSCULAR at 11:05

## 2023-05-16 NOTE — PROGRESS NOTES
EST PATIENT ORTHOPAEDIC: Knee    PRIMARY CARE PHYSICIAN: Neeta Estrada MD   REFERRING PROVIDER: No referring provider defined for this encounter.     ASSESSMENT & PLAN:    Impression:  Bilateral Knee Mild Osteoarthritis, Primary    Bilateral Knee Pes Bursitis    Follow Up Plan: PRN    Non operative care:    Bette Johnson has physical exam evidence of above and wishes to pursue an non-operative care. I am recommending the following: knee injections, steroid    To review: Patient reports knee pain for approximately the last year.  She associates that with the onset of COVID in January of 2021.  X-rays I reviewed and obtained demonstrate mild arthritis.  She also had clinical symptoms associated with pes bursitis.  I tried a Medrol Dosepak previously.  This helped with her pain.  She did have primary care do an intra-articular injection into her knee which also help with her pain.  Overall her pain was doing well up until the end of February after she was doing therapy with the [facility] program and began to experience acute onset knee pain.  She tried an intramuscular steroid injection and oral medications along with a variety of creams that have failed to improve her pain.  She is interested today in repeat intraarticular steroid injection.    The patient has been ordered:  Injection    CONSULTS:   None    ACTIVE PROBLEM LIST  Patient Active Problem List   Diagnosis    Morbid obesity with BMI of 40.0-44.9, adult    Essential hypertension    Thoracic aorta atherosclerosis    Diabetes mellitus type 2 in obese    Transaminitis    History of 2019 novel coronavirus disease (COVID-19)    Type 2 diabetes mellitus with stage 3a chronic kidney disease, without long-term current use of insulin    Chronic kidney disease, stage 3a    Decreased strength of trunk and back           SUBJECTIVE    CHIEF COMPLAINT: Knee Pain    HPI:   Bette Johnson is a 73 y.o. female here for evaluation and management of right knee pain.  There is not a specific incident that brought about this pain. she has had progressive problems with the knee(s) starting 1 years ago but is now progressing to interfere with activities which include: walking and enjoying hobbies    Currently the pain in the joint is rated at mild with activity. The pain is intermittent and is located in the knee, located medially. The pain is described as aching, sharp, stabbing and stiffness. Relieving factors include ice or heat and over the counter medication .     There is associated Catching.     Bette Johnson has no additional complaints.     5/16/23: Here for follow up. Acute on chronic knee pain, right worse than left. Known mild OA. No new injury or fall.     PROGRESSIVE SYMPTOMS:  Pain worsened by weight bearing    FUNCTIONAL STATUS:   Climb a flight of stairs or walk up a hill     PREVIOUS TREATMENTS:  Medical: Attempted Weight Loss, OTC NSAIDS and Steroid Injections  Physical Therapy: Activities Modified   Previous Orthopaedic Surgery: None    REVIEW OF SYSTEMS:  PAIN ASSESSMENT:  See HPI.  MUSCULOSKELETAL: See HPI.  OTHER 10 point review of systems is negative except as stated in HPI above    PAST MEDICAL HISTORY   has a past medical history of Chronic kidney disease, stage 3a (02/04/2022), Diabetes mellitus, type 2, Hypertension, Renal manifestation of secondary diabetes mellitus, Severe obesity (BMI >= 40) (07/12/2017), and Type 2 diabetes mellitus.     PAST SURGICAL HISTORY   has a past surgical history that includes Cholecystectomy; Colonoscopy (N/A, 1/25/2018); and ERCP (N/A, 2/17/2021).     FAMILY HISTORY  family history includes Diabetes in her brother, mother, sister, sister, and sister; Hyperlipidemia in her sister; Hypertension in her sister and sister; Kidney disease in her brother.     SOCIAL HISTORY   reports that she has never smoked. She has never used smokeless tobacco. She reports current alcohol use. She reports that she does not use drugs.      ALLERGIES   Review of patient's allergies indicates:   Allergen Reactions    Iodine and iodide containing products Shortness Of Breath and Other (See Comments)     Fever, blisters around mouth, flu like symptoms  Fever, blisters around mouth, flu like symptoms      Penicillins Other (See Comments) and Anaphylaxis     Loss of consciousness  Other reaction(s): Penicillins (Fatal)  Note:  Allergic Reaction: Anaphylaxis    Caffeine Other (See Comments)     Hyper then  Causes low  Hyperactivity, followed by sleepiness  Hyperactivity, followed by sleepiness    Note: hyperactivity/ crash     Iodine      Note: fever blisters, elevated temp.     Latex, natural rubber Other (See Comments)     Dental work cause mouth ulcers  Ulcers in mouth when used during dental work  Ulcers in mouth when used during dental work    Note: sores         MEDICATIONS  Current Outpatient Medications on File Prior to Visit   Medication Sig Dispense Refill    amLODIPine (NORVASC) 10 MG tablet Take 1 tablet by mouth once daily 90 tablet 3    aspirin (ECOTRIN) 81 MG EC tablet Take 81 mg by mouth once daily.      atorvastatin (LIPITOR) 10 MG tablet Take 1 tablet (10 mg total) by mouth once daily. 90 tablet 1    cholecalciferol, vitamin D3, (VITAMIN D3) 50 mcg (2,000 unit) Cap Take 1 capsule by mouth once daily. gummy      coenzyme Q10 100 mg capsule Take 200 mg by mouth once daily.      diclofenac sodium (VOLTAREN) 1 % Gel Apply 2 g topically 2 (two) times daily. 100 g 0    LIDOcaine (LIDODERM) 5 % Place 1 patch onto the skin once daily. Remove & Discard patch within 12 hours or as directed by MD 15 patch 0    loratadine (CLARITIN) 10 mg tablet Take 10 mg by mouth once daily.      metFORMIN (GLUCOPHAGE) 850 MG tablet Take 1 tablet by mouth once daily with breakfast 90 tablet 1    multivit-minerals/folic acid (WOMEN'S MULTIVITAMIN GUMMIES ORAL) Take by mouth.      valsartan-hydrochlorothiazide (DIOVAN-HCT) 320-25 mg per tablet Take 1 tablet by  mouth once daily. 90 tablet 1     No current facility-administered medications on file prior to visit.          PHYSICAL EXAM   vitals were not taken for this visit.   There is no height or weight on file to calculate BMI.      All other systems deferred.  GENERAL:  No acute distress  HABITUS: Obese  GAIT: Antalgic  SKIN: Normal  and No erythema, warmth, fluctuance     KNEE EXAM:    bilateral:   Effusion: No joint effusion  TTP: yes over Pes Bursa < medial joint line  no crepitus with passive knee ROM  Passive ROM: Extension 0, Flexion 130  No pain with manipulation of patella  Stable to varus/valgus stress. No increased laxity to anterior/posterior drawer testing  negative Maday's test  No pain with IR/ER rotation of the hip  5/5 strength in knee flexion and extension, ankle plantarflexion and dorsiflexion  Neurovascular Status: Sensation intact to light touch in Sural, Saphenous, SPN, DPN, Tibial nerve distribution  2+ pulse DP/PT, normal capillary refill, foot has normal warmth    DATA:  Diagnostic tests reviewed for today's visit:     4v of the knee reveal Mild degenerative changes of the Medial and Patellofemoral compartment. There is evidence of advanced osteoarthritis changes with Joint space narrowing.

## 2023-05-16 NOTE — PROCEDURES
Large Joint Aspiration/Injection: bilateral knee    Date/Time: 5/16/2023 11:20 AM  Performed by: Alber Álvarez MD  Authorized by: Alber Álvarez MD     Consent Done?:  Yes (Verbal)  Indications:  Arthritis and pain  Site marked: the procedure site was marked    Timeout: prior to procedure the correct patient, procedure, and site was verified    Prep: patient was prepped and draped in usual sterile fashion      Local anesthesia used?: Yes    Anesthesia:  Local infiltration  Local anesthetic:  Lidocaine 1% without epinephrine and topical anesthetic    Details:  Needle Size:  22 G  Approach:  Anterolateral  Location:  Knee  Laterality:  Bilateral  Site:  Bilateral knee  Medications (Right):  40 mg triamcinolone acetonide 40 mg/mL  Medications (Left):  40 mg triamcinolone acetonide 40 mg/mL  Patient tolerance:  Patient tolerated the procedure well with no immediate complications

## 2023-05-16 NOTE — PROGRESS NOTES
[facility] Healthy Back Physical Therapy Treatment      Name: Bette Johnson  Clinic Number: [id number]    Therapy Diagnosis:   Encounter Diagnosis   Name Primary?    Decreased strength of trunk and back Yes       Physician: No ref. provider found    Visit Date: 2023    Physician Orders: PT Eval and Treat   Medical Diagnosis from Referral: Chronic midline low back pain with sciatica, sciatica laterality unspecified  Evaluation Date: 2023  Authorization Period Expiration: 2024  Plan of Care Expiration: 2023   Reassessment Due: 23  Visit # / Visits authorized:       Time In: 9:00 am  Time Out: 10:00 am   Total Billable Time: 60 minutes  Insurance type:  Fee for service Insurance Patient    Precautions: Standard     Pattern of pain determined: 1 (future PEN)    Subjective   Bette reports extended time /c no LB discomfort.  Patient report biggest limitation at this time is her B knee pain.  Patient report visiting MD for her knee /c instruction for activity modification.  Patient note she understands despite her knee discomfort that keeping moving is the best tx because this is what helped most with her back.  Patient feels she is more attentive of her posture but does not own a lumbar roll.    Patient admit to poor HEP compliance due to focus on B knee but feels she knows how to do them.  Patient is requesting discharge today.        Patient reports tolerating previous visit well with some soreness the following day.   Patient reports their pain to be 0/10 on a 0-10 scale with 0 being no pain and 10 being the worst pain imaginable.  Pain Location: across low back or down L leg      Occupation: Retired  Leisure: Spending time with family, walking  Pts goals: Sweep and mop without any issues     Objective     Baseline IM Testing Results:   Baseline Isometric Testing on Med X equipment: Testing administered by PT  Date of testin2023  ROM 0-36 deg increased to 0-39 on 3/29/23   Max  Peak Torque 105    Min Peak Torque 56    Flex/Ext Ratio 2:1   % below normative data 9      MIDPOINT/DISCHARGE Isometric Testing on Med X equipment: Testing administered by PT  Date of testin23  ROM 0 - 45 deg   Max Peak Torque 146   Min Peak Torque 76   Flex/Ext Ratio 1.9   % ABOVE normative data 12%    24% above eval          MOVEMENT LOSS Back    Norms ROM Loss Initial 23   Flexion Fingers touch toes, sacral angle >/= 70 deg, uniform spinal curvature, posterior weight shift  minimal loss Min loss, difficulty getting full curve reversal Min loss   Extension ASIS surpasses toes, spine of scapulae surpasses heels, uniform spinal curve moderate loss Min loss Min loss   Side glide Right   minimal loss Min loss Min loss   Side glide Left   minimal loss Min loss Min loss   Rotation Right PT observes contralateral shoulder moderate loss Min loss Min loss   Rotation Left PT observes contralateral shoulder moderate loss Min loss Min loss       Limitation/Restriction for FOTO 2023 Survey     Therapist reviewed FOTO scores for Bette Johnson on 2023.   FOTO documents entered into EPIC - see Media section.     Limitation Score: 52% limitation  Visit 5:42% limitation  Visit 11: 39%             Treatment    Bette received the treatments listed below:        Bette received neuromuscular education  to isolate and engage spinal stabilization musculature correctly for motor control and coordination to aid in function and posture for 15 minutes on the Medical Medx Machine.  Patient performed MedX dynamic exercise with emphasis on spinal muscular control using pacer throughout  active range of motion. Therapist assisted patient in achieving optimal exertion for neural reeducation and endurance training by using the  Tatiana Exertion Rating scale, by instructing the patient to aim for mid range of exertion, performing 15-20 repetitions, slowly, correctly,and safely.   Additional neuromuscular  activities  to improve: Coordination and Posture included:      [facility] 5/17/2023   Visit Number 11   VAS Pain Rating 0   Treadmill Time (in min.) -   Speed -   Time 8   Lumbar Stretches - Slouch Overcorrection -   Extension in Standing 20   Flexion in Lying -   Flexion in Sitting 10   Lumbar Extension Seat Pad -   Femur Restraint -   Top Dead Center -   Counterweight -   Lumbar Flexion 45   Lumbar Extension 0   Lumbar Peak Torque 146   Min Torque 76   Test Percent Below Normative Data -   Test Percent Gain in Strength from Initial  24   Lumbar Weight 60   Repetitions 5   Rating of Perceived Exertion -   Ice - Z Lie (in min.) 5       therapeutic exercises to develop strength, endurance, ROM, flexibility, posture, and core stabilization for 45 minutes including:    NuStep 8 min     LTR x 10   DKTC /c towel x 5  HS stretch 3 x 20 sec hold B /c towel support   PPT tilts x 10   Bridges x 15   Prone press ups x 10     Standing open books     Peripheral muscle strengthening which included 1 set of 15-20 repetitions at a slow, controlled 10-13 second per rep pace focused on strengthening supporting musculature for improved body mechanics and functional mobility.  Pt and therapist focused on proper form during treatment to ensure optimal strengthening of each targeted muscle group.  Machines were utilized including torso rotation, leg extension, leg curl, chest press, upright row. Tricep extension, bicep curl, leg press, and hip abduction and hip add      therapeutic activities to improve functional performance for 00 minutes, including:  Lifting mechanics:  Hip hinge   Golfers lift  Box lift  Dead lift from 12 in box 10KB 2 x 10  STS from 18 in 7.5KB 2 x 10    Home Exercises Provided and Patient Education Provided   Stretching:  She has not done 3/22/23 and we updated HEP to flexion seated and pelvic tilts    EIS/EIS   LTR  HS stretch   Open books standing   Seated flexion     Strengthening:   Cardio program  (V5): started 4/5/23  Lifting education (V11):  Posture/ Using Lumbar Roll: she has small pillow  Fridge Magnet Discharge handout (date given): 05/17/23     Education provided:     Exercises were reviewed and Bette was able to demonstrate them prior to the end of the session.  Bette demonstrated good  understanding of the education provided.      See EMR under Patient Instructions for exercises provided 2/27/2023.  Assessment     Patient has attended 11 visits of the [facility] program focusing aerobic training, isometric testing with dynamic strengthening on MedX machine for spine, whole body strengthening on peripheral strengthening equipment, HEP, and patient education. Patient has completed a portion of the [facility] Program and requests to be transitioned discharged. Stressed the importance of continuing exercise and maintaining proper body mechanics and ergonomics in order to fully participate in activities of daily living, work, and leisure activities. At this time, patient demonstrates improvement in ability to reduce symptoms, improved posture, improved spinal ROM and improved strength. Discharge handout with HEP given and reviewed all information given. Patient able to demonstrate and verbalize understanding. Patient does not plan to attend Wellness and is appropriate for transition to Ind workouts    -Improved postural recognition /c report of self corrections.  However, patient not obtain lumbar roll or able to report ergonomic changes to home.  Patient note performing stretches to dec LB discomfort thereby meeting associated goal.  However, patient need sig cue for HEP indicating poor compliance and unable to meet associated goal.    -Improved lumbar ROM, initially on MedX test 0 - 36 deg and currently 0 - 45 deg thereby meeting LTG.  -Improved strength lumbar MedX IM test with 24% improvement vs eval and finishing 12% above age norms.  Although patient demonstrate improvement of significance,  patient not meeting estimated goals.    -Initial outcome tool score 52% and current outcome tool score 39% indicating reduced pain and improved function and mirroring patient subjective report.        Patient has made fair progress towards established goals.  Patient to be d/c to Ind workouts per patient request.     Anticipated Barriers for therapy: None   Pt's spiritual, cultural and educational needs considered and pt agreeable to plan of care and goals as stated below:       GOALS: Pt is in agreement with the following goals.     Short term goals:  6 weeks or 10 visits   1.  Pt will demonstrate increased lumbar ROM by at least 6 degrees from the initial ROM value with improvements noted in functional ROM and ability to perform ADLs.  (MET 05/17/23)  2.  Pt will demonstrate increased MedX average isometric strength value  by 30% from initial test resulting in improved ability to perform bending, lifting, and carrying activities safely, confidently. (NOT MET)   3.  Patient report a reduction in worst pain score by 1-2 points for improved tolerance for standing and walking in and around her house.  (MET 05/17/23)  4.  Pt able to perform HEP correctly with minimal cueing or supervision from therapist to encourage independent management of symptoms. (NOT MET)        Long term goals: 10 weeks or 20 visits   1. Pt will demonstrate increased lumbar ROM by at least 9 degrees from initial ROM value, resulting in improved ability to perform functional fwd bending while standing and sitting. (MET 05/17/23)  2. Pt will demonstrate increased MedX average isometric strength value  by 50% from initial test resulting in improved ability to perform bending, lifting, and carrying activities safely, confidently.  (NOT MET)  3. Pt to demonstrate ability to independently control and reduce their pain through posture positioning and mechanical movements throughout a typical day.  (MET 05/17/23)  4.  Pt will demonstrate reduced pain  and improved functional outcomes as reported on the FOTO by reaching a limitation score of < or = 40% or less in order to demonstrate subjective improvement in pt's condition.    (NOT MET)  5. Pt will demonstrate independence with the HEP at discharge  (NOT MET)  6.  Patient will report being able to do as much sweeping and mopping as she needs to each week with no greater than 3/10 low back pain during or after  (NOT MET)      Plan   D/c to Ind workouts        Therapist: Savage Gale, PT  5/17/2023

## 2023-05-17 ENCOUNTER — CLINICAL SUPPORT (OUTPATIENT)
Dept: REHABILITATION | Facility: HOSPITAL | Age: 73
End: 2023-05-17
Payer: MEDICARE

## 2023-05-17 DIAGNOSIS — R29.898 DECREASED STRENGTH OF TRUNK AND BACK: Primary | ICD-10-CM

## 2023-05-17 PROCEDURE — 97110 THERAPEUTIC EXERCISES: CPT | Mod: PN

## 2023-05-17 PROCEDURE — 97750 PHYSICAL PERFORMANCE TEST: CPT | Mod: PN

## 2023-05-17 PROCEDURE — 97112 NEUROMUSCULAR REEDUCATION: CPT | Mod: PN

## 2023-05-17 NOTE — PATIENT INSTRUCTIONS
"HEALTHY BACK TOOLS        KEEP YOUR SPINE FEELING FINE   HEALTHY HABITS   Do your "GO TO" stretches 2/day   Get a good night's REST   Watch your POSTURE in sitting/standing Drink PLENTY of water   Use a lumbar roll Eat LOTS of fruits & vegetables   GET UP often (walk and/or stretch) Manage your STRESS   Make your workplace IDEAL FOR YOU  Don't smoke   Lift correctly EXERCISE                           WHAT TO DO WHEN SYMPTOMS FLARE UP  Back and neck pain may occasionally flare up.  If you experience a flare   up, remember your tools. Be encouraged, by remembering that flare-ups will   usually pass.   My Tools:    ~Use your "Go To" Stretches/Positions   ~Keep Moving-pain usually gets better if you move  ~Z lie (with or without ice)  10 min several times a day until symptoms reduce  ~Slowly resume normal activities   ~Practice Deep Breathing and Relaxation techniques                                                 MY EXERCISE PLAN  GO TO STRETCHES  2/day (like brushing your teeth) STRENGTHENING  2-3 times/week CARDIO PROGRAM  min/week   Seated lean forward  Pelvic Tilts Recumbent bike    Open Books "[employer] Pool    Standing/laying on stomach     Hamstring stretch with towel assist              "

## 2023-06-03 ENCOUNTER — PATIENT MESSAGE (OUTPATIENT)
Dept: ORTHOPEDICS | Facility: CLINIC | Age: 73
End: 2023-06-03
Payer: MEDICARE

## 2023-07-13 DIAGNOSIS — I10 ESSENTIAL HYPERTENSION: ICD-10-CM

## 2023-07-14 RX ORDER — VALSARTAN AND HYDROCHLOROTHIAZIDE 320; 25 MG/1; MG/1
1 TABLET, FILM COATED ORAL DAILY
Qty: 90 TABLET | Refills: 1 | Status: SHIPPED | OUTPATIENT
Start: 2023-07-14 | End: 2023-08-10 | Stop reason: SDUPTHER

## 2023-07-14 NOTE — TELEPHONE ENCOUNTER
Refill Decision Note   Bette Johnson  is requesting a refill authorization.  Brief Assessment and Rationale for Refill:  Approve     Medication Therapy Plan:  FLOS      Comments:     Note composed:1:48 AM 07/14/2023

## 2023-07-14 NOTE — TELEPHONE ENCOUNTER
Care Due:                  Date            Visit Type   Department     Provider  --------------------------------------------------------------------------------                                [facility]                              PRIMARY      MED/ INTERNAL  Last Visit: 02-      CARE ([facility])   MED/ PEDS      Neeta Estrada                              [facility]                              PRIMARY      MED/ INTERNAL  Next Visit: 08-      CARE ([facility])   MED/ PEDS      Neeta Estrada                                                            Last  Test          Frequency    Reason                     Performed    Due Date  --------------------------------------------------------------------------------    HBA1C.......  6 months...  metFORMIN................  01- 07-    [employer] Embedded Care Due Messages. Reference number: [id number].   7/13/2023 11:19:12 PM CDT

## 2023-07-25 ENCOUNTER — TELEPHONE (OUTPATIENT)
Dept: ENDOSCOPY | Facility: HOSPITAL | Age: 73
End: 2023-07-25

## 2023-07-25 DIAGNOSIS — Z12.11 COLON CANCER SCREENING: Primary | ICD-10-CM

## 2023-07-25 NOTE — TELEPHONE ENCOUNTER
Attempted to call the patient to schedule an endoscopy procedure(s) Colonoscopy. The patient did not answer the call and left a voice message. PAT appointment rescheduled.

## 2023-08-02 ENCOUNTER — LAB VISIT (OUTPATIENT)
Dept: LAB | Facility: HOSPITAL | Age: 73
End: 2023-08-02
Attending: FAMILY MEDICINE
Payer: MEDICARE

## 2023-08-02 DIAGNOSIS — E11.9 TYPE 2 DIABETES MELLITUS WITHOUT COMPLICATION, WITHOUT LONG-TERM CURRENT USE OF INSULIN: ICD-10-CM

## 2023-08-02 LAB
ALBUMIN SERPL BCP-MCNC: 4 G/DL (ref 3.5–5.2)
ALP SERPL-CCNC: 70 U/L (ref 55–135)
ALT SERPL W/O P-5'-P-CCNC: 16 U/L (ref 10–44)
ANION GAP SERPL CALC-SCNC: 11 MMOL/L (ref 8–16)
AST SERPL-CCNC: 21 U/L (ref 10–40)
BILIRUB SERPL-MCNC: 0.4 MG/DL (ref 0.1–1)
BUN SERPL-MCNC: 14 MG/DL (ref 8–23)
CALCIUM SERPL-MCNC: 10 MG/DL (ref 8.7–10.5)
CHLORIDE SERPL-SCNC: 98 MMOL/L (ref 95–110)
CO2 SERPL-SCNC: 26 MMOL/L (ref 23–29)
CREAT SERPL-MCNC: 1.1 MG/DL (ref 0.5–1.4)
EST. GFR  (NO RACE VARIABLE): 53.7 ML/MIN/1.73 M^2
ESTIMATED AVG GLUCOSE: 157 MG/DL (ref 68–131)
GLUCOSE SERPL-MCNC: 243 MG/DL (ref 70–110)
HBA1C MFR BLD: 7.1 % (ref 4–5.6)
POTASSIUM SERPL-SCNC: 4.4 MMOL/L (ref 3.5–5.1)
PROT SERPL-MCNC: 8.1 G/DL (ref 6–8.4)
SODIUM SERPL-SCNC: 135 MMOL/L (ref 136–145)

## 2023-08-02 PROCEDURE — 80053 COMPREHEN METABOLIC PANEL: CPT | Performed by: FAMILY MEDICINE

## 2023-08-02 PROCEDURE — 36415 COLL VENOUS BLD VENIPUNCTURE: CPT | Mod: PO | Performed by: FAMILY MEDICINE

## 2023-08-02 PROCEDURE — 83036 HEMOGLOBIN GLYCOSYLATED A1C: CPT | Performed by: FAMILY MEDICINE

## 2023-08-10 ENCOUNTER — PATIENT MESSAGE (OUTPATIENT)
Dept: FAMILY MEDICINE | Facility: CLINIC | Age: 73
End: 2023-08-10

## 2023-08-10 ENCOUNTER — HOSPITAL ENCOUNTER (OUTPATIENT)
Dept: RADIOLOGY | Facility: HOSPITAL | Age: 73
Discharge: HOME | End: 2023-08-10
Attending: FAMILY MEDICINE
Payer: MEDICARE

## 2023-08-10 ENCOUNTER — OFFICE VISIT (OUTPATIENT)
Dept: FAMILY MEDICINE | Facility: CLINIC | Age: 73
End: 2023-08-10
Payer: MEDICARE

## 2023-08-10 VITALS
DIASTOLIC BLOOD PRESSURE: 60 MMHG | WEIGHT: 248.88 LBS | TEMPERATURE: 98 F | BODY MASS INDEX: 42.49 KG/M2 | SYSTOLIC BLOOD PRESSURE: 134 MMHG | HEART RATE: 59 BPM | HEIGHT: 64 IN | OXYGEN SATURATION: 97 %

## 2023-08-10 DIAGNOSIS — E55.9 VITAMIN D DEFICIENCY: ICD-10-CM

## 2023-08-10 DIAGNOSIS — E66.01 MORBID OBESITY WITH BMI OF 40.0-44.9, ADULT: ICD-10-CM

## 2023-08-10 DIAGNOSIS — E11.22 TYPE 2 DIABETES MELLITUS WITH STAGE 3A CHRONIC KIDNEY DISEASE, WITHOUT LONG-TERM CURRENT USE OF INSULIN: ICD-10-CM

## 2023-08-10 DIAGNOSIS — I70.0 THORACIC AORTA ATHEROSCLEROSIS: Primary | ICD-10-CM

## 2023-08-10 DIAGNOSIS — I10 ESSENTIAL HYPERTENSION: ICD-10-CM

## 2023-08-10 DIAGNOSIS — E11.69 DIABETES MELLITUS TYPE 2 IN OBESE: ICD-10-CM

## 2023-08-10 DIAGNOSIS — N18.31 CHRONIC KIDNEY DISEASE, STAGE 3A: ICD-10-CM

## 2023-08-10 DIAGNOSIS — R07.81 RIB PAIN ON LEFT SIDE: ICD-10-CM

## 2023-08-10 DIAGNOSIS — N18.31 TYPE 2 DIABETES MELLITUS WITH STAGE 3A CHRONIC KIDNEY DISEASE, WITHOUT LONG-TERM CURRENT USE OF INSULIN: ICD-10-CM

## 2023-08-10 DIAGNOSIS — E66.9 DIABETES MELLITUS TYPE 2 IN OBESE: ICD-10-CM

## 2023-08-10 PROCEDURE — 3075F PR MOST RECENT SYSTOLIC BLOOD PRESS GE 130-139MM HG: ICD-10-PCS | Mod: CPTII,S$GLB,, | Performed by: FAMILY MEDICINE

## 2023-08-10 PROCEDURE — 3078F DIAST BP <80 MM HG: CPT | Mod: CPTII,S$GLB,, | Performed by: FAMILY MEDICINE

## 2023-08-10 PROCEDURE — 99999 PR PBB SHADOW E&M-EST. PATIENT-LVL IV: ICD-10-PCS | Mod: PBBFAC,,, | Performed by: FAMILY MEDICINE

## 2023-08-10 PROCEDURE — 3061F NEG MICROALBUMINURIA REV: CPT | Mod: CPTII,S$GLB,, | Performed by: FAMILY MEDICINE

## 2023-08-10 PROCEDURE — 3078F PR MOST RECENT DIASTOLIC BLOOD PRESSURE < 80 MM HG: ICD-10-PCS | Mod: CPTII,S$GLB,, | Performed by: FAMILY MEDICINE

## 2023-08-10 PROCEDURE — 1160F RVW MEDS BY RX/DR IN RCRD: CPT | Mod: CPTII,S$GLB,, | Performed by: FAMILY MEDICINE

## 2023-08-10 PROCEDURE — 3051F HG A1C>EQUAL 7.0%<8.0%: CPT | Mod: CPTII,S$GLB,, | Performed by: FAMILY MEDICINE

## 2023-08-10 PROCEDURE — 71100 XR RIBS 2 VIEW LEFT: ICD-10-PCS | Mod: 26,LT,, | Performed by: RADIOLOGY

## 2023-08-10 PROCEDURE — 99999 PR PBB SHADOW E&M-EST. PATIENT-LVL IV: CPT | Mod: PBBFAC,,, | Performed by: FAMILY MEDICINE

## 2023-08-10 PROCEDURE — 99214 PR OFFICE/OUTPT VISIT, EST, LEVL IV, 30-39 MIN: ICD-10-PCS | Mod: S$GLB,,, | Performed by: FAMILY MEDICINE

## 2023-08-10 PROCEDURE — 99214 OFFICE O/P EST MOD 30 MIN: CPT | Mod: S$GLB,,, | Performed by: FAMILY MEDICINE

## 2023-08-10 PROCEDURE — 71100 X-RAY EXAM RIBS UNI 2 VIEWS: CPT | Mod: TC,FY,PO,LT

## 2023-08-10 PROCEDURE — 71100 X-RAY EXAM RIBS UNI 2 VIEWS: CPT | Mod: 26,LT,, | Performed by: RADIOLOGY

## 2023-08-10 PROCEDURE — 1125F PR PAIN SEVERITY QUANTIFIED, PAIN PRESENT: ICD-10-PCS | Mod: CPTII,S$GLB,, | Performed by: FAMILY MEDICINE

## 2023-08-10 PROCEDURE — 1159F MED LIST DOCD IN RCRD: CPT | Mod: CPTII,S$GLB,, | Performed by: FAMILY MEDICINE

## 2023-08-10 PROCEDURE — 3075F SYST BP GE 130 - 139MM HG: CPT | Mod: CPTII,S$GLB,, | Performed by: FAMILY MEDICINE

## 2023-08-10 PROCEDURE — 1159F PR MEDICATION LIST DOCUMENTED IN MEDICAL RECORD: ICD-10-PCS | Mod: CPTII,S$GLB,, | Performed by: FAMILY MEDICINE

## 2023-08-10 PROCEDURE — 3051F PR MOST RECENT HEMOGLOBIN A1C LEVEL 7.0 - < 8.0%: ICD-10-PCS | Mod: CPTII,S$GLB,, | Performed by: FAMILY MEDICINE

## 2023-08-10 PROCEDURE — 3061F PR NEG MICROALBUMINURIA RESULT DOCUMENTED/REVIEW: ICD-10-PCS | Mod: CPTII,S$GLB,, | Performed by: FAMILY MEDICINE

## 2023-08-10 PROCEDURE — 3288F PR FALLS RISK ASSESSMENT DOCUMENTED: ICD-10-PCS | Mod: CPTII,S$GLB,, | Performed by: FAMILY MEDICINE

## 2023-08-10 PROCEDURE — 3008F BODY MASS INDEX DOCD: CPT | Mod: CPTII,S$GLB,, | Performed by: FAMILY MEDICINE

## 2023-08-10 PROCEDURE — 3066F NEPHROPATHY DOC TX: CPT | Mod: CPTII,S$GLB,, | Performed by: FAMILY MEDICINE

## 2023-08-10 PROCEDURE — 3008F PR BODY MASS INDEX (BMI) DOCUMENTED: ICD-10-PCS | Mod: CPTII,S$GLB,, | Performed by: FAMILY MEDICINE

## 2023-08-10 PROCEDURE — 3066F PR DOCUMENTATION OF TREATMENT FOR NEPHROPATHY: ICD-10-PCS | Mod: CPTII,S$GLB,, | Performed by: FAMILY MEDICINE

## 2023-08-10 PROCEDURE — 1101F PT FALLS ASSESS-DOCD LE1/YR: CPT | Mod: CPTII,S$GLB,, | Performed by: FAMILY MEDICINE

## 2023-08-10 PROCEDURE — 1160F PR REVIEW ALL MEDS BY PRESCRIBER/CLIN PHARMACIST DOCUMENTED: ICD-10-PCS | Mod: CPTII,S$GLB,, | Performed by: FAMILY MEDICINE

## 2023-08-10 PROCEDURE — 3288F FALL RISK ASSESSMENT DOCD: CPT | Mod: CPTII,S$GLB,, | Performed by: FAMILY MEDICINE

## 2023-08-10 PROCEDURE — 1125F AMNT PAIN NOTED PAIN PRSNT: CPT | Mod: CPTII,S$GLB,, | Performed by: FAMILY MEDICINE

## 2023-08-10 PROCEDURE — 1101F PR PT FALLS ASSESS DOC 0-1 FALLS W/OUT INJ PAST YR: ICD-10-PCS | Mod: CPTII,S$GLB,, | Performed by: FAMILY MEDICINE

## 2023-08-10 RX ORDER — ATORVASTATIN CALCIUM 10 MG/1
10 TABLET, FILM COATED ORAL DAILY
Qty: 90 TABLET | Refills: 3 | Status: SHIPPED | OUTPATIENT
Start: 2023-08-10 | End: 2023-08-10 | Stop reason: SDUPTHER

## 2023-08-10 RX ORDER — METFORMIN HYDROCHLORIDE 850 MG/1
850 TABLET ORAL DAILY
Qty: 90 TABLET | Refills: 3 | Status: SHIPPED | OUTPATIENT
Start: 2023-08-10

## 2023-08-10 RX ORDER — CHOLECALCIFEROL (VITAMIN D3) 25 MCG
5000 TABLET ORAL DAILY
COMMUNITY

## 2023-08-10 RX ORDER — AMLODIPINE BESYLATE 10 MG/1
10 TABLET ORAL DAILY
Qty: 90 TABLET | Refills: 3 | Status: SHIPPED | OUTPATIENT
Start: 2023-08-10

## 2023-08-10 RX ORDER — ATORVASTATIN CALCIUM 10 MG/1
10 TABLET, FILM COATED ORAL EVERY OTHER DAY
Qty: 36 TABLET | Refills: 3 | Status: SHIPPED | OUTPATIENT
Start: 2023-08-10

## 2023-08-10 RX ORDER — VALSARTAN AND HYDROCHLOROTHIAZIDE 320; 25 MG/1; MG/1
1 TABLET, FILM COATED ORAL DAILY
Qty: 90 TABLET | Refills: 3 | Status: SHIPPED | OUTPATIENT
Start: 2023-08-10

## 2023-08-10 NOTE — PROGRESS NOTES
"Routine Office Visit    Bette Johnson  1950  [id number]      Subjective     Bette is a 73 y.o. female who presents today for:    Hypertension - Patient is doing well on valsartan / hctz. Patient denies any side effects on medication   Hyperlipidemia - Patient feels less energy. Patient has been taking atorvastatin 3 times per week.   Diabetes - Patient takes medications as prescribed.   Low back pain / left side pain - chronic condition for patient. Patient reports having pain on the left side of her rib. It is associated with fat pad swelling. No palpable mass. She has also noticed increasing sciatic pain or burning pain down her legs.     Objective     Review of Systems   Constitutional:  Negative for chills and fever.   HENT:  Negative for congestion.    Eyes:  Negative for blurred vision.   Respiratory:  Negative for cough.    Cardiovascular:  Negative for chest pain.   Gastrointestinal:  Negative for abdominal pain, constipation, diarrhea, heartburn, nausea and vomiting.   Genitourinary:  Negative for dysuria.   Musculoskeletal:  Positive for joint pain. Negative for myalgias.   Skin:  Negative for itching and rash.   Neurological:  Negative for dizziness and headaches.   Psychiatric/Behavioral:  Negative for depression.      Do you have leakage of urine? No    /60   Pulse (!) 59   Temp 97.6 °F (36.4 °C) (Oral)   Ht 5' 4" (1.626 m)   Wt 112.9 kg (248 lb 14.4 oz)   LMP  (LMP Unknown)   SpO2 97%   BMI 42.72 kg/m²   Physical Exam  Constitutional:       Appearance: She is well-developed.   HENT:      Head: Normocephalic and atraumatic.   Eyes:      Conjunctiva/sclera: Conjunctivae normal.      Pupils: Pupils are equal, round, and reactive to light.   Cardiovascular:      Rate and Rhythm: Normal rate and regular rhythm.      Heart sounds: Normal heart sounds. No murmur heard.     No friction rub. No gallop.   Pulmonary:      Effort: No respiratory distress.      Breath sounds: Normal breath " "sounds.   Abdominal:      General: Bowel sounds are normal. There is no distension.      Palpations: Abdomen is soft.      Tenderness: There is no abdominal tenderness.   Musculoskeletal:         General: Normal range of motion.      Cervical back: Normal range of motion and neck supple.   Lymphadenopathy:      Cervical: No cervical adenopathy.   Skin:     General: Skin is warm.   Neurological:      Mental Status: She is alert and oriented to person, place, and time.             Assessment     Health Maintenance         Date Due Completion Date    COVID-19 Vaccine (7 - Moderna series) 10/04/2022 6/4/2022    Colorectal Cancer Screening 01/25/2023 1/25/2018    Influenza Vaccine (1) 09/01/2023 10/1/2022    Mammogram 11/11/2023 11/11/2022    Override on 7/14/2016: Done    Eye Exam 12/12/2023 12/12/2022    Override on 12/10/2021: Done (Dr. KWON)    Override on 6/19/2017: Done    Lipid Panel 01/30/2024 1/30/2023    Hemoglobin A1c 02/02/2024 8/2/2023    Foot Exam 02/09/2024 2/9/2023    Override on 2/9/2023: Done    Override on 1/11/2022: Done    Override on 4/19/2019: Done    Override on 6/11/2018: Done    Diabetes Urine Screening 08/02/2024 8/2/2023    High Dose Statin 08/10/2024 8/10/2023    TETANUS VACCINE 02/02/2031 2/2/2021              Problem List Items Addressed This Visit          Cardiac/Vascular    Essential hypertension    Relevant Medications    amLODIPine (NORVASC) 10 MG tablet    valsartan-hydrochlorothiazide (DIOVAN-HCT) 320-25 mg per tablet  The current medical regimen is effective;  continue present plan and medications.       Thoracic aorta atherosclerosis - Primary    Overview     " There is thoracic aortic atherosclerosis" - Xray Chest 11-  Patient with Atherosclerosis of the Aorta.  Stable/asymptomatic. Currently stable on lipid and b/p monitoring.              Renal/    Chronic kidney disease, stage 3a  Noted in chart  Continue to monitor          Endocrine    Diabetes mellitus type 2 in " obese    Relevant Medications    metFORMIN (GLUCOPHAGE) 850 MG tablet    atorvastatin (LIPITOR) 10 MG tablet  The current medical regimen is effective;  continue present plan and medications.       Other Relevant Orders    CBC Auto Differential    Comprehensive Metabolic Panel    Lipid Panel    Hemoglobin A1C    TSH    Morbid obesity with BMI of 40.0-44.9, adult  The patient is asked to make an attempt to improve diet and exercise patterns to aid in medical management of this problem.       Type 2 diabetes mellitus with stage 3a chronic kidney disease, without long-term current use of insulin    Relevant Medications    metFORMIN (GLUCOPHAGE) 850 MG tablet  The current medical regimen is effective;  continue present plan and medications.       Vitamin D deficiency    Relevant Medications    vitamin D (VITAMIN D3) 1000 units Tab     Other Visit Diagnoses       Rib pain on left side        Relevant Orders    X-Ray Ribs 2 View Left (Completed)                  Follow up in about 6 months (around 2/10/2024), or if symptoms worsen or fail to improve, for yearly exam.

## 2023-08-14 NOTE — PROGRESS NOTES
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  She was accompanied by her mother.     Jak has had more frequent heart racing symptoms that feel like the SVT that she had before the ablation.  She has felt these symptoms when she is walking and when she is in the shower.  She wore an event monitor that demonstrated episodes with heart rates up the 190s.  Jak has not been active with sports or any vigorous activity when she wore the event monitor.    A complete 10-point review of systems was otherwise negative.    Jak's past medical history is summarized in the problem list above.    ALLERGIES:   Allergen Reactions   • Shellfish Allergy   (Food Or Med) ANAPHYLAXIS     She is taking no medications.    She lives in [place], [place].    There has been no change to the family history.    Physical Exam:    /61 (BP Location: RUE - Right upper extremity, Patient Position: Sitting)   Pulse 80   Resp 17   Ht 5' 3\" (1.6 m)   Wt 45.6 kg (100 lb 8.5 oz)   BMI 17.81 kg/m²   BSA 1.44 m²     On physical exam, she was an alert and pleasant girl in no distress. The lungs were clear to auscultation bilaterally.  The point of maximal impulse was normally placed, and there were no lifts, heaves, or thrills.  There was a normal S1 and a physiologically split S2.  There was a regular rate and rhythm, and there  "Routine Office Visit    Bette Johnson  1950  [id number]      Subjective     Bette is a 71 y.o. female who presents today for:    1. Right knee pain - chronic problem for patient. Pain can change in severity and is intermittent. Pain is worse with ambulating. Patient is unable to completely bend her she has followed up with ortho and plans to start physical therapy   2. Hypertension - Patient has been taking her medication as prescribed. No side effect from current regimen.   3. Annual physical       Objective     Review of Systems   Constitutional: Negative for chills and fever.   HENT: Negative for congestion.    Eyes: Negative for blurred vision.   Respiratory: Negative for cough.    Cardiovascular: Negative for chest pain.   Gastrointestinal: Negative for abdominal pain, constipation, diarrhea, heartburn, nausea and vomiting.   Genitourinary: Negative for dysuria.   Musculoskeletal: Negative for myalgias.   Skin: Negative for itching and rash.   Neurological: Negative for dizziness and headaches.   Psychiatric/Behavioral: Negative for depression.       /68 (BP Location: Left arm, Patient Position: Sitting, BP Method: Large (Manual))   Pulse 62   Temp 97.7 °F (36.5 °C) (Oral)   Ht 5' 4" (1.626 m)   Wt 112.3 kg (247 lb 9.2 oz)   LMP  (LMP Unknown)   SpO2 97%   BMI 42.50 kg/m²   Physical Exam  Constitutional:       Appearance: She is well-developed and well-nourished.   HENT:      Head: Normocephalic and atraumatic.   Eyes:      Extraocular Movements: EOM normal.      Conjunctiva/sclera: Conjunctivae normal.      Pupils: Pupils are equal, round, and reactive to light.   Cardiovascular:      Rate and Rhythm: Normal rate and regular rhythm.      Heart sounds: Normal heart sounds. No murmur heard.  No friction rub. No gallop.    Pulmonary:      Effort: No respiratory distress.      Breath sounds: Normal breath sounds.   Abdominal:      General: Bowel sounds are normal. There is no distension.      " "Palpations: Abdomen is soft.      Tenderness: There is no abdominal tenderness.   Musculoskeletal:         General: Normal range of motion.      Cervical back: Normal range of motion and neck supple.   Lymphadenopathy:      Cervical: No cervical adenopathy.   Skin:     General: Skin is warm.   Neurological:      Mental Status: She is alert and oriented to person, place, and time.   Psychiatric:         Mood and Affect: Mood and affect normal.           Assessment     Problem List Items Addressed This Visit        Cardiac/Vascular    Essential hypertension    Relevant Orders    Hypertension Digital Medicine (HDM) Enrollment Order (Completed)    Hypertension Digital Medicine ([facility]): Assign Onboarding Questionnaires (Completed)    Thoracic aorta atherosclerosis    Overview     " There is thoracic aortic atherosclerosis" - Xray Chest 11-  Patient with Atherosclerosis of the Aorta.  Stable/asymptomatic. Currently stable on lipid and b/p monitoring.              Renal/    Chronic kidney disease, stage 3a  The current medical regimen is effective;  continue present plan and medications.         Endocrine    Diabetes mellitus type 2 in obese    Morbid obesity with BMI of 40.0-44.9, adult  The current medical regimen is effective;  continue present plan and medications.  The patient is asked to make an attempt to improve diet and exercise patterns to aid in medical management of this problem.      Type 2 diabetes mellitus without complication, without long-term current use of insulin    Relevant Orders    Diabetes Digital Medicine (DDMP) Enrollment Order (Completed)    Diabetes Digital Medicine (DDMP): Assign Onboarding Questionnaires (Completed)    Comprehensive Metabolic Panel    Hemoglobin A1C    CBC Auto Differential    TSH      Other Visit Diagnoses     Annual physical exam    -  Primary  Health Maintenance       Date Due Completion Date    Shingles Vaccine (2 of 2) 02/14/2022 12/20/2021    Hemoglobin A1c " were no murmurs, gallops, or rubs.  The abdomen was soft and nontender, with no hepatomegaly.  The extremities were warm and well-perfused, and there was no edema.      Laboratory Studies:  A 12-lead electrocardiogram was performed, and I reviewed and interpreted it; it demonstrated sinus rhythm with a rate of 65 beats per minute.  The OH, QRS, and QTc intervals were normal.  There was no evidence of chamber enlargement or ventricular hypertrophy.  There was no evidence of pre-excitation.      Event monitor (worn from 7/11/23 to 8/9/23) demonstrated multiple episodes of narrow-complex tachycardia with rates up to 193-196 bpm, suspicious for a return of supraventricular tachycardia.     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  Since then she has developed a return of heart racing symptoms, and recent event monitor demonstrated narrow complex tachycardia that is very suspicious for supraventricular tachycardia.  We spoke about the possibility of catheter ablation to treat this tachycardia.  Jak and her mother would like her to be scheduled for the procedure.       Recommendations:  1.    She will be scheduled and catheter ablation.  2.  I would like to see her back in clinic in 3 months after the procedure.       Thank you for allowing me to participate in the care of Jak.  Please contact me if you have any questions.     Sincerely,    Clyde Botello MD  Pediatric Cardiology / Electrophysiology     07/27/2022 1/27/2022    Mammogram 11/08/2022 11/8/2021    Override on 7/14/2016: Done    Eye Exam 12/10/2022 12/10/2021 (Done)    Override on 12/10/2021: Done (Dr. KWON)    Override on 6/19/2017: Done    Diabetes Urine Screening 01/11/2023 1/11/2022    Foot Exam 01/11/2023 1/11/2022    Override on 1/11/2022: Done    Override on 4/19/2019: Done    Override on 6/11/2018: Done    Colorectal Cancer Screening 01/25/2023 1/25/2018    Lipid Panel 01/27/2023 1/27/2022    High Dose Statin 02/04/2023 2/4/2022    TETANUS VACCINE 02/02/2031 2/2/2021        I addressed all major concerns as it related to health maintenance.  All were ordered and scheduled based on the patients wishes.  Any additional health maintenance will be readdressed at the next physical if declined or deferred by the patient.      Essential (hemorrhagic) thrombocythemia      Noted in chart  Stable             No follow-ups on file.

## 2023-08-15 ENCOUNTER — TELEPHONE (OUTPATIENT)
Dept: FAMILY MEDICINE | Facility: CLINIC | Age: 73
End: 2023-08-15
Payer: MEDICARE

## 2023-08-15 DIAGNOSIS — Z12.31 ENCOUNTER FOR SCREENING MAMMOGRAM FOR BREAST CANCER: Primary | ICD-10-CM

## 2023-09-17 ENCOUNTER — PATIENT MESSAGE (OUTPATIENT)
Dept: OTHER | Facility: OTHER | Age: 73
End: 2023-09-17
Payer: MEDICARE

## 2023-09-17 ENCOUNTER — PATIENT MESSAGE (OUTPATIENT)
Dept: ADMINISTRATIVE | Facility: OTHER | Age: 73
End: 2023-09-17
Payer: MEDICARE

## 2023-09-21 ENCOUNTER — TELEPHONE (OUTPATIENT)
Dept: ENDOSCOPY | Facility: HOSPITAL | Age: 73
End: 2023-09-21

## 2023-09-21 ENCOUNTER — CLINICAL SUPPORT (OUTPATIENT)
Dept: ENDOSCOPY | Facility: HOSPITAL | Age: 73
End: 2023-09-21
Attending: FAMILY MEDICINE
Payer: MEDICARE

## 2023-09-21 DIAGNOSIS — Z12.11 COLON CANCER SCREENING: ICD-10-CM

## 2023-09-21 NOTE — TELEPHONE ENCOUNTER
Called patient for PAT appointment to schedule colonoscopy. Patient wants to schedule colonoscopy at [facility] but schedule is completely booked. Phone number given to patient to call back to schedule for January.

## 2023-10-24 ENCOUNTER — PATIENT MESSAGE (OUTPATIENT)
Dept: FAMILY MEDICINE | Facility: CLINIC | Age: 73
End: 2023-10-24
Payer: MEDICARE

## 2023-11-13 ENCOUNTER — HOSPITAL ENCOUNTER (OUTPATIENT)
Dept: RADIOLOGY | Facility: HOSPITAL | Age: 73
Discharge: HOME | End: 2023-11-13
Attending: FAMILY MEDICINE
Payer: MEDICARE

## 2023-11-13 DIAGNOSIS — Z12.31 ENCOUNTER FOR SCREENING MAMMOGRAM FOR BREAST CANCER: ICD-10-CM

## 2023-11-13 PROCEDURE — 77063 MAMMO DIGITAL SCREENING BILAT WITH TOMO: ICD-10-PCS | Mod: 26,,, | Performed by: RADIOLOGY

## 2023-11-13 PROCEDURE — 77067 SCR MAMMO BI INCL CAD: CPT | Mod: TC

## 2023-11-13 PROCEDURE — 77067 MAMMO DIGITAL SCREENING BILAT WITH TOMO: ICD-10-PCS | Mod: 26,,, | Performed by: RADIOLOGY

## 2023-11-13 PROCEDURE — 77067 SCR MAMMO BI INCL CAD: CPT | Mod: 26,,, | Performed by: RADIOLOGY

## 2023-11-13 PROCEDURE — 77063 BREAST TOMOSYNTHESIS BI: CPT | Mod: 26,,, | Performed by: RADIOLOGY

## 2023-11-15 ENCOUNTER — OFFICE VISIT (OUTPATIENT)
Dept: FAMILY MEDICINE | Facility: CLINIC | Age: 73
End: 2023-11-15
Payer: MEDICARE

## 2023-11-15 VITALS
SYSTOLIC BLOOD PRESSURE: 144 MMHG | OXYGEN SATURATION: 95 % | TEMPERATURE: 98 F | WEIGHT: 246.94 LBS | DIASTOLIC BLOOD PRESSURE: 62 MMHG | RESPIRATION RATE: 18 BRPM | BODY MASS INDEX: 42.16 KG/M2 | HEART RATE: 54 BPM | HEIGHT: 64 IN

## 2023-11-15 DIAGNOSIS — M54.10 RADICULAR LEG PAIN: Primary | ICD-10-CM

## 2023-11-15 PROCEDURE — 3008F PR BODY MASS INDEX (BMI) DOCUMENTED: ICD-10-PCS | Mod: CPTII,S$GLB,, | Performed by: PHYSICIAN ASSISTANT

## 2023-11-15 PROCEDURE — 99214 OFFICE O/P EST MOD 30 MIN: CPT | Mod: S$GLB,,, | Performed by: PHYSICIAN ASSISTANT

## 2023-11-15 PROCEDURE — 3061F PR NEG MICROALBUMINURIA RESULT DOCUMENTED/REVIEW: ICD-10-PCS | Mod: CPTII,S$GLB,, | Performed by: PHYSICIAN ASSISTANT

## 2023-11-15 PROCEDURE — 99999 PR PBB SHADOW E&M-EST. PATIENT-LVL IV: CPT | Mod: PBBFAC,,, | Performed by: PHYSICIAN ASSISTANT

## 2023-11-15 PROCEDURE — 3008F BODY MASS INDEX DOCD: CPT | Mod: CPTII,S$GLB,, | Performed by: PHYSICIAN ASSISTANT

## 2023-11-15 PROCEDURE — 1160F PR REVIEW ALL MEDS BY PRESCRIBER/CLIN PHARMACIST DOCUMENTED: ICD-10-PCS | Mod: CPTII,S$GLB,, | Performed by: PHYSICIAN ASSISTANT

## 2023-11-15 PROCEDURE — 1160F RVW MEDS BY RX/DR IN RCRD: CPT | Mod: CPTII,S$GLB,, | Performed by: PHYSICIAN ASSISTANT

## 2023-11-15 PROCEDURE — 1125F PR PAIN SEVERITY QUANTIFIED, PAIN PRESENT: ICD-10-PCS | Mod: CPTII,S$GLB,, | Performed by: PHYSICIAN ASSISTANT

## 2023-11-15 PROCEDURE — 3078F DIAST BP <80 MM HG: CPT | Mod: CPTII,S$GLB,, | Performed by: PHYSICIAN ASSISTANT

## 2023-11-15 PROCEDURE — 99214 PR OFFICE/OUTPT VISIT, EST, LEVL IV, 30-39 MIN: ICD-10-PCS | Mod: S$GLB,,, | Performed by: PHYSICIAN ASSISTANT

## 2023-11-15 PROCEDURE — 1101F PT FALLS ASSESS-DOCD LE1/YR: CPT | Mod: CPTII,S$GLB,, | Performed by: PHYSICIAN ASSISTANT

## 2023-11-15 PROCEDURE — 3288F FALL RISK ASSESSMENT DOCD: CPT | Mod: CPTII,S$GLB,, | Performed by: PHYSICIAN ASSISTANT

## 2023-11-15 PROCEDURE — 3061F NEG MICROALBUMINURIA REV: CPT | Mod: CPTII,S$GLB,, | Performed by: PHYSICIAN ASSISTANT

## 2023-11-15 PROCEDURE — 3066F PR DOCUMENTATION OF TREATMENT FOR NEPHROPATHY: ICD-10-PCS | Mod: CPTII,S$GLB,, | Performed by: PHYSICIAN ASSISTANT

## 2023-11-15 PROCEDURE — 1159F PR MEDICATION LIST DOCUMENTED IN MEDICAL RECORD: ICD-10-PCS | Mod: CPTII,S$GLB,, | Performed by: PHYSICIAN ASSISTANT

## 2023-11-15 PROCEDURE — 1159F MED LIST DOCD IN RCRD: CPT | Mod: CPTII,S$GLB,, | Performed by: PHYSICIAN ASSISTANT

## 2023-11-15 PROCEDURE — 3077F SYST BP >= 140 MM HG: CPT | Mod: CPTII,S$GLB,, | Performed by: PHYSICIAN ASSISTANT

## 2023-11-15 PROCEDURE — 3078F PR MOST RECENT DIASTOLIC BLOOD PRESSURE < 80 MM HG: ICD-10-PCS | Mod: CPTII,S$GLB,, | Performed by: PHYSICIAN ASSISTANT

## 2023-11-15 PROCEDURE — 3077F PR MOST RECENT SYSTOLIC BLOOD PRESSURE >= 140 MM HG: ICD-10-PCS | Mod: CPTII,S$GLB,, | Performed by: PHYSICIAN ASSISTANT

## 2023-11-15 PROCEDURE — 3051F HG A1C>EQUAL 7.0%<8.0%: CPT | Mod: CPTII,S$GLB,, | Performed by: PHYSICIAN ASSISTANT

## 2023-11-15 PROCEDURE — 3288F PR FALLS RISK ASSESSMENT DOCUMENTED: ICD-10-PCS | Mod: CPTII,S$GLB,, | Performed by: PHYSICIAN ASSISTANT

## 2023-11-15 PROCEDURE — 3066F NEPHROPATHY DOC TX: CPT | Mod: CPTII,S$GLB,, | Performed by: PHYSICIAN ASSISTANT

## 2023-11-15 PROCEDURE — 3051F PR MOST RECENT HEMOGLOBIN A1C LEVEL 7.0 - < 8.0%: ICD-10-PCS | Mod: CPTII,S$GLB,, | Performed by: PHYSICIAN ASSISTANT

## 2023-11-15 PROCEDURE — 1125F AMNT PAIN NOTED PAIN PRSNT: CPT | Mod: CPTII,S$GLB,, | Performed by: PHYSICIAN ASSISTANT

## 2023-11-15 PROCEDURE — 1101F PR PT FALLS ASSESS DOC 0-1 FALLS W/OUT INJ PAST YR: ICD-10-PCS | Mod: CPTII,S$GLB,, | Performed by: PHYSICIAN ASSISTANT

## 2023-11-15 PROCEDURE — 99999 PR PBB SHADOW E&M-EST. PATIENT-LVL IV: ICD-10-PCS | Mod: PBBFAC,,, | Performed by: PHYSICIAN ASSISTANT

## 2023-11-15 RX ORDER — GABAPENTIN 100 MG/1
100 CAPSULE ORAL 3 TIMES DAILY PRN
Qty: 30 CAPSULE | Refills: 0 | Status: SHIPPED | OUTPATIENT
Start: 2023-11-15 | End: 2024-11-14

## 2023-11-15 RX ORDER — INFLUENZA A VIRUS A/VICTORIA/4897/2022 IVR-238 (H1N1) ANTIGEN (FORMALDEHYDE INACTIVATED), INFLUENZA A VIRUS A/DARWIN/9/2021 SAN-010 (H3N2) ANTIGEN (FORMALDEHYDE INACTIVATED), INFLUENZA B VIRUS B/PHUKET/3073/2013 ANTIGEN (FORMALDEHYDE INACTIVATED), AND INFLUENZA B VIRUS B/MICHIGAN/01/2021 ANTIGEN (FORMALDEHYDE INACTIVATED) 60; 60; 60; 60 UG/.7ML; UG/.7ML; UG/.7ML; UG/.7ML
INJECTION, SUSPENSION INTRAMUSCULAR
COMMUNITY
Start: 2023-10-09 | End: 2024-03-26 | Stop reason: CLARIF

## 2023-11-15 NOTE — PROGRESS NOTES
Subjective     Patient ID: Bette Johnson is a 73 y.o. female.    Chief Complaint: Leg Problem (Burning. Both legs)    HPI:  73 y.o. female with HTN, DM2, and CKD presents to clinic for bilateral posterior leg burning sensation from mid thigh to mid calf x1 week. She states that the pain occurs while standing and lingers until she sits down. She is not able to  one place for longer than about 5 minutes. Daily activities such as cooking have been affected. Patient has tried icing legs and applying cooling topical gel, which did not help, and Tylenol, which brought some relief. No recent falls, injuries, or history of broken bones in legs. No new medications, foods, or activities. Patient also reports regular bilateral knee pain due to arthritis and a history of chronic lower back pain which improves which techniques she learned in physical therapy. She has tried steroid injections for joint issues two times in the past, but is not interested in joint injections. Patient states that she has trouble exercising for weight loss due to chronic knee pain. She also has sciatica about once a month which feels like a shooting pain down her leg, which alternates legs. Patient states that today's pain is different than her previous sciatica pains. Patient denies numbness, tingling, swelling, color change, fever, chest pain, and SOB      Review of Systems   Constitutional:  Negative for activity change, chills, fever and unexpected weight change.   HENT:  Negative for hearing loss, rhinorrhea, sore throat and trouble swallowing.    Eyes:  Negative for discharge and visual disturbance.   Respiratory:  Negative for cough, chest tightness and wheezing.    Cardiovascular:  Negative for chest pain and palpitations.   Gastrointestinal:  Negative for blood in stool, change in bowel habit, constipation, diarrhea and vomiting.   Endocrine: Negative for polydipsia and polyuria.   Genitourinary:  Negative for difficulty  urinating, dysuria, hematuria and menstrual problem.   Musculoskeletal:  Positive for arthralgias and joint swelling. Negative for neck pain.        Burning pain in bilateral lower extremities   Integumentary:  Negative for rash.   Neurological:  Negative for dizziness, seizures, weakness, light-headedness, numbness and headaches.   Psychiatric/Behavioral:  Negative for confusion and dysphoric mood.         Objective     Physical Exam  Constitutional:       Appearance: She is normal weight.   HENT:      Head: Normocephalic and atraumatic.   Cardiovascular:      Rate and Rhythm: Normal rate and regular rhythm.   Pulmonary:      Effort: Pulmonary effort is normal.      Breath sounds: Normal breath sounds.   Musculoskeletal:      Cervical back: Normal range of motion and neck supple.      Right upper leg: No tenderness.      Left upper leg: No tenderness.      Right knee: Normal range of motion.      Left knee: Normal range of motion.      Right lower leg: No edema.      Left lower leg: No edema.      Comments: Point tenderness to palpation of bilateral medial knees.  Normal ROM of knee.  Normal strength tests including hip abduction, adduction, and flexion, knee extension and flexion, and ankle flexion and extension with resistance   Skin:     General: Skin is warm and dry.   Neurological:      General: No focal deficit present.      Mental Status: She is alert.   Psychiatric:         Mood and Affect: Mood normal.         Behavior: Behavior normal.         Thought Content: Thought content normal.         Judgment: Judgment normal.          Assessment and Plan     1. Radicular leg pain  -     gabapentin (NEURONTIN) 100 MG capsule; Take 1 capsule (100 mg total) by mouth 3 (three) times daily as needed.  Dispense: 30 capsule; Refill: 0  -     discussed can cause drowsiness. Can continue tylenol prn. Advised stretching exercises. If no relief call next week    I hereby acknowledge that I am relying upon documentation  authored by a PA student working under my supervision and further I hereby attest that I have verified the student documentation or findings by personally re-performing the physical exam and medical decision making activities of the Evaluation and Management service to be billed.  Mary Rosa PAC         No follow-ups on file.

## 2023-11-20 ENCOUNTER — TELEPHONE (OUTPATIENT)
Dept: FAMILY MEDICINE | Facility: CLINIC | Age: 73
End: 2023-11-20
Payer: MEDICARE

## 2023-11-20 ENCOUNTER — PATIENT OUTREACH (OUTPATIENT)
Dept: ADMINISTRATIVE | Facility: HOSPITAL | Age: 73
End: 2023-11-20
Payer: MEDICARE

## 2023-11-20 ENCOUNTER — PATIENT MESSAGE (OUTPATIENT)
Dept: ADMINISTRATIVE | Facility: HOSPITAL | Age: 73
End: 2023-11-20
Payer: MEDICARE

## 2023-11-20 VITALS — DIASTOLIC BLOOD PRESSURE: 62 MMHG | SYSTOLIC BLOOD PRESSURE: 131 MMHG

## 2023-12-04 ENCOUNTER — OFFICE VISIT (OUTPATIENT)
Dept: ORTHOPEDICS | Facility: CLINIC | Age: 73
End: 2023-12-04
Attending: ORTHOPAEDIC SURGERY
Payer: MEDICARE

## 2023-12-04 VITALS — HEIGHT: 64 IN | BODY MASS INDEX: 42 KG/M2 | WEIGHT: 246 LBS

## 2023-12-04 DIAGNOSIS — M25.561 PAIN IN BOTH KNEES, UNSPECIFIED CHRONICITY: Primary | ICD-10-CM

## 2023-12-04 DIAGNOSIS — M54.16 LUMBAR RADICULOPATHY: Primary | ICD-10-CM

## 2023-12-04 DIAGNOSIS — M70.50 PES ANSERINE BURSITIS: ICD-10-CM

## 2023-12-04 DIAGNOSIS — M25.562 PAIN IN BOTH KNEES, UNSPECIFIED CHRONICITY: Primary | ICD-10-CM

## 2023-12-04 DIAGNOSIS — M79.2 NEUROPATHIC PAIN: ICD-10-CM

## 2023-12-04 PROCEDURE — 99999 PR PBB SHADOW E&M-EST. PATIENT-LVL III: CPT | Mod: PBBFAC,,, | Performed by: ORTHOPAEDIC SURGERY

## 2023-12-04 PROCEDURE — 3051F HG A1C>EQUAL 7.0%<8.0%: CPT | Mod: CPTII,S$GLB,, | Performed by: ORTHOPAEDIC SURGERY

## 2023-12-04 PROCEDURE — 3066F PR DOCUMENTATION OF TREATMENT FOR NEPHROPATHY: ICD-10-PCS | Mod: CPTII,S$GLB,, | Performed by: ORTHOPAEDIC SURGERY

## 2023-12-04 PROCEDURE — 1125F AMNT PAIN NOTED PAIN PRSNT: CPT | Mod: CPTII,S$GLB,, | Performed by: ORTHOPAEDIC SURGERY

## 2023-12-04 PROCEDURE — 1125F PR PAIN SEVERITY QUANTIFIED, PAIN PRESENT: ICD-10-PCS | Mod: CPTII,S$GLB,, | Performed by: ORTHOPAEDIC SURGERY

## 2023-12-04 PROCEDURE — 3008F PR BODY MASS INDEX (BMI) DOCUMENTED: ICD-10-PCS | Mod: CPTII,S$GLB,, | Performed by: ORTHOPAEDIC SURGERY

## 2023-12-04 PROCEDURE — 1159F PR MEDICATION LIST DOCUMENTED IN MEDICAL RECORD: ICD-10-PCS | Mod: CPTII,S$GLB,, | Performed by: ORTHOPAEDIC SURGERY

## 2023-12-04 PROCEDURE — 3008F BODY MASS INDEX DOCD: CPT | Mod: CPTII,S$GLB,, | Performed by: ORTHOPAEDIC SURGERY

## 2023-12-04 PROCEDURE — 3061F NEG MICROALBUMINURIA REV: CPT | Mod: CPTII,S$GLB,, | Performed by: ORTHOPAEDIC SURGERY

## 2023-12-04 PROCEDURE — 3061F PR NEG MICROALBUMINURIA RESULT DOCUMENTED/REVIEW: ICD-10-PCS | Mod: CPTII,S$GLB,, | Performed by: ORTHOPAEDIC SURGERY

## 2023-12-04 PROCEDURE — 99213 PR OFFICE/OUTPT VISIT, EST, LEVL III, 20-29 MIN: ICD-10-PCS | Mod: S$GLB,,, | Performed by: ORTHOPAEDIC SURGERY

## 2023-12-04 PROCEDURE — 3051F PR MOST RECENT HEMOGLOBIN A1C LEVEL 7.0 - < 8.0%: ICD-10-PCS | Mod: CPTII,S$GLB,, | Performed by: ORTHOPAEDIC SURGERY

## 2023-12-04 PROCEDURE — 99213 OFFICE O/P EST LOW 20 MIN: CPT | Mod: S$GLB,,, | Performed by: ORTHOPAEDIC SURGERY

## 2023-12-04 PROCEDURE — 3066F NEPHROPATHY DOC TX: CPT | Mod: CPTII,S$GLB,, | Performed by: ORTHOPAEDIC SURGERY

## 2023-12-04 PROCEDURE — 1159F MED LIST DOCD IN RCRD: CPT | Mod: CPTII,S$GLB,, | Performed by: ORTHOPAEDIC SURGERY

## 2023-12-04 PROCEDURE — 99999 PR PBB SHADOW E&M-EST. PATIENT-LVL III: ICD-10-PCS | Mod: PBBFAC,,, | Performed by: ORTHOPAEDIC SURGERY

## 2023-12-04 NOTE — PROGRESS NOTES
EST PATIENT ORTHOPAEDIC: Knee    PRIMARY CARE PHYSICIAN: Neeta Estrada MD   REFERRING PROVIDER: Alber Álvarez MD  [address] [facility]  [place] [id number]     ASSESSMENT & PLAN:    Impression:  Bilateral Knee Mild Osteoarthritis, Primary    Bilateral Knee Pes Bursitis  Bilateral Lumbar Radiculopathy    Follow Up Plan: PRN    Non operative care:    Bette Johnson has physical exam evidence of above and wishes to pursue an non-operative care. I am recommending the following: referral to pain management, consideration of pes bursa injections vs oral steroids, topicals, consideration of PT    To review: Patient reports knee pain for approximately the last year.  She associates that with the onset of COVID in January of 2021.  X-rays I reviewed and obtained demonstrate mild arthritis.  She also had clinical symptoms associated with pes bursitis.  I tried a Medrol Dosepak previously.  This helped with her pain.  She did have primary care do an intra-articular injection into her knee which also help with her pain.  Overall her pain was doing well up until the end of February after she was doing therapy with the Healthy Back program and began to experience acute onset knee pain.  She tried an intramuscular steroid injection and oral medications along with a variety of creams that have failed to improve her pain.  She had repeat injection with me which helped with her pain.  Today, she is reporting a change in her pain.  She is reporting bilaterally into her thigh burning also medial based knee burning.  This pain has been present for 1 month.  No associated injury.  She was cooking at the time of onset.  She saw her primary care for evaluation she was placed on gabapentin for this.  She is unable to tolerate that medication secondary to drowsiness.  Again this pain is different than previous osteoarthritic management that we were doing previously.  She does not report a throbbing her aching pain today as was  previous.    The patient has been ordered:  Injection    CONSULTS:   None    ACTIVE PROBLEM LIST  Patient Active Problem List   Diagnosis    Morbid obesity with BMI of 40.0-44.9, adult    Essential hypertension    Thoracic aorta atherosclerosis    Diabetes mellitus type 2 in obese    Transaminitis    History of 2019 novel coronavirus disease (COVID-19)    Type 2 diabetes mellitus with stage 3a chronic kidney disease, without long-term current use of insulin    Chronic kidney disease, stage 3a    Decreased strength of trunk and back    Vitamin D deficiency           SUBJECTIVE    CHIEF COMPLAINT: Knee Pain    HPI:   Bette Johnson is a 73 y.o. female here for evaluation and management of right knee pain. There is not a specific incident that brought about this pain. she has had progressive problems with the knee(s) starting 1 years ago but is now progressing to interfere with activities which include: walking and enjoying hobbies    Currently the pain in the joint is rated at mild with activity. The pain is intermittent and is located in the knee, located medially. The pain is described as aching, sharp, stabbing and stiffness. Relieving factors include ice or heat and over the counter medication .     There is associated Catching.     Bette Johnson has no additional complaints.     5/16/23: Here for follow up. Acute on chronic knee pain, right worse than left. Known mild OA. No new injury or fall.     12/4/23:  Patient here for complaints of bilateral thigh and knee burning.  Seen by PCP, failed gabapentin and activity modification.    PROGRESSIVE SYMPTOMS:  Pain worsened by weight bearing    FUNCTIONAL STATUS:   Climb a flight of stairs or walk up a hill     PREVIOUS TREATMENTS:  Medical: Attempted Weight Loss, OTC NSAIDS and Steroid Injections  Physical Therapy: Activities Modified   Previous Orthopaedic Surgery: None    REVIEW OF SYSTEMS:  PAIN ASSESSMENT:  See HPI.  MUSCULOSKELETAL: See HPI.  OTHER 10  point review of systems is negative except as stated in HPI above    PAST MEDICAL HISTORY   has a past medical history of Chronic kidney disease, stage 3a (02/04/2022), Diabetes mellitus, type 2, Hypertension, Renal manifestation of secondary diabetes mellitus, Severe obesity (BMI >= 40) (07/12/2017), and Type 2 diabetes mellitus.     PAST SURGICAL HISTORY   has a past surgical history that includes Cholecystectomy; Colonoscopy (N/A, 1/25/2018); and ERCP (N/A, 2/17/2021).     FAMILY HISTORY  family history includes Diabetes in her brother, mother, sister, sister, and sister; Hyperlipidemia in her sister; Hypertension in her sister and sister; Kidney disease in her brother.     SOCIAL HISTORY   reports that she has never smoked. She has never used smokeless tobacco. She reports current alcohol use. She reports that she does not use drugs.     ALLERGIES   Review of patient's allergies indicates:   Allergen Reactions    Iodine and iodide containing products Shortness Of Breath and Other (See Comments)     Fever, blisters around mouth, flu like symptoms  Fever, blisters around mouth, flu like symptoms      Penicillins Other (See Comments) and Anaphylaxis     Loss of consciousness  Other reaction(s): Penicillins (Fatal)  Note:  Allergic Reaction: Anaphylaxis    Caffeine Other (See Comments)     Hyper then  Causes low  Hyperactivity, followed by sleepiness  Hyperactivity, followed by sleepiness    Note: hyperactivity/ crash     Iodine      Note: fever blisters, elevated temp.     Latex, natural rubber Other (See Comments)     Dental work cause mouth ulcers  Ulcers in mouth when used during dental work  Ulcers in mouth when used during dental work    Note: sores         MEDICATIONS  Current Outpatient Medications on File Prior to Visit   Medication Sig Dispense Refill    amLODIPine (NORVASC) 10 MG tablet Take 1 tablet (10 mg total) by mouth once daily. 90 tablet 3    aspirin (ECOTRIN) 81 MG EC tablet Take 81 mg by mouth  "once daily.      atorvastatin (LIPITOR) 10 MG tablet Take 1 tablet (10 mg total) by mouth every other day. 36 tablet 3    coenzyme Q10 100 mg capsule Take 200 mg by mouth once daily.      FLUZONE HIGHDOSE QUAD 23-24  mcg/0.7 mL Syrg       gabapentin (NEURONTIN) 100 MG capsule Take 1 capsule (100 mg total) by mouth 3 (three) times daily as needed. 30 capsule 0    loratadine (CLARITIN) 10 mg tablet Take 10 mg by mouth once daily.      metFORMIN (GLUCOPHAGE) 850 MG tablet Take 1 tablet (850 mg total) by mouth once daily. 90 tablet 3    multivit-minerals/folic acid (WOMEN'S MULTIVITAMIN GUMMIES ORAL) Take by mouth.      valsartan-hydrochlorothiazide (DIOVAN-HCT) 320-25 mg per tablet Take 1 tablet by mouth once daily. 90 tablet 3    vitamin D (VITAMIN D3) 1000 units Tab Take 5,000 Units by mouth once daily.      diclofenac sodium (VOLTAREN) 1 % Gel Apply 2 g topically 2 (two) times daily. (Patient not taking: Reported on 11/15/2023) 100 g 0     No current facility-administered medications on file prior to visit.          PHYSICAL EXAM   height is 5' 4" (1.626 m) and weight is 111.6 kg (246 lb).   Body mass index is 42.23 kg/m².      All other systems deferred.  GENERAL:  No acute distress  HABITUS: Obese  GAIT: Antalgic  SKIN: Normal  and No erythema, warmth, fluctuance     KNEE EXAM:    bilateral:   Effusion: No joint effusion  TTP: yes over Pes Bursa > medial joint line  no crepitus with passive knee ROM  Passive ROM: Extension 0, Flexion 130  No pain with manipulation of patella  Stable to varus/valgus stress. No increased laxity to anterior/posterior drawer testing  negative Maday's test  No pain with IR/ER rotation of the hip  5/5 strength in knee flexion and extension, ankle plantarflexion and dorsiflexion  Neurovascular Status: Sensation intact to light touch in Sural, Saphenous, SPN, DPN, Tibial nerve distribution  2+ pulse DP/PT, normal capillary refill, foot has normal warmth    DATA:  Diagnostic tests " reviewed for today's visit:     4v of the knee reveal Mild degenerative changes of the Medial and Patellofemoral compartment. There is evidence of advanced osteoarthritis changes with Joint space narrowing.

## 2023-12-05 ENCOUNTER — TELEPHONE (OUTPATIENT)
Dept: PAIN MEDICINE | Facility: CLINIC | Age: 73
End: 2023-12-05
Payer: MEDICARE

## 2023-12-05 NOTE — TELEPHONE ENCOUNTER
----- Message from Lonnie Pandya sent at 12/5/2023 10:24 AM CST -----  Type:  Patient Returning Call    Who Called: Self    Who Left Message for Patient: ELENA WATSON    Does the patient know what this is regarding?:Yes    Would the patient rather a call back or a response via [facility]? [facility] Call Back Number:. [phone]  Additional Information:       
Pt scheduled for consult with Dr. Vincent on 12/12/2023 at 3:00pm- location information provided.   
full range of motion in all extremities

## 2023-12-12 ENCOUNTER — OFFICE VISIT (OUTPATIENT)
Dept: PAIN MEDICINE | Facility: CLINIC | Age: 73
End: 2023-12-12
Payer: MEDICARE

## 2023-12-12 VITALS
BODY MASS INDEX: 42.7 KG/M2 | OXYGEN SATURATION: 98 % | WEIGHT: 248.81 LBS | HEART RATE: 58 BPM | SYSTOLIC BLOOD PRESSURE: 143 MMHG | DIASTOLIC BLOOD PRESSURE: 65 MMHG

## 2023-12-12 DIAGNOSIS — M79.2 NEUROPATHIC PAIN: ICD-10-CM

## 2023-12-12 DIAGNOSIS — M47.816 LUMBAR SPONDYLOSIS: ICD-10-CM

## 2023-12-12 DIAGNOSIS — M48.062 SPINAL STENOSIS OF LUMBAR REGION WITH NEUROGENIC CLAUDICATION: ICD-10-CM

## 2023-12-12 DIAGNOSIS — M54.16 LUMBAR RADICULOPATHY: ICD-10-CM

## 2023-12-12 DIAGNOSIS — M51.36 DDD (DEGENERATIVE DISC DISEASE), LUMBAR: Primary | ICD-10-CM

## 2023-12-12 PROCEDURE — 3077F PR MOST RECENT SYSTOLIC BLOOD PRESSURE >= 140 MM HG: ICD-10-PCS | Mod: CPTII,S$GLB,, | Performed by: PAIN MEDICINE

## 2023-12-12 PROCEDURE — 3077F SYST BP >= 140 MM HG: CPT | Mod: CPTII,S$GLB,, | Performed by: PAIN MEDICINE

## 2023-12-12 PROCEDURE — 1160F RVW MEDS BY RX/DR IN RCRD: CPT | Mod: CPTII,S$GLB,, | Performed by: PAIN MEDICINE

## 2023-12-12 PROCEDURE — 3066F NEPHROPATHY DOC TX: CPT | Mod: CPTII,S$GLB,, | Performed by: PAIN MEDICINE

## 2023-12-12 PROCEDURE — 99204 PR OFFICE/OUTPT VISIT, NEW, LEVL IV, 45-59 MIN: ICD-10-PCS | Mod: S$GLB,,, | Performed by: PAIN MEDICINE

## 2023-12-12 PROCEDURE — 99204 OFFICE O/P NEW MOD 45 MIN: CPT | Mod: S$GLB,,, | Performed by: PAIN MEDICINE

## 2023-12-12 PROCEDURE — 3061F NEG MICROALBUMINURIA REV: CPT | Mod: CPTII,S$GLB,, | Performed by: PAIN MEDICINE

## 2023-12-12 PROCEDURE — 99999 PR PBB SHADOW E&M-EST. PATIENT-LVL IV: ICD-10-PCS | Mod: PBBFAC,,, | Performed by: PAIN MEDICINE

## 2023-12-12 PROCEDURE — 1159F MED LIST DOCD IN RCRD: CPT | Mod: CPTII,S$GLB,, | Performed by: PAIN MEDICINE

## 2023-12-12 PROCEDURE — 3078F PR MOST RECENT DIASTOLIC BLOOD PRESSURE < 80 MM HG: ICD-10-PCS | Mod: CPTII,S$GLB,, | Performed by: PAIN MEDICINE

## 2023-12-12 PROCEDURE — 3288F PR FALLS RISK ASSESSMENT DOCUMENTED: ICD-10-PCS | Mod: CPTII,S$GLB,, | Performed by: PAIN MEDICINE

## 2023-12-12 PROCEDURE — 3061F PR NEG MICROALBUMINURIA RESULT DOCUMENTED/REVIEW: ICD-10-PCS | Mod: CPTII,S$GLB,, | Performed by: PAIN MEDICINE

## 2023-12-12 PROCEDURE — 3051F HG A1C>EQUAL 7.0%<8.0%: CPT | Mod: CPTII,S$GLB,, | Performed by: PAIN MEDICINE

## 2023-12-12 PROCEDURE — 1160F PR REVIEW ALL MEDS BY PRESCRIBER/CLIN PHARMACIST DOCUMENTED: ICD-10-PCS | Mod: CPTII,S$GLB,, | Performed by: PAIN MEDICINE

## 2023-12-12 PROCEDURE — 3078F DIAST BP <80 MM HG: CPT | Mod: CPTII,S$GLB,, | Performed by: PAIN MEDICINE

## 2023-12-12 PROCEDURE — 3051F PR MOST RECENT HEMOGLOBIN A1C LEVEL 7.0 - < 8.0%: ICD-10-PCS | Mod: CPTII,S$GLB,, | Performed by: PAIN MEDICINE

## 2023-12-12 PROCEDURE — 1101F PR PT FALLS ASSESS DOC 0-1 FALLS W/OUT INJ PAST YR: ICD-10-PCS | Mod: CPTII,S$GLB,, | Performed by: PAIN MEDICINE

## 2023-12-12 PROCEDURE — 3288F FALL RISK ASSESSMENT DOCD: CPT | Mod: CPTII,S$GLB,, | Performed by: PAIN MEDICINE

## 2023-12-12 PROCEDURE — 99999 PR PBB SHADOW E&M-EST. PATIENT-LVL IV: CPT | Mod: PBBFAC,,, | Performed by: PAIN MEDICINE

## 2023-12-12 PROCEDURE — 3066F PR DOCUMENTATION OF TREATMENT FOR NEPHROPATHY: ICD-10-PCS | Mod: CPTII,S$GLB,, | Performed by: PAIN MEDICINE

## 2023-12-12 PROCEDURE — 3008F PR BODY MASS INDEX (BMI) DOCUMENTED: ICD-10-PCS | Mod: CPTII,S$GLB,, | Performed by: PAIN MEDICINE

## 2023-12-12 PROCEDURE — 3008F BODY MASS INDEX DOCD: CPT | Mod: CPTII,S$GLB,, | Performed by: PAIN MEDICINE

## 2023-12-12 PROCEDURE — 1159F PR MEDICATION LIST DOCUMENTED IN MEDICAL RECORD: ICD-10-PCS | Mod: CPTII,S$GLB,, | Performed by: PAIN MEDICINE

## 2023-12-12 PROCEDURE — 1101F PT FALLS ASSESS-DOCD LE1/YR: CPT | Mod: CPTII,S$GLB,, | Performed by: PAIN MEDICINE

## 2023-12-12 NOTE — PROGRESS NOTES
Subjective:     Patient ID: Bette Johnson is a 73 y.o. female    Chief Complaint: Knee Pain (Both knees pain and burning when walking and sitting )      Referred by: Alber Álvarez MD      HPI:    Initial Encounter (12/12/23):  Bette Johnson is a 73 y.o. female who presents today with chronic bilateral low back and lower extremity pain.  Patient does have history of chronic bilateral knee pain secondary to osteoarthritis.  However, has begun having a distinctly different pain in her thighs and low back.  This pain started roughly 1 month ago.  It is mainly present when standing and walking.  Can only stand or walk for 5-10 minutes before needing to sit down.  Pain improves immediately upon sitting.  Pain is located primarily in the buttocks and posterior thighs.  Does have some associated low back pain.  Denies any associated numbness, tingling, weakness, bowel bladder dysfunction.  Also has some pain in the medial aspect of the proximal leg.  This is thought to be related to pes anserine bursitis.   This pain is described in detail below.    Physical Therapy:  Yes.  Has completed the healthy back program, but did not continue regular home exercises.  Patient does admit to leading a fairly sedentary lifestyle.    Non-pharmacologic Treatment:  Rest helps         TENS?  No    Pain Medications:         Currently taking:  Voltaren gel, gabapentin    Has tried in the past:  NSAIDs, Tylenol    Has not tried:  Opioids, Muscle relaxants, TCAs, SNRIs    Blood thinners:  Aspirin 81 mg    Interventional Therapies:  None    Relevant Surgeries:  None    Affecting sleep?  Yes    Affecting daily activities? yes    Depressive symptoms? No          SI/HI? No    Work status: Retired    Pain Scores:    Best:       10/10  Worst:     10/10  Usually:   10/10  Today:    10/10    Pain Disability Index  Family/Home Responsibilities:: 10  Recreation:: 10  Social Activity:: 10  Occupation:: 0 (retired)  Sexual Behavior:: 0  Self  Care:: 10  Life-Support Activities:: 10  Pain Disability Index (PDI): 50    Review of Systems   Constitutional:  Negative for activity change, appetite change, chills, fatigue, fever and unexpected weight change.   HENT:  Negative for hearing loss.    Eyes:  Negative for visual disturbance.   Respiratory:  Negative for chest tightness and shortness of breath.    Cardiovascular:  Negative for chest pain.   Gastrointestinal:  Negative for abdominal pain, constipation, diarrhea, nausea and vomiting.   Genitourinary:  Negative for difficulty urinating.   Musculoskeletal:  Positive for arthralgias, back pain, gait problem and myalgias. Negative for neck pain.   Skin:  Negative for rash.   Neurological:  Negative for dizziness, weakness, light-headedness, numbness and headaches.   Psychiatric/Behavioral:  Positive for sleep disturbance. Negative for hallucinations and suicidal ideas. The patient is not nervous/anxious.        Past Medical History:   Diagnosis Date    Chronic kidney disease, stage 3a 02/04/2022    Diabetes mellitus, type 2     Hypertension     Renal manifestation of secondary diabetes mellitus     Severe obesity (BMI >= 40) 07/12/2017    Type 2 diabetes mellitus        Past Surgical History:   Procedure Laterality Date    CHOLECYSTECTOMY      COLONOSCOPY N/A 1/25/2018    Procedure: COLONOSCOPY;  Surgeon: Jeffrey Solano MD;  Location: [facility];  Service: Endoscopy;  Laterality: N/A;    ERCP N/A 2/17/2021    Procedure: ERCP (ENDOSCOPIC RETROGRADE CHOLANGIOPANCREATOGRAPHY);  Surgeon: Emanuel Turner MD;  Location: [address]);  Service: Endoscopy;  Laterality: N/A;       Social History     Socioeconomic History    Marital status: Single    Number of children: 2    Highest education level: Associate degree: academic program   Tobacco Use    Smoking status: Never    Smokeless tobacco: Never   Substance and Sexual Activity    Alcohol use: Yes     Comment: 'once a month maybe/wine     Drug use: No     Sexual activity: Not Currently     Social Determinants of Health     Financial Resource Strain: Low Risk  (11/14/2023)    Overall Financial Resource Strain (CARDIA)     Difficulty of Paying Living Expenses: Not very hard   Food Insecurity: Food Insecurity Present (11/14/2023)    Hunger Vital Sign     Worried About Running Out of Food in the Last Year: Never true     Ran Out of Food in the Last Year: Sometimes true   Transportation Needs: No Transportation Needs (11/14/2023)    PRAPARE - Transportation     Lack of Transportation (Medical): No     Lack of Transportation (Non-Medical): No   Physical Activity: Inactive (11/14/2023)    Exercise Vital Sign     Days of Exercise per Week: 0 days     Minutes of Exercise per Session: 0 min   Stress: No Stress Concern Present (11/14/2023)    [employer] [place] [employer] - Occupational Stress Questionnaire     Feeling of Stress : Not at all   Social Connections: Moderately Integrated (11/14/2023)    Social Connection and Isolation Panel [NHANES]     Frequency of Communication with Friends and Family: More than three times a week     Frequency of Social Gatherings with Friends and Family: Once a week     Attends Hoahaoism Services: More than 4 times per year     Active Member of Clubs or Organizations: Yes     Attends Club or Organization Meetings: More than 4 times per year     Marital Status:    Housing Stability: Low Risk  (11/14/2023)    Housing Stability Vital Sign     Unable to Pay for Housing in the Last Year: No     Number of Places Lived in the Last Year: 1     Unstable Housing in the Last Year: No       Review of patient's allergies indicates:   Allergen Reactions    Iodine and iodide containing products Shortness Of Breath and Other (See Comments)     Fever, blisters around mouth, flu like symptoms  Fever, blisters around mouth, flu like symptoms      Penicillins Other (See Comments) and Anaphylaxis     Loss of consciousness  Other reaction(s):  Penicillins (Fatal)  Note:  Allergic Reaction: Anaphylaxis    Caffeine Other (See Comments)     Hyper then  Causes low  Hyperactivity, followed by sleepiness  Hyperactivity, followed by sleepiness    Note: hyperactivity/ crash     Iodine      Note: fever blisters, elevated temp.     Latex, natural rubber Other (See Comments)     Dental work cause mouth ulcers  Ulcers in mouth when used during dental work  Ulcers in mouth when used during dental work    Note: sores        Current Outpatient Medications on File Prior to Visit   Medication Sig Dispense Refill    amLODIPine (NORVASC) 10 MG tablet Take 1 tablet (10 mg total) by mouth once daily. 90 tablet 3    aspirin (ECOTRIN) 81 MG EC tablet Take 81 mg by mouth once daily.      atorvastatin (LIPITOR) 10 MG tablet Take 1 tablet (10 mg total) by mouth every other day. 36 tablet 3    coenzyme Q10 100 mg capsule Take 200 mg by mouth once daily.      diclofenac sodium (VOLTAREN) 1 % Gel Apply 2 g topically 2 (two) times daily. 100 g 0    FLUZONE HIGHDOSE QUAD 23-24  mcg/0.7 mL Syrg       gabapentin (NEURONTIN) 100 MG capsule Take 1 capsule (100 mg total) by mouth 3 (three) times daily as needed. 30 capsule 0    loratadine (CLARITIN) 10 mg tablet Take 10 mg by mouth once daily.      metFORMIN (GLUCOPHAGE) 850 MG tablet Take 1 tablet (850 mg total) by mouth once daily. 90 tablet 3    multivit-minerals/folic acid (WOMEN'S MULTIVITAMIN GUMMIES ORAL) Take by mouth.      valsartan-hydrochlorothiazide (DIOVAN-HCT) 320-25 mg per tablet Take 1 tablet by mouth once daily. 90 tablet 3    vitamin D (VITAMIN D3) 1000 units Tab Take 5,000 Units by mouth once daily.       No current facility-administered medications on file prior to visit.       Objective:      BP (!) 143/65 (BP Location: Left arm, Patient Position: Sitting)   Pulse (!) 58   Wt 112.9 kg (248 lb 12.6 oz)   LMP  (LMP Unknown)   SpO2 98%   BMI 42.70 kg/m²     Exam:  GEN:  Well developed, well nourished.  No acute  distress.  Normal pain behavior.  HEENT:  No trauma.  Mucous membranes moist.  Nares patent bilaterally.  PSYCH: Normal affect. Thought content appropriate.  CHEST:  Breathing symmetric.  No audible wheezing.  ABD: Soft, non-distended.  SKIN:  Warm, pink, dry.  No rash on exposed areas.    EXT:  No cyanosis, clubbing, or edema.  No color change or changes in nail or hair growth.  NEURO/MUSCULOSKELETAL:  Fully alert, oriented, and appropriate. Speech normal morelia. No cranial nerve deficits.   Gait:  Normal.  No trendelenburg sign bilaterally.   Motor Strength:  5/5 motor strength throughout lower extremities.   Sensory:  No sensory deficit in the lower extremities.   Reflexes:  1+ and symmetric patellar DTRs.  Unable to elicit bilateral Achilles DTRs.  Downgoing Babinski's bilaterally.  No clonus or spasticity.  L-Spine:  Full ROM with pain on flexion and extension.  Positive pain with axial/facet loading bilaterally.  Negative SLR bilaterally.    No TTP over lumbar paraspinals, bilateral SI joints, hips, piriformis muscles, or GTB.        Imaging:    Narrative & Impression    EXAMINATION:  CT ABDOMEN PELVIS WITH CONTRAST     CLINICAL HISTORY:  LUQ abdominal pain;     TECHNIQUE:  Low dose axial images, sagittal and coronal reformations were obtained from the lung bases to the pubic symphysis following the IV administration of 100 mL of Omnipaque 350 .  Oral contrast was not given.  Single phase postcontrast CT examination of the abdomen and pelvis was performed.     COMPARISON:  None.     FINDINGS:  The lung bases demonstrate mild motion artifact and atelectatic change.  The stomach demonstrates nonspecific appearance of mild-to-moderate distention with fluid and air and ingested material.     There is diminished attenuation of the liver consistent with mild diffuse fatty infiltrate.  Patient appears status post cholecystectomy.  There is biliary dilatation noted, this can be seen with passive biliary dilatation of  prior cholecystectomy, however note is made that there are several densities seen within the common duct, a prominent density seen on axial image 47, with additional densities noted on axial images 56 through 62.  These areas of density are suspicious for choledocholithiasis.  Clinical and historical and laboratory correlation is needed if indicated ERCP may be helpful for further evaluation.  There is no evidence for abnormal pancreatic ductal dilatation and there is no evidence for peripancreatic inflammatory change.     There is no evidence for acute process of the spleen or adrenal glands.  There is no evidence for hydronephrosis or obstructive uropathy bilaterally, the ureters appear normal in caliber along their visualized course without evidence for ureteral calculus or hydroureter.  There is prominent atherosclerotic change at the origin of the celiac artery, there is likely a component of stenosis however there is no occlusion, the celiac artery demonstrates appropriate opacification.  The abdominal aorta demonstrates appropriate opacification, there is no aneurysmal dilatation.  The urinary bladder appears unremarkable for degree of distention.  There is no evidence for acute process of the uterus or adnexa.     There is no evidence for small bowel obstructive process.  The appendix is identified, it does not appear inflamed.  There is no evidence for inflammatory or obstructive process of the colon.  Diverticula of the colon are noted, there is no evidence for acute diverticulitis.  There is no free intraperitoneal air.  The osseous structures demonstrate chronic change.     Impression:     Multiple densities associated with the common duct likely represent choledocholithiasis, there is biliary dilatation which can be seen associated with prior cholecystectomy however in the presence of choledocholithiasis the possibility of a component of biliary obstructive change is to be considered, clinical and  historical and laboratory correlation is otherwise needed.     Diverticula of the colon are noted, there is no evidence for acute diverticulitis.        Electronically signed by: Saud Good  Date:                                            02/16/2021  Time:                                           02:46       Assessment:       Encounter Diagnoses   Name Primary?    Lumbar radiculopathy     Neuropathic pain     DDD (degenerative disc disease), lumbar Yes    Lumbar spondylosis     Spinal stenosis of lumbar region with neurogenic claudication          Plan:       Bette was seen today for knee pain.    Diagnoses and all orders for this visit:    DDD (degenerative disc disease), lumbar    Lumbar radiculopathy  -     Ambulatory referral/consult to Pain Clinic    Neuropathic pain  -     Ambulatory referral/consult to Pain Clinic    Lumbar spondylosis    Spinal stenosis of lumbar region with neurogenic claudication        Bette Johnson is a 73 y.o. female with chronic bilateral low back and lower extremity pain.  Pain is likely multifactorial.  Likely has pain related to osteoarthritis of both knees.  Also may have pain related to bilateral pes anserine bursitis.  The worst of her pains at this time, however seems to be related to neurogenic claudication related to spinal stenosis.  Patient is noted to have spinal stenosis in the lower lumbar region.  Has undergone the healthy back program, but has not maintain a regular home exercise program..    Pertinent imaging studies reviewed by me. Imaging results were discussed with patient.  We discussed the importance of optimizing strength and range of motion of her lumbar spine.  Patient states she understands had a perform home exercises, she does does not do them.  She states that she will work on initiating a home exercise program based off of her previous courses of physical therapy.  Return to clinic in 6 weeks or sooner if needed.  At that time we will discuss  efficacy of home exercise program.  May consider lumbar MRI and possibly interventional procedures if appropriate.            This note was created by combination of typed  and M-Modal dictation. Transcription and phonetic errors may be present.  If there are any questions, please contact me.

## 2023-12-19 ENCOUNTER — PATIENT OUTREACH (OUTPATIENT)
Dept: ADMINISTRATIVE | Facility: HOSPITAL | Age: 73
End: 2023-12-19
Payer: MEDICARE

## 2023-12-21 LAB
LEFT EYE DM RETINOPATHY: NEGATIVE
RIGHT EYE DM RETINOPATHY: NEGATIVE

## 2023-12-29 ENCOUNTER — OFFICE VISIT (OUTPATIENT)
Dept: ORTHOPEDICS | Facility: CLINIC | Age: 73
End: 2023-12-29
Payer: MEDICARE

## 2023-12-29 VITALS — HEIGHT: 64 IN | BODY MASS INDEX: 42.34 KG/M2 | WEIGHT: 248 LBS

## 2023-12-29 DIAGNOSIS — M17.12 PRIMARY OSTEOARTHRITIS OF LEFT KNEE: Primary | ICD-10-CM

## 2023-12-29 PROCEDURE — 99213 OFFICE O/P EST LOW 20 MIN: CPT | Mod: 25,S$GLB,, | Performed by: ORTHOPAEDIC SURGERY

## 2023-12-29 PROCEDURE — 1159F PR MEDICATION LIST DOCUMENTED IN MEDICAL RECORD: ICD-10-PCS | Mod: CPTII,S$GLB,, | Performed by: ORTHOPAEDIC SURGERY

## 2023-12-29 PROCEDURE — 1159F MED LIST DOCD IN RCRD: CPT | Mod: CPTII,S$GLB,, | Performed by: ORTHOPAEDIC SURGERY

## 2023-12-29 PROCEDURE — 99213 PR OFFICE/OUTPT VISIT, EST, LEVL III, 20-29 MIN: ICD-10-PCS | Mod: 25,S$GLB,, | Performed by: ORTHOPAEDIC SURGERY

## 2023-12-29 PROCEDURE — 3008F PR BODY MASS INDEX (BMI) DOCUMENTED: ICD-10-PCS | Mod: CPTII,S$GLB,, | Performed by: ORTHOPAEDIC SURGERY

## 2023-12-29 PROCEDURE — 99999 PR PBB SHADOW E&M-EST. PATIENT-LVL III: CPT | Mod: PBBFAC,,, | Performed by: ORTHOPAEDIC SURGERY

## 2023-12-29 PROCEDURE — 3051F HG A1C>EQUAL 7.0%<8.0%: CPT | Mod: CPTII,S$GLB,, | Performed by: ORTHOPAEDIC SURGERY

## 2023-12-29 PROCEDURE — 3061F PR NEG MICROALBUMINURIA RESULT DOCUMENTED/REVIEW: ICD-10-PCS | Mod: CPTII,S$GLB,, | Performed by: ORTHOPAEDIC SURGERY

## 2023-12-29 PROCEDURE — 20610 DRAIN/INJ JOINT/BURSA W/O US: CPT | Mod: LT,S$GLB,, | Performed by: ORTHOPAEDIC SURGERY

## 2023-12-29 PROCEDURE — 3061F NEG MICROALBUMINURIA REV: CPT | Mod: CPTII,S$GLB,, | Performed by: ORTHOPAEDIC SURGERY

## 2023-12-29 PROCEDURE — 3008F BODY MASS INDEX DOCD: CPT | Mod: CPTII,S$GLB,, | Performed by: ORTHOPAEDIC SURGERY

## 2023-12-29 PROCEDURE — 1125F PR PAIN SEVERITY QUANTIFIED, PAIN PRESENT: ICD-10-PCS | Mod: CPTII,S$GLB,, | Performed by: ORTHOPAEDIC SURGERY

## 2023-12-29 PROCEDURE — 3051F PR MOST RECENT HEMOGLOBIN A1C LEVEL 7.0 - < 8.0%: ICD-10-PCS | Mod: CPTII,S$GLB,, | Performed by: ORTHOPAEDIC SURGERY

## 2023-12-29 PROCEDURE — 1125F AMNT PAIN NOTED PAIN PRSNT: CPT | Mod: CPTII,S$GLB,, | Performed by: ORTHOPAEDIC SURGERY

## 2023-12-29 PROCEDURE — 99999 PR PBB SHADOW E&M-EST. PATIENT-LVL III: ICD-10-PCS | Mod: PBBFAC,,, | Performed by: ORTHOPAEDIC SURGERY

## 2023-12-29 PROCEDURE — 3066F NEPHROPATHY DOC TX: CPT | Mod: CPTII,S$GLB,, | Performed by: ORTHOPAEDIC SURGERY

## 2023-12-29 PROCEDURE — 3066F PR DOCUMENTATION OF TREATMENT FOR NEPHROPATHY: ICD-10-PCS | Mod: CPTII,S$GLB,, | Performed by: ORTHOPAEDIC SURGERY

## 2023-12-29 PROCEDURE — 20610 LARGE JOINT ASPIRATION/INJECTION: L KNEE: ICD-10-PCS | Mod: LT,S$GLB,, | Performed by: ORTHOPAEDIC SURGERY

## 2023-12-29 RX ORDER — TRIAMCINOLONE ACETONIDE 40 MG/ML
40 INJECTION, SUSPENSION INTRA-ARTICULAR; INTRAMUSCULAR
Status: DISCONTINUED | OUTPATIENT
Start: 2023-12-29 | End: 2023-12-29 | Stop reason: HOSPADM

## 2023-12-29 RX ADMIN — TRIAMCINOLONE ACETONIDE 40 MG: 40 INJECTION, SUSPENSION INTRA-ARTICULAR; INTRAMUSCULAR at 11:12

## 2023-12-29 NOTE — PROGRESS NOTES
EST PATIENT ORTHOPAEDIC: Knee    PRIMARY CARE PHYSICIAN: Neeta Estrada MD   REFERRING PROVIDER: No referring provider defined for this encounter.     ASSESSMENT & PLAN:    Impression:  Bilateral Knee Mild Osteoarthritis, Primary    Bilateral Knee Pes Bursitis  Bilateral Lumbar Radiculopathy    Follow Up Plan: PRN    Non operative care:    Bette Johnson has physical exam evidence of above and wishes to pursue an non-operative care. I am recommending the following: continued follow up with pain management, injection to left knee today    To review: Patient reports knee pain for approximately the last year.  She associates that with the onset of COVID in January of 2021.  X-rays I reviewed and obtained demonstrate mild arthritis.  She also had clinical symptoms associated with pes bursitis.  I tried a Medrol Dosepak previously.  This helped with her pain.  She did have primary care do an intra-articular injection into her knee which also help with her pain.  Overall her pain was doing well up until the end of February after she was doing therapy with the [facility] program and began to experience acute onset knee pain.  She tried an intramuscular steroid injection and oral medications along with a variety of creams that have failed to improve her pain.  She had repeat injection with me which helped with her pain.  Previously, she is reporting a change in her pain.  She is reporting bilaterally into her thigh burning also medial based knee burning.  This pain has been present for 1 month.  No associated injury.  She was cooking at the time of onset.  She saw her primary care for evaluation she was placed on gabapentin for this.  She is unable to tolerate that medication secondary to drowsiness. I sent her to pain management which she has scheduled follow up. While she still has burning pain, she is now having more aching and throbbing pain similar to that she was helped with previous intraarticular injection. So  will repeat that today as it has helped in the past, to left knee     The patient has been ordered:  Injection    CONSULTS:   None    ACTIVE PROBLEM LIST  Patient Active Problem List   Diagnosis    Morbid obesity with BMI of 40.0-44.9, adult    Essential hypertension    Thoracic aorta atherosclerosis    Diabetes mellitus type 2 in obese    Transaminitis    History of 2019 novel coronavirus disease (COVID-19)    Type 2 diabetes mellitus with stage 3a chronic kidney disease, without long-term current use of insulin    Chronic kidney disease, stage 3a    Decreased strength of trunk and back    Vitamin D deficiency           SUBJECTIVE    CHIEF COMPLAINT: Knee Pain    HPI:   Bette Johnson is a 73 y.o. female here for evaluation and management of right knee pain. There is not a specific incident that brought about this pain. she has had progressive problems with the knee(s) starting 1 years ago but is now progressing to interfere with activities which include: walking and enjoying hobbies    Currently the pain in the joint is rated at mild with activity. The pain is intermittent and is located in the knee, located medially. The pain is described as aching, sharp, stabbing and stiffness. Relieving factors include ice or heat and over the counter medication .     There is associated Catching.     Bette Johnson has no additional complaints.     5/16/23: Here for follow up. Acute on chronic knee pain, right worse than left. Known mild OA. No new injury or fall.     12/4/23:  Patient here for complaints of bilateral thigh and knee burning.  Seen by PCP, failed gabapentin and activity modification.    12/29/23: Here today for follow up with continued neuropathic based pain for which she is seen pain management.  She is having more aching throbbing medial joint line pain with weight-bearing difficulty standing that were similar to those that we were treating previously.  Those were helped by steroid injections.  She  had like a new steroid injection to help with that pain. This is only for the left knee, right knee is doing well.     PROGRESSIVE SYMPTOMS:  Pain worsened by weight bearing    FUNCTIONAL STATUS:   Climb a flight of stairs or walk up a hill     PREVIOUS TREATMENTS:  Medical: Attempted Weight Loss, OTC NSAIDS and Steroid Injections  Physical Therapy: Activities Modified   Previous Orthopaedic Surgery: None    REVIEW OF SYSTEMS:  PAIN ASSESSMENT:  See HPI.  MUSCULOSKELETAL: See HPI.  OTHER 10 point review of systems is negative except as stated in HPI above    PAST MEDICAL HISTORY   has a past medical history of Chronic kidney disease, stage 3a (02/04/2022), Diabetes mellitus, type 2, Hypertension, Renal manifestation of secondary diabetes mellitus, Severe obesity (BMI >= 40) (07/12/2017), and Type 2 diabetes mellitus.     PAST SURGICAL HISTORY   has a past surgical history that includes Cholecystectomy; Colonoscopy (N/A, 1/25/2018); and ERCP (N/A, 2/17/2021).     FAMILY HISTORY  family history includes Diabetes in her brother, mother, sister, sister, and sister; Hyperlipidemia in her sister; Hypertension in her sister and sister; Kidney disease in her brother.     SOCIAL HISTORY   reports that she has never smoked. She has never used smokeless tobacco. She reports current alcohol use. She reports that she does not use drugs.     ALLERGIES   Review of patient's allergies indicates:   Allergen Reactions    Iodine and iodide containing products Shortness Of Breath and Other (See Comments)     Fever, blisters around mouth, flu like symptoms  Fever, blisters around mouth, flu like symptoms      Penicillins Other (See Comments) and Anaphylaxis     Loss of consciousness  Other reaction(s): Penicillins (Fatal)  Note:  Allergic Reaction: Anaphylaxis    Caffeine Other (See Comments)     Hyper then  Causes low  Hyperactivity, followed by sleepiness  Hyperactivity, followed by sleepiness    Note: hyperactivity/ crash     Iodine   "    Note: fever blisters, elevated temp.     Latex, natural rubber Other (See Comments)     Dental work cause mouth ulcers  Ulcers in mouth when used during dental work  Ulcers in mouth when used during dental work    Note: sores         MEDICATIONS  Current Outpatient Medications on File Prior to Visit   Medication Sig Dispense Refill    amLODIPine (NORVASC) 10 MG tablet Take 1 tablet (10 mg total) by mouth once daily. 90 tablet 3    aspirin (ECOTRIN) 81 MG EC tablet Take 81 mg by mouth once daily.      atorvastatin (LIPITOR) 10 MG tablet Take 1 tablet (10 mg total) by mouth every other day. 36 tablet 3    coenzyme Q10 100 mg capsule Take 200 mg by mouth once daily.      diclofenac sodium (VOLTAREN) 1 % Gel Apply 2 g topically 2 (two) times daily. 100 g 0    FLUZONE HIGHDOSE QUAD 23-24  mcg/0.7 mL Syrg       gabapentin (NEURONTIN) 100 MG capsule Take 1 capsule (100 mg total) by mouth 3 (three) times daily as needed. 30 capsule 0    loratadine (CLARITIN) 10 mg tablet Take 10 mg by mouth once daily.      metFORMIN (GLUCOPHAGE) 850 MG tablet Take 1 tablet (850 mg total) by mouth once daily. 90 tablet 3    multivit-minerals/folic acid (WOMEN'S MULTIVITAMIN GUMMIES ORAL) Take by mouth.      valsartan-hydrochlorothiazide (DIOVAN-HCT) 320-25 mg per tablet Take 1 tablet by mouth once daily. 90 tablet 3    vitamin D (VITAMIN D3) 1000 units Tab Take 5,000 Units by mouth once daily.       No current facility-administered medications on file prior to visit.          PHYSICAL EXAM   height is 5' 4" (1.626 m) and weight is 112.5 kg (248 lb).   Body mass index is 42.57 kg/m².      All other systems deferred.  GENERAL:  No acute distress  HABITUS: Obese  GAIT: Antalgic  SKIN: Normal  and No erythema, warmth, fluctuance     KNEE EXAM:    bilateral:   Effusion: No joint effusion  TTP: yes over Pes Bursa > medial joint line  no crepitus with passive knee ROM  Passive ROM: Extension 0, Flexion 130  No pain with manipulation of " patella  Stable to varus/valgus stress. No increased laxity to anterior/posterior drawer testing  negative Maday's test  No pain with IR/ER rotation of the hip  5/5 strength in knee flexion and extension, ankle plantarflexion and dorsiflexion  Neurovascular Status: Sensation intact to light touch in Sural, Saphenous, SPN, DPN, Tibial nerve distribution  2+ pulse DP/PT, normal capillary refill, foot has normal warmth    DATA:  Diagnostic tests reviewed for today's visit:     4v of the knee reveal Mild degenerative changes of the Medial and Patellofemoral compartment. There is evidence of advanced osteoarthritis changes with Joint space narrowing.

## 2023-12-29 NOTE — PROCEDURES
Large Joint Aspiration/Injection: L knee    Date/Time: 12/29/2023 11:40 AM    Performed by: Alber Álvarez MD  Authorized by: Alber Álvarez MD    Consent Done?:  Yes (Verbal)  Indications:  Arthritis  Prep: patient was prepped and draped in usual sterile fashion    Anesthesia:  Local infiltration  Local anesthetic:  Topical anesthetic and lidocaine 1% without epinephrine    Details:  Needle Size:  22 G  Approach:  Anterolateral  Location:  Knee  Site:  L knee  Medications:  40 mg triamcinolone acetonide 40 mg/mL  Patient tolerance:  Patient tolerated the procedure well with no immediate complications

## 2024-01-12 ENCOUNTER — OFFICE VISIT (OUTPATIENT)
Dept: PAIN MEDICINE | Facility: CLINIC | Age: 74
End: 2024-01-12
Payer: MEDICARE

## 2024-01-12 VITALS — OXYGEN SATURATION: 96 % | DIASTOLIC BLOOD PRESSURE: 61 MMHG | HEART RATE: 55 BPM | SYSTOLIC BLOOD PRESSURE: 130 MMHG

## 2024-01-12 DIAGNOSIS — M48.062 SPINAL STENOSIS OF LUMBAR REGION WITH NEUROGENIC CLAUDICATION: ICD-10-CM

## 2024-01-12 DIAGNOSIS — M54.16 LUMBAR RADICULOPATHY, CHRONIC: ICD-10-CM

## 2024-01-12 DIAGNOSIS — M47.816 LUMBAR SPONDYLOSIS: ICD-10-CM

## 2024-01-12 DIAGNOSIS — M54.16 LUMBAR RADICULOPATHY: ICD-10-CM

## 2024-01-12 DIAGNOSIS — M51.36 DDD (DEGENERATIVE DISC DISEASE), LUMBAR: Primary | ICD-10-CM

## 2024-01-12 PROCEDURE — 3075F SYST BP GE 130 - 139MM HG: CPT | Mod: CPTII,S$GLB,,

## 2024-01-12 PROCEDURE — 1160F RVW MEDS BY RX/DR IN RCRD: CPT | Mod: CPTII,S$GLB,,

## 2024-01-12 PROCEDURE — 99214 OFFICE O/P EST MOD 30 MIN: CPT | Mod: S$GLB,,,

## 2024-01-12 PROCEDURE — 99999 PR PBB SHADOW E&M-EST. PATIENT-LVL III: CPT | Mod: PBBFAC,,,

## 2024-01-12 PROCEDURE — 1101F PT FALLS ASSESS-DOCD LE1/YR: CPT | Mod: CPTII,S$GLB,,

## 2024-01-12 PROCEDURE — 3288F FALL RISK ASSESSMENT DOCD: CPT | Mod: CPTII,S$GLB,,

## 2024-01-12 PROCEDURE — 1159F MED LIST DOCD IN RCRD: CPT | Mod: CPTII,S$GLB,,

## 2024-01-12 PROCEDURE — 3078F DIAST BP <80 MM HG: CPT | Mod: CPTII,S$GLB,,

## 2024-01-12 NOTE — PROGRESS NOTES
Subjective:     Patient ID: Bette Johnson is a 73 y.o. female    Chief Complaint: Follow-up (4 week f/u)      Referred by: No ref. provider found      HPI:    Interval History PA (01/12/2024):  Patient returns to clinic for follow up of chronic bilateral lower back and extremity pain.  Patient denies any changes in the quality or location of her pain since previous visit.  Continues to note pain across her bilateral lower back and into her buttocks and posterior thighs.  Notes pain is intermittent, typically presents with activity, prolonged standing or walking.  Notes only being able to walk short distance before needing to sit down and rest.  Denies any numbness, tingling, weakness, bowel or bladder dysfunction.  Also with continued knee pain follow up by orthopedics who recently completed left knee steroid injection.  Patient noting improvement in her left knee pain with the injection.  Since previous visit she has been doing more regular home exercises and stretching without significant improvement.    Initial Encounter (12/12/23):  Bette Johnson is a 73 y.o. female who presents today with chronic bilateral low back and lower extremity pain.  Patient does have history of chronic bilateral knee pain secondary to osteoarthritis.  However, has begun having a distinctly different pain in her thighs and low back.  This pain started roughly 1 month ago.  It is mainly present when standing and walking.  Can only stand or walk for 5-10 minutes before needing to sit down.  Pain improves immediately upon sitting.  Pain is located primarily in the buttocks and posterior thighs.  Does have some associated low back pain.  Denies any associated numbness, tingling, weakness, bowel bladder dysfunction.  Also has some pain in the medial aspect of the proximal leg.  This is thought to be related to pes anserine bursitis.   This pain is described in detail below.    Physical Therapy:  Yes.  Has completed the healthy back  program continues with regular home exercise program.    Non-pharmacologic Treatment:  Rest helps         TENS?  No    Pain Medications:         Currently taking:  Voltaren gel, gabapentin    Has tried in the past:  NSAIDs, Tylenol    Has not tried:  Opioids, Muscle relaxants, TCAs, SNRIs    Blood thinners:  Aspirin 81 mg    Interventional Therapies:  None    Relevant Surgeries:  None    Affecting sleep?  Yes    Affecting daily activities? yes    Depressive symptoms? No          SI/HI? No    Work status: Retired    Pain Scores:    Best:       0/10  Worst:     10/10  Usually:   7/10  Today:    5/10    Pain Disability Index  Family/Home Responsibilities:: 8  Recreation:: 10  Social Activity:: 7  Occupation:: 8  Sexual Behavior:: 0  Self Care:: 8  Life-Support Activities:: 4  Pain Disability Index (PDI): 45    Review of Systems   Constitutional:  Negative for activity change, appetite change, chills, fatigue, fever and unexpected weight change.   HENT:  Negative for hearing loss.    Eyes:  Negative for visual disturbance.   Respiratory:  Negative for chest tightness and shortness of breath.    Cardiovascular:  Negative for chest pain.   Gastrointestinal:  Negative for abdominal pain, constipation, diarrhea, nausea and vomiting.   Genitourinary:  Negative for difficulty urinating.   Musculoskeletal:  Positive for arthralgias, back pain, gait problem and myalgias. Negative for neck pain.   Skin:  Negative for rash.   Neurological:  Negative for dizziness, weakness, light-headedness, numbness and headaches.   Psychiatric/Behavioral:  Positive for sleep disturbance. Negative for hallucinations and suicidal ideas. The patient is not nervous/anxious.        Past Medical History:   Diagnosis Date    Chronic kidney disease, stage 3a 02/04/2022    Diabetes mellitus, type 2     Hypertension     Renal manifestation of secondary diabetes mellitus     Severe obesity (BMI >= 40) 07/12/2017    Type 2 diabetes mellitus        Past  Surgical History:   Procedure Laterality Date    CHOLECYSTECTOMY      COLONOSCOPY N/A 1/25/2018    Procedure: COLONOSCOPY;  Surgeon: Jeffrey Solano MD;  Location: [facility] ENDO;  Service: Endoscopy;  Laterality: N/A;    ERCP N/A 2/17/2021    Procedure: ERCP (ENDOSCOPIC RETROGRADE CHOLANGIOPANCREATOGRAPHY);  Surgeon: Emanuel Turner MD;  Location: [facility] ENDO ([address] [place] [address]);  Service: Endoscopy;  Laterality: N/A;       Social History     Socioeconomic History    Marital status: Single    Number of children: 2    Highest education level: Associate degree: academic program   Tobacco Use    Smoking status: Never    Smokeless tobacco: Never   Substance and Sexual Activity    Alcohol use: Yes     Comment: 'once a month maybe/wine     Drug use: No    Sexual activity: Not Currently     Social Determinants of Health     Financial Resource Strain: Low Risk  (11/14/2023)    Overall Financial Resource Strain (CARDIA)     Difficulty of Paying Living Expenses: Not very hard   Food Insecurity: Food Insecurity Present (11/14/2023)    Hunger Vital Sign     Worried About Running Out of Food in the Last Year: Never true     Ran Out of Food in the Last Year: Sometimes true   Transportation Needs: No Transportation Needs (11/14/2023)    PRAPARE - Transportation     Lack of Transportation (Medical): No     Lack of Transportation (Non-Medical): No   Physical Activity: Inactive (11/14/2023)    Exercise Vital Sign     Days of Exercise per Week: 0 days     Minutes of Exercise per Session: 0 min   Stress: No Stress Concern Present (11/14/2023)    [employer] - Occupational Stress Questionnaire     Feeling of Stress : Not at all   Social Connections: Moderately Integrated (11/14/2023)    Social Connection and Isolation Panel [NHANES]     Frequency of Communication with Friends and Family: More than three times a week     Frequency of Social Gatherings with Friends and Family: Once a week     Attends Jainism Services: More  than 4 times per year     Active Member of Clubs or Organizations: Yes     Attends Club or Organization Meetings: More than 4 times per year     Marital Status:    Housing Stability: Low Risk  (11/14/2023)    Housing Stability Vital Sign     Unable to Pay for Housing in the Last Year: No     Number of Places Lived in the Last Year: 1     Unstable Housing in the Last Year: No       Review of patient's allergies indicates:   Allergen Reactions    Iodine and iodide containing products Shortness Of Breath and Other (See Comments)     Fever, blisters around mouth, flu like symptoms  Fever, blisters around mouth, flu like symptoms      Penicillins Other (See Comments) and Anaphylaxis     Loss of consciousness  Other reaction(s): Penicillins (Fatal)  Note:  Allergic Reaction: Anaphylaxis    Caffeine Other (See Comments)     Hyper then  Causes low  Hyperactivity, followed by sleepiness  Hyperactivity, followed by sleepiness    Note: hyperactivity/ crash     Iodine      Note: fever blisters, elevated temp.     Latex, natural rubber Other (See Comments)     Dental work cause mouth ulcers  Ulcers in mouth when used during dental work  Ulcers in mouth when used during dental work    Note: sores        Current Outpatient Medications on File Prior to Visit   Medication Sig Dispense Refill    amLODIPine (NORVASC) 10 MG tablet Take 1 tablet (10 mg total) by mouth once daily. 90 tablet 3    aspirin (ECOTRIN) 81 MG EC tablet Take 81 mg by mouth once daily.      atorvastatin (LIPITOR) 10 MG tablet Take 1 tablet (10 mg total) by mouth every other day. 36 tablet 3    coenzyme Q10 100 mg capsule Take 200 mg by mouth once daily.      diclofenac sodium (VOLTAREN) 1 % Gel Apply 2 g topically 2 (two) times daily. 100 g 0    FLUZONE HIGHDOSE QUAD 23-24  mcg/0.7 mL Syrg       gabapentin (NEURONTIN) 100 MG capsule Take 1 capsule (100 mg total) by mouth 3 (three) times daily as needed. 30 capsule 0    loratadine (CLARITIN) 10 mg  tablet Take 10 mg by mouth once daily.      metFORMIN (GLUCOPHAGE) 850 MG tablet Take 1 tablet (850 mg total) by mouth once daily. 90 tablet 3    multivit-minerals/folic acid (WOMEN'S MULTIVITAMIN GUMMIES ORAL) Take by mouth.      valsartan-hydrochlorothiazide (DIOVAN-HCT) 320-25 mg per tablet Take 1 tablet by mouth once daily. 90 tablet 3    vitamin D (VITAMIN D3) 1000 units Tab Take 5,000 Units by mouth once daily.       No current facility-administered medications on file prior to visit.       Objective:      /61 (BP Location: Left arm, Patient Position: Sitting)   Pulse (!) 55   LMP  (LMP Unknown)   SpO2 96%     Exam:  GEN:  Well developed, well nourished.  No acute distress.  Normal pain behavior.  HEENT:  No trauma.  Mucous membranes moist.  Nares patent bilaterally.  PSYCH: Normal affect. Thought content appropriate.  CHEST:  Breathing symmetric.  No audible wheezing.  ABD: Soft, non-distended.  SKIN:  Warm, pink, dry.  No rash on exposed areas.    EXT:  No cyanosis, clubbing, or edema.  No color change or changes in nail or hair growth.  NEURO/MUSCULOSKELETAL:  Fully alert, oriented, and appropriate. Speech normal morelia. No cranial nerve deficits.   Gait:  Normal.  No trendelenburg sign bilaterally.         Imaging:    Narrative & Impression    EXAMINATION:  CT ABDOMEN PELVIS WITH CONTRAST     CLINICAL HISTORY:  LUQ abdominal pain;     TECHNIQUE:  Low dose axial images, sagittal and coronal reformations were obtained from the lung bases to the pubic symphysis following the IV administration of 100 mL of Omnipaque 350 .  Oral contrast was not given.  Single phase postcontrast CT examination of the abdomen and pelvis was performed.     COMPARISON:  None.     FINDINGS:  The lung bases demonstrate mild motion artifact and atelectatic change.  The stomach demonstrates nonspecific appearance of mild-to-moderate distention with fluid and air and ingested material.     There is diminished attenuation of  the liver consistent with mild diffuse fatty infiltrate.  Patient appears status post cholecystectomy.  There is biliary dilatation noted, this can be seen with passive biliary dilatation of prior cholecystectomy, however note is made that there are several densities seen within the common duct, a prominent density seen on axial image 47, with additional densities noted on axial images 56 through 62.  These areas of density are suspicious for choledocholithiasis.  Clinical and historical and laboratory correlation is needed if indicated ERCP may be helpful for further evaluation.  There is no evidence for abnormal pancreatic ductal dilatation and there is no evidence for peripancreatic inflammatory change.     There is no evidence for acute process of the spleen or adrenal glands.  There is no evidence for hydronephrosis or obstructive uropathy bilaterally, the ureters appear normal in caliber along their visualized course without evidence for ureteral calculus or hydroureter.  There is prominent atherosclerotic change at the origin of the celiac artery, there is likely a component of stenosis however there is no occlusion, the celiac artery demonstrates appropriate opacification.  The abdominal aorta demonstrates appropriate opacification, there is no aneurysmal dilatation.  The urinary bladder appears unremarkable for degree of distention.  There is no evidence for acute process of the uterus or adnexa.     There is no evidence for small bowel obstructive process.  The appendix is identified, it does not appear inflamed.  There is no evidence for inflammatory or obstructive process of the colon.  Diverticula of the colon are noted, there is no evidence for acute diverticulitis.  There is no free intraperitoneal air.  The osseous structures demonstrate chronic change.     Impression:     Multiple densities associated with the common duct likely represent choledocholithiasis, there is biliary dilatation which can be  seen associated with prior cholecystectomy however in the presence of choledocholithiasis the possibility of a component of biliary obstructive change is to be considered, clinical and historical and laboratory correlation is otherwise needed.     Diverticula of the colon are noted, there is no evidence for acute diverticulitis.        Electronically signed by: Saud Good  Date:                                            02/16/2021  Time:                                           02:46       Assessment:       Encounter Diagnoses   Name Primary?    Lumbar radiculopathy, chronic     DDD (degenerative disc disease), lumbar Yes    Lumbar radiculopathy     Lumbar spondylosis     Spinal stenosis of lumbar region with neurogenic claudication        Plan:       Bette was seen today for follow-up.    Diagnoses and all orders for this visit:    DDD (degenerative disc disease), lumbar  -     MRI Lumbar Spine Without Contrast; Future    Lumbar radiculopathy, chronic  -     MRI Lumbar Spine Without Contrast; Future    Lumbar radiculopathy    Lumbar spondylosis    Spinal stenosis of lumbar region with neurogenic claudication          Bette Johnson is a 73 y.o. female with chronic bilateral low back and lower extremity pain.  Pain is likely multifactorial.  Likely has pain related to osteoarthritis of both knees.  Also may have pain related to bilateral pes anserine bursitis.  The worst of her pains at this time, however seems to be related to neurogenic claudication related to spinal stenosis.  Patient is noted to have spinal stenosis in the lower lumbar region.      Prior records reviewed.   Pertinent imaging studies reviewed by me. Imaging results were discussed with patient.  Ordered lumbar MRI to further evaluate.  Stressed the importance of maintaining regular home exercise program and being mindful of how they use their back throughout the day.  Patient expressed understanding and agreement.  Return to clinic  after imaging to discuss results.  May consider interventional procedures at that time.      Lc Barbosa PA-C  [facility]  Interventional Pain Management     This note was created by combination of typed  and M-Modal dictation.  Transcription and phonetic errors may be present.  If there are any questions, please contact me.

## 2024-01-16 ENCOUNTER — PATIENT MESSAGE (OUTPATIENT)
Dept: FAMILY MEDICINE | Facility: CLINIC | Age: 74
End: 2024-01-16
Payer: MEDICARE

## 2024-01-18 ENCOUNTER — PATIENT OUTREACH (OUTPATIENT)
Dept: ADMINISTRATIVE | Facility: HOSPITAL | Age: 74
End: 2024-01-18
Payer: MEDICARE

## 2024-01-20 ENCOUNTER — HOSPITAL ENCOUNTER (OUTPATIENT)
Dept: RADIOLOGY | Facility: HOSPITAL | Age: 74
Discharge: HOME | End: 2024-01-20
Payer: MEDICARE

## 2024-01-20 DIAGNOSIS — M54.16 LUMBAR RADICULOPATHY, CHRONIC: ICD-10-CM

## 2024-01-20 DIAGNOSIS — M51.36 DDD (DEGENERATIVE DISC DISEASE), LUMBAR: ICD-10-CM

## 2024-01-20 PROCEDURE — 72148 MRI LUMBAR SPINE W/O DYE: CPT | Mod: 26,,, | Performed by: RADIOLOGY

## 2024-01-20 PROCEDURE — 72148 MRI LUMBAR SPINE W/O DYE: CPT | Mod: TC

## 2024-01-21 ENCOUNTER — PATIENT MESSAGE (OUTPATIENT)
Dept: ADMINISTRATIVE | Facility: OTHER | Age: 74
End: 2024-01-21
Payer: MEDICARE

## 2024-02-01 ENCOUNTER — OFFICE VISIT (OUTPATIENT)
Dept: PAIN MEDICINE | Facility: CLINIC | Age: 74
End: 2024-02-01
Payer: MEDICARE

## 2024-02-01 VITALS
OXYGEN SATURATION: 96 % | HEART RATE: 60 BPM | DIASTOLIC BLOOD PRESSURE: 72 MMHG | SYSTOLIC BLOOD PRESSURE: 167 MMHG | RESPIRATION RATE: 16 BRPM

## 2024-02-01 DIAGNOSIS — M54.16 LUMBAR RADICULOPATHY, CHRONIC: ICD-10-CM

## 2024-02-01 DIAGNOSIS — M51.36 DDD (DEGENERATIVE DISC DISEASE), LUMBAR: Primary | ICD-10-CM

## 2024-02-01 DIAGNOSIS — M47.816 LUMBAR SPONDYLOSIS: ICD-10-CM

## 2024-02-01 DIAGNOSIS — M48.062 SPINAL STENOSIS OF LUMBAR REGION WITH NEUROGENIC CLAUDICATION: ICD-10-CM

## 2024-02-01 PROCEDURE — 99213 OFFICE O/P EST LOW 20 MIN: CPT | Mod: S$GLB,,,

## 2024-02-01 PROCEDURE — 99999 PR PBB SHADOW E&M-EST. PATIENT-LVL III: CPT | Mod: PBBFAC,,,

## 2024-02-01 PROCEDURE — 1101F PT FALLS ASSESS-DOCD LE1/YR: CPT | Mod: CPTII,S$GLB,,

## 2024-02-01 PROCEDURE — 3288F FALL RISK ASSESSMENT DOCD: CPT | Mod: CPTII,S$GLB,,

## 2024-02-01 PROCEDURE — 1160F RVW MEDS BY RX/DR IN RCRD: CPT | Mod: CPTII,S$GLB,,

## 2024-02-01 PROCEDURE — 1125F AMNT PAIN NOTED PAIN PRSNT: CPT | Mod: CPTII,S$GLB,,

## 2024-02-01 PROCEDURE — 3078F DIAST BP <80 MM HG: CPT | Mod: CPTII,S$GLB,,

## 2024-02-01 PROCEDURE — 3077F SYST BP >= 140 MM HG: CPT | Mod: CPTII,S$GLB,,

## 2024-02-01 PROCEDURE — 1159F MED LIST DOCD IN RCRD: CPT | Mod: CPTII,S$GLB,,

## 2024-02-01 NOTE — PROGRESS NOTES
Subjective:     Patient ID: Bette Johnson is a 73 y.o. female    Chief Complaint: Knee Pain (Bilateral stiffness )      Referred by: No ref. provider found      HPI:    Interval History PA (02/01/2024):  Patient returns to clinic for follow up of chronic bilateral lower back and extremity pain and MRI review.  Patient denies any changes in the quality or location of her pain since previous visit.  She denies any new or worsening symptoms.  Notes pain has been tolerable since our previous visit, current pain is at a 1/10.  States her pain will significantly worsened with prolonged activity but overall feels it was at a manageable level at this time.  She would like to continue with more home exercises and stretching before considering any additional interventional procedures.    Interval History PA (01/12/2024):  Patient returns to clinic for follow up of chronic bilateral lower back and extremity pain.  Patient denies any changes in the quality or location of her pain since previous visit.  Continues to note pain across her bilateral lower back and into her buttocks and posterior thighs.  Notes pain is intermittent, typically presents with activity, prolonged standing or walking.  Notes only being able to walk short distance before needing to sit down and rest.  Denies any numbness, tingling, weakness, bowel or bladder dysfunction.  Also with continued knee pain follow up by orthopedics who recently completed left knee steroid injection.  Patient noting improvement in her left knee pain with the injection.  Since previous visit she has been doing more regular home exercises and stretching without significant improvement.    Initial Encounter (12/12/23):  Bette Johnson is a 73 y.o. female who presents today with chronic bilateral low back and lower extremity pain.  Patient does have history of chronic bilateral knee pain secondary to osteoarthritis.  However, has begun having a distinctly different pain in  her thighs and low back.  This pain started roughly 1 month ago.  It is mainly present when standing and walking.  Can only stand or walk for 5-10 minutes before needing to sit down.  Pain improves immediately upon sitting.  Pain is located primarily in the buttocks and posterior thighs.  Does have some associated low back pain.  Denies any associated numbness, tingling, weakness, bowel bladder dysfunction.  Also has some pain in the medial aspect of the proximal leg.  This is thought to be related to pes anserine bursitis.   This pain is described in detail below.    Physical Therapy:  Yes.  Has completed the healthy back program continues with regular home exercise program.    Non-pharmacologic Treatment:  Rest helps         TENS?  No    Pain Medications:         Currently taking:  Voltaren gel, gabapentin    Has tried in the past:  NSAIDs, Tylenol    Has not tried:  Opioids, Muscle relaxants, TCAs, SNRIs    Blood thinners:  Aspirin 81 mg    Interventional Therapies:  None    Relevant Surgeries:  None    Affecting sleep?  Yes    Affecting daily activities? yes    Depressive symptoms? No          SI/HI? No    Work status: Retired    Pain Scores:    Best:       0/10  Worst:     10/10  Usually:   5/10  Today:    1/10    Pain Disability Index  Family/Home Responsibilities:: 3  Recreation:: 5  Social Activity:: 5  Occupation:: 0  Sexual Behavior:: 0  Self Care:: 0  Life-Support Activities:: 0  Pain Disability Index (PDI): 13    Review of Systems   Constitutional:  Negative for activity change, appetite change, chills, fatigue, fever and unexpected weight change.   HENT:  Negative for hearing loss.    Eyes:  Negative for visual disturbance.   Respiratory:  Negative for chest tightness and shortness of breath.    Cardiovascular:  Negative for chest pain.   Gastrointestinal:  Negative for abdominal pain, constipation, diarrhea, nausea and vomiting.   Genitourinary:  Negative for difficulty urinating.   Musculoskeletal:   Positive for arthralgias, back pain, gait problem and myalgias. Negative for neck pain.   Skin:  Negative for rash.   Neurological:  Negative for dizziness, weakness, light-headedness, numbness and headaches.   Psychiatric/Behavioral:  Positive for sleep disturbance. Negative for hallucinations and suicidal ideas. The patient is not nervous/anxious.        Past Medical History:   Diagnosis Date    Chronic kidney disease, stage 3a 02/04/2022    Diabetes mellitus, type 2     Hypertension     Renal manifestation of secondary diabetes mellitus     Severe obesity (BMI >= 40) 07/12/2017    Type 2 diabetes mellitus        Past Surgical History:   Procedure Laterality Date    CHOLECYSTECTOMY      COLONOSCOPY N/A 1/25/2018    Procedure: COLONOSCOPY;  Surgeon: Jeffrey Solano MD;  Location: [facility] END[facility];  Service: Endoscopy;  Laterality: N/A;    ERCP N/A 2/17/2021    Procedure: ERCP (ENDOSCOPIC RETROGRADE CHOLANGIOPANCREATOGRAPHY);  Surgeon: Emanuel Turner MD;  Location: [facility] ([address] [place] [address]);  Service: Endoscopy;  Laterality: N/A;       Social History     Socioeconomic History    Marital status: Single    Number of children: 2    Highest education level: Associate degree: academic program   Tobacco Use    Smoking status: Never    Smokeless tobacco: Never   Substance and Sexual Activity    Alcohol use: Yes     Comment: 'once a month maybe/wine     Drug use: No    Sexual activity: Not Currently     Social Determinants of Health     Financial Resource Strain: Low Risk  (11/14/2023)    Overall Financial Resource Strain (CARDIA)     Difficulty of Paying Living Expenses: Not very hard   Food Insecurity: Food Insecurity Present (11/14/2023)    Hunger Vital Sign     Worried About Running Out of Food in the Last Year: Never true     Ran Out of Food in the Last Year: Sometimes true   Transportation Needs: No Transportation Needs (11/14/2023)    PRAPARE - Transportation     Lack of Transportation (Medical): No     Lack of Transportation  (Non-Medical): No   Physical Activity: Inactive (11/14/2023)    Exercise Vital Sign     Days of Exercise per Week: 0 days     Minutes of Exercise per Session: 0 min   Stress: No Stress Concern Present (11/14/2023)    [employer] - Occupational Stress Questionnaire     Feeling of Stress : Not at all   Social Connections: Moderately Integrated (11/14/2023)    Social Connection and Isolation Panel [NHANES]     Frequency of Communication with Friends and Family: More than three times a week     Frequency of Social Gatherings with Friends and Family: Once a week     Attends [employer] Services: More than 4 times per year     Active Member of Clubs or Organizations: Yes     Attends Club or Organization Meetings: More than 4 times per year     Marital Status:    Housing Stability: Low Risk  (11/14/2023)    Housing Stability Vital Sign     Unable to Pay for Housing in the Last Year: No     Number of Places Lived in the Last Year: 1     Unstable Housing in the Last Year: No       Review of patient's allergies indicates:   Allergen Reactions    Iodine and iodide containing products Shortness Of Breath and Other (See Comments)     Fever, blisters around mouth, flu like symptoms  Fever, blisters around mouth, flu like symptoms      Penicillins Other (See Comments) and Anaphylaxis     Loss of consciousness  Other reaction(s): Penicillins (Fatal)  Note:  Allergic Reaction: Anaphylaxis    Caffeine Other (See Comments)     Hyper then  Causes low  Hyperactivity, followed by sleepiness  Hyperactivity, followed by sleepiness    Note: hyperactivity/ crash     Iodine      Note: fever blisters, elevated temp.     Latex, natural rubber Other (See Comments)     Dental work cause mouth ulcers  Ulcers in mouth when used during dental work  Ulcers in mouth when used during dental work    Note: sores        Current Outpatient Medications on File Prior to Visit   Medication Sig Dispense Refill    amLODIPine  (NORVASC) 10 MG tablet Take 1 tablet (10 mg total) by mouth once daily. 90 tablet 3    aspirin (ECOTRIN) 81 MG EC tablet Take 81 mg by mouth once daily.      atorvastatin (LIPITOR) 10 MG tablet Take 1 tablet (10 mg total) by mouth every other day. 36 tablet 3    coenzyme Q10 100 mg capsule Take 200 mg by mouth once daily.      diclofenac sodium (VOLTAREN) 1 % Gel Apply 2 g topically 2 (two) times daily. 100 g 0    FLUZONE HIGHDOSE QUAD 23-24  mcg/0.7 mL Syrg       gabapentin (NEURONTIN) 100 MG capsule Take 1 capsule (100 mg total) by mouth 3 (three) times daily as needed. 30 capsule 0    loratadine (CLARITIN) 10 mg tablet Take 10 mg by mouth once daily.      metFORMIN (GLUCOPHAGE) 850 MG tablet Take 1 tablet (850 mg total) by mouth once daily. 90 tablet 3    multivit-minerals/folic acid (WOMEN'S MULTIVITAMIN GUMMIES ORAL) Take by mouth.      valsartan-hydrochlorothiazide (DIOVAN-HCT) 320-25 mg per tablet Take 1 tablet by mouth once daily. 90 tablet 3    vitamin D (VITAMIN D3) 1000 units Tab Take 5,000 Units by mouth once daily.       No current facility-administered medications on file prior to visit.       Objective:      BP (!) 167/72 (BP Location: Right arm, Patient Position: Sitting, BP Method: Medium (Automatic))   Pulse 60   Resp 16   LMP  (LMP Unknown)   SpO2 96%     Exam:  GEN:  Well developed, well nourished.  No acute distress.  Normal pain behavior.  HEENT:  No trauma.  Mucous membranes moist.  Nares patent bilaterally.  PSYCH: Normal affect. Thought content appropriate.  CHEST:  Breathing symmetric.  No audible wheezing.  ABD: Soft, non-distended.  SKIN:  Warm, pink, dry.  No rash on exposed areas.    EXT:  No cyanosis, clubbing, or edema.  No color change or changes in nail or hair growth.  NEURO/MUSCULOSKELETAL:  Fully alert, oriented, and appropriate. Speech normal morelia. No cranial nerve deficits.   Gait:  Normal.  No trendelenburg sign bilaterally.         Imaging:  Narrative &  Impression  EXAMINATION:  MRI LUMBAR SPINE WITHOUT CONTRAST     CLINICAL HISTORY:  Lumbar radiculopathy, symptoms persist with conservative treatment; Radiculopathy, lumbar region     TECHNIQUE:  Multiplanar, multisequence MR images were acquired from the thoracolumbar junction to the sacrum without the administration of contrast.     FINDINGS:  Alignment: Normal.     Vertebrae: 5 lumbar-type vertebral bodies. No aggressive marrow replacement process or fracture.Nobone marrow edema .     Discs: Multilevel desiccation of disc material with cortical endplate degenerative changes L3-L4 and L4-L5.  Disc space narrowing present L5-S1.     Cord: Normal. Conus terminates at L2. Redundant nerve roots of the cauda equina characterized by elongated tortuous nerve roots with serpiginous or coiled appearance above the L2 level related to lumbar canal stenosis.     Degenerative findings:     T12-L1: There is no focal disc herniation. No significant central canal narrowing . No significant neural foraminal narrowing.     L1-L2: There is no focal disc herniation. No significant central canal narrowing . No significant neural foraminal narrowing.     L2-L3: There is no focal disc herniation. No significant central canal narrowing . No significant neural foraminal narrowing.     L3-L4: Broad-based disc bulge, facet degenerative change and ligamentum flavum thickening which contribute to  moderate central canal narrowing . No significant neural foraminal narrowing.     L4-L5: Broad-based disc bulge, facet degenerative change and ligamentum flavum thickening which contribute to  mild central canal narrowing . No significant neural foraminal narrowing.     L5-S1: Broad based mild diffuse bulging of disc material .  Facet hypertrophy.  No significant central canal narrowing . Mild bilateral neural foraminal narrowing.     Paraspinal muscles & soft tissues: Unremarkable.     Impression:     Lumbar spondylosis most evident L3-L4 through  L5-S1, worst level of which is L3-L4 with moderate central canal narrowing        Electronically signed by: Fidencio Marques MD  Date:                                            01/21/2024  Time:                                           07:57      Narrative & Impression    EXAMINATION:  CT ABDOMEN PELVIS WITH CONTRAST     CLINICAL HISTORY:  LUQ abdominal pain;     TECHNIQUE:  Low dose axial images, sagittal and coronal reformations were obtained from the lung bases to the pubic symphysis following the IV administration of 100 mL of Omnipaque 350 .  Oral contrast was not given.  Single phase postcontrast CT examination of the abdomen and pelvis was performed.     COMPARISON:  None.     FINDINGS:  The lung bases demonstrate mild motion artifact and atelectatic change.  The stomach demonstrates nonspecific appearance of mild-to-moderate distention with fluid and air and ingested material.     There is diminished attenuation of the liver consistent with mild diffuse fatty infiltrate.  Patient appears status post cholecystectomy.  There is biliary dilatation noted, this can be seen with passive biliary dilatation of prior cholecystectomy, however note is made that there are several densities seen within the common duct, a prominent density seen on axial image 47, with additional densities noted on axial images 56 through 62.  These areas of density are suspicious for choledocholithiasis.  Clinical and historical and laboratory correlation is needed if indicated ERCP may be helpful for further evaluation.  There is no evidence for abnormal pancreatic ductal dilatation and there is no evidence for peripancreatic inflammatory change.     There is no evidence for acute process of the spleen or adrenal glands.  There is no evidence for hydronephrosis or obstructive uropathy bilaterally, the ureters appear normal in caliber along their visualized course without evidence for ureteral calculus or hydroureter.  There is prominent  atherosclerotic change at the origin of the celiac artery, there is likely a component of stenosis however there is no occlusion, the celiac artery demonstrates appropriate opacification.  The abdominal aorta demonstrates appropriate opacification, there is no aneurysmal dilatation.  The urinary bladder appears unremarkable for degree of distention.  There is no evidence for acute process of the uterus or adnexa.     There is no evidence for small bowel obstructive process.  The appendix is identified, it does not appear inflamed.  There is no evidence for inflammatory or obstructive process of the colon.  Diverticula of the colon are noted, there is no evidence for acute diverticulitis.  There is no free intraperitoneal air.  The osseous structures demonstrate chronic change.     Impression:     Multiple densities associated with the common duct likely represent choledocholithiasis, there is biliary dilatation which can be seen associated with prior cholecystectomy however in the presence of choledocholithiasis the possibility of a component of biliary obstructive change is to be considered, clinical and historical and laboratory correlation is otherwise needed.     Diverticula of the colon are noted, there is no evidence for acute diverticulitis.        Electronically signed by: Saud Good  Date:                                            02/16/2021  Time:                                           02:46       Assessment:       Encounter Diagnoses   Name Primary?    DDD (degenerative disc disease), lumbar Yes    Lumbar radiculopathy, chronic     Lumbar spondylosis     Spinal stenosis of lumbar region with neurogenic claudication          Plan:       Bette was seen today for knee pain.    Diagnoses and all orders for this visit:    DDD (degenerative disc disease), lumbar    Lumbar radiculopathy, chronic    Lumbar spondylosis    Spinal stenosis of lumbar region with neurogenic claudication            Bette WANG  Alex is a 73 y.o. female with chronic bilateral low back and lower extremity pain.  Pain is likely multifactorial.  Likely has pain related to osteoarthritis of both knees.  Also may have pain related to bilateral pes anserine bursitis.  The worst of her pains at this time, however seems to be related to neurogenic claudication related to spinal stenosis.  Lumbar MRI most notable for a broad-based disc bulge at L3-4 creating moderate to severe central canal stenosis.      Prior records reviewed.  Pertinent imaging studies reviewed by me. Imaging results were discussed with patient.  Discussed various treatment options including a bilateral L4 transforaminal epidural steroid injection.  Patient would like to hold off on any interventional procedures at this time and continue with more at home exercise.  She does note her pain has been at a more manageable level since previous visit.  Can consider proceeding with above procedure if pain persists/worsens.  Stressed the importance of maintaining regular home exercise program and being mindful of how they use their back throughout the day.  Patient expressed understanding and agreement.  Return to clinic as needed.        Lc Barbosa PA-C  [facility]  Interventional Pain Management     This note was created by combination of typed  and M-Modal dictation.  Transcription and phonetic errors may be present.  If there are any questions, please contact me.

## 2024-02-09 ENCOUNTER — LAB VISIT (OUTPATIENT)
Dept: LAB | Facility: HOSPITAL | Age: 74
End: 2024-02-09
Attending: FAMILY MEDICINE
Payer: MEDICARE

## 2024-02-09 DIAGNOSIS — E66.9 DIABETES MELLITUS TYPE 2 IN OBESE: ICD-10-CM

## 2024-02-09 DIAGNOSIS — E11.69 DIABETES MELLITUS TYPE 2 IN OBESE: ICD-10-CM

## 2024-02-09 LAB
ALBUMIN SERPL BCP-MCNC: 3.8 G/DL (ref 3.5–5.2)
ALP SERPL-CCNC: 76 U/L (ref 55–135)
ALT SERPL W/O P-5'-P-CCNC: 17 U/L (ref 10–44)
ANION GAP SERPL CALC-SCNC: 13 MMOL/L (ref 8–16)
AST SERPL-CCNC: 21 U/L (ref 10–40)
BASOPHILS # BLD AUTO: 0.04 K/UL (ref 0–0.2)
BASOPHILS NFR BLD: 0.7 % (ref 0–1.9)
BILIRUB SERPL-MCNC: 0.4 MG/DL (ref 0.1–1)
BUN SERPL-MCNC: 16 MG/DL (ref 8–23)
CALCIUM SERPL-MCNC: 9.3 MG/DL (ref 8.7–10.5)
CHLORIDE SERPL-SCNC: 102 MMOL/L (ref 95–110)
CHOLEST SERPL-MCNC: 165 MG/DL (ref 120–199)
CHOLEST/HDLC SERPL: 3.1 {RATIO} (ref 2–5)
CO2 SERPL-SCNC: 24 MMOL/L (ref 23–29)
CREAT SERPL-MCNC: 1.1 MG/DL (ref 0.5–1.4)
DIFFERENTIAL METHOD BLD: ABNORMAL
EOSINOPHIL # BLD AUTO: 0.1 K/UL (ref 0–0.5)
EOSINOPHIL NFR BLD: 1.7 % (ref 0–8)
ERYTHROCYTE [DISTWIDTH] IN BLOOD BY AUTOMATED COUNT: 13.6 % (ref 11.5–14.5)
EST. GFR  (NO RACE VARIABLE): 53.7 ML/MIN/1.73 M^2
ESTIMATED AVG GLUCOSE: 171 MG/DL (ref 68–131)
GLUCOSE SERPL-MCNC: 168 MG/DL (ref 70–110)
HBA1C MFR BLD: 7.6 % (ref 4–5.6)
HCT VFR BLD AUTO: 45.8 % (ref 37–48.5)
HDLC SERPL-MCNC: 54 MG/DL (ref 40–75)
HDLC SERPL: 32.7 % (ref 20–50)
HGB BLD-MCNC: 14.4 G/DL (ref 12–16)
IMM GRANULOCYTES # BLD AUTO: 0.02 K/UL (ref 0–0.04)
IMM GRANULOCYTES NFR BLD AUTO: 0.3 % (ref 0–0.5)
LDLC SERPL CALC-MCNC: 86.4 MG/DL (ref 63–159)
LYMPHOCYTES # BLD AUTO: 2.6 K/UL (ref 1–4.8)
LYMPHOCYTES NFR BLD: 43.8 % (ref 18–48)
MCH RBC QN AUTO: 28.2 PG (ref 27–31)
MCHC RBC AUTO-ENTMCNC: 31.4 G/DL (ref 32–36)
MCV RBC AUTO: 90 FL (ref 82–98)
MONOCYTES # BLD AUTO: 0.4 K/UL (ref 0.3–1)
MONOCYTES NFR BLD: 6 % (ref 4–15)
NEUTROPHILS # BLD AUTO: 2.9 K/UL (ref 1.8–7.7)
NEUTROPHILS NFR BLD: 47.5 % (ref 38–73)
NONHDLC SERPL-MCNC: 111 MG/DL
NRBC BLD-RTO: 0 /100 WBC
PLATELET # BLD AUTO: 294 K/UL (ref 150–450)
PMV BLD AUTO: 10.2 FL (ref 9.2–12.9)
POTASSIUM SERPL-SCNC: 4.1 MMOL/L (ref 3.5–5.1)
PROT SERPL-MCNC: 7.9 G/DL (ref 6–8.4)
RBC # BLD AUTO: 5.11 M/UL (ref 4–5.4)
SODIUM SERPL-SCNC: 139 MMOL/L (ref 136–145)
TRIGL SERPL-MCNC: 123 MG/DL (ref 30–150)
TSH SERPL DL<=0.005 MIU/L-ACNC: 2.74 UIU/ML (ref 0.4–4)
WBC # BLD AUTO: 6.01 K/UL (ref 3.9–12.7)

## 2024-02-09 PROCEDURE — 85025 COMPLETE CBC W/AUTO DIFF WBC: CPT | Performed by: FAMILY MEDICINE

## 2024-02-09 PROCEDURE — 83036 HEMOGLOBIN GLYCOSYLATED A1C: CPT | Performed by: FAMILY MEDICINE

## 2024-02-09 PROCEDURE — 36415 COLL VENOUS BLD VENIPUNCTURE: CPT | Mod: PO | Performed by: FAMILY MEDICINE

## 2024-02-09 PROCEDURE — 80053 COMPREHEN METABOLIC PANEL: CPT | Performed by: FAMILY MEDICINE

## 2024-02-09 PROCEDURE — 84443 ASSAY THYROID STIM HORMONE: CPT | Performed by: FAMILY MEDICINE

## 2024-02-09 PROCEDURE — 80061 LIPID PANEL: CPT | Performed by: FAMILY MEDICINE

## 2024-02-19 ENCOUNTER — PATIENT MESSAGE (OUTPATIENT)
Dept: ADMINISTRATIVE | Facility: HOSPITAL | Age: 74
End: 2024-02-19
Payer: MEDICARE

## 2024-02-20 ENCOUNTER — PATIENT OUTREACH (OUTPATIENT)
Dept: ADMINISTRATIVE | Facility: HOSPITAL | Age: 74
End: 2024-02-20
Payer: MEDICARE

## 2024-02-20 DIAGNOSIS — Z12.11 SCREENING FOR COLORECTAL CANCER: Primary | ICD-10-CM

## 2024-02-20 DIAGNOSIS — Z12.12 SCREENING FOR COLORECTAL CANCER: Primary | ICD-10-CM

## 2024-02-22 ENCOUNTER — OFFICE VISIT (OUTPATIENT)
Dept: FAMILY MEDICINE | Facility: CLINIC | Age: 74
End: 2024-02-22
Payer: MEDICARE

## 2024-02-22 VITALS
OXYGEN SATURATION: 96 % | WEIGHT: 243.75 LBS | DIASTOLIC BLOOD PRESSURE: 66 MMHG | BODY MASS INDEX: 41.61 KG/M2 | TEMPERATURE: 98 F | HEIGHT: 64 IN | HEART RATE: 60 BPM | SYSTOLIC BLOOD PRESSURE: 132 MMHG

## 2024-02-22 DIAGNOSIS — Z00.00 ANNUAL PHYSICAL EXAM: Primary | ICD-10-CM

## 2024-02-22 DIAGNOSIS — M51.36 DDD (DEGENERATIVE DISC DISEASE), LUMBAR: ICD-10-CM

## 2024-02-22 DIAGNOSIS — M48.062 SPINAL STENOSIS OF LUMBAR REGION WITH NEUROGENIC CLAUDICATION: ICD-10-CM

## 2024-02-22 DIAGNOSIS — E11.69 HYPERLIPIDEMIA ASSOCIATED WITH TYPE 2 DIABETES MELLITUS: ICD-10-CM

## 2024-02-22 DIAGNOSIS — E78.5 HYPERLIPIDEMIA ASSOCIATED WITH TYPE 2 DIABETES MELLITUS: ICD-10-CM

## 2024-02-22 DIAGNOSIS — E11.69 DIABETES MELLITUS TYPE 2 IN OBESE: ICD-10-CM

## 2024-02-22 DIAGNOSIS — M47.816 LUMBAR SPONDYLOSIS: ICD-10-CM

## 2024-02-22 DIAGNOSIS — G89.29 BILATERAL CHRONIC KNEE PAIN: ICD-10-CM

## 2024-02-22 DIAGNOSIS — M25.561 BILATERAL CHRONIC KNEE PAIN: ICD-10-CM

## 2024-02-22 DIAGNOSIS — E66.01 MORBID OBESITY WITH BMI OF 40.0-44.9, ADULT: ICD-10-CM

## 2024-02-22 DIAGNOSIS — M25.562 BILATERAL CHRONIC KNEE PAIN: ICD-10-CM

## 2024-02-22 DIAGNOSIS — E66.9 DIABETES MELLITUS TYPE 2 IN OBESE: ICD-10-CM

## 2024-02-22 DIAGNOSIS — I70.0 THORACIC AORTA ATHEROSCLEROSIS: ICD-10-CM

## 2024-02-22 DIAGNOSIS — N18.31 CHRONIC KIDNEY DISEASE, STAGE 3A: ICD-10-CM

## 2024-02-22 DIAGNOSIS — I10 ESSENTIAL HYPERTENSION: ICD-10-CM

## 2024-02-22 PROCEDURE — 1101F PT FALLS ASSESS-DOCD LE1/YR: CPT | Mod: CPTII,S$GLB,, | Performed by: FAMILY MEDICINE

## 2024-02-22 PROCEDURE — 99999 PR PBB SHADOW E&M-EST. PATIENT-LVL IV: CPT | Mod: PBBFAC,,, | Performed by: FAMILY MEDICINE

## 2024-02-22 PROCEDURE — 3078F DIAST BP <80 MM HG: CPT | Mod: CPTII,S$GLB,, | Performed by: FAMILY MEDICINE

## 2024-02-22 PROCEDURE — 99397 PER PM REEVAL EST PAT 65+ YR: CPT | Mod: S$GLB,,, | Performed by: FAMILY MEDICINE

## 2024-02-22 PROCEDURE — 3008F BODY MASS INDEX DOCD: CPT | Mod: CPTII,S$GLB,, | Performed by: FAMILY MEDICINE

## 2024-02-22 PROCEDURE — 3051F HG A1C>EQUAL 7.0%<8.0%: CPT | Mod: CPTII,S$GLB,, | Performed by: FAMILY MEDICINE

## 2024-02-22 PROCEDURE — 1160F RVW MEDS BY RX/DR IN RCRD: CPT | Mod: CPTII,S$GLB,, | Performed by: FAMILY MEDICINE

## 2024-02-22 PROCEDURE — 3288F FALL RISK ASSESSMENT DOCD: CPT | Mod: CPTII,S$GLB,, | Performed by: FAMILY MEDICINE

## 2024-02-22 PROCEDURE — 3075F SYST BP GE 130 - 139MM HG: CPT | Mod: CPTII,S$GLB,, | Performed by: FAMILY MEDICINE

## 2024-02-22 PROCEDURE — 1159F MED LIST DOCD IN RCRD: CPT | Mod: CPTII,S$GLB,, | Performed by: FAMILY MEDICINE

## 2024-02-22 PROCEDURE — 1125F AMNT PAIN NOTED PAIN PRSNT: CPT | Mod: CPTII,S$GLB,, | Performed by: FAMILY MEDICINE

## 2024-02-22 NOTE — PROGRESS NOTES
"  Physical Exam  /66   Pulse 60   Temp 97.6 °F (36.4 °C) (Oral)   Ht 5' 4" (1.626 m)   Wt 110.6 kg (243 lb 11.5 oz)   LMP  (LMP Unknown)   SpO2 96%   BMI 41.83 kg/m²      Office Visit    Patient Name: Bette Johnson    : 1950  MRN: [id number]      Assessment/Plan:  Bette Johnson is a 74 y.o. female who presents today for :    Annual physical exam  -previous labs reviewed and discussed with patient  -anticipatory guidance provided with age appropriate preventative services discussed, healthy diet and regular physical exercise also discussed with patient  -d/w pt about the importance of portion control  -Recommend 15-30 minutes of moderate intensity exercise 5 days/week.    Diabetes mellitus type 2 in obese  Hyperlipidemia associated with type 2 diabetes mellitus  Thoracic aorta atherosclerosis  -previous A1c reviewed:   Lab Results   Component Value Date    HGBA1C 7.6 (H) 2024     -aim for goal of A1c<7%, continue current medication regimen  -reviewed with patient about routine diabetic care  -weight loss and regular physical excercise    Essential hypertension  Morbid obesity with BMI of 40.0-44.9, adult  Chronic kidney disease, stage 3a  -continue current medication regimen  -DASH diet, regular cardiovascular exercises  -portion control with low carb/low fat diet to help with weight loss    Bilateral chronic knee pain  DDD (degenerative disc disease), lumbar  Lumbar spondylosis  Spinal stenosis of lumbar region with neurogenic claudication  -stable, continue current management and f/u schedule per Pain Mx and Ortho              Follow up 6mo    This note was created by combination of typed  and MModal dictation.  Transcription errors may be present.  If there are any questions, please contact me.        ----------------------------------------------------------------------------------------------------------------------      HPI:  Patient Care Team:  Neeta Estrada MD as PCP " - General (Family Medicine)  Vinicius Mcginnis III, OD as Consulting Provider (Optometry)  Ermias Lewis MD as Consulting Physician (Gastroenterology)  Leia Martinez MA as Care Coordinator  Birgit Larkin as Digital Medicine Health   Macie Almanzar, Raciel as Hypertension Digital Medicine Clinician (Pharmacist)  Lily Churchill MD as Hypertension Digital Medicine Responsible Provider (Family Medicine)  Medicare, [facility] Managed as Hypertension Digital Medicine Contract  Matt Mascorro OD (Optometry)    Bette is a 74 y.o. female with      Patient Active Problem List   Diagnosis    Morbid obesity with BMI of 40.0-44.9, adult    Essential hypertension    Thoracic aorta atherosclerosis    Diabetes mellitus type 2 in obese    Transaminitis    History of 2019 novel coronavirus disease (COVID-19)    Type 2 diabetes mellitus with stage 3a chronic kidney disease, without long-term current use of insulin    Chronic kidney disease, stage 3a    Decreased strength of trunk and back    Vitamin D deficiency       This patient is new to me     Bette presents today for:  Annual Exam      Her last follow up with her PCP was about 6 months. She is doing well and has no major complaints today. Health maintenance-wise, she is up to date on colon cancer screening and MMG. Her BP is well controlled on current regimen. A1c has increased slightly  to 7.6% since prior visit, but she is otherwise compliant with her Metformin as prescribed.  She denies any cardiovascular or neurologic complaints today        Additional ROS    CONST: no fever, no activity change, weight stable.   EYES: no vision change.   ENT: no sore throat. No dysphagia.   CV: no CP with exertion  RESP: no SOB  GI: no N/V/diarrhea/constipation  : no urinary concerns  MSK: no new myalgias or arthralgias.   SKIN: no new rashes  NEURO: no focal deficits.   PSYCH: no new issues.   ENDOCRINE: no polyuria.         Current Medications  Medications  reviewed and updated.       Current Outpatient Medications:     amLODIPine (NORVASC) 10 MG tablet, Take 1 tablet (10 mg total) by mouth once daily., Disp: 90 tablet, Rfl: 3    aspirin (ECOTRIN) 81 MG EC tablet, Take 81 mg by mouth once daily., Disp: , Rfl:     atorvastatin (LIPITOR) 10 MG tablet, Take 1 tablet (10 mg total) by mouth every other day., Disp: 36 tablet, Rfl: 3    coenzyme Q10 100 mg capsule, Take 200 mg by mouth once daily., Disp: , Rfl:     diclofenac sodium (VOLTAREN) 1 % Gel, Apply 2 g topically 2 (two) times daily., Disp: 100 g, Rfl: 0    FLUZONE HIGHDOSE QUAD 23-24  mcg/0.7 mL Syrg, , Disp: , Rfl:     gabapentin (NEURONTIN) 100 MG capsule, Take 1 capsule (100 mg total) by mouth 3 (three) times daily as needed., Disp: 30 capsule, Rfl: 0    loratadine (CLARITIN) 10 mg tablet, Take 10 mg by mouth once daily., Disp: , Rfl:     metFORMIN (GLUCOPHAGE) 850 MG tablet, Take 1 tablet (850 mg total) by mouth once daily., Disp: 90 tablet, Rfl: 3    multivit-minerals/folic acid (WOMEN'S MULTIVITAMIN GUMMIES ORAL), Take by mouth., Disp: , Rfl:     valsartan-hydrochlorothiazide (DIOVAN-HCT) 320-25 mg per tablet, Take 1 tablet by mouth once daily., Disp: 90 tablet, Rfl: 3    vitamin D (VITAMIN D3) 1000 units Tab, Take 5,000 Units by mouth once daily., Disp: , Rfl:     Past Surgical History:   Procedure Laterality Date    CHOLECYSTECTOMY      COLONOSCOPY N/A 1/25/2018    Procedure: COLONOSCOPY;  Surgeon: Jeffrey Solano MD;  Location[facility] ENDO;  Service: Endoscopy;  Laterality: N/A;    ERCP N/A 2/17/2021    Procedure: ERCP (ENDOSCOPIC RETROGRADE CHOLANGIOPANCREATOGRAPHY);  Surgeon: Emanuel Turner MD;  Location: [facility] ([address] [place] [address]);  Service: Endoscopy;  Laterality: N/A;       Family History   Problem Relation Age of Onset    Diabetes Mother     Diabetes Sister     Diabetes Brother     Kidney disease Brother     Diabetes Sister     Hypertension Sister     Diabetes Sister     Hypertension Sister      Hyperlipidemia Sister        Social History     Socioeconomic History    Marital status: Single    Number of children: 2    Highest education level: Associate degree: academic program   Tobacco Use    Smoking status: Never    Smokeless tobacco: Never   Substance and Sexual Activity    Alcohol use: Yes     Comment: 'once a month maybe/wine     Drug use: No    Sexual activity: Not Currently     Social Determinants of Health     Financial Resource Strain: Low Risk  (11/14/2023)    Overall Financial Resource Strain (CARDIA)     Difficulty of Paying Living Expenses: Not very hard   Food Insecurity: Food Insecurity Present (11/14/2023)    Hunger Vital Sign     Worried About Running Out of Food in the Last Year: Never true     Ran Out of Food in the Last Year: Sometimes true   Transportation Needs: No Transportation Needs (11/14/2023)    PRAPARE - Transportation     Lack of Transportation (Medical): No     Lack of Transportation (Non-Medical): No   Physical Activity: Inactive (11/14/2023)    Exercise Vital Sign     Days of Exercise per Week: 0 days     Minutes of Exercise per Session: 0 min   Stress: No Stress Concern Present (11/14/2023)    [employer] - Occupational Stress Questionnaire     Feeling of Stress : Not at all   Social Connections: Unknown (11/14/2023)    Social Connection and Isolation Panel [NHANES]     Frequency of Communication with Friends and Family: More than three times a week     Frequency of Social Gatherings with Friends and Family: Once a week     Active Member of Clubs or Organizations: Yes     Attends Club or Organization Meetings: More than 4 times per year     Marital Status:    Housing Stability: Low Risk  (11/14/2023)    Housing Stability Vital Sign     Unable to Pay for Housing in the Last Year: No     Number of Places Lived in the Last Year: 1     Unstable Housing in the Last Year: No           Allergies   Review of patient's allergies indicates:  "  Allergen Reactions    Iodine and iodide containing products Shortness Of Breath and Other (See Comments)     Fever, blisters around mouth, flu like symptoms  Fever, blisters around mouth, flu like symptoms      Penicillins Other (See Comments) and Anaphylaxis     Loss of consciousness  Other reaction(s): Penicillins (Fatal)  Note:  Allergic Reaction: Anaphylaxis    Caffeine Other (See Comments)     Hyper then  Causes low  Hyperactivity, followed by sleepiness  Hyperactivity, followed by sleepiness    Note: hyperactivity/ crash     Iodine      Note: fever blisters, elevated temp.     Latex, natural rubber Other (See Comments)     Dental work cause mouth ulcers  Ulcers in mouth when used during dental work  Ulcers in mouth when used during dental work    Note: sores              Review of Systems  See HPI      [unfilled]  /66   Pulse 60   Temp 97.6 °F (36.4 °C) (Oral)   Ht 5' 4" (1.626 m)   Wt 110.6 kg (243 lb 11.5 oz)   LMP  (LMP Unknown)   SpO2 96%   BMI 41.83 kg/m²     GEN: NAD, well developed, pleasant, well nourished  HEENT: NCAT, PERRLA, EOMI, sclera clear, anicteric, bilateral ear exam wnl, O/P clear, MMM with no lesions  NECK: normal, supple with midline trachea, no LAD, no thyromegaly  LUNGS: CTAB, no w/r/r, no increased work of breathing   HEART: RRR, normal S1 and S2, no m/r/g, no edema  ABD: s/nt/nd, NABS  SKIN: normal turgor, no rashes  PSYCH: AOx3, appropriate mood and affect  MSK: warm/well perfused, normal ROM in all extremities, no c/c/e.  NEURO: normal without focal findings, CN II-XII are grossly intact.      FOOT:  Protective Sensation (w/ 10 gram monofilament):  Right: Intact  Left: Intact    Visual Inspection:  Normal -  Bilateral    Pedal Pulses:   Right: Present  Left: Present    Posterior Tibialis Pulses:   Right:Present  Left: Present          Labs  Lab Results   Component Value Date    HGBA1C 7.6 (H) 02/09/2024     Lab Results   Component Value Date     02/09/2024    K " 4.1 02/09/2024     02/09/2024    CO2 24 02/09/2024    BUN 16 02/09/2024    CREATININE 1.1 02/09/2024    CALCIUM 9.3 02/09/2024    ANIONGAP 13 02/09/2024    ESTGFRAFRICA 59 (A) 01/27/2022    EGFRNONAA 51 (A) 01/27/2022     Lab Results   Component Value Date    CHOL 165 02/09/2024    CHOL 120 01/30/2023    CHOL 128 01/27/2022     Lab Results   Component Value Date    HDL 54 02/09/2024    HDL 41 01/30/2023    HDL 43 01/27/2022     Lab Results   Component Value Date    LDLCALC 86.4 02/09/2024    LDLCALC 56.6 (L) 01/30/2023    LDLCALC 61.6 (L) 01/27/2022     Lab Results   Component Value Date    TRIG 123 02/09/2024    TRIG 112 01/30/2023    TRIG 117 01/27/2022     Lab Results   Component Value Date    CHOLHDL 32.7 02/09/2024    CHOLHDL 34.2 01/30/2023    CHOLHDL 33.6 01/27/2022     Last set of blood work has been reviewed as noted above.

## 2024-04-02 ENCOUNTER — CLINICAL SUPPORT (OUTPATIENT)
Dept: ENDOSCOPY | Facility: HOSPITAL | Age: 74
End: 2024-04-02
Attending: FAMILY MEDICINE
Payer: MEDICARE

## 2024-04-02 DIAGNOSIS — Z12.11 SCREENING FOR COLORECTAL CANCER: ICD-10-CM

## 2024-04-02 DIAGNOSIS — Z12.12 SCREENING FOR COLORECTAL CANCER: ICD-10-CM

## 2024-04-02 NOTE — PLAN OF CARE
Called pt to schedule colonoscopy. No available dates at [facility]. Placed on call back list for July schedule. Encouraged pt to call back as well.

## 2024-04-09 ENCOUNTER — TELEPHONE (OUTPATIENT)
Dept: ENDOSCOPY | Facility: HOSPITAL | Age: 74
End: 2024-04-09
Payer: MEDICARE

## 2024-04-09 NOTE — TELEPHONE ENCOUNTER
Called pt to inform July schedule open for scheduling colonoscopy. Unable to lvm. Portal message sent

## 2024-04-16 ENCOUNTER — TELEPHONE (OUTPATIENT)
Dept: ENDOSCOPY | Facility: HOSPITAL | Age: 74
End: 2024-04-16
Payer: MEDICARE

## 2024-04-16 DIAGNOSIS — Z12.11 SCREENING FOR MALIGNANT NEOPLASM OF COLON: Primary | ICD-10-CM

## 2024-04-16 NOTE — TELEPHONE ENCOUNTER
Spoke to pt to schedule procedure(s) Colonoscopy       Physician to perform procedure(s) Dr. ZAKI Rodriguez   Date of Procedure (s) 7/5/24  Arrival Time 8:15 AM  Time of Procedure(s) 9:15 AM   Location of Procedure(s) [address]--Enter at the rear of the building through the emergency department screening station or the Outpatient Registration door, then continue to endoscopy department on the 2nd floor.    Type of Rx Prep sent to patient: PEG  Instructions provided to patient via [facility]    Patient was informed on the following information and verbalized understanding. Screening questionnaire reviewed with patient and complete. If procedure requires anesthesia, a responsible adult needs to be present to accompany the patient home, patient cannot drive after receiving anesthesia. Appointment details are tentative, especially check-in time. Patient will receive a prep-op call 7 days prior to confirm check-in time for procedure. If applicable the patient should contact their pharmacy to verify Rx for procedure prep is ready for pick-up. Patient was advised to call the scheduling department at [phone] if pharmacy states no Rx is available. Patient was advised to call the endoscopy scheduling department if any questions or concerns arise.      [facility] Endoscopy Scheduling Department

## 2024-04-16 NOTE — TELEPHONE ENCOUNTER
----- Message from Virginia Ortiz RN sent at 4/16/2024 10:20 AM CDT -----    ----- Message -----  From: Isael Carson  Sent: 4/16/2024  10:04 AM CDT  To: [facility] Endo Schedulers    Type:  Sooner Apoointment Request    Caller is requesting a sooner appointment.    Name of Caller:pt   When is the first available appointment?none  Symptoms:colonoscopy  Would the patient rather a call back or a response via [facility]? Call   Best Call Back Number: [phone]  Additional Information:

## 2024-05-01 ENCOUNTER — TELEPHONE (OUTPATIENT)
Dept: ENDOSCOPY | Facility: HOSPITAL | Age: 74
End: 2024-05-01
Payer: MEDICARE

## 2024-05-01 NOTE — TELEPHONE ENCOUNTER
Contact and spoke with patient reschedule colonoscopy procedure on 7/5/2024 due to change in provider schedule. Patient has to  bowel prep from pharmacy it's on hold. Updated prep instructions has been sent to patient portal. Patient verbalized understanding.     Spoke to patient to schedule procedure(s) Colonoscopy       Physician to perform procedure(s) Dr. Jade  Date of Procedure (s) 5/22/2024  Arrival Time 7:30 AM   Time of Procedure(s) 8:30 AM    Location of Procedure(s) [address]--Enter at the rear of the [facility] through the emergency department screening station or the Outpatient Registration door, then continue to endoscopy department on the 2nd floor.    Type of Rx Prep sent to patient: PEG  Instructions provided to patient via [facility]    Patient was informed on the following information and verbalized understanding. Screening questionnaire reviewed with patient and complete. If procedure requires anesthesia, a responsible adult needs to be present to accompany the patient home, patient cannot drive after receiving anesthesia. Appointment details are tentative, especially check-in time. Patient will receive a prep-op call 7 days prior to confirm check-in time for procedure. If applicable the patient should contact their pharmacy to verify Rx for procedure prep is ready for pick-up. Patient was advised to call the scheduling department at [phone] if pharmacy states no Rx is available. Patient was advised to call the endoscopy scheduling department if any questions or concerns arise.      [facility] Endoscopy Scheduling Department

## 2024-05-21 ENCOUNTER — ANESTHESIA EVENT (OUTPATIENT)
Dept: ENDOSCOPY | Facility: HOSPITAL | Age: 74
End: 2024-05-21
Payer: MEDICARE

## 2024-05-21 ENCOUNTER — OFFICE VISIT (OUTPATIENT)
Dept: FAMILY MEDICINE | Facility: CLINIC | Age: 74
End: 2024-05-21
Payer: MEDICARE

## 2024-05-21 ENCOUNTER — TELEPHONE (OUTPATIENT)
Dept: GASTROENTEROLOGY | Facility: CLINIC | Age: 74
End: 2024-05-21
Payer: MEDICARE

## 2024-05-21 VITALS
WEIGHT: 250.44 LBS | TEMPERATURE: 98 F | SYSTOLIC BLOOD PRESSURE: 132 MMHG | BODY MASS INDEX: 42.99 KG/M2 | DIASTOLIC BLOOD PRESSURE: 60 MMHG | OXYGEN SATURATION: 96 % | HEART RATE: 55 BPM

## 2024-05-21 DIAGNOSIS — Z00.00 ENCOUNTER FOR MEDICARE ANNUAL WELLNESS EXAM: ICD-10-CM

## 2024-05-21 DIAGNOSIS — E11.22 TYPE 2 DIABETES MELLITUS WITH STAGE 3A CHRONIC KIDNEY DISEASE, WITHOUT LONG-TERM CURRENT USE OF INSULIN: ICD-10-CM

## 2024-05-21 DIAGNOSIS — N18.31 TYPE 2 DIABETES MELLITUS WITH STAGE 3A CHRONIC KIDNEY DISEASE, WITHOUT LONG-TERM CURRENT USE OF INSULIN: ICD-10-CM

## 2024-05-21 DIAGNOSIS — I10 ESSENTIAL HYPERTENSION: ICD-10-CM

## 2024-05-21 DIAGNOSIS — Z00.00 ENCOUNTER FOR PREVENTIVE HEALTH EXAMINATION: ICD-10-CM

## 2024-05-21 DIAGNOSIS — I70.0 THORACIC AORTA ATHEROSCLEROSIS: ICD-10-CM

## 2024-05-21 DIAGNOSIS — E66.01 MORBID OBESITY WITH BMI OF 40.0-44.9, ADULT: ICD-10-CM

## 2024-05-21 DIAGNOSIS — N18.31 CHRONIC KIDNEY DISEASE, STAGE 3A: ICD-10-CM

## 2024-05-21 DIAGNOSIS — Z71.89 ADVANCED DIRECTIVES, COUNSELING/DISCUSSION: ICD-10-CM

## 2024-05-21 DIAGNOSIS — R26.9 ABNORMALITY OF GAIT AND MOBILITY: Primary | ICD-10-CM

## 2024-05-21 PROCEDURE — G0439 PPPS, SUBSEQ VISIT: HCPCS | Mod: HCNC,S$GLB,,

## 2024-05-21 PROCEDURE — 1158F ADVNC CARE PLAN TLK DOCD: CPT | Mod: HCNC,CPTII,S$GLB,

## 2024-05-21 PROCEDURE — 3288F FALL RISK ASSESSMENT DOCD: CPT | Mod: HCNC,CPTII,S$GLB,

## 2024-05-21 PROCEDURE — 1170F FXNL STATUS ASSESSED: CPT | Mod: HCNC,CPTII,S$GLB,

## 2024-05-21 PROCEDURE — 3078F DIAST BP <80 MM HG: CPT | Mod: HCNC,CPTII,S$GLB,

## 2024-05-21 PROCEDURE — 3075F SYST BP GE 130 - 139MM HG: CPT | Mod: HCNC,CPTII,S$GLB,

## 2024-05-21 PROCEDURE — 1101F PT FALLS ASSESS-DOCD LE1/YR: CPT | Mod: HCNC,CPTII,S$GLB,

## 2024-05-21 PROCEDURE — 99999 PR PBB SHADOW E&M-EST. PATIENT-LVL V: CPT | Mod: PBBFAC,HCNC,,

## 2024-05-21 PROCEDURE — 1159F MED LIST DOCD IN RCRD: CPT | Mod: HCNC,CPTII,S$GLB,

## 2024-05-21 PROCEDURE — 3051F HG A1C>EQUAL 7.0%<8.0%: CPT | Mod: HCNC,CPTII,S$GLB,

## 2024-05-21 PROCEDURE — 1126F AMNT PAIN NOTED NONE PRSNT: CPT | Mod: HCNC,CPTII,S$GLB,

## 2024-05-21 RX ORDER — LIDOCAINE HYDROCHLORIDE 10 MG/ML
1 INJECTION, SOLUTION EPIDURAL; INFILTRATION; INTRACAUDAL; PERINEURAL ONCE
Status: CANCELLED | OUTPATIENT
Start: 2024-05-21 | End: 2024-05-21

## 2024-05-21 NOTE — ASSESSMENT & PLAN NOTE
Lab Results   Component Value Date    HGBA1C 7.6 (H) 02/09/2024     Metformin. Not at goal. Could consider adding GLP but pt declines additional medication at this time.     -discussed with patient about routine diabetic care that includes but are not limited to regular eye exams, skin care, daily foot exam, proper nutrition, regular BG monitoring at home (fasting and mealtime) and medication compliance in a diabetic.  Target morning BS  and meal-time BS <180

## 2024-05-21 NOTE — PATIENT INSTRUCTIONS
Counseling and Referral of Other Preventative  (Italic type indicates deductible and co-insurance are waived)    Patient Name: Bette Johnson  Today's Date: 5/21/2024    Health Maintenance       Date Due Completion Date    RSV Vaccine (Age 60+ and Pregnant patients) (1 - 1-dose 60+ series) Never done ---    Colorectal Cancer Screening 01/25/2023 1/25/2018    COVID-19 Vaccine (8 - 2023-24 season) 09/01/2023 6/4/2022    Diabetes Urine Screening 08/02/2024 8/2/2023    Hemoglobin A1c 08/09/2024 2/9/2024    Mammogram 11/13/2024 11/13/2023    Override on 7/14/2016: Done    Eye Exam 12/21/2024 12/21/2023    Override on 12/21/2023: Done ([facility])    Override on 12/10/2021: Done (Dr. KWON)    Override on 6/19/2017: Done    Lipid Panel 02/09/2025 2/9/2024    Foot Exam 02/22/2025 2/22/2024    Override on 2/9/2023: Done    Override on 1/11/2022: Done    Override on 4/19/2019: Done    Override on 6/11/2018: Done    High Dose Statin 05/21/2025 5/21/2024    TETANUS VACCINE 02/02/2031 2/2/2021        No orders of the defined types were placed in this encounter.    The following information is provided to all patients.  This information is to help you find resources for any of the problems found today that may be affecting your health:                  Living healthy guide: www.[facility].[place].gov      Understanding Diabetes: www.diabetes.org      Eating healthy: www.cdc.gov/healthyweight      CDC home safety checklist: www.cdc.gov/steadi/patient.html      Agency on Aging: www.goea.[place].gov      Alcoholics anonymous (AA): www.aa.org      Physical Activity: www.allie.nih.gov/ly2grun      Tobacco use: www.quitwithusla.org

## 2024-05-21 NOTE — H&P (VIEW-ONLY)
"    HPI     Chief Complaint:  AWV    Bette Johnson is a 74 y.o. female with multiple medical diagnoses as listed in the medical history and problem list that presented for a Medicare AWV and comprehensive Health Risk Assessment today.  Pt is new to me but is known to this clinic with her last appointment being 2024.      The following components were reviewed and updated:    Medical history  Family History  Social history  Allergies and Current Medications  Health Risk Assessment  Health Maintenance  Care Team     HPI      Assessment & Plan     (all problems are new to me)    Diagnoses and health risks identified today and associated recommendations/orders:    Problem List Items Addressed This Visit          Cardiac/Vascular    Essential hypertension    Current Assessment & Plan     The current medical regimen is effective;  continue present plan and medications.           Thoracic aorta atherosclerosis    Overview     " There is thoracic aortic atherosclerosis" - Xray Chest 2010  Patient with Atherosclerosis of the Aorta.  Stable/asymptomatic. Currently stable on lipid and b/p monitoring.           Current Assessment & Plan     Stable, asymptomatic chronic condition.  Will continue to maximize risk factor reduction and adjust medication as needed              Renal/    Chronic kidney disease, stage 3a    Current Assessment & Plan     Continue sodium restriction, and avoidance of nephrotoxic agents.              Endocrine    Morbid obesity with BMI of 40.0-44.9, adult    Current Assessment & Plan     We discussed weight concerns and safe, effective ways of losing pounds, includin) diet:  low carbohydrate, low fat diet, stay away from fast food, fried and processed food, use whole grain, lot of fruits and vegetables, use healthy fat such as avocado, nuts and olive oil in reasonable quantity, stay away from sodas. Regular meals with lean proteins.  2) physical activity: ideally 150 min a week, " with cardiovascular and resistance activity.  Patient was encouraged to set realistic attainable goals for weight loss, and we will follow up periodically.           Type 2 diabetes mellitus with stage 3a chronic kidney disease, without long-term current use of insulin    Current Assessment & Plan     Lab Results   Component Value Date    HGBA1C 7.6 (H) 02/09/2024     Metformin. Not at goal. Could consider adding GLP but pt declines additional medication at this time.     -discussed with patient about routine diabetic care that includes but are not limited to regular eye exams, skin care, daily foot exam, proper nutrition, regular BG monitoring at home (fasting and mealtime) and medication compliance in a diabetic.  Target morning BS  and meal-time BS <180            Other Visit Diagnoses       Abnormality of gait and mobility    -  Primary  Fall risk. Stable, chronic condition.  Will continue to maximize risk factor reduction and adjust medication as needed        Encounter for Medicare annual wellness exam      Pt was seen today for an Annual Wellness visit. Healthcare maintenance and screening recommendations were discussed and updated as indicated. Return in one year for AWV.        Encounter for preventive health examination      Counseled on age appropriate medical preventative services including age appropriate cancer screenings, age appropriate eye and dental exams, over all nutritional health, need for a consistent exercise regimen, and an over all push towards maintaining a vigorous and active lifestyle.  Counseled on age appropriate vaccines and discussed upcoming health care needs based on age/gender. Discussed good sleep hygiene and stress management.        Advanced directives, counseling/discussion    I offered to discuss advanced care planning, including how to pick a person who would make decisions for you if you were unable to make them for yourself, called a health care power of ,  and what kind of decisions you might make such as use of life sustaining treatments such as ventilators and tube feeding when faced with a life limiting illness recorded on a living will that they will need to know. (How you want to be cared for as you near the end of your natural life)     X Patient is interested in learning more about how to make advanced directives.  I provided them paperwork and offered to discuss this with them.                  --------------------------------------------    ** See Completed Assessments for Annual Wellness Visit within the encounter summary.**    The following assessments were completed:  Living Situation  CAGE  Depression Screening  Timed Get Up and Go  Whisper Test  Cognitive Function Screening  Nutrition Screening  ADL Screening  PAQ Screening      Provided Bette Johnson  with a 5-10 year written screening schedule and personal prevention plan. Recommendations were developed using the USPSTF age appropriate recommendations. Education, counseling, and referrals were provided as needed. After Visit Summary printed and given to patient which includes a list of additional screenings\tests needed.    Review for Opioid Screening: Pt does not have Rx for Opioids     Review for Substance Use Disorders: Patient does not abuse use substances      Exam     Review of Systems:  (as noted above)  Review of Systems    Physical Exam:   Physical Exam  Constitutional:       General: She is not in acute distress.     Appearance: Normal appearance. She is obese. She is not toxic-appearing.   Skin:     General: Skin is warm.      Capillary Refill: Capillary refill takes less than 2 seconds.   Neurological:      General: No focal deficit present.      Mental Status: She is alert and oriented to person, place, and time.       Vitals:    05/21/24 0929   BP: 132/60   Pulse: (!) 55   Temp: 97.8 °F (36.6 °C)   TempSrc: Oral   SpO2: 96%   Weight: 113.6 kg (250 lb 7.1 oz)      Body mass index is  42.99 kg/m².    Clock:              Health Maintenance:  Health Maintenance         Date Due Completion Date    RSV Vaccine (Age 60+ and Pregnant patients) (1 - 1-dose 60+ series) Never done ---    Colorectal Cancer Screening 01/25/2023 1/25/2018    COVID-19 Vaccine (8 - 2023-24 season) 09/01/2023 6/4/2022    Diabetes Urine Screening 08/02/2024 8/2/2023    Hemoglobin A1c 08/09/2024 2/9/2024    Mammogram 11/13/2024 11/13/2023    Override on 7/14/2016: Done    Eye Exam 12/21/2024 12/21/2023    Override on 12/21/2023: Done ([facility]    Override on 12/10/2021: Done (Dr. KWON)    Override on 6/19/2017: Done    Lipid Panel 02/09/2025 2/9/2024    Foot Exam 02/22/2025 2/22/2024    Override on 2/9/2023: Done    Override on 1/11/2022: Done    Override on 4/19/2019: Done    Override on 6/11/2018: Done    High Dose Statin 05/21/2025 5/21/2024    TETANUS VACCINE 02/02/2031 2/2/2021            Health maintenance reviewed    Colonoscopy tomorrow.       Follow Up:  No follow-ups on file.    History     Past Medical History:  Past Medical History:   Diagnosis Date    Chronic kidney disease, stage 3a 02/04/2022    Diabetes mellitus, type 2     Hypertension     Renal manifestation of secondary diabetes mellitus     Severe obesity (BMI >= 40) 07/12/2017    Type 2 diabetes mellitus        Past Surgical History:  Past Surgical History:   Procedure Laterality Date    CHOLECYSTECTOMY      COLONOSCOPY N/A 1/25/2018    Procedure: COLONOSCOPY;  Surgeon: Jeffrey Solano MD;  Location: [facility] ENDO;  Service: Endoscopy;  Laterality: N/A;    ERCP N/A 2/17/2021    Procedure: ERCP (ENDOSCOPIC RETROGRADE CHOLANGIOPANCREATOGRAPHY);  Surgeon: Emanuel Turner MD;  Location: [facility] ([address] [place] [address]);  Service: Endoscopy;  Laterality: N/A;       Social History:  Social History     Socioeconomic History    Marital status: Single    Number of children: 2    Highest education level: Associate degree: academic program   Tobacco Use     Smoking status: Never    Smokeless tobacco: Never   Substance and Sexual Activity    Alcohol use: Yes     Comment: 'once a month maybe/wine     Drug use: No    Sexual activity: Not Currently     Social Determinants of Health     Financial Resource Strain: Low Risk  (5/21/2024)    Overall Financial Resource Strain (CARDIA)     Difficulty of Paying Living Expenses: Not hard at all   Food Insecurity: No Food Insecurity (5/21/2024)    Hunger Vital Sign     Worried About Running Out of Food in the Last Year: Never true     Ran Out of Food in the Last Year: Never true   Transportation Needs: No Transportation Needs (5/21/2024)    PRAPARE - Transportation     Lack of Transportation (Medical): No     Lack of Transportation (Non-Medical): No   Physical Activity: Inactive (5/21/2024)    Exercise Vital Sign     Days of Exercise per Week: 0 days     Minutes of Exercise per Session: 0 min   Stress: No Stress Concern Present (5/21/2024)    [employer] - Occupational Stress Questionnaire     Feeling of Stress : Not at all   Housing Stability: Low Risk  (5/21/2024)    Housing Stability Vital Sign     Unable to Pay for Housing in the Last Year: No     Homeless in the Last Year: No       Family History:  Family History   Problem Relation Name Age of Onset    Diabetes Mother      Diabetes Sister Belle     Diabetes Brother Ward     Kidney disease Brother Ward     Diabetes Sister Josy     Hypertension Sister Josy     Diabetes Sister Kathy     Hypertension Sister Kathy     Hyperlipidemia Sister Kathy        Allergies and Medications: (updated and reviewed)  Review of patient's allergies indicates:   Allergen Reactions    Iodine and iodide containing products Shortness Of Breath and Other (See Comments)     Fever, blisters around mouth, flu like symptoms  Fever, blisters around mouth, flu like symptoms      Penicillins Other (See Comments) and Anaphylaxis     Loss of consciousness  Other  reaction(s): Penicillins (Fatal)  Note:  Allergic Reaction: Anaphylaxis    Caffeine Other (See Comments)     Hyper then  Causes low  Hyperactivity, followed by sleepiness  Hyperactivity, followed by sleepiness    Note: hyperactivity/ crash     Iodine      Note: fever blisters, elevated temp.     Latex, natural rubber Other (See Comments)     Dental work cause mouth ulcers  Ulcers in mouth when used during dental work  Ulcers in mouth when used during dental work    Note: sores      Current Outpatient Medications   Medication Sig Dispense Refill    amLODIPine (NORVASC) 10 MG tablet Take 1 tablet (10 mg total) by mouth once daily. 90 tablet 3    aspirin (ECOTRIN) 81 MG EC tablet Take 81 mg by mouth once daily.      atorvastatin (LIPITOR) 10 MG tablet Take 1 tablet (10 mg total) by mouth every other day. 36 tablet 3    coenzyme Q10 100 mg capsule Take 200 mg by mouth once daily.      diclofenac sodium (VOLTAREN) 1 % Gel Apply 2 g topically 2 (two) times daily. 100 g 0    loratadine (CLARITIN) 10 mg tablet Take 10 mg by mouth once daily.      metFORMIN (GLUCOPHAGE) 850 MG tablet Take 1 tablet (850 mg total) by mouth once daily. 90 tablet 3    multivit-minerals/folic acid (WOMEN'S MULTIVITAMIN GUMMIES ORAL) Take by mouth.      valsartan-hydrochlorothiazide (DIOVAN-HCT) 320-25 mg per tablet Take 1 tablet by mouth once daily. 90 tablet 3    vitamin D (VITAMIN D3) 1000 units Tab Take 5,000 Units by mouth once daily.       No current facility-administered medications for this visit.       Patient Care Team:  Neeta Estrada MD as PCP - General (Family Medicine)  Vinicius Mcginnis III, OD as Consulting Provider (Optometry)  Ermias Lewis MD as Consulting Physician (Gastroenterology)  Leia Martinez MA as Care Coordinator  Birgit Larkin as Digital Medicine Health   Macie Almanzar, PharmD as Hypertension Digital Medicine Clinician (Pharmacist)  Lily Churchill MD as Hypertension Digital Medicine Responsible  Provider (Family Medicine)  Medicare, [place] Managed as Hypertension Digital Medicine Contract  Matt Mascorro OD (Optometry)         - The patient is given an After Visit Summary that lists all medications with directions, allergies, education, orders placed during this encounter and follow-up instructions.      - I have reviewed the patient's medical information including past medical, family, and social history sections including the medications and allergies.      - We discussed the patient's current medications.     This note was created by combination of typed  and MModal dictation.  Transcription errors may be present.  If there are any questions, please contact me.       I offered to discuss advanced care planning, including how to pick a person who would make decisions for you if you were unable to make them for yourself, called a health care power of , and what kind of decisions you might make such as use of life sustaining treatments such as ventilators and tube feeding when faced with a life limiting illness recorded on a living will that they will need to know. (How you want to be cared for as you near the end of your natural life)     X Patient is interested in learning more about how to make advanced directives.  I provided them paperwork and offered to discuss this with them.

## 2024-05-21 NOTE — ASSESSMENT & PLAN NOTE
We discussed weight concerns and safe, effective ways of losing pounds, includin) diet:  low carbohydrate, low fat diet, stay away from fast food, fried and processed food, use whole grain, lot of fruits and vegetables, use healthy fat such as avocado, nuts and olive oil in reasonable quantity, stay away from sodas. Regular meals with lean proteins.  2) physical activity: ideally 150 min a week, with cardiovascular and resistance activity.  Patient was encouraged to set realistic attainable goals for weight loss, and we will follow up periodically.

## 2024-05-21 NOTE — PROGRESS NOTES
"    HPI     Chief Complaint:  AWV    Bette Johnson is a 74 y.o. female with multiple medical diagnoses as listed in the medical history and problem list that presented for a Medicare AWV and comprehensive Health Risk Assessment today.  Pt is new to me but is known to this clinic with her last appointment being 2024.      The following components were reviewed and updated:    Medical history  Family History  Social history  Allergies and Current Medications  Health Risk Assessment  Health Maintenance  Care Team     HPI      Assessment & Plan     (all problems are new to me)    Diagnoses and health risks identified today and associated recommendations/orders:    Problem List Items Addressed This Visit          Cardiac/Vascular    Essential hypertension    Current Assessment & Plan     The current medical regimen is effective;  continue present plan and medications.           Thoracic aorta atherosclerosis    Overview     " There is thoracic aortic atherosclerosis" - Xray Chest 2010  Patient with Atherosclerosis of the Aorta.  Stable/asymptomatic. Currently stable on lipid and b/p monitoring.           Current Assessment & Plan     Stable, asymptomatic chronic condition.  Will continue to maximize risk factor reduction and adjust medication as needed              Renal/    Chronic kidney disease, stage 3a    Current Assessment & Plan     Continue sodium restriction, and avoidance of nephrotoxic agents.              Endocrine    Morbid obesity with BMI of 40.0-44.9, adult    Current Assessment & Plan     We discussed weight concerns and safe, effective ways of losing pounds, includin) diet:  low carbohydrate, low fat diet, stay away from fast food, fried and processed food, use whole grain, lot of fruits and vegetables, use healthy fat such as avocado, nuts and olive oil in reasonable quantity, stay away from sodas. Regular meals with lean proteins.  2) physical activity: ideally 150 min a week, " with cardiovascular and resistance activity.  Patient was encouraged to set realistic attainable goals for weight loss, and we will follow up periodically.           Type 2 diabetes mellitus with stage 3a chronic kidney disease, without long-term current use of insulin    Current Assessment & Plan     Lab Results   Component Value Date    HGBA1C 7.6 (H) 02/09/2024     Metformin. Not at goal. Could consider adding GLP but pt declines additional medication at this time.     -discussed with patient about routine diabetic care that includes but are not limited to regular eye exams, skin care, daily foot exam, proper nutrition, regular BG monitoring at home (fasting and mealtime) and medication compliance in a diabetic.  Target morning BS  and meal-time BS <180            Other Visit Diagnoses       Abnormality of gait and mobility    -  Primary  Fall risk. Stable, chronic condition.  Will continue to maximize risk factor reduction and adjust medication as needed        Encounter for Medicare annual wellness exam      Pt was seen today for an Annual Wellness visit. Healthcare maintenance and screening recommendations were discussed and updated as indicated. Return in one year for AWV.        Encounter for preventive health examination      Counseled on age appropriate medical preventative services including age appropriate cancer screenings, age appropriate eye and dental exams, over all nutritional health, need for a consistent exercise regimen, and an over all push towards maintaining a vigorous and active lifestyle.  Counseled on age appropriate vaccines and discussed upcoming health care needs based on age/gender. Discussed good sleep hygiene and stress management.        Advanced directives, counseling/discussion    I offered to discuss advanced care planning, including how to pick a person who would make decisions for you if you were unable to make them for yourself, called a health care power of ,  and what kind of decisions you might make such as use of life sustaining treatments such as ventilators and tube feeding when faced with a life limiting illness recorded on a living will that they will need to know. (How you want to be cared for as you near the end of your natural life)     X Patient is interested in learning more about how to make advanced directives.  I provided them paperwork and offered to discuss this with them.                  --------------------------------------------    ** See Completed Assessments for Annual Wellness Visit within the encounter summary.**    The following assessments were completed:  Living Situation  CAGE  Depression Screening  Timed Get Up and Go  Whisper Test  Cognitive Function Screening  Nutrition Screening  ADL Screening  PAQ Screening      Provided Bette Johnson  with a 5-10 year written screening schedule and personal prevention plan. Recommendations were developed using the USPSTF age appropriate recommendations. Education, counseling, and referrals were provided as needed. After Visit Summary printed and given to patient which includes a list of additional screenings\tests needed.    Review for Opioid Screening: Pt does not have Rx for Opioids     Review for Substance Use Disorders: Patient does not abuse use substances      Exam     Review of Systems:  (as noted above)  Review of Systems    Physical Exam:   Physical Exam  Constitutional:       General: She is not in acute distress.     Appearance: Normal appearance. She is obese. She is not toxic-appearing.   Skin:     General: Skin is warm.      Capillary Refill: Capillary refill takes less than 2 seconds.   Neurological:      General: No focal deficit present.      Mental Status: She is alert and oriented to person, place, and time.       Vitals:    05/21/24 0929   BP: 132/60   Pulse: (!) 55   Temp: 97.8 °F (36.6 °C)   TempSrc: Oral   SpO2: 96%   Weight: 113.6 kg (250 lb 7.1 oz)      Body mass index is  42.99 kg/m².    Clock:              Health Maintenance:  Health Maintenance         Date Due Completion Date    RSV Vaccine (Age 60+ and Pregnant patients) (1 - 1-dose 60+ series) Never done ---    Colorectal Cancer Screening 01/25/2023 1/25/2018    COVID-19 Vaccine (8 - 2023-24 season) 09/01/2023 6/4/2022    Diabetes Urine Screening 08/02/2024 8/2/2023    Hemoglobin A1c 08/09/2024 2/9/2024    Mammogram 11/13/2024 11/13/2023    Override on 7/14/2016: Done    Eye Exam 12/21/2024 12/21/2023    Override on 12/21/2023: [facility]    Override on 12/10/2021: Done (Dr. KWON)    Override on 6/19/2017: Done    Lipid Panel 02/09/2025 2/9/2024    Foot Exam 02/22/2025 2/22/2024    Override on 2/9/2023: Done    Override on 1/11/2022: Done    Override on 4/19/2019: Done    Override on 6/11/2018: Done    High Dose Statin 05/21/2025 5/21/2024    TETANUS VACCINE 02/02/2031 2/2/2021            Health maintenance reviewed    Colonoscopy tomorrow.       Follow Up:  No follow-ups on file.    History     Past Medical History:  Past Medical History:   Diagnosis Date    Chronic kidney disease, stage 3a 02/04/2022    Diabetes mellitus, type 2     Hypertension     Renal manifestation of secondary diabetes mellitus     Severe obesity (BMI >= 40) 07/12/2017    Type 2 diabetes mellitus        Past Surgical History:  Past Surgical History:   Procedure Laterality Date    CHOLECYSTECTOMY      COLONOSCOPY N/A 1/25/2018    Procedure: COLONOSCOPY;  Surgeon: Jeffrey Solano MD;  Location: [facility] ENDO;  Service: Endoscopy;  Laterality: N/A;    ERCP N/A 2/17/2021    Procedure: ERCP (ENDOSCOPIC RETROGRADE CHOLANGIOPANCREATOGRAPHY);  Surgeon: Emanuel Turner MD;  Location: [facility] ([address] [place] [address]);  Service: Endoscopy;  Laterality: N/A;       Social History:  Social History     Socioeconomic History    Marital status: Single    Number of children: 2    Highest education level: Associate degree: academic program   Tobacco Use     Smoking status: Never    Smokeless tobacco: Never   Substance and Sexual Activity    Alcohol use: Yes     Comment: 'once a month maybe/wine     Drug use: No    Sexual activity: Not Currently     Social Determinants of Health     Financial Resource Strain: Low Risk  (5/21/2024)    Overall Financial Resource Strain (CARDIA)     Difficulty of Paying Living Expenses: Not hard at all   Food Insecurity: No Food Insecurity (5/21/2024)    Hunger Vital Sign     Worried About Running Out of Food in the Last Year: Never true     Ran Out of Food in the Last Year: Never true   Transportation Needs: No Transportation Needs (5/21/2024)    PRAPARE - Transportation     Lack of Transportation (Medical): No     Lack of Transportation (Non-Medical): No   Physical Activity: Inactive (5/21/2024)    Exercise Vital Sign     Days of Exercise per Week: 0 days     Minutes of Exercise per Session: 0 min   Stress: No Stress Concern Present (5/21/2024)    [employer] [place] [employer] - Occupational Stress Questionnaire     Feeling of Stress : Not at all   Housing Stability: Low Risk  (5/21/2024)    Housing Stability Vital Sign     Unable to Pay for Housing in the Last Year: No     Homeless in the Last Year: No       Family History:  Family History   Problem Relation Name Age of Onset    Diabetes Mother      Diabetes Sister Belle     Diabetes Brother Ward     Kidney disease Brother Ward     Diabetes Sister Josy     Hypertension Sister Josy     Diabetes Sister Kathy     Hypertension Sister Kathy     Hyperlipidemia Sister Kathy        Allergies and Medications: (updated and reviewed)  Review of patient's allergies indicates:   Allergen Reactions    Iodine and iodide containing products Shortness Of Breath and Other (See Comments)     Fever, blisters around mouth, flu like symptoms  Fever, blisters around mouth, flu like symptoms      Penicillins Other (See Comments) and Anaphylaxis     Loss of consciousness  Other  reaction(s): Penicillins (Fatal)  Note:  Allergic Reaction: Anaphylaxis    Caffeine Other (See Comments)     Hyper then  Causes low  Hyperactivity, followed by sleepiness  Hyperactivity, followed by sleepiness    Note: hyperactivity/ crash     Iodine      Note: fever blisters, elevated temp.     Latex, natural rubber Other (See Comments)     Dental work cause mouth ulcers  Ulcers in mouth when used during dental work  Ulcers in mouth when used during dental work    Note: sores      Current Outpatient Medications   Medication Sig Dispense Refill    amLODIPine (NORVASC) 10 MG tablet Take 1 tablet (10 mg total) by mouth once daily. 90 tablet 3    aspirin (ECOTRIN) 81 MG EC tablet Take 81 mg by mouth once daily.      atorvastatin (LIPITOR) 10 MG tablet Take 1 tablet (10 mg total) by mouth every other day. 36 tablet 3    coenzyme Q10 100 mg capsule Take 200 mg by mouth once daily.      diclofenac sodium (VOLTAREN) 1 % Gel Apply 2 g topically 2 (two) times daily. 100 g 0    loratadine (CLARITIN) 10 mg tablet Take 10 mg by mouth once daily.      metFORMIN (GLUCOPHAGE) 850 MG tablet Take 1 tablet (850 mg total) by mouth once daily. 90 tablet 3    multivit-minerals/folic acid (WOMEN'S MULTIVITAMIN GUMMIES ORAL) Take by mouth.      valsartan-hydrochlorothiazide (DIOVAN-HCT) 320-25 mg per tablet Take 1 tablet by mouth once daily. 90 tablet 3    vitamin D (VITAMIN D3) 1000 units Tab Take 5,000 Units by mouth once daily.       No current facility-administered medications for this visit.       Patient Care Team:  Neeta Estrada MD as PCP - General (Family Medicine)  Vinicius Mcginnis III, OD as Consulting Provider (Optometry)  Ermias Lewis MD as Consulting Physician (Gastroenterology)  Leia Martinez MA as Care Coordinator  Birgit Larkin as Digital Medicine Health   Macie Almanzar, PharmD as Hypertension Digital Medicine Clinician (Pharmacist)  Lily Churchill MD as Hypertension Digital Medicine Responsible  Provider (Family Medicine)  Medicare, [place] Managed as Hypertension Digital Medicine Contract  Matt Mascorro OD (Optometry)         - The patient is given an After Visit Summary that lists all medications with directions, allergies, education, orders placed during this encounter and follow-up instructions.      - I have reviewed the patient's medical information including past medical, family, and social history sections including the medications and allergies.      - We discussed the patient's current medications.     This note was created by combination of typed  and MModal dictation.  Transcription errors may be present.  If there are any questions, please contact me.       I offered to discuss advanced care planning, including how to pick a person who would make decisions for you if you were unable to make them for yourself, called a health care power of , and what kind of decisions you might make such as use of life sustaining treatments such as ventilators and tube feeding when faced with a life limiting illness recorded on a living will that they will need to know. (How you want to be cared for as you near the end of your natural life)     X Patient is interested in learning more about how to make advanced directives.  I provided them paperwork and offered to discuss this with them.

## 2024-05-22 ENCOUNTER — PATIENT MESSAGE (OUTPATIENT)
Dept: ADMINISTRATIVE | Facility: OTHER | Age: 74
End: 2024-05-22
Payer: MEDICARE

## 2024-05-22 ENCOUNTER — HOSPITAL ENCOUNTER (OUTPATIENT)
Facility: HOSPITAL | Age: 74
Discharge: HOME | End: 2024-05-22
Attending: INTERNAL MEDICINE | Admitting: INTERNAL MEDICINE
Payer: MEDICARE

## 2024-05-22 ENCOUNTER — ANESTHESIA (OUTPATIENT)
Dept: ENDOSCOPY | Facility: HOSPITAL | Age: 74
End: 2024-05-22
Payer: MEDICARE

## 2024-05-22 DIAGNOSIS — Z12.11 SPECIAL SCREENING FOR MALIGNANT NEOPLASM OF COLON: ICD-10-CM

## 2024-05-22 LAB — POCT GLUCOSE: 142 MG/DL (ref 70–110)

## 2024-05-22 PROCEDURE — 25000003 PHARM REV CODE 250: Mod: HCNC | Performed by: NURSE ANESTHETIST, CERTIFIED REGISTERED

## 2024-05-22 PROCEDURE — D9220A PRA ANESTHESIA: Mod: PT,ANES,, | Performed by: STUDENT IN AN ORGANIZED HEALTH CARE EDUCATION/TRAINING PROGRAM

## 2024-05-22 PROCEDURE — 45381 COLONOSCOPY SUBMUCOUS NJX: CPT | Mod: PT,51,HCNC, | Performed by: INTERNAL MEDICINE

## 2024-05-22 PROCEDURE — 63600175 PHARM REV CODE 636 W HCPCS: Mod: HCNC | Performed by: NURSE ANESTHETIST, CERTIFIED REGISTERED

## 2024-05-22 PROCEDURE — 27201028 HC NEEDLE, SCLERO: Mod: HCNC | Performed by: INTERNAL MEDICINE

## 2024-05-22 PROCEDURE — 88305 TISSUE EXAM BY PATHOLOGIST: CPT | Mod: HCNC | Performed by: PATHOLOGY

## 2024-05-22 PROCEDURE — 45381 COLONOSCOPY SUBMUCOUS NJX: CPT | Mod: PT,HCNC | Performed by: INTERNAL MEDICINE

## 2024-05-22 PROCEDURE — 37000009 HC ANESTHESIA EA ADD 15 MINS: Mod: HCNC | Performed by: INTERNAL MEDICINE

## 2024-05-22 PROCEDURE — 37000008 HC ANESTHESIA 1ST 15 MINUTES: Mod: HCNC | Performed by: INTERNAL MEDICINE

## 2024-05-22 PROCEDURE — 88305 TISSUE EXAM BY PATHOLOGIST: CPT | Mod: 26,HCNC,, | Performed by: PATHOLOGY

## 2024-05-22 PROCEDURE — 45385 COLONOSCOPY W/LESION REMOVAL: CPT | Mod: PT,HCNC,, | Performed by: INTERNAL MEDICINE

## 2024-05-22 PROCEDURE — D9220A PRA ANESTHESIA: Mod: PT,CRNA,, | Performed by: NURSE ANESTHETIST, CERTIFIED REGISTERED

## 2024-05-22 PROCEDURE — 27201089 HC SNARE, DISP (ANY): Mod: HCNC | Performed by: INTERNAL MEDICINE

## 2024-05-22 PROCEDURE — 25000003 PHARM REV CODE 250: Mod: HCNC | Performed by: ANESTHESIOLOGY

## 2024-05-22 PROCEDURE — 45385 COLONOSCOPY W/LESION REMOVAL: CPT | Mod: PT,HCNC | Performed by: INTERNAL MEDICINE

## 2024-05-22 RX ORDER — LIDOCAINE HYDROCHLORIDE 20 MG/ML
INJECTION, SOLUTION EPIDURAL; INFILTRATION; INTRACAUDAL; PERINEURAL
Status: DISCONTINUED
Start: 2024-05-22 | End: 2024-05-22 | Stop reason: HOSPADM

## 2024-05-22 RX ORDER — LIDOCAINE HYDROCHLORIDE 20 MG/ML
INJECTION INTRAVENOUS
Status: DISCONTINUED | OUTPATIENT
Start: 2024-05-22 | End: 2024-05-22

## 2024-05-22 RX ORDER — SODIUM CHLORIDE 9 MG/ML
INJECTION, SOLUTION INTRAVENOUS CONTINUOUS
Status: DISCONTINUED | OUTPATIENT
Start: 2024-05-22 | End: 2024-05-22 | Stop reason: HOSPADM

## 2024-05-22 RX ORDER — PROPOFOL 10 MG/ML
VIAL (ML) INTRAVENOUS
Status: DISCONTINUED
Start: 2024-05-22 | End: 2024-05-22 | Stop reason: HOSPADM

## 2024-05-22 RX ORDER — PROPOFOL 10 MG/ML
VIAL (ML) INTRAVENOUS
Status: DISCONTINUED | OUTPATIENT
Start: 2024-05-22 | End: 2024-05-22

## 2024-05-22 RX ADMIN — PROPOFOL 40 MG: 10 INJECTION, EMULSION INTRAVENOUS at 09:05

## 2024-05-22 RX ADMIN — SODIUM CHLORIDE: 0.9 INJECTION, SOLUTION INTRAVENOUS at 08:05

## 2024-05-22 RX ADMIN — LIDOCAINE HYDROCHLORIDE 100 MG: 20 INJECTION, SOLUTION INTRAVENOUS at 09:05

## 2024-05-22 RX ADMIN — PROPOFOL 80 MG: 10 INJECTION, EMULSION INTRAVENOUS at 09:05

## 2024-05-22 NOTE — ANESTHESIA POSTPROCEDURE EVALUATION
Anesthesia Post Evaluation    Patient: Bette Johsnon    Procedure(s) Performed: Procedure(s) (LRB):  COLONOSCOPY (N/A)    Final Anesthesia Type: general      Patient location during evaluation: PACU  Patient participation: Yes- Able to Participate  Level of consciousness: awake and alert  Post-procedure vital signs: reviewed and stable  Pain management: adequate  Airway patency: patent    PONV status at discharge: No PONV  Anesthetic complications: no      Cardiovascular status: stable  Respiratory status: room air  Hydration status: euvolemic  Follow-up not needed.              Vitals Value Taken Time   /52 05/22/24 0944   Temp 36 °C (96.8 °F) 05/22/24 0944   Pulse 69 05/22/24 0944   Resp 17 05/22/24 0944   SpO2 98 % 05/22/24 0944         No case tracking events are documented in the log.      Pain/Francisco Score: No data recorded

## 2024-05-22 NOTE — PROVATION PATIENT INSTRUCTIONS
Discharge Summary/Instructions after an Endoscopic Procedure  Patient Name: Bette Johnson  Patient MRN: [id number]  Patient YOB: 1950  Wednesday, May 22, 2024  Nancy Jade MD  Dear patient,  As a result of recent federal legislation (The Federal Cures Act), you may   receive lab or pathology results from your procedure in your [facility]   account before your physician is able to contact you. Your physician or   their representative will relay the results to you with their   recommendations at their soonest availability.  Thank you,  RESTRICTIONS:  During your procedure today, you received medications for sedation.  These   medications may affect your judgment, balance and coordination.  Therefore,   for 24 hours, you have the following restrictions:   - DO NOT drive a car, operate machinery, make legal/financial decisions,   sign important papers or drink alcohol.    ACTIVITY:  Today: no heavy lifting, straining or running due to procedural   sedation/anesthesia.  The following day: return to full activity including work.  DIET:  Eat and drink normally unless instructed otherwise.     TREATMENT FOR COMMON SIDE EFFECTS:  - Mild abdominal pain, nausea, belching, bloating or excessive gas:  rest,   eat lightly and use a heating pad.  - Sore Throat: treat with throat lozenges and/or gargle with warm salt   water.  - Because air was used during the procedure, expelling large amounts of air   from your rectum or belching is normal.  - If a bowel prep was taken, you may not have a bowel movement for 1-3 days.    This is normal.  SYMPTOMS TO WATCH FOR AND REPORT TO YOUR PHYSICIAN:  1. Abdominal pain or bloating, other than gas cramps.  2. Chest pain.  3. Back pain.  4. Signs of infection such as: chills or fever occurring within 24 hours   after the procedure.  5. Rectal bleeding, which would show as bright red, maroon, or black stools.   (A tablespoon of blood from the rectum is not serious, especially  if   hemorrhoids are present.)  6. Vomiting.  7. Weakness or dizziness.  GO DIRECTLY TO THE NEAREST EMERGENCY ROOM IF YOU HAVE ANY OF THE FOLLOWING:      Difficulty breathing              Chills and/or fever over 101 F   Persistent vomiting and/or vomiting blood   Severe abdominal pain   Severe chest pain   Black, tarry stools   Bleeding- more than one tablespoon   Any other symptom or condition that you feel may need urgent attention  Your doctor recommends these additional instructions:  If any biopsies were taken, your doctors clinic will contact you in 1 to 2   weeks with any results.  - Discharge patient to home.   - Repeat colonoscopy in 5 years for surveillance.  For questions, problems or results please call your physician - Nancy Jade MD at Work:  [phone].  [facility] can be reached at [phone]     IF A COMPLICATION OR EMERGENCY SITUATION ARISES AND YOU ARE UNABLE TO REACH   YOUR PHYSICIAN - GO DIRECTLY TO THE EMERGENCY ROOM.  Nancy Jade MD  5/22/2024 9:44:50 AM  This report has been verified and signed electronically.  Dear patient,  As a result of recent federal legislation (The Federal Cures Act), you may   receive lab or pathology results from your procedure in your [facility]   account before your physician is able to contact you. Your physician or   their representative will relay the results to you with their   recommendations at their soonest availability.  Thank you,  PROVATION

## 2024-05-22 NOTE — OR NURSING
PROCEDURE AND RECOVERY COMPLETED.  DISCUSSED FINDINGS WITH PATIENT ; DISCHARGE INSTRUCTIONS GIVEN AND UNDERSTANDING VERBALIZED; ASSISTED UP WITHOUT UNTOWARD EFFECTS; PATIENT READY DISCHARGE

## 2024-05-22 NOTE — TRANSFER OF CARE
Anesthesia Transfer of Care Note    Patient: Bette Johnson    Procedure(s) Performed: Procedure(s) (LRB):  COLONOSCOPY (N/A)    Patient location: GI    Anesthesia Type: general    Transport from OR: Transported from OR on room air with adequate spontaneous ventilation    Post pain: adequate analgesia    Post assessment: no apparent anesthetic complications and tolerated procedure well    Post vital signs: stable    Level of consciousness: sedated    Nausea/Vomiting: no nausea/vomiting    Complications: none    Transfer of care protocol was followed      Last vitals: Visit Vitals  BP (!) 107/52 (BP Location: Left arm, Patient Position: Lying)   Pulse 69   Temp 36 °C (96.8 °F) (Skin)   Resp 17   LMP  (LMP Unknown)   SpO2 98%   Breastfeeding No

## 2024-05-23 VITALS
DIASTOLIC BLOOD PRESSURE: 57 MMHG | TEMPERATURE: 97 F | RESPIRATION RATE: 18 BRPM | HEART RATE: 52 BPM | SYSTOLIC BLOOD PRESSURE: 114 MMHG | OXYGEN SATURATION: 96 %

## 2024-05-23 LAB
FINAL PATHOLOGIC DIAGNOSIS: NORMAL
GROSS: NORMAL
Lab: NORMAL

## 2024-06-04 ENCOUNTER — PATIENT MESSAGE (OUTPATIENT)
Dept: FAMILY MEDICINE | Facility: CLINIC | Age: 74
End: 2024-06-04
Payer: MEDICARE

## 2024-06-04 ENCOUNTER — PATIENT MESSAGE (OUTPATIENT)
Dept: GASTROENTEROLOGY | Facility: CLINIC | Age: 74
End: 2024-06-04
Payer: MEDICARE

## 2024-06-12 ENCOUNTER — TELEPHONE (OUTPATIENT)
Dept: WOUND CARE | Facility: HOSPITAL | Age: 74
End: 2024-06-12
Payer: MEDICARE

## 2024-06-12 NOTE — TELEPHONE ENCOUNTER
----- Message from Macie Almanzar PharmD sent at 6/12/2024 10:33 AM CDT -----  Regarding: Blood Pressure  Hi Dr. Estrada,    I follow this patient in digital medicine for hypertension. Patient has uncontrolled BP (avg 143/66) despite max dose amlodipine, hydrochlorothiazide, and valsartan. I wanted to get your thoughts on assessing for secondary causes of HTN such as primary aldosteronism and/or TULIO? She has not been tested for MAURIZIO either.     Value your input.     Thank you ,    Macie Almanzar, PharmD  Digital Medicine Clinical Pharmacist  [phone]

## 2024-06-12 NOTE — TELEPHONE ENCOUNTER
Patient with high blood pressure per pharmacist   However most recent bp wnl   Consider sleep study   Recommend f/u with me if bp continues to rise.

## 2024-06-25 ENCOUNTER — PATIENT OUTREACH (OUTPATIENT)
Dept: ADMINISTRATIVE | Facility: HOSPITAL | Age: 74
End: 2024-06-25
Payer: MEDICARE

## 2024-07-09 ENCOUNTER — PATIENT MESSAGE (OUTPATIENT)
Dept: FAMILY MEDICINE | Facility: CLINIC | Age: 74
End: 2024-07-09
Payer: MEDICARE

## 2024-07-15 ENCOUNTER — PATIENT MESSAGE (OUTPATIENT)
Dept: ADMINISTRATIVE | Facility: HOSPITAL | Age: 74
End: 2024-07-15
Payer: MEDICARE

## 2024-08-02 ENCOUNTER — TELEPHONE (OUTPATIENT)
Dept: FAMILY MEDICINE | Facility: CLINIC | Age: 74
End: 2024-08-02
Payer: MEDICARE

## 2024-08-02 DIAGNOSIS — I70.0 THORACIC AORTA ATHEROSCLEROSIS: ICD-10-CM

## 2024-08-02 DIAGNOSIS — I10 ESSENTIAL HYPERTENSION: ICD-10-CM

## 2024-08-02 DIAGNOSIS — E11.22 TYPE 2 DIABETES MELLITUS WITH STAGE 3A CHRONIC KIDNEY DISEASE, WITHOUT LONG-TERM CURRENT USE OF INSULIN: Primary | ICD-10-CM

## 2024-08-02 DIAGNOSIS — N18.31 TYPE 2 DIABETES MELLITUS WITH STAGE 3A CHRONIC KIDNEY DISEASE, WITHOUT LONG-TERM CURRENT USE OF INSULIN: Primary | ICD-10-CM

## 2024-08-02 NOTE — TELEPHONE ENCOUNTER
----- Message from Fritz Rodriguez sent at 8/2/2024  8:58 AM CDT -----  Regarding: self  Type: Patient Call Back    Who called:self    What is the request in detail:calling in regard of getting orders for labs and urine    Can the clinic reply by [facility]?no    Would the patient rather a call back or a response via [facility]? callback    Best call back number:[phone]    Additional Information:

## 2024-08-10 ENCOUNTER — LAB VISIT (OUTPATIENT)
Dept: LAB | Facility: HOSPITAL | Age: 74
End: 2024-08-10
Attending: FAMILY MEDICINE
Payer: MEDICARE

## 2024-08-10 DIAGNOSIS — I70.0 THORACIC AORTA ATHEROSCLEROSIS: ICD-10-CM

## 2024-08-10 LAB
CHOLEST SERPL-MCNC: 133 MG/DL (ref 120–199)
CHOLEST/HDLC SERPL: 3 {RATIO} (ref 2–5)
HDLC SERPL-MCNC: 45 MG/DL (ref 40–75)
HDLC SERPL: 33.8 % (ref 20–50)
LDLC SERPL CALC-MCNC: 58 MG/DL (ref 63–159)
NONHDLC SERPL-MCNC: 88 MG/DL
TRIGL SERPL-MCNC: 150 MG/DL (ref 30–150)

## 2024-08-10 PROCEDURE — 80061 LIPID PANEL: CPT | Mod: HCNC | Performed by: FAMILY MEDICINE

## 2024-08-10 PROCEDURE — 36415 COLL VENOUS BLD VENIPUNCTURE: CPT | Mod: HCNC,PO | Performed by: FAMILY MEDICINE

## 2024-08-16 ENCOUNTER — OFFICE VISIT (OUTPATIENT)
Dept: FAMILY MEDICINE | Facility: CLINIC | Age: 74
End: 2024-08-16
Payer: MEDICARE

## 2024-08-16 VITALS
SYSTOLIC BLOOD PRESSURE: 138 MMHG | WEIGHT: 246.25 LBS | BODY MASS INDEX: 42.04 KG/M2 | TEMPERATURE: 98 F | HEART RATE: 51 BPM | DIASTOLIC BLOOD PRESSURE: 58 MMHG | OXYGEN SATURATION: 95 % | HEIGHT: 64 IN

## 2024-08-16 DIAGNOSIS — I70.0 THORACIC AORTA ATHEROSCLEROSIS: ICD-10-CM

## 2024-08-16 DIAGNOSIS — E78.5 HYPERLIPIDEMIA ASSOCIATED WITH TYPE 2 DIABETES MELLITUS: ICD-10-CM

## 2024-08-16 DIAGNOSIS — Z12.31 ENCOUNTER FOR SCREENING MAMMOGRAM FOR BREAST CANCER: ICD-10-CM

## 2024-08-16 DIAGNOSIS — I10 ESSENTIAL HYPERTENSION: ICD-10-CM

## 2024-08-16 DIAGNOSIS — E11.69 HYPERLIPIDEMIA ASSOCIATED WITH TYPE 2 DIABETES MELLITUS: ICD-10-CM

## 2024-08-16 DIAGNOSIS — N18.31 TYPE 2 DIABETES MELLITUS WITH STAGE 3A CHRONIC KIDNEY DISEASE, WITHOUT LONG-TERM CURRENT USE OF INSULIN: Primary | ICD-10-CM

## 2024-08-16 DIAGNOSIS — E11.69 DIABETES MELLITUS TYPE 2 IN OBESE: ICD-10-CM

## 2024-08-16 DIAGNOSIS — R53.83 FATIGUE, UNSPECIFIED TYPE: ICD-10-CM

## 2024-08-16 DIAGNOSIS — E66.01 MORBID OBESITY WITH BMI OF 40.0-44.9, ADULT: ICD-10-CM

## 2024-08-16 DIAGNOSIS — N18.31 CHRONIC KIDNEY DISEASE, STAGE 3A: ICD-10-CM

## 2024-08-16 DIAGNOSIS — E66.9 DIABETES MELLITUS TYPE 2 IN OBESE: ICD-10-CM

## 2024-08-16 DIAGNOSIS — E11.22 TYPE 2 DIABETES MELLITUS WITH STAGE 3A CHRONIC KIDNEY DISEASE, WITHOUT LONG-TERM CURRENT USE OF INSULIN: Primary | ICD-10-CM

## 2024-08-16 PROCEDURE — 99999 PR PBB SHADOW E&M-EST. PATIENT-LVL V: CPT | Mod: PBBFAC,HCNC,, | Performed by: FAMILY MEDICINE

## 2024-08-16 NOTE — PROGRESS NOTES
"Routine Office Visit    Bette Johnson  1950  [id number]      Subjective     Bette is a 74 y.o. female who presents today for:    Hypertension - Patient is doing well on valsartan / hctz and amlodipine. Patient denies any side effects on medication   Hyperlipidemia - Patient feels less energy. She thinks it may be related to her statin. But she was advised she may need a sleep study. She does not know if she snores at night. Patient has been taking atorvastatin 3 times per week.   Diabetes - Patient takes medications as prescribed.       Objective     Review of Systems   Constitutional:  Negative for chills and fever.   HENT:  Negative for congestion.    Eyes:  Negative for blurred vision.   Respiratory:  Negative for cough.    Cardiovascular:  Negative for chest pain.   Gastrointestinal:  Negative for abdominal pain, constipation, diarrhea, heartburn, nausea and vomiting.   Genitourinary:  Negative for dysuria.   Musculoskeletal:  Negative for myalgias.   Skin:  Negative for itching and rash.   Neurological:  Negative for dizziness and headaches.   Psychiatric/Behavioral:  Negative for depression.          BP (!) 138/58 (BP Location: Left arm, Patient Position: Sitting, BP Method: Large (Manual))   Pulse (!) 51   Temp 97.7 °F (36.5 °C) (Oral)   Ht 5' 4" (1.626 m)   Wt 111.7 kg (246 lb 4.1 oz)   LMP  (LMP Unknown)   SpO2 95%   BMI 42.27 kg/m²   Physical Exam  Constitutional:       Appearance: She is well-developed.   HENT:      Head: Normocephalic and atraumatic.   Eyes:      Conjunctiva/sclera: Conjunctivae normal.      Pupils: Pupils are equal, round, and reactive to light.   Cardiovascular:      Rate and Rhythm: Normal rate and regular rhythm.      Heart sounds: Normal heart sounds. No murmur heard.     No friction rub. No gallop.   Pulmonary:      Effort: No respiratory distress.      Breath sounds: Normal breath sounds.   Abdominal:      General: Bowel sounds are normal. There is no distension.    " "  Palpations: Abdomen is soft.      Tenderness: There is no abdominal tenderness.   Musculoskeletal:         General: Normal range of motion.      Cervical back: Normal range of motion and neck supple.   Lymphadenopathy:      Cervical: No cervical adenopathy.   Skin:     General: Skin is warm.   Neurological:      Mental Status: She is alert and oriented to person, place, and time.             Assessment     Health Maintenance         Date Due Completion Date    RSV Vaccine (Age 60+ and Pregnant patients) (1 - 1-dose 60+ series) Never done ---    COVID-19 Vaccine (8 - 2023-24 season) 09/01/2023 6/4/2022    Hemoglobin A1c 08/09/2024 2/9/2024    Mammogram 11/13/2024 11/13/2023    Override on 7/14/2016: Done    Influenza Vaccine (1) 09/01/2024 10/9/2023    Override on 10/7/2023: Done (Done at [facility])    Eye Exam 12/21/2024 12/21/2023    Override on 12/21/2023: Done (Dr Noe with the [facility])    Override on 12/10/2021: Done (Dr. KWON)    Override on 6/19/2017: Done    Foot Exam 02/22/2025 2/22/2024    Override on 2/9/2023: Done    Override on 1/11/2022: Done    Override on 4/19/2019: Done    Override on 6/11/2018: Done    Diabetes Urine Screening 08/10/2025 8/10/2024    Lipid Panel 08/10/2025 8/10/2024    High Dose Statin 08/16/2025 8/16/2024    Colorectal Cancer Screening 05/22/2029 5/22/2024    TETANUS VACCINE 02/02/2031 2/2/2021              Problem List Items Addressed This Visit          Cardiac/Vascular    Essential hypertension    Relevant Orders    CBC Auto Differential    Comprehensive Metabolic Panel    Lipid Panel    Hemoglobin A1C    TSH  The current medical regimen is effective;  continue present plan and medications.       Thoracic aorta atherosclerosis    Overview     " There is thoracic aortic atherosclerosis" - Xray Chest 11-  Patient with Atherosclerosis of the Aorta.  Stable/asymptomatic. Currently stable on lipid and b/p monitoring.              Renal/    Chronic kidney " disease, stage 3a  Stable  Continue to monitor          Endocrine    Diabetes mellitus type 2 in obese    Morbid obesity with BMI of 40.0-44.9, adult  The patient is asked to make an attempt to improve diet and exercise patterns to aid in medical management of this problem.      Type 2 diabetes mellitus with stage 3a chronic kidney disease, without long-term current use of insulin - Primary    Relevant Orders    CBC Auto Differential    Comprehensive Metabolic Panel    Lipid Panel    Hemoglobin A1C    TSH  The current medical regimen is effective;  continue present plan and medications.        Other Visit Diagnoses       Hyperlipidemia associated with type 2 diabetes mellitus      The current medical regimen is effective;  continue present plan and medications.       Encounter for screening mammogram for breast cancer        Relevant Orders    Mammo Digital Screening Bilat w/ Thom    Fatigue, unspecified type        Relevant Orders    Ambulatory referral/consult to Sleep Disorders                  Follow up if symptoms worsen or fail to improve.

## 2024-08-20 ENCOUNTER — TELEPHONE (OUTPATIENT)
Dept: OTOLARYNGOLOGY | Facility: CLINIC | Age: 74
End: 2024-08-20
Payer: MEDICARE

## 2024-08-20 NOTE — TELEPHONE ENCOUNTER
Spoke to pt states she received RSV vaccine on Friday, then started with sore throat, states feels like when she had strep in the past.  Informed dont do testing in office, would need to see pcp or go to urgent care.  We treat chronic issues not acute.  Verb understanding

## 2024-08-21 ENCOUNTER — HOSPITAL ENCOUNTER (EMERGENCY)
Facility: HOSPITAL | Age: 74
Discharge: HOME | End: 2024-08-21
Attending: EMERGENCY MEDICINE
Payer: MEDICARE

## 2024-08-21 VITALS
HEART RATE: 90 BPM | DIASTOLIC BLOOD PRESSURE: 63 MMHG | SYSTOLIC BLOOD PRESSURE: 152 MMHG | TEMPERATURE: 98 F | BODY MASS INDEX: 42.23 KG/M2 | WEIGHT: 246 LBS | RESPIRATION RATE: 18 BRPM | OXYGEN SATURATION: 96 %

## 2024-08-21 DIAGNOSIS — R09.81 NASAL CONGESTION: ICD-10-CM

## 2024-08-21 DIAGNOSIS — R52 GENERALIZED BODY ACHES: ICD-10-CM

## 2024-08-21 DIAGNOSIS — U07.1 COVID-19: Primary | ICD-10-CM

## 2024-08-21 DIAGNOSIS — R05.1 ACUTE COUGH: ICD-10-CM

## 2024-08-21 DIAGNOSIS — J02.9 SORE THROAT: ICD-10-CM

## 2024-08-21 DIAGNOSIS — U07.1 COVID-19 VIRUS DETECTED: ICD-10-CM

## 2024-08-21 LAB
CTP QC/QA: YES
MOLECULAR STREP A: NEGATIVE
POC MOLECULAR INFLUENZA A AGN: NEGATIVE
POC MOLECULAR INFLUENZA B AGN: NEGATIVE
SARS-COV-2 RDRP RESP QL NAA+PROBE: POSITIVE

## 2024-08-21 PROCEDURE — 99284 EMERGENCY DEPT VISIT MOD MDM: CPT | Mod: HCNC

## 2024-08-21 PROCEDURE — 87651 STREP A DNA AMP PROBE: CPT | Mod: HCNC

## 2024-08-21 PROCEDURE — 87502 INFLUENZA DNA AMP PROBE: CPT | Mod: HCNC

## 2024-08-21 PROCEDURE — 87635 SARS-COV-2 COVID-19 AMP PRB: CPT | Mod: HCNC

## 2024-08-21 RX ORDER — CETIRIZINE HYDROCHLORIDE 10 MG/1
10 TABLET ORAL DAILY PRN
Qty: 30 TABLET | Refills: 0 | Status: SHIPPED | OUTPATIENT
Start: 2024-08-21

## 2024-08-21 RX ORDER — ACETAMINOPHEN 500 MG
500 TABLET ORAL EVERY 6 HOURS PRN
Qty: 30 TABLET | Refills: 0 | Status: SHIPPED | OUTPATIENT
Start: 2024-08-21

## 2024-08-21 RX ORDER — BENZONATATE 100 MG/1
100 CAPSULE ORAL 3 TIMES DAILY PRN
Qty: 30 CAPSULE | Refills: 0 | Status: SHIPPED | OUTPATIENT
Start: 2024-08-21

## 2024-08-21 RX ORDER — PROMETHAZINE HYDROCHLORIDE AND DEXTROMETHORPHAN HYDROBROMIDE 6.25; 15 MG/5ML; MG/5ML
5 SYRUP ORAL EVERY 4 HOURS PRN
Qty: 180 ML | Refills: 0 | Status: SHIPPED | OUTPATIENT
Start: 2024-08-21

## 2024-08-21 RX ORDER — FLUTICASONE PROPIONATE 50 MCG
1 SPRAY, SUSPENSION (ML) NASAL 2 TIMES DAILY PRN
Qty: 15 G | Refills: 0 | Status: SHIPPED | OUTPATIENT
Start: 2024-08-21

## 2024-08-21 NOTE — DISCHARGE INSTRUCTIONS

## 2024-08-21 NOTE — ED PROVIDER NOTES
Encounter Date: 8/21/2024    SCRIBE #1 NOTE: I, Mariposa Navarro, am scribing for, and in the presence of,  Alayna Holdsworth, PA-C. I have scribed the following portions of the note - Other sections scribed: HPI, ROS.       History     Chief Complaint   Patient presents with    Cough     Pt complaining of cough, sore throat, body aches since Tuesday unrelieved by tylenol.      This 74 y.o female with a medical history of Chronic kidney disease (stage 3a), Hypertension, Severe obesity, and Type 2 diabetes mellitus presents to the ED accompanied by her daughter c/o a non-productive cough since Tuesday. Pt reports associated nasal congestion, rhinorrhea, sore throat, body aches, headache and chills. She states that she has taken Tylenol for treatment. Pt notes that she received the RSV vaccination last Friday and subsequently began to feel unwell. No recent sick contacts/ She denies fever, nausea, emesis, trouble swallowing, sob, cp, or diarrhea. No other associated symptoms.    The history is provided by the patient.     Review of patient's allergies indicates:   Allergen Reactions    Iodine and iodide containing products Shortness Of Breath and Other (See Comments)     Fever, blisters around mouth, flu like symptoms  Fever, blisters around mouth, flu like symptoms      Penicillins Other (See Comments) and Anaphylaxis     Loss of consciousness  Other reaction(s): Penicillins (Fatal)  Note:  Allergic Reaction: Anaphylaxis    Caffeine Other (See Comments)     Hyper then  Causes low  Hyperactivity, followed by sleepiness  Hyperactivity, followed by sleepiness    Note: hyperactivity/ crash     Iodine      Note: fever blisters, elevated temp.     Latex, natural rubber Other (See Comments)     Dental work cause mouth ulcers  Ulcers in mouth when used during dental work  Ulcers in mouth when used during dental work    Note: sores      Past Medical History:   Diagnosis Date    Chronic kidney disease, stage 3a 02/04/2022     Diabetes mellitus, type 2     Hypertension     Renal manifestation of secondary diabetes mellitus     Severe obesity (BMI >= 40) 07/12/2017    Type 2 diabetes mellitus      Past Surgical History:   Procedure Laterality Date    CHOLECYSTECTOMY      COLONOSCOPY N/A 1/25/2018    Procedure: COLONOSCOPY;  Surgeon: Jeffrey Solano MD;  Location: [facility];  Service: Endoscopy;  Laterality: N/A;    COLONOSCOPY N/A 5/22/2024    Procedure: COLONOSCOPY;  Surgeon: Nancy Jade MD;  Location: [facility];  Service: Endoscopy;  Laterality: N/A;  referred by , no scheduling concerns peg instructions sent to pt portal.cf    ERCP N/A 2/17/2021    Procedure: ERCP (ENDOSCOPIC RETROGRADE CHOLANGIOPANCREATOGRAPHY);  Surgeon: Emanuel Turner MD;  Location: [facility] ([address] [place] [address]);  Service: Endoscopy;  Laterality: N/A;     Family History   Problem Relation Name Age of Onset    Diabetes Mother      Diabetes Sister Belle     Diabetes Brother Ward     Kidney disease Brother Ward     Diabetes Sister Josy     Hypertension Sister Josy     Diabetes Sister Kathy     Hypertension Sister Kathy     Hyperlipidemia Sister Kathy      Social History     Tobacco Use    Smoking status: Never    Smokeless tobacco: Never   Substance Use Topics    Alcohol use: Yes     Comment: 'once a month maybe/wine     Drug use: No     Review of Systems   Constitutional:  Positive for chills. Negative for diaphoresis and fever.   HENT:  Positive for congestion (nasal), rhinorrhea and sore throat. Negative for trouble swallowing.    Respiratory:  Positive for cough. Negative for shortness of breath.    Cardiovascular:  Negative for chest pain.   Gastrointestinal:  Negative for abdominal pain, constipation, diarrhea, nausea and vomiting.   Musculoskeletal:  Positive for myalgias.   Neurological:  Positive for headaches. Negative for dizziness, weakness and light-headedness.       Physical Exam     Initial Vitals [08/21/24 1711]   BP Pulse Resp  Temp SpO2   (!) 152/63 90 18 98.2 °F (36.8 °C) 96 %      MAP       --         Physical Exam    Nursing note and vitals reviewed.  Constitutional: She appears well-developed and well-nourished. She is not diaphoretic. She is active. She does not appear ill. No distress.   HENT:   Head: Normocephalic and atraumatic.   Right Ear: External ear normal.   Left Ear: External ear normal.   Nose: Nose normal.   Mouth/Throat: Uvula is midline and mucous membranes are normal. Posterior oropharyngeal erythema present. No oropharyngeal exudate, posterior oropharyngeal edema or tonsillar abscesses.   Eyes: Conjunctivae, EOM and lids are normal. Pupils are equal, round, and reactive to light. Right eye exhibits no discharge. Left eye exhibits no discharge.   Neck: Phonation normal. Neck supple.   Normal range of motion.   Full passive range of motion without pain.     Cardiovascular:  Normal rate and regular rhythm.           Pulmonary/Chest: Effort normal and breath sounds normal. No respiratory distress.   Abdominal: She exhibits no distension.   Musculoskeletal:         General: Normal range of motion.      Cervical back: Full passive range of motion without pain, normal range of motion and neck supple.     Neurological: She is alert and oriented to person, place, and time. She has normal strength. No sensory deficit. GCS eye subscore is 4. GCS verbal subscore is 5. GCS motor subscore is 6.   Skin: Skin is dry. Capillary refill takes less than 2 seconds.         ED Course   Procedures  Labs Reviewed   SARS-COV-2 RDRP GENE - Abnormal       Result Value    POC Rapid COVID Positive (*)      Acceptable Yes     POCT INFLUENZA A/B MOLECULAR    POC Molecular Influenza A Ag Negative      POC Molecular Influenza B Ag Negative       Acceptable Yes     POCT STREP A MOLECULAR    Molecular Strep A, POC Negative       Acceptable Yes            Imaging Results    None          Medications - No data  to display  Medical Decision Making   74 y.o female with a medical history of Chronic kidney disease (stage 3a), Hypertension, Severe obesity, and Type 2 diabetes mellitus presents to the ED accompanied by her daughter c/o a non-productive cough since Tuesday.  Patient's chart and medical history reviewed.    Ddx:  COVID  Flu  Strep throat  Viral URI    Patient's vitals reviewed.  Afebrile, no respiratory distress, and nontoxic-appearing in the ED. patient had erythematous throat with no exudates or edema noted. No drooling, no hot potato voice, no neck edema or erythema, and normal neck ROM.  Patient denied pain medication.  Patient is flu and strep negative. Patient is COVID positive. Patient's O2 sat is 96% on room air with no respiratory distress and bilateral normal breath sounds.  Patient is ambulatory O2 saturation is 98% on room air. Discussed with patient she will need to quarantine until afebrile for 24 hours without taking any medications that would lower temperature or any new symptoms developing. Discussed with patient she can use Tylenol as needed for fevers and headaches, as well as staying hydrated and resting until this clears from her system. Patient given will be sent home on Flonase, Zyrtec, Tessalon Perles, benzocaine lozenges, and promethazine DM cough syrup for symptomatic control.  Patient will follow-up with her PCP. Patient agrees with this plan. Discussed with her strict return precautions, she verbalized understanding. Patient is stable for discharge.     Amount and/or Complexity of Data Reviewed  External Data Reviewed: notes.    Risk  OTC drugs.  Prescription drug management.            Scribe Attestation:   Scribe #1: I performed the above scribed service and the documentation accurately describes the services I performed. I attest to the accuracy of the note.                         I, Alayna Holdsworth,PA-C, personally performed the services described in this documentation. All  medical record entries made by the scribe were at my direction and in my presence. I have reviewed the chart and agree that the record reflects my personal performance and is accurate and complete.      DISCLAIMER: This note was prepared with [facility] voice recognition transcription software. Garbled syntax, mangled pronouns, and other bizarre constructions may be attributed to that software system.       Clinical Impression:  Final diagnoses:  [R09.81] Nasal congestion  [R05.1] Acute cough  [R52] Generalized body aches  [J02.9] Sore throat  [U07.1] COVID-19 (Primary)          ED Disposition Condition    Discharge Stable          ED Prescriptions       Medication Sig Dispense Start Date End Date Auth. Provider    benzocaine-menthoL 6-10 mg lozenge Take 1 lozenge by mouth every 2 (two) hours as needed for Pain (sore throat). 18 tablet 8/21/2024 -- Holdsworth, Alayna, PA-C    cetirizine (ZYRTEC) 10 MG tablet Take 1 tablet (10 mg total) by mouth daily as needed for Allergies or Rhinitis. 30 tablet 8/21/2024 -- Holdsworth, Alayna, PA-C    fluticasone propionate (FLONASE) 50 mcg/actuation nasal spray 1 spray (50 mcg total) by Each Nostril route 2 (two) times daily as needed for Rhinitis or Allergies. 15 g 8/21/2024 -- Holdsworth, Alayna, PA-C    benzonatate (TESSALON) 100 MG capsule Take 1 capsule (100 mg total) by mouth 3 (three) times daily as needed for Cough. 30 capsule 8/21/2024 -- Holdsworth, Alayna, PA-C    promethazine-dextromethorphan (PROMETHAZINE-DM) 6.25-15 mg/5 mL Syrp Take 5 mLs by mouth every 4 (four) hours as needed (Cough). 180 mL 8/21/2024 -- Holdsworth, Alayna, PA-C    acetaminophen (TYLENOL) 500 MG tablet Take 1 tablet (500 mg total) by mouth every 6 (six) hours as needed for Pain or Temperature greater than. 30 tablet 8/21/2024 -- Holdsworth, Alayna, PA-C          Follow-up Information       Follow up With Specialties Details Why Contact Info    Neeta Estrada MD Family Medicine, Wound Care   [address] [facility] [place] [address]  [phone]               Holdsworth, Alayna, PA-C  08/21/24 [id number]

## 2024-08-22 ENCOUNTER — PATIENT OUTREACH (OUTPATIENT)
Dept: ADMINISTRATIVE | Facility: HOSPITAL | Age: 74
End: 2024-08-22
Payer: MEDICARE

## 2024-08-22 ENCOUNTER — PATIENT OUTREACH (OUTPATIENT)
Dept: EMERGENCY MEDICINE | Facility: HOSPITAL | Age: 74
End: 2024-08-22
Payer: MEDICARE

## 2024-08-22 NOTE — PROGRESS NOTES
Patient was seen in the ED on 8/21/24. Phones patient to assist with Post ED Discharge Navigation.   Patient agreed to assistance scheduling a PCP follow-up appointment. In Basket message sent to PCP staff for scheduling assistance. Appointment scheduled. Appointment reminder set.  Damaris Avalos

## 2024-08-26 ENCOUNTER — TELEPHONE (OUTPATIENT)
Dept: FAMILY MEDICINE | Facility: CLINIC | Age: 74
End: 2024-08-26
Payer: MEDICARE

## 2024-08-28 ENCOUNTER — PATIENT OUTREACH (OUTPATIENT)
Dept: EMERGENCY MEDICINE | Facility: HOSPITAL | Age: 74
End: 2024-08-28
Payer: MEDICARE

## 2024-08-28 NOTE — PROGRESS NOTES
Reminded patient of upcoming appointment on 8/30/24 at 10:30 with Laurita Marroquin NP [facility], Family Medicine [address] [place] [address] [phone].   Damaris Avalos

## 2024-08-28 NOTE — PROGRESS NOTES
Phoned patient for follow-up. Patient was unavailable. E-mailed patient urging to call if additional assistance is needed.  Damaris Avalos

## 2024-08-30 ENCOUNTER — OFFICE VISIT (OUTPATIENT)
Dept: FAMILY MEDICINE | Facility: CLINIC | Age: 74
End: 2024-08-30
Payer: MEDICARE

## 2024-08-30 VITALS
SYSTOLIC BLOOD PRESSURE: 132 MMHG | TEMPERATURE: 98 F | HEART RATE: 64 BPM | DIASTOLIC BLOOD PRESSURE: 60 MMHG | HEIGHT: 64 IN | OXYGEN SATURATION: 97 % | WEIGHT: 242.75 LBS | BODY MASS INDEX: 41.44 KG/M2

## 2024-08-30 DIAGNOSIS — E11.22 TYPE 2 DIABETES MELLITUS WITH STAGE 3A CHRONIC KIDNEY DISEASE, WITHOUT LONG-TERM CURRENT USE OF INSULIN: ICD-10-CM

## 2024-08-30 DIAGNOSIS — N18.31 TYPE 2 DIABETES MELLITUS WITH STAGE 3A CHRONIC KIDNEY DISEASE, WITHOUT LONG-TERM CURRENT USE OF INSULIN: ICD-10-CM

## 2024-08-30 DIAGNOSIS — I10 ESSENTIAL HYPERTENSION: ICD-10-CM

## 2024-08-30 DIAGNOSIS — Z09 HOSPITAL DISCHARGE FOLLOW-UP: ICD-10-CM

## 2024-08-30 DIAGNOSIS — Z86.16 HISTORY OF COVID-19: Primary | ICD-10-CM

## 2024-08-30 PROCEDURE — 99999 PR PBB SHADOW E&M-EST. PATIENT-LVL IV: CPT | Mod: PBBFAC,HCNC,,

## 2024-08-30 NOTE — PROGRESS NOTES
[facility]     Chief Complaint:  Chief Complaint   Patient presents with    Follow-up       Bette Johnson is a 74 y.o. female with multiple medical diagnoses as listed in the medical history and problem list that presents for   Chief Complaint   Patient presents with    Follow-up   .   Patient is not known to me with her last appointment in this department on 8/16/2024.      [facility]    Hospital encounter notes, objective/subjective data, diagnostics, and plan of care from 8/21 reviewed.    Scratchy throat started 8/19 with dry/hacking cough and then went to ED 8/21. COVID positive. Symptoms improved but still having congestion and sounds liek frog. Everything tastes very salty. Cotninues to take medication prescribed from ED. Symptoms are improving.     Assessment & Plan       Problem List Items Addressed This Visit          Cardiac/Vascular    Essential hypertension  The current medical regimen is effective;  continue present plan and medications.         Endocrine    Type 2 diabetes mellitus with stage 3a chronic kidney disease, without long-term current use of insulin  Lab Results   Component Value Date    HGBA1C 7.6 (H) 02/09/2024     Metformin. Will continue to work on diet/exercise. Managed by PCP     Other Visit Diagnoses       History of COVID-19    -  Primary  The current medical regimen is effective;  continue present plan and medications.      Hospital discharge follow-up      NAD and no acute concerns. I counseled the patient on fluids, rest, OTC medications that can safely be used, hand/cough hygiene, expected course of illness, and when further medical attention would be warranted.  Encouraged rest, fluids, nasal decongestants, cough suppressants, lozenges, Chloraseptic spray for sore throat, and nasal saline/Netti pot followed by Flonase, antihistamines prn.              --------------------------------------------      Health Maintenance:  Health Maintenance         Date Due Completion Date    COVID-19  Vaccine (8 - 2023-24 season) 09/01/2023 6/4/2022    Hemoglobin A1c 08/09/2024 2/9/2024    Mammogram 11/29/2024 (Originally 11/13/2024) 11/13/2023    Override on 7/14/2016: Done    Influenza Vaccine (1) 09/01/2024 10/9/2023    Override on 10/7/2023: Done (Done at [facility])    Eye Exam 12/21/2024 12/21/2023    Override on 12/21/2023: Done ([facility]    Override on 12/10/2021: Done (Dr. KWON)    Override on 6/19/2017: Done    Foot Exam 02/22/2025 2/22/2024    Override on 2/9/2023: Done    Override on 1/11/2022: Done    Override on 4/19/2019: Done    Override on 6/11/2018: Done    Diabetes Urine Screening 08/10/2025 8/10/2024    Lipid Panel 08/10/2025 8/10/2024    High Dose Statin 08/16/2025 8/16/2024    Colorectal Cancer Screening 05/22/2029 5/22/2024    TETANUS VACCINE 02/02/2031 2/2/2021            Health maintenance reviewed    Follow Up:  No follow-ups on file.    Discussed DDx, condition, and treatment.   Education sent to patient portal/included in after visit summary.  ED precautions given.   Notify provider if symptoms do not resolve or increase in severity.   Patient verbalizes understanding and agrees with plan of care.    Exam     Review of Systems:  (as noted above)  Review of Systems    Physical Exam:   Physical Exam  Constitutional:       General: She is not in acute distress.     Appearance: Normal appearance. She is obese. She is not toxic-appearing.   HENT:      Head: Normocephalic and atraumatic.      Right Ear: Tympanic membrane normal.      Left Ear: Tympanic membrane normal.      Nose: Nose normal.      Mouth/Throat:      Pharynx: No oropharyngeal exudate or posterior oropharyngeal erythema.   Cardiovascular:      Rate and Rhythm: Normal rate and regular rhythm.      Pulses: Normal pulses.      Heart sounds: Normal heart sounds.   Pulmonary:      Effort: Pulmonary effort is normal. No respiratory distress.      Breath sounds: Normal breath sounds. No wheezing.  "  Musculoskeletal:      Cervical back: Normal range of motion and neck supple. No rigidity.   Skin:     Capillary Refill: Capillary refill takes less than 2 seconds.   Neurological:      General: No focal deficit present.      Mental Status: She is alert and oriented to person, place, and time.   Psychiatric:         Mood and Affect: Mood normal.       Vitals:    08/30/24 1025   BP: 132/60   Pulse: 64   Temp: 98.3 °F (36.8 °C)   SpO2: 97%   Weight: 110.1 kg (242 lb 11.6 oz)   Height: 5' 4" (1.626 m)      Body mass index is 41.66 kg/m².        History     Past Medical History:  Past Medical History:   Diagnosis Date    Chronic kidney disease, stage 3a 02/04/2022    Diabetes mellitus, type 2     Hypertension     Renal manifestation of secondary diabetes mellitus     Severe obesity (BMI >= 40) 07/12/2017    Type 2 diabetes mellitus        Past Surgical History:  Past Surgical History:   Procedure Laterality Date    CHOLECYSTECTOMY      COLONOSCOPY N/A 1/25/2018    Procedure: COLONOSCOPY;  Surgeon: Jeffrey Solano MD;  Location: [facility];  Service: Endoscopy;  Laterality: N/A;    COLONOSCOPY N/A 5/22/2024    Procedure: COLONOSCOPY;  Surgeon: Nancy Jade MD;  Location: [facility];  Service: Endoscopy;  Laterality: N/A;  referred by , no scheduling concerns peg instructions sent to pt portal.    ERCP N/A 2/17/2021    Procedure: ERCP (ENDOSCOPIC RETROGRADE CHOLANGIOPANCREATOGRAPHY);  Surgeon: Emanuel Turner MD;  Location: [facility] ([address] [place] [address]);  Service: Endoscopy;  Laterality: N/A;       Social History:  Social History     Socioeconomic History    Marital status: Single    Number of children: 2    Highest education level: Associate degree: academic program   Tobacco Use    Smoking status: Never    Smokeless tobacco: Never   Substance and Sexual Activity    Alcohol use: Yes     Comment: 'once a month maybe/wine     Drug use: No    Sexual activity: Not Currently     Social Determinants of Health "     Financial Resource Strain: Low Risk  (5/21/2024)    Overall Financial Resource Strain (CARDIA)     Difficulty of Paying Living Expenses: Not hard at all   Food Insecurity: No Food Insecurity (5/21/2024)    Hunger Vital Sign     Worried About Running Out of Food in the Last Year: Never true     Ran Out of Food in the Last Year: Never true   Transportation Needs: No Transportation Needs (5/21/2024)    PRAPARE - Transportation     Lack of Transportation (Medical): No     Lack of Transportation (Non-Medical): No   Physical Activity: Inactive (5/21/2024)    Exercise Vital Sign     Days of Exercise per Week: 0 days     Minutes of Exercise per Session: 0 min   Stress: No Stress Concern Present (5/21/2024)    [employer] [place] [employer] - Occupational Stress Questionnaire     Feeling of Stress : Not at all   Housing Stability: Low Risk  (5/21/2024)    Housing Stability Vital Sign     Unable to Pay for Housing in the Last Year: No     Homeless in the Last Year: No       Family History:  Family History   Problem Relation Name Age of Onset    Diabetes Mother      Diabetes Sister Belle     Diabetes Brother Ward     Kidney disease Brother Ward     Diabetes Sister Josy     Hypertension Sister Josy     Diabetes Sister Kathy     Hypertension Sister Kathy     Hyperlipidemia Sister Kathy        Allergies and Medications: (updated and reviewed)  Review of patient's allergies indicates:   Allergen Reactions    Iodine and iodide containing products Shortness Of Breath and Other (See Comments)     Fever, blisters around mouth, flu like symptoms  Fever, blisters around mouth, flu like symptoms      Penicillins Other (See Comments) and Anaphylaxis     Loss of consciousness  Other reaction(s): Penicillins (Fatal)  Note:  Allergic Reaction: Anaphylaxis    Caffeine Other (See Comments)     Hyper then  Causes low  Hyperactivity, followed by sleepiness  Hyperactivity, followed by sleepiness    Note: hyperactivity/  crash     Iodine      Note: fever blisters, elevated temp.     Latex, natural rubber Other (See Comments)     Dental work cause mouth ulcers  Ulcers in mouth when used during dental work  Ulcers in mouth when used during dental work    Note: sores      Current Outpatient Medications   Medication Sig Dispense Refill    acetaminophen (TYLENOL) 500 MG tablet Take 1 tablet (500 mg total) by mouth every 6 (six) hours as needed for Pain or Temperature greater than. 30 tablet 0    amLODIPine (NORVASC) 10 MG tablet Take 1 tablet (10 mg total) by mouth once daily. 90 tablet 3    aspirin (ECOTRIN) 81 MG EC tablet Take 81 mg by mouth once daily.      atorvastatin (LIPITOR) 10 MG tablet Take 1 tablet (10 mg total) by mouth every other day. 36 tablet 3    benzocaine-menthoL 6-10 mg lozenge Take 1 lozenge by mouth every 2 (two) hours as needed for Pain (sore throat). 18 tablet 0    benzonatate (TESSALON) 100 MG capsule Take 1 capsule (100 mg total) by mouth 3 (three) times daily as needed for Cough. 30 capsule 0    cetirizine (ZYRTEC) 10 MG tablet Take 1 tablet (10 mg total) by mouth daily as needed for Allergies or Rhinitis. 30 tablet 0    coenzyme Q10 100 mg capsule Take 200 mg by mouth once daily.      diclofenac sodium (VOLTAREN) 1 % Gel Apply 2 g topically 2 (two) times daily. 100 g 0    fluticasone propionate (FLONASE) 50 mcg/actuation nasal spray 1 spray (50 mcg total) by Each Nostril route 2 (two) times daily as needed for Rhinitis or Allergies. 15 g 0    loratadine (CLARITIN) 10 mg tablet Take 10 mg by mouth once daily.      metFORMIN (GLUCOPHAGE) 850 MG tablet Take 1 tablet (850 mg total) by mouth once daily. 90 tablet 3    multivit-minerals/folic acid (WOMEN'S MULTIVITAMIN GUMMIES ORAL) Take by mouth.      promethazine-dextromethorphan (PROMETHAZINE-DM) 6.25-15 mg/5 mL Syrp Take 5 mLs by mouth every 4 (four) hours as needed (Cough). 180 mL 0    valsartan-hydrochlorothiazide (DIOVAN-HCT) 320-25 mg per tablet Take 1  tablet by mouth once daily. 90 tablet 3    vitamin D (VITAMIN D3) 1000 units Tab Take 5,000 Units by mouth once daily.       No current facility-administered medications for this visit.       Patient Care Team:  Neeta Estrada MD as PCP - General (Family Medicine)  Vinicius Mcginnis III, OD as Consulting Provider (Optometry)  Ermias Lewis MD as Consulting Physician (Gastroenterology)  Leia Martinez MA as Care Coordinator  Birgit Larkin as Digital Medicine Health   Macie Almanzar PharmD as Hypertension Digital Medicine Clinician (Pharmacist)  Lily Churchill MD as Hypertension Digital Medicine Responsible Provider (Family Medicine)  Medicare, [facility] Managed as Hypertension Digital Medicine Contract  Matt Mascorro OD (Optometry)  Damaris Avalos as ED Navigator         - The patient is given an After Visit Summary that lists all medications with directions, allergies, education, orders placed during this encounter and follow-up instructions.      - I have reviewed the patient's medical information including past medical, family, and social history sections including the medications and allergies.      - We discussed the patient's current medications.     This note was created by combination of typed  and MModal dictation.  Transcription errors may be present.  If there are any questions, please contact me.

## 2024-09-12 DIAGNOSIS — E11.69 DIABETES MELLITUS TYPE 2 IN OBESE: ICD-10-CM

## 2024-09-12 DIAGNOSIS — E66.9 DIABETES MELLITUS TYPE 2 IN OBESE: ICD-10-CM

## 2024-09-12 NOTE — TELEPHONE ENCOUNTER
Care Due:                  Date            Visit Type   Department     Provider  --------------------------------------------------------------------------------                                [facility]                              PRIMARY      MED/ INTERNAL  Last Visit: 08-      CARE ([facility])   MED/ PEDS      Neeta Estrada                              [facility]                              PRIMARY      MED/ INTERNAL  Next Visit: 02-      CARE ([facility])   MED/ PEDS      Neeta Estrada                                                            Last  Test          Frequency    Reason                     Performed    Due Date  --------------------------------------------------------------------------------    HBA1C.......  6 months...  metFORMIN................  02- 08-    [employer] Embedded Care Due Messages. Reference number: [id number].   9/12/2024 10:18:30 AM CDT

## 2024-09-13 RX ORDER — ATORVASTATIN CALCIUM 10 MG/1
10 TABLET, FILM COATED ORAL EVERY OTHER DAY
Qty: 45 TABLET | Refills: 1 | Status: SHIPPED | OUTPATIENT
Start: 2024-09-13

## 2024-09-13 NOTE — TELEPHONE ENCOUNTER
Refill Decision Note   Bette Johnson  is requesting a refill authorization.  Brief Assessment and Rationale for Refill:  Approve     Medication Therapy Plan:  FLOS.  Patient was seen in the ED on 8/21/24 for COVID. No changes were made to Atorvastatin. Patient followed up with Laurita Marroquin NP on 8/30/24.      Extended chart review required: Yes   Comments:     Note composed:10:52 AM 09/13/2024

## 2024-09-21 DIAGNOSIS — I10 ESSENTIAL HYPERTENSION: ICD-10-CM

## 2024-09-22 NOTE — TELEPHONE ENCOUNTER
No care due was identified.  Health system Embedded Care Due Messages. Reference number: [id number].   9/21/2024 9:24:55 PM CDT

## 2024-09-23 RX ORDER — AMLODIPINE BESYLATE 10 MG/1
10 TABLET ORAL
Qty: 90 TABLET | Refills: 0 | Status: SHIPPED | OUTPATIENT
Start: 2024-09-23

## 2024-10-02 NOTE — ANESTHESIA PREPROCEDURE EVALUATION
05/22/2024  Bette Johnson is a 74 y.o., female.      Pre-op Assessment    I have reviewed the Patient Summary Reports.     I have reviewed the Nursing Notes. I have reviewed the NPO Status.   I have reviewed the Medications.     Review of Systems  Anesthesia Hx:  No problems with previous Anesthesia               Denies Personal Hx of Anesthesia complications.                    Social:  Non-Smoker       Cardiovascular:     Hypertension           hyperlipidemia                             Pulmonary:  Pulmonary Normal                       Renal/:  Chronic Renal Disease, CKD                Hepatic/GI:  Hepatic/GI Normal                 Neurological:  Neurology Normal                                      Endocrine:  Diabetes         Morbid Obesity / BMI > 40      Physical Exam  General: Cooperative, Alert and Oriented    Airway:  Mallampati: II   Mouth Opening: Normal  TM Distance: Normal  Tongue: Normal  Neck ROM: Normal ROM    Dental:  Intact        Anesthesia Plan  Type of Anesthesia, risks & benefits discussed:    Anesthesia Type: Gen Natural Airway  Intra-op Monitoring Plan: Standard ASA Monitors  Post Op Pain Control Plan: multimodal analgesia  Induction:  IV  Informed Consent: Informed consent signed with the Patient and all parties understand the risks and agree with anesthesia plan.  All questions answered.   ASA Score: 3  Day of Surgery Review of History & Physical: H&P Update referred to the surgeon/provider.    Ready For Surgery From Anesthesia Perspective.     .       - improved. Continue flonase and loratadine

## 2024-10-21 ENCOUNTER — PATIENT MESSAGE (OUTPATIENT)
Dept: ADMINISTRATIVE | Facility: HOSPITAL | Age: 74
End: 2024-10-21
Payer: MEDICARE

## 2024-10-21 DIAGNOSIS — E11.69 DIABETES MELLITUS TYPE 2 IN OBESE: ICD-10-CM

## 2024-10-21 DIAGNOSIS — E66.9 DIABETES MELLITUS TYPE 2 IN OBESE: ICD-10-CM

## 2024-10-21 RX ORDER — METFORMIN HYDROCHLORIDE 850 MG/1
850 TABLET ORAL
Qty: 90 TABLET | Refills: 1 | Status: SHIPPED | OUTPATIENT
Start: 2024-10-21

## 2024-11-04 DIAGNOSIS — I10 ESSENTIAL HYPERTENSION: ICD-10-CM

## 2024-11-04 RX ORDER — VALSARTAN AND HYDROCHLOROTHIAZIDE 320; 25 MG/1; MG/1
1 TABLET, FILM COATED ORAL DAILY
Qty: 90 TABLET | Refills: 1 | Status: SHIPPED | OUTPATIENT
Start: 2024-11-04

## 2024-11-04 NOTE — TELEPHONE ENCOUNTER
No care due was identified.  Health [facility] Embedded Care Due Messages. Reference number: [id number].   11/04/2024 8:41:31 AM CST

## 2024-11-18 ENCOUNTER — HOSPITAL ENCOUNTER (OUTPATIENT)
Dept: RADIOLOGY | Facility: HOSPITAL | Age: 74
Discharge: HOME | End: 2024-11-18
Attending: FAMILY MEDICINE
Payer: MEDICARE

## 2024-11-18 DIAGNOSIS — Z12.31 ENCOUNTER FOR SCREENING MAMMOGRAM FOR BREAST CANCER: ICD-10-CM

## 2024-11-18 PROCEDURE — 77063 BREAST TOMOSYNTHESIS BI: CPT | Mod: 26,HCNC,, | Performed by: RADIOLOGY

## 2024-11-18 PROCEDURE — 77067 SCR MAMMO BI INCL CAD: CPT | Mod: 26,HCNC,, | Performed by: RADIOLOGY

## 2024-11-18 PROCEDURE — 77067 SCR MAMMO BI INCL CAD: CPT | Mod: TC,HCNC,PO

## 2024-11-21 ENCOUNTER — PATIENT MESSAGE (OUTPATIENT)
Dept: ADMINISTRATIVE | Facility: OTHER | Age: 74
End: 2024-11-21
Payer: MEDICARE

## 2024-12-10 ENCOUNTER — PATIENT MESSAGE (OUTPATIENT)
Dept: FAMILY MEDICINE | Facility: CLINIC | Age: 74
End: 2024-12-10
Payer: MEDICARE

## 2024-12-13 ENCOUNTER — PATIENT OUTREACH (OUTPATIENT)
Dept: ADMINISTRATIVE | Facility: HOSPITAL | Age: 74
End: 2024-12-13
Payer: MEDICARE

## 2024-12-17 ENCOUNTER — OFFICE VISIT (OUTPATIENT)
Dept: PULMONOLOGY | Facility: CLINIC | Age: 74
End: 2024-12-17
Payer: MEDICARE

## 2024-12-17 ENCOUNTER — HOSPITAL ENCOUNTER (OUTPATIENT)
Dept: SLEEP MEDICINE | Facility: HOSPITAL | Age: 74
Discharge: HOME | End: 2024-12-17
Attending: INTERNAL MEDICINE
Payer: MEDICARE

## 2024-12-17 VITALS
OXYGEN SATURATION: 97 % | WEIGHT: 242.06 LBS | HEART RATE: 80 BPM | BODY MASS INDEX: 41.55 KG/M2 | SYSTOLIC BLOOD PRESSURE: 140 MMHG | DIASTOLIC BLOOD PRESSURE: 81 MMHG

## 2024-12-17 DIAGNOSIS — G47.33 OSA (OBSTRUCTIVE SLEEP APNEA): ICD-10-CM

## 2024-12-17 DIAGNOSIS — E66.813 CLASS 3 SEVERE OBESITY WITH SERIOUS COMORBIDITY AND BODY MASS INDEX (BMI) OF 40.0 TO 44.9 IN ADULT, UNSPECIFIED OBESITY TYPE: ICD-10-CM

## 2024-12-17 DIAGNOSIS — R53.83 FATIGUE, UNSPECIFIED TYPE: ICD-10-CM

## 2024-12-17 DIAGNOSIS — G47.01 INSOMNIA DUE TO MEDICAL CONDITION: ICD-10-CM

## 2024-12-17 DIAGNOSIS — G47.33 OSA (OBSTRUCTIVE SLEEP APNEA): Primary | ICD-10-CM

## 2024-12-17 DIAGNOSIS — E66.01 CLASS 3 SEVERE OBESITY WITH SERIOUS COMORBIDITY AND BODY MASS INDEX (BMI) OF 40.0 TO 44.9 IN ADULT, UNSPECIFIED OBESITY TYPE: ICD-10-CM

## 2024-12-17 PROBLEM — E66.9 OBESITY: Status: ACTIVE | Noted: 2024-12-17

## 2024-12-17 PROBLEM — G47.00 INSOMNIA: Status: ACTIVE | Noted: 2024-12-17

## 2024-12-17 PROCEDURE — 95800 SLP STDY UNATTENDED: CPT | Mod: HCNC

## 2024-12-17 PROCEDURE — 95806 SLEEP STUDY UNATT&RESP EFFT: CPT | Mod: 26,HCNC,, | Performed by: INTERNAL MEDICINE

## 2024-12-17 PROCEDURE — 99999 PR PBB SHADOW E&M-EST. PATIENT-LVL IV: CPT | Mod: PBBFAC,HCNC,, | Performed by: INTERNAL MEDICINE

## 2024-12-17 PROCEDURE — 3079F DIAST BP 80-89 MM HG: CPT | Mod: HCNC,CPTII,S$GLB, | Performed by: INTERNAL MEDICINE

## 2024-12-17 PROCEDURE — 3066F NEPHROPATHY DOC TX: CPT | Mod: HCNC,CPTII,S$GLB, | Performed by: INTERNAL MEDICINE

## 2024-12-17 PROCEDURE — 1101F PT FALLS ASSESS-DOCD LE1/YR: CPT | Mod: HCNC,CPTII,S$GLB, | Performed by: INTERNAL MEDICINE

## 2024-12-17 PROCEDURE — 3077F SYST BP >= 140 MM HG: CPT | Mod: HCNC,CPTII,S$GLB, | Performed by: INTERNAL MEDICINE

## 2024-12-17 PROCEDURE — 1125F AMNT PAIN NOTED PAIN PRSNT: CPT | Mod: HCNC,CPTII,S$GLB, | Performed by: INTERNAL MEDICINE

## 2024-12-17 PROCEDURE — 3061F NEG MICROALBUMINURIA REV: CPT | Mod: HCNC,CPTII,S$GLB, | Performed by: INTERNAL MEDICINE

## 2024-12-17 PROCEDURE — 1159F MED LIST DOCD IN RCRD: CPT | Mod: HCNC,CPTII,S$GLB, | Performed by: INTERNAL MEDICINE

## 2024-12-17 PROCEDURE — 3008F BODY MASS INDEX DOCD: CPT | Mod: HCNC,CPTII,S$GLB, | Performed by: INTERNAL MEDICINE

## 2024-12-17 PROCEDURE — 99204 OFFICE O/P NEW MOD 45 MIN: CPT | Mod: HCNC,S$GLB,, | Performed by: INTERNAL MEDICINE

## 2024-12-17 PROCEDURE — 3288F FALL RISK ASSESSMENT DOCD: CPT | Mod: HCNC,CPTII,S$GLB, | Performed by: INTERNAL MEDICINE

## 2024-12-17 PROCEDURE — 3051F HG A1C>EQUAL 7.0%<8.0%: CPT | Mod: HCNC,CPTII,S$GLB, | Performed by: INTERNAL MEDICINE

## 2024-12-17 NOTE — ASSESSMENT & PLAN NOTE
aware of need for drastic weight loss.  Difficulty with exercise due to joint pain.  Not interested in weight loss medication.  Follow up with pcp.

## 2024-12-17 NOTE — ASSESSMENT & PLAN NOTE
The patient symptomatically has restless sleep, witnessed apnea, uncontrolled htn with findings of high grade mallampatti, elevated bmi. This warrants further investigation for possible obstructive sleep apnea.  Patient will be contacted after sleep study is done.

## 2024-12-17 NOTE — PROGRESS NOTES
Bette Johnson  was seen as a new patient at the request of Neeta Estrada MD for the evaluation of concepcion.    CHIEF COMPLAINT:    Chief Complaint   Patient presents with    Apnea       HISTORY OF PRESENT ILLNESS: Bette Johnson is a 74 y.o. female is here for sleep evaluation.   Patient with uncontrolled htn and was referred for concepcion evaluation.      STOPBANG during initial visit:    Snore:  sleep alone  Tire:  daily  Observed apnea:  patient was told by her ex- that she stopped breathing during sleep in 1970s  Pressure (HTN):  yes    BMI:  Body mass index is 41.55 kg/m².   Age:  74 y.o.  Neck (inch):  15  Gender:  female    Total STOP BANG score = 5/8  Low risk CONCEPCION: 0-2, Intermediate risk CONCEPCION: 3-4, High risk CONCEPCION: 5+    Other sleep ROS:  no parasomnia. No cataplexy.  Chronic stiffness in thigh.  Improve with stretching.  Chronic lower back pain with sciatica.      [place] Sleepiness Scale score during initial sleep evaluation was 0.    SLEEP ROUTINE:  Activity the hour prior to sleep: watch tv     Bed partner:  alone  Time to bed:  10:30 pm   Lights off:  off   Sleep onset latency:  5 minutes         Disruptions or awakenings:    2 times (no  difficulty going back to sleep)    Wakeup time:      8 AM  Perceived sleep quality:  tire       Daytime naps:      denied  Weekend sleep routine:      same  Caffeine use: denied  exercise habit:   denied      PAST MEDICAL HISTORY:    Active Ambulatory Problems     Diagnosis Date Noted    Morbid obesity with BMI of 40.0-44.9, adult 07/12/2017    Essential hypertension 07/12/2017    Thoracic aorta atherosclerosis 07/12/2017    Diabetes mellitus type 2 in obese 10/30/2018    Transaminitis 02/16/2021    History of 2019 novel coronavirus disease (COVID-19) 02/18/2021    Type 2 diabetes mellitus with stage 3a chronic kidney disease, without long-term current use of insulin 01/11/2022    Chronic kidney disease, stage 3a 02/04/2022    Decreased strength of trunk and back  02/27/2023    Vitamin D deficiency 08/10/2023    MAURIZIO (obstructive sleep apnea) 12/17/2024    Obesity 12/17/2024    Insomnia 12/17/2024     Resolved Ambulatory Problems     Diagnosis Date Noted    Type 2 diabetes mellitus without complication, without long-term current use of insulin 07/12/2017    Chest pain 01/13/2018    Colon cancer screening 01/25/2018    Choledocholithiasis 02/16/2021    UTI (urinary tract infection) 02/16/2021    Epigastric pain 02/16/2021    Cholangitis 02/17/2021    Bacteremia due to Gram-negative bacteria 02/17/2021    Thrombocytosis 04/05/2021    Decreased range of motion of right knee 03/14/2022    Quadriceps weakness 03/14/2022     Past Medical History:   Diagnosis Date    Diabetes mellitus, type 2     Hypertension     Renal manifestation of secondary diabetes mellitus     Severe obesity (BMI >= 40) 07/12/2017    Type 2 diabetes mellitus                 PAST SURGICAL HISTORY:    Past Surgical History:   Procedure Laterality Date    CHOLECYSTECTOMY      COLONOSCOPY N/A 1/25/2018    Procedure: COLONOSCOPY;  Surgeon: Jeffrey Solano MD;  Location: [facility];  Service: Endoscopy;  Laterality: N/A;    COLONOSCOPY N/A 5/22/2024    Procedure: COLONOSCOPY;  Surgeon: Nancy Jade MD;  Location: [facility];  Service: Endoscopy;  Laterality: N/A;  referred by , no scheduling concerns peg instructions sent to  [email or web address]    ERCP N/A 2/17/2021    Procedure: ERCP (ENDOSCOPIC RETROGRADE CHOLANGIOPANCREATOGRAPHY);  Surgeon: Emanuel Turner MD;  Location: [address]);  Service: Endoscopy;  Laterality: N/A;         FAMILY HISTORY:                Family History   Problem Relation Name Age of Onset    Diabetes Mother      Diabetes Sister Belle     Diabetes Brother Ward     Kidney disease Brother Ward     Diabetes Sister Josy     Hypertension Sister Josy     Diabetes Sister Kathy     Hypertension Sister Kathy     Hyperlipidemia Sister Kathy        SOCIAL HISTORY:           Tobacco:   Social History     Tobacco Use   Smoking Status Never   Smokeless Tobacco Never       alcohol use:    Social History     Substance and Sexual Activity   Alcohol Use Yes    Comment: 'once a month maybe/wine                  Occupation: retire retail    ALLERGIES:    Review of patient's allergies indicates:   Allergen Reactions    Iodine and iodide containing products Shortness Of Breath and Other (See Comments)     Fever, blisters around mouth, flu like symptoms  Fever, blisters around mouth, flu like symptoms      Penicillins Other (See Comments) and Anaphylaxis     Loss of consciousness  Other reaction(s): Penicillins (Fatal)  Note:  Allergic Reaction: Anaphylaxis    Caffeine Other (See Comments)     Hyper then  Causes low  Hyperactivity, followed by sleepiness  Hyperactivity, followed by sleepiness    Note: hyperactivity/ crash     Iodine      Note: fever blisters, elevated temp.     Latex, natural rubber Other (See Comments)     Dental work cause mouth ulcers  Ulcers in mouth when used during dental work  Ulcers in mouth when used during dental work    Note: sores        CURRENT MEDICATIONS:    Current Outpatient Medications   Medication Sig Dispense Refill    acetaminophen (TYLENOL) 500 MG tablet Take 1 tablet (500 mg total) by mouth every 6 (six) hours as needed for Pain or Temperature greater than. 30 tablet 0    amLODIPine (NORVASC) 10 MG tablet Take 1 tablet by mouth once daily 90 tablet 0    aspirin (ECOTRIN) 81 MG EC tablet Take 81 mg by mouth once daily.      atorvastatin (LIPITOR) 10 MG tablet Take 1 tablet (10 mg total) by mouth every other day. 45 tablet 1    cetirizine (ZYRTEC) 10 MG tablet Take 1 tablet (10 mg total) by mouth daily as needed for Allergies or Rhinitis. 30 tablet 0    coenzyme Q10 100 mg capsule Take 200 mg by mouth once daily.      fluticasone propionate (FLONASE) 50 mcg/actuation nasal spray 1 spray (50 mcg total) by Each Nostril route 2 (two) times daily as needed for  Rhinitis or Allergies. 15 g 0    loratadine (CLARITIN) 10 mg tablet Take 10 mg by mouth once daily.      metFORMIN (GLUCOPHAGE) 850 MG tablet Take 1 tablet by mouth once daily 90 tablet 1    multivit-minerals/folic acid (WOMEN'S MULTIVITAMIN GUMMIES ORAL) Take by mouth.      valsartan-hydrochlorothiazide (DIOVAN-HCT) 320-25 mg per tablet Take 1 tablet by mouth once daily 90 tablet 1    vitamin D (VITAMIN D3) 1000 units Tab Take 5,000 Units by mouth once daily.      benzocaine-menthoL 6-10 mg lozenge Take 1 lozenge by mouth every 2 (two) hours as needed for Pain (sore throat). 18 tablet 0    benzonatate (TESSALON) 100 MG capsule Take 1 capsule (100 mg total) by mouth 3 (three) times daily as needed for Cough. 30 capsule 0    diclofenac sodium (VOLTAREN) 1 % Gel Apply 2 g topically 2 (two) times daily. 100 g 0    promethazine-dextromethorphan (PROMETHAZINE-DM) 6.25-15 mg/5 mL Syrp Take 5 mLs by mouth every 4 (four) hours as needed (Cough). 180 mL 0     No current facility-administered medications for this visit.                  REVIEW OF SYSTEMS:     Sleep related symptoms as per HPI.  CONST:Denies weight gain    HEENT: Denies sinus congestion  PULM: Denies dyspnea  CARD:  Denies palpitations   GI:  Denies acid reflux  : Denies polyuria  NEURO: intermittent headaches  PSYCH: Denies mood disturbance  HEME: Denies anemia   Otherwise, a balance of systems reviewed is negative.          PHYSICAL EXAM:  Vitals:    12/17/24 0922   BP: (!) 140/81   Pulse: 80   SpO2: 97%   Weight: 109.8 kg (242 lb 1 oz)   PainSc:   4   PainLoc: Generalized     Body mass index is 41.55 kg/m².     GENERAL: Normal development, well groomed  HEENT:  Conjunctivae are non-erythematous; Pupils equal, round, and reactive to light; Nose is symmetrical; Nasal mucosa is pink and moist; Septum is midline; Inferior turbinates are normal; Nasal airflow is normal; Posterior pharynx is pink; Modified Mallampati: 3; Posterior palate is normal; Tonsils +1;  Uvula is normal and pink;Tongue is normal; Dentition is fair; No TMJ tenderness; Jaw opening and protrusion without click and without discomfort.  NECK: Supple. Neck circumference is 15 inches. No thyromegaly. No palpable nodes.     SKIN: On face and neck: No abrasions, no rashes, no lesions.  No subcutaneous nodules are palpable.  RESPIRATORY: Chest is clear to auscultation.  Normal chest expansion and non-labored breathing at rest.  CARDIOVASCULAR: Normal S1, S2.  No murmurs, gallops or rubs. No carotid bruits bilaterally.  EXTREMITIES: No edema. No clubbing. No cyanosis. Station normal. Gait normal.        NEURO/PSYCH: Oriented to time, place and person. Normal attention span and concentration. Affect is full. Mood is normal.                                              DATA no prior sleep study    Lab Results   Component Value Date    TSH 2.738 02/09/2024       ASSESSMENT/PLAN    Problem List Items Addressed This Visit       Insomnia    Overview     maintenance is the issue.  Encourage follow up with pcp for better control of hyperglycemia.           Obesity    Current Assessment & Plan     aware of need for drastic weight loss.  Difficulty with exercise due to joint pain.  Not interested in weight loss medication.  Follow up with pcp.           MAURIZIO (obstructive sleep apnea) - Primary    Current Assessment & Plan     The patient symptomatically has restless sleep, witnessed apnea, uncontrolled htn with findings of high grade mallampatti, elevated bmi. This warrants further investigation for possible obstructive sleep apnea.  Patient will be contacted after sleep study is done.            Relevant Orders    Home Sleep Study     Other Visit Diagnoses       Fatigue, unspecified type                  Diagnostic: Polysomnogram. The nature of this procedure and its indication was discussed with the patient.     Education: During our discussion today, we talked about the etiology of obstructive sleep apnea as well as  the potential ramifications of untreated sleep apnea, which could include daytime sleepiness, hypertension, heart disease and/or stroke.     Precautions: The patient was advised to abstain from driving should they feel sleepy or drowsy.       Thank you for allowing me the opportunity to participate in the care of your patient.    Patient will No follow-ups on file.    Please cc note to  Neeta Estrada MD.    35 minutes of total time spent on the encounter, which includes face to face time and non-face to face time preparing to see the patient (eg, review of tests), Obtaining and/or reviewing separately obtained history, documenting clinical information in the electronic or other health record, independently interpreting results (not separately reported) and communicating results to the patient/family/caregiver, or Care coordination (not separately reported).

## 2024-12-21 ENCOUNTER — PATIENT MESSAGE (OUTPATIENT)
Dept: PULMONOLOGY | Facility: CLINIC | Age: 74
End: 2024-12-21
Payer: MEDICARE

## 2024-12-21 DIAGNOSIS — G47.33 OSA (OBSTRUCTIVE SLEEP APNEA): Primary | ICD-10-CM

## 2024-12-21 NOTE — PROCEDURES
"Dear Provider,     You have ordered sleep LAB services to perform the sleep study for Bette Johnson.  The sleep study that you ordered is complete.      Please find Sleep Study result in "Chart Review" under the "Media tab."      As the ordering provider, you are responsible for reviewing the results and implementing a treatment plan with your patient.    If you need a Sleep Medicine provider to explain the sleep study findings and arrange treatment for the patient, please refer patient for consultation to our Sleep Clinic via [facility] with Ambulatory Consult Sleep.    To do that please place an order for an  "Ambulatory Consult Sleep" - it will go to our clinic work queue for our Medical Assistant to contact the patient for an appointment.     For any questions, please contact our clinic staff at [phone] to talk to clinical staff.   "

## 2024-12-23 ENCOUNTER — TELEPHONE (OUTPATIENT)
Dept: PULMONOLOGY | Facility: CLINIC | Age: 74
End: 2024-12-23
Payer: MEDICARE

## 2024-12-23 NOTE — TELEPHONE ENCOUNTER
No answer.            ----- Message from [employer] sent at 12/23/2024  3:14 PM CST -----  Regarding: [employer]  Contact: [phone]  Good afternoon! Patient was contacted regarding an appointment and she stated that she wanted to talk to her Dr first regarding her sleep study results.Mrs Johnson was advised to call back when she is ready to schedule her appointment. Thanks! Alex

## 2024-12-26 ENCOUNTER — PATIENT OUTREACH (OUTPATIENT)
Dept: ADMINISTRATIVE | Facility: HOSPITAL | Age: 74
End: 2024-12-26
Payer: MEDICARE

## 2024-12-27 ENCOUNTER — TELEPHONE (OUTPATIENT)
Dept: PULMONOLOGY | Facility: CLINIC | Age: 74
End: 2024-12-27
Payer: MEDICARE

## 2024-12-27 NOTE — TELEPHONE ENCOUNTER
Spoke with patient scheduled an appointment to see provider.    TARA To           ----- Message from Tech Macie sent at 12/27/2024  8:40 AM CST -----  Regarding: Return call  .Type:  Patient Returning Call    Who Called: self    Who Left Message for Patient: Zuleika Ramírez MA    Does the patient know what this is regarding?:no     Would the patient rather a call back or a response via [facility]? Call     Best Call Back Number:.[phone]      Additional Information:

## 2024-12-27 NOTE — TELEPHONE ENCOUNTER
Left message to call office back.    TARA To  Pulm/Sleep [facility]  [phone]                   ----- Message from Hernandez sent at 12/23/2024  3:14 PM CST -----  Regarding: [employer]  Contact: [phone]  Good afternoon! Patient was contacted regarding an appointment and she stated that she wanted to talk to her Dr first regarding her sleep study results.Mrs Johnson was advised to call back when she is ready to schedule her appointment. Thanks! Alex

## 2024-12-30 DIAGNOSIS — I10 ESSENTIAL HYPERTENSION: ICD-10-CM

## 2024-12-30 NOTE — TELEPHONE ENCOUNTER
Care Due:                  Date            Visit Type   Department     Provider  --------------------------------------------------------------------------------                                [facility]                              PRIMARY      MED/ INTERNAL  Last Visit: 08-      CARE ([facility])   MED/ PEDS      Neeta Estrada                              [facility]                              PRIMARY      MED/ INTERNAL  Next Visit: 02-      CARE ([facility])   MED/ PEDS      Neeta Estrada                                                            Last  Test          Frequency    Reason                     Performed    Due Date  --------------------------------------------------------------------------------    CMP.........  12 months..  atorvastatin, metFORMIN,   02- 02-                             valsartan-hydrochlorothia                             zide.....................    HBA1C.......  6 months...  metFORMIN................  02- 08-    [facility] Embedded Care Due Messages. Reference number: [id number].   12/30/2024 10:05:44 AM CST

## 2024-12-31 RX ORDER — AMLODIPINE BESYLATE 10 MG/1
10 TABLET ORAL
Qty: 90 TABLET | Refills: 0 | Status: SHIPPED | OUTPATIENT
Start: 2024-12-31

## 2025-01-10 ENCOUNTER — OFFICE VISIT (OUTPATIENT)
Dept: PULMONOLOGY | Facility: CLINIC | Age: 75
End: 2025-01-10
Payer: MEDICARE

## 2025-01-10 VITALS
HEIGHT: 64 IN | DIASTOLIC BLOOD PRESSURE: 69 MMHG | SYSTOLIC BLOOD PRESSURE: 147 MMHG | OXYGEN SATURATION: 94 % | WEIGHT: 242.75 LBS | HEART RATE: 76 BPM | BODY MASS INDEX: 41.44 KG/M2

## 2025-01-10 DIAGNOSIS — E66.01 CLASS 3 SEVERE OBESITY WITH SERIOUS COMORBIDITY AND BODY MASS INDEX (BMI) OF 40.0 TO 44.9 IN ADULT, UNSPECIFIED OBESITY TYPE: ICD-10-CM

## 2025-01-10 DIAGNOSIS — E66.813 CLASS 3 SEVERE OBESITY WITH SERIOUS COMORBIDITY AND BODY MASS INDEX (BMI) OF 40.0 TO 44.9 IN ADULT, UNSPECIFIED OBESITY TYPE: ICD-10-CM

## 2025-01-10 DIAGNOSIS — G47.33 OSA (OBSTRUCTIVE SLEEP APNEA): Primary | ICD-10-CM

## 2025-01-10 DIAGNOSIS — G47.01 INSOMNIA DUE TO MEDICAL CONDITION: ICD-10-CM

## 2025-01-10 PROCEDURE — 99999 PR PBB SHADOW E&M-EST. PATIENT-LVL III: CPT | Mod: PBBFAC,HCNC,, | Performed by: INTERNAL MEDICINE

## 2025-01-10 NOTE — PROGRESS NOTES
Bette Johnson  was seen as a follow up.    CHIEF COMPLAINT:    Chief Complaint   Patient presents with    Results       HISTORY OF PRESENT ILLNESS: Bette Johnson is a 74 y.o. female is here for sleep evaluation.   Patient with uncontrolled htn and was referred for concepcion evaluation.  Our first encounter was 12/17/24.      STOPBANG during initial visit:    Snore:  sleep alone  Tire:  daily  Observed apnea:  patient was told by her ex- that she stopped breathing during sleep in 1970s  Pressure (HTN):  yes    BMI:  Body mass index is 41.66 kg/m².   Age:  74 y.o.  Neck (inch):  15  Gender:  female    Total STOP BANG score = 5/8  Low risk CONCEPCION: 0-2, Intermediate risk CONCEPCION: 3-4, High risk CONCEPCION: 5+    Other sleep ROS:  no parasomnia. No cataplexy.  Chronic stiffness in thigh.  Improve with stretching.  Chronic lower back pain with sciatica.      S/p hsat 12/17/24 with ahi of 32.  Patient is here to discuss result of sleep study.      [place] Sleepiness Scale score during initial sleep evaluation was 0.    SLEEP ROUTINE:  Activity the hour prior to sleep: watch tv     Bed partner:  alone  Time to bed:  10:30 pm   Lights off:  off   Sleep onset latency:  5 minutes         Disruptions or awakenings:    2 times (no  difficulty going back to sleep)    Wakeup time:      8 AM  Perceived sleep quality:  tire       Daytime naps:      denied  Weekend sleep routine:      same  Caffeine use: denied  exercise habit:   denied      PAST MEDICAL HISTORY:    Active Ambulatory Problems     Diagnosis Date Noted    Morbid obesity with BMI of 40.0-44.9, adult 07/12/2017    Essential hypertension 07/12/2017    Thoracic aorta atherosclerosis 07/12/2017    Diabetes mellitus type 2 in obese 10/30/2018    Transaminitis 02/16/2021    History of 2019 novel coronavirus disease (COVID-19) 02/18/2021    Type 2 diabetes mellitus with stage 3a chronic kidney disease, without long-term current use of insulin 01/11/2022    Chronic kidney disease,  stage 3a 02/04/2022    Decreased strength of trunk and back 02/27/2023    Vitamin D deficiency 08/10/2023    MAURIZIO (obstructive sleep apnea) 12/17/2024    Obesity 12/17/2024    Insomnia 12/17/2024     Resolved Ambulatory Problems     Diagnosis Date Noted    Type 2 diabetes mellitus without complication, without long-term current use of insulin 07/12/2017    Chest pain 01/13/2018    Colon cancer screening 01/25/2018    Choledocholithiasis 02/16/2021    UTI (urinary tract infection) 02/16/2021    Epigastric pain 02/16/2021    Cholangitis 02/17/2021    Bacteremia due to Gram-negative bacteria 02/17/2021    Thrombocytosis 04/05/2021    Decreased range of motion of right knee 03/14/2022    Quadriceps weakness 03/14/2022     Past Medical History:   Diagnosis Date    Diabetes mellitus, type 2     Hypertension     Renal manifestation of secondary diabetes mellitus     Severe obesity (BMI >= 40) 07/12/2017    Type 2 diabetes mellitus                 PAST SURGICAL HISTORY:    Past Surgical History:   Procedure Laterality Date    CHOLECYSTECTOMY      COLONOSCOPY N/A 1/25/2018    Procedure: COLONOSCOPY;  Surgeon: Jeffrey Solano MD;  Location: [facility];  Service: Endoscopy;  Laterality: N/A;    COLONOSCOPY N/A 5/22/2024    Procedure: COLONOSCOPY;  Surgeon: Nancy Jade MD;  Location: [facility];  Service: Endoscopy;  Laterality: N/A;  referred by , no scheduling concerns peg instructions sent to  [email or web address]    ERCP N/A 2/17/2021    Procedure: ERCP (ENDOSCOPIC RETROGRADE CHOLANGIOPANCREATOGRAPHY);  Surgeon: Emanuel Turner MD;  Location: [facility] ([address] [place] [address]);  Service: Endoscopy;  Laterality: N/A;         FAMILY HISTORY:                Family History   Problem Relation Name Age of Onset    Diabetes Mother      Diabetes Sister Belle     Diabetes Brother Ward     Kidney disease Brother Ward     Diabetes Sister Josy     Hypertension Sister Josy     Diabetes Sister Kathy     Hypertension Sister Kathy      Hyperlipidemia Sister Kathy        SOCIAL HISTORY:          Tobacco:   Social History     Tobacco Use   Smoking Status Never   Smokeless Tobacco Never       alcohol use:    Social History     Substance and Sexual Activity   Alcohol Use Yes    Comment: 'once a month maybe/wine                  Occupation: retire retail    ALLERGIES:    Review of patient's allergies indicates:   Allergen Reactions    Iodine and iodide containing products Shortness Of Breath and Other (See Comments)     Fever, blisters around mouth, flu like symptoms  Fever, blisters around mouth, flu like symptoms      Penicillins Other (See Comments) and Anaphylaxis     Loss of consciousness  Other reaction(s): Penicillins (Fatal)  Note:  Allergic Reaction: Anaphylaxis    Caffeine Other (See Comments)     Hyper then  Causes low  Hyperactivity, followed by sleepiness  Hyperactivity, followed by sleepiness    Note: hyperactivity/ crash     Iodine      Note: fever blisters, elevated temp.     Latex, natural rubber Other (See Comments)     Dental work cause mouth ulcers  Ulcers in mouth when used during dental work  Ulcers in mouth when used during dental work    Note: sores        CURRENT MEDICATIONS:    Current Outpatient Medications   Medication Sig Dispense Refill    acetaminophen (TYLENOL) 500 MG tablet Take 1 tablet (500 mg total) by mouth every 6 (six) hours as needed for Pain or Temperature greater than. 30 tablet 0    amLODIPine (NORVASC) 10 MG tablet Take 1 tablet by mouth once daily 90 tablet 0    aspirin (ECOTRIN) 81 MG EC tablet Take 81 mg by mouth once daily.      atorvastatin (LIPITOR) 10 MG tablet Take 1 tablet (10 mg total) by mouth every other day. 45 tablet 1    coenzyme Q10 100 mg capsule Take 200 mg by mouth once daily.      metFORMIN (GLUCOPHAGE) 850 MG tablet Take 1 tablet by mouth once daily 90 tablet 1    multivit-minerals/folic acid (WOMEN'S MULTIVITAMIN GUMMIES ORAL) Take by mouth.      valsartan-hydrochlorothiazide  "(DIOVAN-HCT) 320-25 mg per tablet Take 1 tablet by mouth once daily 90 tablet 1    vitamin D (VITAMIN D3) 1000 units Tab Take 5,000 Units by mouth once daily.      cetirizine (ZYRTEC) 10 MG tablet Take 1 tablet (10 mg total) by mouth daily as needed for Allergies or Rhinitis. 30 tablet 0    fluticasone propionate (FLONASE) 50 mcg/actuation nasal spray 1 spray (50 mcg total) by Each Nostril route 2 (two) times daily as needed for Rhinitis or Allergies. 15 g 0    loratadine (CLARITIN) 10 mg tablet Take 10 mg by mouth once daily.       No current facility-administered medications for this visit.                  REVIEW OF SYSTEMS:     Sleep related symptoms as per HPI.  CONST:Denies weight gain    HEENT: Denies sinus congestion  PULM: Denies dyspnea  CARD:  Denies palpitations   GI:  Denies acid reflux  : Denies polyuria  NEURO: intermittent headaches  PSYCH: Denies mood disturbance  HEME: Denies anemia   Otherwise, a balance of systems reviewed is negative.          PHYSICAL EXAM:  Vitals:    01/10/25 1253   BP: (!) 147/69   Pulse: 76   SpO2: (!) 94%   Weight: 110.1 kg (242 lb 11.6 oz)   Height: 5' 4" (1.626 m)   PainSc: 0-No pain     Body mass index is 41.66 kg/m².     GENERAL: Normal development, well groomed  HEENT:  Conjunctivae are non-erythematous; Pupils equal, round, and reactive to light; Nose is symmetrical; Nasal mucosa is pink and moist; Septum is midline; Inferior turbinates are normal; Nasal airflow is normal; Posterior pharynx is pink; Modified Mallampati: 3; Posterior palate is normal; Tonsils +1; Uvula is normal and pink;Tongue is normal; Dentition is fair; No TMJ tenderness; Jaw opening and protrusion without click and without discomfort.  NECK: Supple. Neck circumference is 15 inches. No thyromegaly. No palpable nodes.     SKIN: On face and neck: No abrasions, no rashes, no lesions.  No subcutaneous nodules are palpable.  RESPIRATORY: Chest is clear to auscultation.  Normal chest expansion and " non-labored breathing at rest.  CARDIOVASCULAR: Normal S1, S2.  No murmurs, gallops or rubs. No carotid bruits bilaterally.  EXTREMITIES: No edema. No clubbing. No cyanosis. Station normal. Gait normal.        NEURO/PSYCH: Oriented to time, place and person. Normal attention span and concentration. Affect is full. Mood is normal.                                              DATA   hsat 12/17/24 with ahi of 32    Lab Results   Component Value Date    TSH 2.738 02/09/2024       ASSESSMENT/PLAN    Problem List Items Addressed This Visit       Insomnia    Overview     maintenance is the issue.          Obesity    Overview     aware of need for drastic weight loss. Difficulty with exercise due to joint pain. Not interested in weight loss medication. Follow up with pcp.          MAURIZIO (obstructive sleep apnea) - Primary    Overview     Ahi of 32  Result of sleep study d/w patient.  Recommend treatment.  Agree to apap.           Current Assessment & Plan     Patient will contact dme for apap set up.                Education: During our discussion today, we talked about the etiology of obstructive sleep apnea as well as the potential ramifications of untreated sleep apnea, which could include daytime sleepiness, hypertension, heart disease and/or stroke.     Precautions: The patient was advised to abstain from driving should they feel sleepy or drowsy.       Thank you for allowing me the opportunity to participate in the care of your patient.    Patient will No follow-ups on file.    Please cc note to  No ref. provider found.    20 minutes of total time spent on the encounter, which includes face to face time and non-face to face time preparing to see the patient (eg, review of tests), Obtaining and/or reviewing separately obtained history, documenting clinical information in the electronic or other health record, independently interpreting results (not separately reported) and communicating results to the  patient/family/caregiver, or Care coordination (not separately reported).     Visit today included increased complexity associated with the care of the episodic problem concepcion addressed and managing the longitudinal care of the patient due to the serious and/or complex managed problem(s) concepcion.

## 2025-01-14 ENCOUNTER — PATIENT MESSAGE (OUTPATIENT)
Dept: FAMILY MEDICINE | Facility: CLINIC | Age: 75
End: 2025-01-14
Payer: MEDICARE

## 2025-02-10 ENCOUNTER — LAB VISIT (OUTPATIENT)
Dept: LAB | Facility: HOSPITAL | Age: 75
End: 2025-02-10
Attending: FAMILY MEDICINE
Payer: MEDICARE

## 2025-02-10 DIAGNOSIS — I10 ESSENTIAL HYPERTENSION: ICD-10-CM

## 2025-02-10 DIAGNOSIS — E11.22 TYPE 2 DIABETES MELLITUS WITH STAGE 3A CHRONIC KIDNEY DISEASE, WITHOUT LONG-TERM CURRENT USE OF INSULIN: ICD-10-CM

## 2025-02-10 DIAGNOSIS — N18.31 TYPE 2 DIABETES MELLITUS WITH STAGE 3A CHRONIC KIDNEY DISEASE, WITHOUT LONG-TERM CURRENT USE OF INSULIN: ICD-10-CM

## 2025-02-10 LAB
ALBUMIN SERPL BCP-MCNC: 4.1 G/DL (ref 3.5–5.2)
ALP SERPL-CCNC: 67 U/L (ref 40–150)
ALT SERPL W/O P-5'-P-CCNC: 16 U/L (ref 10–44)
ANION GAP SERPL CALC-SCNC: 11 MMOL/L (ref 8–16)
AST SERPL-CCNC: 22 U/L (ref 10–40)
BASOPHILS # BLD AUTO: 0.07 K/UL (ref 0–0.2)
BASOPHILS NFR BLD: 1.2 % (ref 0–1.9)
BILIRUB SERPL-MCNC: 0.4 MG/DL (ref 0.1–1)
BUN SERPL-MCNC: 14 MG/DL (ref 8–23)
CALCIUM SERPL-MCNC: 9.4 MG/DL (ref 8.7–10.5)
CHLORIDE SERPL-SCNC: 102 MMOL/L (ref 95–110)
CHOLEST SERPL-MCNC: 171 MG/DL (ref 120–199)
CHOLEST/HDLC SERPL: 3.8 {RATIO} (ref 2–5)
CO2 SERPL-SCNC: 27 MMOL/L (ref 23–29)
CREAT SERPL-MCNC: 1.2 MG/DL (ref 0.5–1.4)
DIFFERENTIAL METHOD BLD: ABNORMAL
EOSINOPHIL # BLD AUTO: 0.2 K/UL (ref 0–0.5)
EOSINOPHIL NFR BLD: 3.5 % (ref 0–8)
ERYTHROCYTE [DISTWIDTH] IN BLOOD BY AUTOMATED COUNT: 13.1 % (ref 11.5–14.5)
EST. GFR  (NO RACE VARIABLE): 48 ML/MIN/1.73 M^2
ESTIMATED AVG GLUCOSE: 183 MG/DL (ref 68–131)
GLUCOSE SERPL-MCNC: 187 MG/DL (ref 70–110)
HBA1C MFR BLD: 8 % (ref 4–5.6)
HCT VFR BLD AUTO: 43.8 % (ref 37–48.5)
HDLC SERPL-MCNC: 45 MG/DL (ref 40–75)
HDLC SERPL: 26.3 % (ref 20–50)
HGB BLD-MCNC: 13.7 G/DL (ref 12–16)
IMM GRANULOCYTES # BLD AUTO: 0.01 K/UL (ref 0–0.04)
IMM GRANULOCYTES NFR BLD AUTO: 0.2 % (ref 0–0.5)
LDLC SERPL CALC-MCNC: 93.4 MG/DL (ref 63–159)
LYMPHOCYTES # BLD AUTO: 2 K/UL (ref 1–4.8)
LYMPHOCYTES NFR BLD: 36 % (ref 18–48)
MCH RBC QN AUTO: 28.2 PG (ref 27–31)
MCHC RBC AUTO-ENTMCNC: 31.3 G/DL (ref 32–36)
MCV RBC AUTO: 90 FL (ref 82–98)
MONOCYTES # BLD AUTO: 0.5 K/UL (ref 0.3–1)
MONOCYTES NFR BLD: 8.7 % (ref 4–15)
NEUTROPHILS # BLD AUTO: 2.8 K/UL (ref 1.8–7.7)
NEUTROPHILS NFR BLD: 50.4 % (ref 38–73)
NONHDLC SERPL-MCNC: 126 MG/DL
NRBC BLD-RTO: 0 /100 WBC
PLATELET # BLD AUTO: 308 K/UL (ref 150–450)
PMV BLD AUTO: 11.1 FL (ref 9.2–12.9)
POTASSIUM SERPL-SCNC: 4.7 MMOL/L (ref 3.5–5.1)
PROT SERPL-MCNC: 8.3 G/DL (ref 6–8.4)
RBC # BLD AUTO: 4.86 M/UL (ref 4–5.4)
SODIUM SERPL-SCNC: 140 MMOL/L (ref 136–145)
TRIGL SERPL-MCNC: 163 MG/DL (ref 30–150)
TSH SERPL DL<=0.005 MIU/L-ACNC: 2.87 UIU/ML (ref 0.4–4)
WBC # BLD AUTO: 5.64 K/UL (ref 3.9–12.7)

## 2025-02-10 PROCEDURE — 83036 HEMOGLOBIN GLYCOSYLATED A1C: CPT | Mod: HCNC | Performed by: FAMILY MEDICINE

## 2025-02-10 PROCEDURE — 85025 COMPLETE CBC W/AUTO DIFF WBC: CPT | Mod: HCNC | Performed by: FAMILY MEDICINE

## 2025-02-10 PROCEDURE — 84443 ASSAY THYROID STIM HORMONE: CPT | Mod: HCNC | Performed by: FAMILY MEDICINE

## 2025-02-10 PROCEDURE — 80061 LIPID PANEL: CPT | Mod: HCNC | Performed by: FAMILY MEDICINE

## 2025-02-10 PROCEDURE — 80053 COMPREHEN METABOLIC PANEL: CPT | Mod: HCNC | Performed by: FAMILY MEDICINE

## 2025-02-10 PROCEDURE — 36415 COLL VENOUS BLD VENIPUNCTURE: CPT | Mod: HCNC,PO | Performed by: FAMILY MEDICINE

## 2025-02-17 ENCOUNTER — OFFICE VISIT (OUTPATIENT)
Dept: FAMILY MEDICINE | Facility: CLINIC | Age: 75
End: 2025-02-17
Payer: MEDICARE

## 2025-02-17 ENCOUNTER — TELEPHONE (OUTPATIENT)
Dept: PHARMACY | Facility: CLINIC | Age: 75
End: 2025-02-17
Payer: MEDICARE

## 2025-02-17 ENCOUNTER — RESULTS FOLLOW-UP (OUTPATIENT)
Dept: FAMILY MEDICINE | Facility: CLINIC | Age: 75
End: 2025-02-17

## 2025-02-17 ENCOUNTER — PATIENT OUTREACH (OUTPATIENT)
Dept: ADMINISTRATIVE | Facility: HOSPITAL | Age: 75
End: 2025-02-17
Payer: MEDICARE

## 2025-02-17 VITALS
WEIGHT: 237 LBS | HEIGHT: 64 IN | DIASTOLIC BLOOD PRESSURE: 56 MMHG | OXYGEN SATURATION: 97 % | HEART RATE: 60 BPM | BODY MASS INDEX: 40.46 KG/M2 | SYSTOLIC BLOOD PRESSURE: 120 MMHG

## 2025-02-17 DIAGNOSIS — E66.9 DIABETES MELLITUS TYPE 2 IN OBESE: ICD-10-CM

## 2025-02-17 DIAGNOSIS — E11.22 TYPE 2 DIABETES MELLITUS WITH STAGE 3A CHRONIC KIDNEY DISEASE, WITHOUT LONG-TERM CURRENT USE OF INSULIN: ICD-10-CM

## 2025-02-17 DIAGNOSIS — N18.31 TYPE 2 DIABETES MELLITUS WITH STAGE 3A CHRONIC KIDNEY DISEASE, WITHOUT LONG-TERM CURRENT USE OF INSULIN: ICD-10-CM

## 2025-02-17 DIAGNOSIS — Z00.00 ANNUAL PHYSICAL EXAM: Primary | ICD-10-CM

## 2025-02-17 DIAGNOSIS — N18.31 CHRONIC KIDNEY DISEASE, STAGE 3A: ICD-10-CM

## 2025-02-17 DIAGNOSIS — E11.69 DIABETES MELLITUS TYPE 2 IN OBESE: ICD-10-CM

## 2025-02-17 DIAGNOSIS — J06.9 UPPER RESPIRATORY TRACT INFECTION, UNSPECIFIED TYPE: ICD-10-CM

## 2025-02-17 DIAGNOSIS — I10 ESSENTIAL HYPERTENSION: ICD-10-CM

## 2025-02-17 DIAGNOSIS — I70.0 THORACIC AORTA ATHEROSCLEROSIS: ICD-10-CM

## 2025-02-17 DIAGNOSIS — E66.01 MORBID OBESITY WITH BMI OF 40.0-44.9, ADULT: ICD-10-CM

## 2025-02-17 RX ORDER — PROMETHAZINE HYDROCHLORIDE AND DEXTROMETHORPHAN HYDROBROMIDE 6.25; 15 MG/5ML; MG/5ML
5 SYRUP ORAL EVERY 4 HOURS PRN
Qty: 180 ML | Refills: 0 | Status: SHIPPED | OUTPATIENT
Start: 2025-02-17 | End: 2025-02-27

## 2025-02-17 NOTE — PROGRESS NOTES
"Routine Office Visit     Patient Name: Bette Johnson    : 1950  MRN: [id number]    Subjective     History of Present Illness    CHIEF COMPLAINT:  Diabetes management   Hypertension   Hyperlipidemia  Recent cold     DIABETES:  She reports an increase in A1C. She continues Metformin 850 mg and experiences difficulty controlling food cravings. She expresses reluctance to start injectable medications due to fear and apprehension.    RESPIRATORY SYMPTOMS:  She reports a resolving dry cough but persistent scratchy throat. She continues Claritin regularly for allergies. She has been using throat lozenges, Mucinex, and diluted Dr. Staples's antiseptic for symptom relief.    FUNCTIONAL STATUS:  She reports difficulty with footwear, specifically unable to tie shoes and experiencing challenges putting on left shoe, requiring use of a long shoe horn. She is no longer able to perform self-care of toenails.    EYE CARE:  She was seen by Dr. Mondragon at the [facility] group for eye care on .      ROS:  General: no fever, no chills, no fatigue, no weight gain, no weight loss  Eyes: no vision changes, no redness, no discharge  ENT: no ear pain, no nasal congestion, no sore throat  Cardiovascular: no chest pain, no palpitations, no lower extremity edema  Respiratory: +cough, no shortness of breath  Gastrointestinal: no abdominal pain, no nausea, no vomiting, no diarrhea, no constipation, no blood in stool  Genitourinary: no dysuria, no hematuria, no frequency  Musculoskeletal: no joint pain, no muscle pain  Skin: no rash, no lesion  Neurological: no headache, no dizziness, no numbness, no tingling  Psychiatric: no anxiety, no depression, no sleep difficulty           Objective     BP (!) 120/56 (BP Location: Left arm, Patient Position: Sitting)   Pulse 60   Ht 5' 4" (1.626 m)   Wt 107.5 kg (236 lb 15.9 oz)   LMP  (LMP Unknown)   SpO2 97%   BMI 40.68 kg/m²   Physical Exam  Constitutional:       Appearance: She " "is well-developed.   HENT:      Head: Normocephalic and atraumatic.   Eyes:      Conjunctiva/sclera: Conjunctivae normal.      Pupils: Pupils are equal, round, and reactive to light.   Cardiovascular:      Rate and Rhythm: Normal rate and regular rhythm.      Heart sounds: Normal heart sounds. No murmur heard.     No friction rub. No gallop.   Pulmonary:      Effort: No respiratory distress.      Breath sounds: Normal breath sounds.   Abdominal:      General: Bowel sounds are normal. There is no distension.      Palpations: Abdomen is soft.      Tenderness: There is no abdominal tenderness.   Musculoskeletal:         General: Normal range of motion.      Cervical back: Normal range of motion and neck supple.   Lymphadenopathy:      Cervical: No cervical adenopathy.   Skin:     General: Skin is warm.   Neurological:      Mental Status: She is alert and oriented to person, place, and time.         Protective Sensation (w/ 10 gram monofilament):  Right: Intact  Left: Intact    Visual Inspection:  Normal -  Bilateral    Pedal Pulses:   Right: Present  Left: Present    Posterior Tibialis Pulses:   Right:Present  Left: Present     Assessment     Assessment & Plan      OTHER INSTRUCTIONS:   Noted the patient expresses concern about the cost of medications, particularly injectable diabetes medications.   Assessed the patient's financial concerns and considered more affordable treatment options.   Ordered pharmacy assistance to help reduce the cost of the new medication (Jardiance).   Patient to use tape on bridge of nose when wearing mask to reduce fogging of glasses.         Problem List Items Addressed This Visit          Cardiac/Vascular    Essential hypertension  The current medical regimen is effective;  continue present plan and medications.       Thoracic aorta atherosclerosis    Overview   " There is thoracic aortic atherosclerosis" - Xray Chest 11-  Patient with Atherosclerosis of the Aorta.  " Stable/asymptomatic. Currently stable on lipid and b/p monitoring.              Renal/    Chronic kidney disease, stage 3a    Relevant Medications    empagliflozin (JARDIANCE) 25 mg tablet    Other Relevant Orders    Ambulatory referral/consult to Pharmacy Assistance       Endocrine    Diabetes mellitus type 2 in obese    Relevant Medications    empagliflozin (JARDIANCE) 25 mg tablet    Other Relevant Orders    Ambulatory referral/consult to Pharmacy Assistance    Comprehensive Metabolic Panel    Hemoglobin A1C    Morbid obesity with BMI of 40.0-44.9, adult   Noted the patient reports a weight loss of 6 lbs since the last office visit.   Evaluated options for additional diabetes management, including weight loss medications.   Prescribed Jardiance, to be taken daily, which may help with further weight loss in addition to managing blood sugar.   Discussed the potential for further weight loss with the new medication.      Type 2 diabetes mellitus with stage 3a chronic kidney disease, without long-term current use of insulin   Noted an increase in A1C to 8, which is above the goal of less than 7 or 7.5 for the patient's age.   Evaluated the current medication regimen, including metformin 850mg twice daily, and discussed potential changes.   Discussed options including increasing metformin dose or adding Jardiance.   Prescribed Jardiance, to be taken daily, for diabetes management and potential weight loss.   Explained the mechanism of Jardiance in allowing excess sugar to be excreted through urine.   Discussed potential side effects of Jardiance, including urinary tract infections, noting they typically resolve as blood sugar becomes controlled.   Educated the patient on the relationship between elevated blood sugar and increased risk of urinary tract infections.   Recommend continuing current metformin dose (850mg twice daily) with the option to increase to 1000mg if needed in the future.   Instructed the patient  to contact the office if experiencing frequent urinary tract infections while on Jardiance.   Emphasized the importance of regular eye exams for diabetic patients.   Scheduled a follow-up visit to reassess diabetes management and medication efficacy.       Other Visit Diagnoses         Annual physical exam    -  Primary  I addressed all major concerns as it related to health maintenance.  All were ordered and scheduled based on the patients wishes.  Any additional health maintenance will be readdressed at the next physical if declined or deferred by the patient.         Upper respiratory tract infection, unspecified type        Relevant Medications    promethazine-dextromethorphan (PROMETHAZINE-DM) 6.25-15 mg/5 mL Syrp   Assessed respiratory symptoms, likely related to allergies and post-nasal drip from recent travel and exposure at funerals.   Noted the patient reports having a dry cough and sore scratchy throat, which started after attending multiple funerals.   Performed a physical exam and found clear lungs.   Prescribed a cough medication to be taken 4 times daily, with a more sedating option for nighttime use.   Recommend continuing daily Claritin for allergy management.   Noted the patient reports a persistent scratchy throat that won't go away.   Assessed that the throat symptoms might be related to post-nasal drip.   Prescribed a medication to be taken 4 times daily to help with the throat symptoms.   Noted patient reports using Dr. Staples's antiseptic as a home remedy for throat symptoms.                This note was generated with the assistance of ambient listening technology. Verbal consent was obtained by the patient and accompanying visitor(s) for the recording of patient appointment to facilitate this note. I attest to having reviewed and edited the generated note for accuracy, though some syntax or spelling errors may persist. Please contact the author of this note for any clarification.

## 2025-02-17 NOTE — TELEPHONE ENCOUNTER
We have reached out to MsRohan Johnson via letter to inform her of the [employer] application process for Jardiance. A follow-up phone call will be made in 5 business days.    Monique Branham  Pharmacy Patient Assistance Team

## 2025-02-17 NOTE — LETTER
February 17, 2025    Bette Johnson  [address] [address]               Dear Ms. Johnson,      My name is Monique Branham  I am reaching out on behalf of [facility]s Pharmacy Patient Assistance Team in regards to your request for medication assistance. Our goal is to assist qualified [facility] patients with obtaining financial assistance for prescribed medications.     Please note that enrollment into available support may require the following documents:      Proof of household Income (such as social security award letter, pension statement,1040)  Copy of all insurance cards (front and back)  Print out from your insurance or pharmacy showing how much you have spent on prescriptions for the current year  Completed Medication [facility] Authorization Forms        Please reach out to my phone number below if you are still in need of assistance with your medications. Follow-up call will be made in 5 business days. We look forward to hearing from you soon!    Thank you for choosing [facility] for your healthcare needs      Sincerely  Monique KIM @[phone]  Pharmacy Patient Assistance Team  [address] [facility]  [id number] [place] [address]  Fax: [phone]  Email: [email or web address]

## 2025-03-30 DIAGNOSIS — I10 ESSENTIAL HYPERTENSION: ICD-10-CM

## 2025-03-31 RX ORDER — AMLODIPINE BESYLATE 10 MG/1
10 TABLET ORAL
Qty: 90 TABLET | Refills: 0 | Status: SHIPPED | OUTPATIENT
Start: 2025-03-31

## 2025-03-31 NOTE — TELEPHONE ENCOUNTER
Please see the attached refill request. Refill Encounter    PCP Visits: Recent Visits  Date Type Provider Dept   02/17/25 Office Visit Neeta Estrada MD [facility]   08/30/24 Office Visit Laurita Marroquin NP [facility]   08/16/24 Office Visit Neeta Estrada MD [facility]   05/21/24 Office Visit Laurita Marroquin NP [facility]   Showing recent visits within past 360 days and meeting all other requirements  Future Appointments  Date Type Provider Dept   08/22/25 Appointment Neeta Estrada MD [facility]   Showing future appointments within next 720 days and meeting all other requirements      Last 3 Blood Pressure:   BP Readings from Last 3 Encounters:   02/17/25 (!) 120/56   01/10/25 (!) 147/69   12/17/24 (!) 140/81     Preferred Pharmacy:   [facility] [address] [facility] [address]  [address] [place] [address]  Phone: [phone] Fax: [phone]    [facility] - [facility] [address] [facility]  [address] [facility] [address]  Phone: [phone] Fax: [phone]    [facility] [address] [place], [place] - [address] [facility] [address]  [address] [facility] [address]  Phone: [phone] Fax: [phone]    Requested RX:  Requested Prescriptions     Pending Prescriptions Disp Refills    amLODIPine (NORVASC) 10 MG tablet [Pharmacy Med Name: amLODIPine Besylate 10 MG Oral Tablet] 90 tablet 0     Sig: Take 1 tablet by mouth once daily      RX Route: Normal

## 2025-04-01 DIAGNOSIS — E66.9 DIABETES MELLITUS TYPE 2 IN OBESE: ICD-10-CM

## 2025-04-01 DIAGNOSIS — E11.69 DIABETES MELLITUS TYPE 2 IN OBESE: ICD-10-CM

## 2025-04-01 RX ORDER — ATORVASTATIN CALCIUM 10 MG/1
10 TABLET, FILM COATED ORAL EVERY OTHER DAY
Qty: 45 TABLET | Refills: 3 | Status: SHIPPED | OUTPATIENT
Start: 2025-04-01

## 2025-04-01 NOTE — TELEPHONE ENCOUNTER
Refill Decision Note   Bette Johnson  is requesting a refill authorization.    Brief Assessment and Rationale for Refill:   Approve       Medication Therapy Plan:         Comments:     Note composed:6:09 PM 04/01/2025

## 2025-04-01 NOTE — TELEPHONE ENCOUNTER
No care due was identified.  [facility] Embedded Care Due Messages. Reference number: [id number].   4/01/2025 11:21:12 AM CDT

## 2025-04-30 DIAGNOSIS — E11.69 DIABETES MELLITUS TYPE 2 IN OBESE: ICD-10-CM

## 2025-04-30 DIAGNOSIS — E66.9 DIABETES MELLITUS TYPE 2 IN OBESE: ICD-10-CM

## 2025-04-30 DIAGNOSIS — I10 ESSENTIAL HYPERTENSION: ICD-10-CM

## 2025-04-30 RX ORDER — METFORMIN HYDROCHLORIDE 850 MG/1
850 TABLET ORAL
Qty: 90 TABLET | Refills: 1 | Status: SHIPPED | OUTPATIENT
Start: 2025-04-30

## 2025-04-30 RX ORDER — VALSARTAN AND HYDROCHLOROTHIAZIDE 320; 25 MG/1; MG/1
1 TABLET, FILM COATED ORAL DAILY
Qty: 90 TABLET | Refills: 3 | Status: SHIPPED | OUTPATIENT
Start: 2025-04-30

## 2025-04-30 NOTE — TELEPHONE ENCOUNTER
Refill Decision Note   Bette Johnson  is requesting a refill authorization.  Brief Assessment and Rationale for Refill:  Approve     Medication Therapy Plan:        Pharmacist review requested: Yes   Comments:     Note composed:12:03 PM 04/30/2025

## 2025-04-30 NOTE — TELEPHONE ENCOUNTER
No care due was identified.  [facility] Embedded Care Due Messages. Reference number: [id number].   4/30/2025 11:35:48 AM CDT

## 2025-04-30 NOTE — TELEPHONE ENCOUNTER
Refill Routing Note   Medication(s) are not appropriate for processing by [facility] for the following reason(s):        Drug-disease interaction    [facility] action(s):  Approve  Defer      Medication Therapy Plan: Drug-Disease: metFORMIN and Transaminitis    Pharmacist review requested: Yes     Appointments  past 12m or future 3m with PCP    Date Provider   Last Visit   2/17/2025 Neeta Estrada MD   Next Visit   8/22/2025 Neeta Estrada MD   ED visits in past 90 days: 0        Note composed:11:56 AM 04/30/2025

## 2025-05-07 ENCOUNTER — PATIENT MESSAGE (OUTPATIENT)
Dept: ADMINISTRATIVE | Facility: OTHER | Age: 75
End: 2025-05-07
Payer: MEDICARE

## 2025-06-24 DIAGNOSIS — I10 ESSENTIAL HYPERTENSION: ICD-10-CM

## 2025-06-24 RX ORDER — AMLODIPINE BESYLATE 10 MG/1
10 TABLET ORAL
Qty: 90 TABLET | Refills: 2 | Status: SHIPPED | OUTPATIENT
Start: 2025-06-24

## 2025-06-24 NOTE — TELEPHONE ENCOUNTER
Refill Decision Note   Bette Johnson  is requesting a refill authorization.  Brief Assessment and Rationale for Refill:  Approve     Medication Therapy Plan: FOVS/FLOS      Comments:     Note composed:12:15 PM 06/24/2025

## 2025-06-24 NOTE — TELEPHONE ENCOUNTER
Care Due:                  Date            Visit Type   Department     Provider  --------------------------------------------------------------------------------                                EP -         [facility]                              PRIMARY      / INTERNAL MED  Last Visit: 02-      CARE ([facility])   / [place]         Neeta Estrada                              [facility]                              PRIMARY      / INTERNAL MED  Next Visit: 08-      CARE ([facility])   / PEDS         Neeta Estrada                                                            Last  Test          Frequency    Reason                     Performed    Due Date  --------------------------------------------------------------------------------    HBA1C.......  6 months...  empagliflozin, metFORMIN.  02-   08-    [facility] Embedded Care Due Messages. Reference number: [id number].   6/24/2025 12:06:07 PM CDT

## 2025-06-27 DIAGNOSIS — N18.31 TYPE 2 DIABETES MELLITUS WITH STAGE 3A CHRONIC KIDNEY DISEASE, WITHOUT LONG-TERM CURRENT USE OF INSULIN: Primary | ICD-10-CM

## 2025-06-27 DIAGNOSIS — E11.22 TYPE 2 DIABETES MELLITUS WITH STAGE 3A CHRONIC KIDNEY DISEASE, WITHOUT LONG-TERM CURRENT USE OF INSULIN: Primary | ICD-10-CM

## 2025-08-15 ENCOUNTER — LAB VISIT (OUTPATIENT)
Dept: LAB | Facility: HOSPITAL | Age: 75
End: 2025-08-15
Attending: FAMILY MEDICINE
Payer: MEDICARE

## 2025-08-15 DIAGNOSIS — E11.69 DIABETES MELLITUS TYPE 2 IN OBESE: ICD-10-CM

## 2025-08-15 DIAGNOSIS — E66.9 DIABETES MELLITUS TYPE 2 IN OBESE: ICD-10-CM

## 2025-08-15 LAB
ALBUMIN SERPL BCP-MCNC: 4.2 G/DL (ref 3.5–5.2)
ALP SERPL-CCNC: 68 UNIT/L (ref 40–150)
ALT SERPL W/O P-5'-P-CCNC: 19 UNIT/L (ref 0–55)
ANION GAP (OHS): 11 MMOL/L (ref 8–16)
AST SERPL-CCNC: 30 UNIT/L (ref 0–50)
BILIRUB SERPL-MCNC: 0.5 MG/DL (ref 0.1–1)
BUN SERPL-MCNC: 21 MG/DL (ref 8–23)
CALCIUM SERPL-MCNC: 9.4 MG/DL (ref 8.7–10.5)
CHLORIDE SERPL-SCNC: 101 MMOL/L (ref 95–110)
CO2 SERPL-SCNC: 25 MMOL/L (ref 23–29)
CREAT SERPL-MCNC: 1.2 MG/DL (ref 0.5–1.4)
EAG (OHS): 163 MG/DL (ref 68–131)
GFR SERPLBLD CREATININE-BSD FMLA CKD-EPI: 48 ML/MIN/1.73/M2
GLUCOSE SERPL-MCNC: 142 MG/DL (ref 70–110)
HBA1C MFR BLD: 7.3 % (ref 4–5.6)
POTASSIUM SERPL-SCNC: 4.2 MMOL/L (ref 3.5–5.1)
PROT SERPL-MCNC: 8.2 GM/DL (ref 6–8.4)
SODIUM SERPL-SCNC: 137 MMOL/L (ref 136–145)

## 2025-08-15 PROCEDURE — 36415 COLL VENOUS BLD VENIPUNCTURE: CPT | Mod: HCNC,PO

## 2025-08-15 PROCEDURE — 80053 COMPREHEN METABOLIC PANEL: CPT | Mod: HCNC

## 2025-08-15 PROCEDURE — 83036 HEMOGLOBIN GLYCOSYLATED A1C: CPT | Mod: HCNC

## 2025-08-18 ENCOUNTER — PATIENT OUTREACH (OUTPATIENT)
Dept: INTERNAL MEDICINE | Facility: CLINIC | Age: 75
End: 2025-08-18
Payer: MEDICARE

## 2025-08-22 ENCOUNTER — OFFICE VISIT (OUTPATIENT)
Dept: FAMILY MEDICINE | Facility: CLINIC | Age: 75
End: 2025-08-22
Payer: MEDICARE

## 2025-08-22 VITALS
HEIGHT: 64 IN | DIASTOLIC BLOOD PRESSURE: 60 MMHG | SYSTOLIC BLOOD PRESSURE: 124 MMHG | TEMPERATURE: 98 F | OXYGEN SATURATION: 95 % | BODY MASS INDEX: 39.37 KG/M2 | HEART RATE: 62 BPM | WEIGHT: 230.63 LBS

## 2025-08-22 DIAGNOSIS — K57.90 DIVERTICULOSIS: ICD-10-CM

## 2025-08-22 DIAGNOSIS — E11.69 DIABETES MELLITUS TYPE 2 IN OBESE: Primary | ICD-10-CM

## 2025-08-22 DIAGNOSIS — I70.0 THORACIC AORTA ATHEROSCLEROSIS: ICD-10-CM

## 2025-08-22 DIAGNOSIS — I10 ESSENTIAL HYPERTENSION: ICD-10-CM

## 2025-08-22 DIAGNOSIS — N76.0 ACUTE VAGINITIS: ICD-10-CM

## 2025-08-22 DIAGNOSIS — E66.01 SEVERE OBESITY (BMI 35.0-35.9 WITH COMORBIDITY): ICD-10-CM

## 2025-08-22 DIAGNOSIS — E66.9 DIABETES MELLITUS TYPE 2 IN OBESE: Primary | ICD-10-CM

## 2025-08-22 DIAGNOSIS — M47.816 LUMBAR SPONDYLOSIS: ICD-10-CM

## 2025-08-22 DIAGNOSIS — N18.31 CHRONIC KIDNEY DISEASE, STAGE 3A: ICD-10-CM

## 2025-08-22 DIAGNOSIS — Z12.31 ENCOUNTER FOR SCREENING MAMMOGRAM FOR BREAST CANCER: ICD-10-CM

## 2025-08-22 DIAGNOSIS — R53.83 FATIGUE, UNSPECIFIED TYPE: ICD-10-CM

## 2025-08-22 LAB
BACTERIA #/AREA URNS AUTO: ABNORMAL /HPF
BILIRUB UR QL STRIP.AUTO: NEGATIVE
CLARITY UR: CLEAR
COLOR UR AUTO: YELLOW
GLUCOSE UR QL STRIP: ABNORMAL
HGB UR QL STRIP: NEGATIVE
KETONES UR QL STRIP: NEGATIVE
LEUKOCYTE ESTERASE UR QL STRIP: ABNORMAL
MICROSCOPIC COMMENT: ABNORMAL
NITRITE UR QL STRIP: NEGATIVE
PH UR STRIP: 5 [PH]
PROT UR QL STRIP: NEGATIVE
RBC #/AREA URNS AUTO: 1 /HPF (ref 0–4)
SP GR UR STRIP: >=1.03
SQUAMOUS #/AREA URNS AUTO: 3 /HPF
UROBILINOGEN UR STRIP-ACNC: NEGATIVE EU/DL
WBC #/AREA URNS AUTO: 2 /HPF (ref 0–5)
YEAST UR QL AUTO: ABNORMAL /HPF

## 2025-08-22 PROCEDURE — 81003 URINALYSIS AUTO W/O SCOPE: CPT | Mod: HCNC | Performed by: FAMILY MEDICINE

## 2025-08-22 PROCEDURE — 99999 PR PBB SHADOW E&M-EST. PATIENT-LVL IV: CPT | Mod: PBBFAC,HCNC,, | Performed by: FAMILY MEDICINE

## 2025-08-22 RX ORDER — FLUCONAZOLE 150 MG/1
150 TABLET ORAL DAILY
Qty: 1 TABLET | Refills: 1 | Status: SHIPPED | OUTPATIENT
Start: 2025-08-22

## 2025-08-23 LAB — HOLD SPECIMEN: NORMAL
